# Patient Record
Sex: MALE | Race: WHITE | NOT HISPANIC OR LATINO | Employment: OTHER | URBAN - METROPOLITAN AREA
[De-identification: names, ages, dates, MRNs, and addresses within clinical notes are randomized per-mention and may not be internally consistent; named-entity substitution may affect disease eponyms.]

---

## 2017-03-07 ENCOUNTER — AMBULATORY - HEALTHEAST (OUTPATIENT)
Dept: CARDIOLOGY | Facility: CLINIC | Age: 82
End: 2017-03-07

## 2017-03-07 ENCOUNTER — OFFICE VISIT - HEALTHEAST (OUTPATIENT)
Dept: CARDIOLOGY | Facility: CLINIC | Age: 82
End: 2017-03-07

## 2017-03-07 DIAGNOSIS — I47.29 NONSUSTAINED VENTRICULAR TACHYCARDIA (H): ICD-10-CM

## 2017-03-07 DIAGNOSIS — I25.10 ATHEROSCLEROSIS OF NATIVE CORONARY ARTERY OF NATIVE HEART WITHOUT ANGINA PECTORIS: ICD-10-CM

## 2017-03-07 DIAGNOSIS — Z95.0 CARDIAC PACEMAKER IN SITU: ICD-10-CM

## 2017-03-07 DIAGNOSIS — E78.2 MIXED HYPERLIPIDEMIA: ICD-10-CM

## 2017-03-07 DIAGNOSIS — I10 ESSENTIAL HYPERTENSION WITH GOAL BLOOD PRESSURE LESS THAN 130/80: ICD-10-CM

## 2017-03-07 ASSESSMENT — MIFFLIN-ST. JEOR: SCORE: 1388.86

## 2017-03-20 ENCOUNTER — HOSPITAL ENCOUNTER (OUTPATIENT)
Dept: CARDIOLOGY | Facility: HOSPITAL | Age: 82
Discharge: HOME | End: 2017-03-20
Attending: INTERNAL MEDICINE

## 2017-03-20 DIAGNOSIS — I47.29 NONSUSTAINED VENTRICULAR TACHYCARDIA (H): ICD-10-CM

## 2017-03-20 DIAGNOSIS — I25.10 ATHEROSCLEROSIS OF NATIVE CORONARY ARTERY OF NATIVE HEART WITHOUT ANGINA PECTORIS: ICD-10-CM

## 2017-03-20 LAB
AORTIC ROOT: 3.2 CM
AORTIC VALVE MEAN VELOCITY: 73.5 CM/S
AR DECEL SLOPE: 1500 MM/S2
AR PEAK VELOCITY: 323 CM/S
ASCENDING AORTA: 3.4 CM
AV DIMENSIONLESS INDEX VTI: 0.6
AV MEAN GRADIENT: 3 MMHG
AV PEAK GRADIENT: 4.9 MMHG
AV REGURGITANT PEAK GRADIENT: 41.7 MMHG
AV REGURGITATION PRESSURE HALF TIME: 655 MS
AV VALVE AREA: 1.8 CM2
AV VELOCITY RATIO: 0.6
BSA FOR ECHO PROCEDURE: 1.89 M2
CV BLOOD PRESSURE: NORMAL MMHG
CV ECHO HEIGHT: 66 IN
CV ECHO WEIGHT: 170 LBS
DOP CALC AO PEAK VEL: 111 CM/S
DOP CALC AO VTI: 27 CM
DOP CALC LVOT AREA: 3.14 CM2
DOP CALC LVOT DIAMETER: 2 CM
DOP CALC LVOT PEAK VEL: 62.1 CM/S
DOP CALC LVOT STROKE VOLUME: 47.4 CM3
DOP CALCLVOT PEAK VEL VTI: 15.1 CM
EJECTION FRACTION: 54 % (ref 55–75)
FRACTIONAL SHORTENING: 28.8 % (ref 28–44)
INTERVENTRICULAR SEPTUM IN END DIASTOLE: 0.78 CM (ref 0.6–1)
IVS/PW RATIO: 0.8
LA AREA 1: 17.6 CM2
LA AREA 2: 19.1 CM2
LEFT ATRIUM LENGTH: 4.62 CM
LEFT ATRIUM SIZE: 3.7 CM
LEFT ATRIUM VOLUME INDEX: 32.7 ML/M2
LEFT ATRIUM VOLUME: 61.8 CM3
LEFT VENTRICLE CARDIAC INDEX: 1.8 L/MIN/M2
LEFT VENTRICLE CARDIAC OUTPUT: 3.4 L/MIN
LEFT VENTRICLE DIASTOLIC VOLUME INDEX: 44 CM3/M2 (ref 34–74)
LEFT VENTRICLE DIASTOLIC VOLUME: 83.1 CM3 (ref 62–150)
LEFT VENTRICLE HEART RATE: 71 BPM
LEFT VENTRICLE MASS INDEX: 64.3 G/M2
LEFT VENTRICLE SYSTOLIC VOLUME INDEX: 20.2 CM3/M2 (ref 11–31)
LEFT VENTRICLE SYSTOLIC VOLUME: 38.1 CM3 (ref 21–61)
LEFT VENTRICULAR INTERNAL DIMENSION IN DIASTOLE: 4.44 CM (ref 4.2–5.8)
LEFT VENTRICULAR INTERNAL DIMENSION IN SYSTOLE: 3.16 CM (ref 2.5–4)
LEFT VENTRICULAR MASS: 121.5 G
LEFT VENTRICULAR OUTFLOW TRACT MEAN GRADIENT: 1 MMHG
LEFT VENTRICULAR OUTFLOW TRACT MEAN VELOCITY: 41.5 CM/S
LEFT VENTRICULAR OUTFLOW TRACT PEAK GRADIENT: 1 MMHG
LEFT VENTRICULAR POSTERIOR WALL IN END DIASTOLE: 0.94 CM (ref 0.6–1)
LV STROKE VOLUME INDEX: 25.1 ML/M2
MITRAL VALVE DECELERATION SLOPE: 2210 MM/S2
MITRAL VALVE E/A RATIO: 0.7
MITRAL VALVE PRESSURE HALF-TIME: 61 MS
MV AVERAGE E/E' RATIO: 9 CM/S
MV DECELERATION TIME: 158 MS
MV E'TISSUE VEL-LAT: 5.9 CM/S
MV E'TISSUE VEL-MED: 4.06 CM/S
MV LATERAL E/E' RATIO: 7.6
MV MEDIAL E/E' RATIO: 11
MV PEAK A VELOCITY: 62.1 CM/S
MV PEAK E VELOCITY: 44.6 CM/S
MV VALVE AREA PRESSURE 1/2 METHOD: 3.6 CM2
NUC REST DIASTOLIC VOLUME INDEX: 2720 LBS
NUC REST SYSTOLIC VOLUME INDEX: 66 IN
RIGHT VENTRICULAR INTERNAL DIMENSION IN DYSTOLE: 2.26 CM
TRICUSPID REGURGITATION PEAK PRESSURE GRADIENT: 16.2 MMHG
TRICUSPID VALVE PEAK REGURGITANT VELOCITY: 201 CM/S

## 2017-03-20 ASSESSMENT — MIFFLIN-ST. JEOR: SCORE: 1388.86

## 2017-04-10 ENCOUNTER — COMMUNICATION - HEALTHEAST (OUTPATIENT)
Dept: CARDIOLOGY | Facility: CLINIC | Age: 82
End: 2017-04-10

## 2017-04-10 DIAGNOSIS — I10 HTN (HYPERTENSION): ICD-10-CM

## 2017-06-13 ENCOUNTER — AMBULATORY - HEALTHEAST (OUTPATIENT)
Dept: CARDIOLOGY | Facility: CLINIC | Age: 82
End: 2017-06-13

## 2017-06-13 DIAGNOSIS — Z95.0 CARDIAC PACEMAKER IN SITU: ICD-10-CM

## 2017-06-13 LAB — HCC DEVICE COMMENTS: NORMAL

## 2017-09-19 ENCOUNTER — AMBULATORY - HEALTHEAST (OUTPATIENT)
Dept: CARDIOLOGY | Facility: CLINIC | Age: 82
End: 2017-09-19

## 2017-09-19 DIAGNOSIS — Z95.0 CARDIAC PACEMAKER IN SITU: ICD-10-CM

## 2017-09-22 LAB — HCC DEVICE COMMENTS: NORMAL

## 2017-12-27 ENCOUNTER — AMBULATORY - HEALTHEAST (OUTPATIENT)
Dept: CARDIOLOGY | Facility: CLINIC | Age: 82
End: 2017-12-27

## 2017-12-27 DIAGNOSIS — Z95.0 CARDIAC PACEMAKER IN SITU: ICD-10-CM

## 2017-12-27 LAB — HCC DEVICE COMMENTS: NORMAL

## 2018-03-12 ENCOUNTER — OFFICE VISIT - HEALTHEAST (OUTPATIENT)
Dept: CARDIOLOGY | Facility: CLINIC | Age: 83
End: 2018-03-12

## 2018-03-12 ENCOUNTER — AMBULATORY - HEALTHEAST (OUTPATIENT)
Dept: CARDIOLOGY | Facility: CLINIC | Age: 83
End: 2018-03-12

## 2018-03-12 DIAGNOSIS — Z95.0 CARDIAC PACEMAKER IN SITU: ICD-10-CM

## 2018-03-12 DIAGNOSIS — I10 ESSENTIAL HYPERTENSION: ICD-10-CM

## 2018-03-12 DIAGNOSIS — I47.29 NONSUSTAINED VENTRICULAR TACHYCARDIA (H): ICD-10-CM

## 2018-03-12 LAB — HCC DEVICE COMMENTS: NORMAL

## 2018-03-12 ASSESSMENT — MIFFLIN-ST. JEOR: SCORE: 1390

## 2018-06-05 ENCOUNTER — RECORDS - HEALTHEAST (OUTPATIENT)
Dept: ADMINISTRATIVE | Facility: OTHER | Age: 83
End: 2018-06-05

## 2018-06-05 ENCOUNTER — AMBULATORY - HEALTHEAST (OUTPATIENT)
Dept: CARDIOLOGY | Facility: CLINIC | Age: 83
End: 2018-06-05

## 2018-06-08 ENCOUNTER — AMBULATORY - HEALTHEAST (OUTPATIENT)
Dept: CARDIOLOGY | Facility: CLINIC | Age: 83
End: 2018-06-08

## 2018-06-08 DIAGNOSIS — Z95.0 CARDIAC PACEMAKER IN SITU: ICD-10-CM

## 2018-06-08 LAB
HCC DEVICE COMMENTS: NORMAL
HCC DEVICE IMPLANTING PROVIDER: NORMAL
HCC DEVICE MANUFACTURE: NORMAL
HCC DEVICE MODEL: NORMAL
HCC DEVICE SERIAL NUMBER: NORMAL
HCC DEVICE TYPE: NORMAL

## 2018-09-12 ENCOUNTER — AMBULATORY - HEALTHEAST (OUTPATIENT)
Dept: CARDIOLOGY | Facility: CLINIC | Age: 83
End: 2018-09-12

## 2018-09-12 DIAGNOSIS — Z95.0 CARDIAC PACEMAKER IN SITU: ICD-10-CM

## 2018-12-19 ENCOUNTER — AMBULATORY - HEALTHEAST (OUTPATIENT)
Dept: CARDIOLOGY | Facility: CLINIC | Age: 83
End: 2018-12-19

## 2018-12-19 DIAGNOSIS — Z95.0 CARDIAC PACEMAKER IN SITU: ICD-10-CM

## 2019-04-01 ENCOUNTER — AMBULATORY - HEALTHEAST (OUTPATIENT)
Dept: CARDIOLOGY | Facility: CLINIC | Age: 84
End: 2019-04-01

## 2019-04-01 ENCOUNTER — OFFICE VISIT - HEALTHEAST (OUTPATIENT)
Dept: CARDIOLOGY | Facility: CLINIC | Age: 84
End: 2019-04-01

## 2019-04-01 DIAGNOSIS — I47.29 NONSUSTAINED VENTRICULAR TACHYCARDIA (H): ICD-10-CM

## 2019-04-01 DIAGNOSIS — Z95.0 CARDIAC PACEMAKER IN SITU: ICD-10-CM

## 2019-04-01 DIAGNOSIS — I25.10 ATHEROSCLEROSIS OF NATIVE CORONARY ARTERY OF NATIVE HEART WITHOUT ANGINA PECTORIS: ICD-10-CM

## 2019-04-01 ASSESSMENT — MIFFLIN-ST. JEOR: SCORE: 1417.21

## 2019-06-06 ASSESSMENT — MIFFLIN-ST. JEOR: SCORE: 1418.35

## 2019-06-25 ENCOUNTER — AMBULATORY - HEALTHEAST (OUTPATIENT)
Dept: CARDIOLOGY | Facility: CLINIC | Age: 84
End: 2019-06-25

## 2019-06-25 DIAGNOSIS — Z95.0 CARDIAC PACEMAKER IN SITU: ICD-10-CM

## 2019-07-07 ENCOUNTER — ANESTHESIA - HEALTHEAST (OUTPATIENT)
Dept: SURGERY | Facility: CLINIC | Age: 84
End: 2019-07-07

## 2019-07-08 ENCOUNTER — SURGERY - HEALTHEAST (OUTPATIENT)
Dept: SURGERY | Facility: CLINIC | Age: 84
End: 2019-07-08

## 2019-07-08 ASSESSMENT — MIFFLIN-ST. JEOR
SCORE: 1408.83
SCORE: 1400.21

## 2019-09-30 ENCOUNTER — AMBULATORY - HEALTHEAST (OUTPATIENT)
Dept: CARDIOLOGY | Facility: CLINIC | Age: 84
End: 2019-09-30

## 2019-09-30 DIAGNOSIS — Z95.0 CARDIAC PACEMAKER IN SITU: ICD-10-CM

## 2020-01-06 ENCOUNTER — AMBULATORY - HEALTHEAST (OUTPATIENT)
Dept: CARDIOLOGY | Facility: CLINIC | Age: 85
End: 2020-01-06

## 2020-01-06 DIAGNOSIS — I49.5 SICK SINUS SYNDROME (H): ICD-10-CM

## 2020-01-06 DIAGNOSIS — Z95.0 CARDIAC PACEMAKER IN SITU: ICD-10-CM

## 2020-01-07 ASSESSMENT — MIFFLIN-ST. JEOR: SCORE: 1382.07

## 2020-01-12 ENCOUNTER — ANESTHESIA - HEALTHEAST (OUTPATIENT)
Dept: SURGERY | Facility: CLINIC | Age: 85
End: 2020-01-12

## 2020-01-13 ENCOUNTER — SURGERY - HEALTHEAST (OUTPATIENT)
Dept: SURGERY | Facility: CLINIC | Age: 85
End: 2020-01-13

## 2020-01-13 ASSESSMENT — MIFFLIN-ST. JEOR: SCORE: 1427.71

## 2020-01-14 ASSESSMENT — MIFFLIN-ST. JEOR: SCORE: 1427.71

## 2020-04-07 ENCOUNTER — AMBULATORY - HEALTHEAST (OUTPATIENT)
Dept: CARDIOLOGY | Facility: CLINIC | Age: 85
End: 2020-04-07

## 2020-04-07 ENCOUNTER — COMMUNICATION - HEALTHEAST (OUTPATIENT)
Dept: CARDIOLOGY | Facility: CLINIC | Age: 85
End: 2020-04-07

## 2020-04-07 DIAGNOSIS — I47.29 NONSUSTAINED VENTRICULAR TACHYCARDIA (H): ICD-10-CM

## 2020-04-07 DIAGNOSIS — Z95.0 CARDIAC PACEMAKER IN SITU: ICD-10-CM

## 2020-04-08 ENCOUNTER — OFFICE VISIT - HEALTHEAST (OUTPATIENT)
Dept: CARDIOLOGY | Facility: CLINIC | Age: 85
End: 2020-04-08

## 2020-04-08 DIAGNOSIS — Z95.5 HISTORY OF CORONARY ARTERY STENT PLACEMENT: ICD-10-CM

## 2020-04-08 DIAGNOSIS — Z95.0 CARDIAC PACEMAKER IN SITU: ICD-10-CM

## 2020-04-08 DIAGNOSIS — I47.29 NONSUSTAINED VENTRICULAR TACHYCARDIA (H): ICD-10-CM

## 2020-04-08 DIAGNOSIS — I10 ESSENTIAL HYPERTENSION: ICD-10-CM

## 2020-04-08 ASSESSMENT — MIFFLIN-ST. JEOR: SCORE: 1404.74

## 2020-05-14 ENCOUNTER — COMMUNICATION - HEALTHEAST (OUTPATIENT)
Dept: CARDIOLOGY | Facility: CLINIC | Age: 85
End: 2020-05-14

## 2020-05-14 ENCOUNTER — AMBULATORY - HEALTHEAST (OUTPATIENT)
Dept: CARDIOLOGY | Facility: CLINIC | Age: 85
End: 2020-05-14

## 2020-05-14 DIAGNOSIS — I49.5 SICK SINUS SYNDROME (H): ICD-10-CM

## 2020-05-14 DIAGNOSIS — Z95.0 PACEMAKER: ICD-10-CM

## 2020-05-29 ENCOUNTER — COMMUNICATION - HEALTHEAST (OUTPATIENT)
Dept: CARDIOLOGY | Facility: CLINIC | Age: 85
End: 2020-05-29

## 2020-06-01 ENCOUNTER — OFFICE VISIT - HEALTHEAST (OUTPATIENT)
Dept: CARDIOLOGY | Facility: CLINIC | Age: 85
End: 2020-06-01

## 2020-06-01 DIAGNOSIS — Z95.5 HISTORY OF CORONARY ARTERY STENT PLACEMENT: ICD-10-CM

## 2020-06-01 DIAGNOSIS — I48.0 PAROXYSMAL ATRIAL FIBRILLATION (H): ICD-10-CM

## 2020-06-01 DIAGNOSIS — I47.29 NONSUSTAINED VENTRICULAR TACHYCARDIA (H): ICD-10-CM

## 2020-06-01 DIAGNOSIS — I49.5 SICK SINUS SYNDROME (H): ICD-10-CM

## 2020-06-01 RX ORDER — METOPROLOL SUCCINATE 50 MG/1
50 TABLET, EXTENDED RELEASE ORAL DAILY
Qty: 90 TABLET | Refills: 3 | Status: SHIPPED | OUTPATIENT
Start: 2020-06-01 | End: 2022-06-17

## 2020-06-02 ENCOUNTER — COMMUNICATION - HEALTHEAST (OUTPATIENT)
Dept: CARDIOLOGY | Facility: CLINIC | Age: 85
End: 2020-06-02

## 2020-07-08 ENCOUNTER — AMBULATORY - HEALTHEAST (OUTPATIENT)
Dept: CARDIOLOGY | Facility: CLINIC | Age: 85
End: 2020-07-08

## 2020-07-08 DIAGNOSIS — I49.5 SICK SINUS SYNDROME (H): ICD-10-CM

## 2020-07-08 DIAGNOSIS — Z95.0 CARDIAC PACEMAKER IN SITU: ICD-10-CM

## 2020-07-17 ENCOUNTER — AMBULATORY - HEALTHEAST (OUTPATIENT)
Dept: CARDIOLOGY | Facility: CLINIC | Age: 85
End: 2020-07-17

## 2020-07-17 DIAGNOSIS — I47.29 NONSUSTAINED VENTRICULAR TACHYCARDIA (H): ICD-10-CM

## 2020-07-17 DIAGNOSIS — Z95.0 CARDIAC PACEMAKER IN SITU: ICD-10-CM

## 2020-10-08 ENCOUNTER — COMMUNICATION - HEALTHEAST (OUTPATIENT)
Dept: CARDIOLOGY | Facility: CLINIC | Age: 85
End: 2020-10-08

## 2020-10-08 ENCOUNTER — AMBULATORY - HEALTHEAST (OUTPATIENT)
Dept: CARDIOLOGY | Facility: CLINIC | Age: 85
End: 2020-10-08

## 2020-10-08 DIAGNOSIS — Z95.0 CARDIAC PACEMAKER IN SITU: ICD-10-CM

## 2020-10-08 DIAGNOSIS — I49.5 SICK SINUS SYNDROME (H): ICD-10-CM

## 2020-10-09 RX ORDER — WARFARIN SODIUM 5 MG/1
2.5 TABLET ORAL EVERY EVENING
Status: SHIPPED | COMMUNITY
Start: 2020-10-09

## 2021-01-12 ENCOUNTER — COMMUNICATION - HEALTHEAST (OUTPATIENT)
Dept: CARDIOLOGY | Facility: CLINIC | Age: 86
End: 2021-01-12

## 2021-01-12 ENCOUNTER — AMBULATORY - HEALTHEAST (OUTPATIENT)
Dept: CARDIOLOGY | Facility: CLINIC | Age: 86
End: 2021-01-12

## 2021-01-12 DIAGNOSIS — I47.29 NONSUSTAINED VENTRICULAR TACHYCARDIA (H): ICD-10-CM

## 2021-01-12 DIAGNOSIS — Z95.0 CARDIAC PACEMAKER IN SITU: ICD-10-CM

## 2021-01-12 DIAGNOSIS — I49.5 SICK SINUS SYNDROME (H): ICD-10-CM

## 2021-04-02 ENCOUNTER — AMBULATORY - HEALTHEAST (OUTPATIENT)
Dept: CARDIOLOGY | Facility: CLINIC | Age: 86
End: 2021-04-02

## 2021-04-02 DIAGNOSIS — Z95.0 CARDIAC PACEMAKER IN SITU: ICD-10-CM

## 2021-04-02 DIAGNOSIS — I49.5 SICK SINUS SYNDROME (H): ICD-10-CM

## 2021-04-02 ASSESSMENT — MIFFLIN-ST. JEOR: SCORE: 1413.82

## 2021-05-27 NOTE — PROGRESS NOTES
[facility] Office Note    Assessment / Plan:    1.   Hypertension.  Well-controlled today on his current medication regimen.  No changes suggested.  2.  Paroxysmal Nonsustained ventricular tachycardia.  Rare episodes, with preserved left ventricular function on most recent echocardiogram.  No changes suggested   3.  History of coronary artery disease.  No recent symptoms.  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. 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 male with a history of coronary artery disease; in 2000 he underwent a stent placement to the mid circumflex artery following myocardial infarction.  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  He also has a history of sick sinus syndrome for which he underwent a pacemaker placement in 2009.  In 10/2015 he was noted to have frequent bouts of nonsustained ventricular tachycardia on his pacemaker.  He has continued to have occasional bursts of asymptomatic nonsustained ventricular tachycardia.  He returns today for routine follow-up.    Knee pains still limit his walking.  He has had no chest pains or palpitations    Pacemaker check today again shows a few episodes of nonsustained ventricular tachycardia some of which may have been supraventricular in origin but misrepresented.  Device check is otherwise normal.    ______________________________________________________________________    Problem List:  Patient Active Problem List   Diagnosis     Hyperlipidemia     Hypertension     Coronary Artery Disease     Sick Sinus Syndrome     Nonsustained ventricular tachycardia (H)     Cardiac pacemaker, dual, in situ     Medical History:  Past Medical History:   Diagnosis Date     A-fib (H)      Arrhythmia      Coronary  artery disease      Gout      Hyperlipidemia      Hypertension      Kidney stones      MI (myocardial infarction) (H)      VT (ventricular tachycardia) (H)      Surgical History:  Past Surgical History:   Procedure Laterality Date     CARDIAC CATHETERIZATION       CORONARY STENT PLACEMENT  2001    LCx     INSERT / REPLACE / REMOVE PACEMAKER  1/2009     WA INSERT INTRACORONARY STENT      Description: Cath Stent Placement;  Recorded: 10/01/2012;  Comments: Mid LCx     WA INSERT INTRACORONARY STENT      Description: Cath Stent Placement;  Recorded: 10/21/2014;  Comments: Mid LCx     TOTAL HIP ARTHROPLASTY Left      Social History:  Social History     Socioeconomic History     Marital status:      Spouse name: Not on file     Number of children: Not on file     Years of education: Not on file     Highest education level: Not on file   Occupational History     Not on file   Social Needs     Financial resource strain: Not on file     Food insecurity:     Worry: Not on file     Inability: Not on file     Transportation needs:     Medical: Not on file     Non-medical: Not on file   Tobacco Use     Smoking status: Never Smoker     Smokeless tobacco: Never Used   Substance and Sexual Activity     Alcohol use: No     Drug use: Not on file     Sexual activity: Not on file   Lifestyle     Physical activity:     Days per week: Not on file     Minutes per session: Not on file     Stress: Not on file   Relationships     Social connections:     Talks on phone: Not on file     Gets together: Not on file     Attends [employer] service: Not on file     Active member of club or organization: Not on file     Attends meetings of clubs or organizations: Not on file     Relationship status: Not on file     Intimate partner violence:     Fear of current or ex partner: Not on file     Emotionally abused: Not on file     Physically abused: Not on file     Forced sexual activity: Not on file   Other Topics Concern     Not on file   Social  "History Narrative     Not on file     Sleep History:  Restorative  Exercise:  Walks occasionally.  Up and down steps without a problem, except knee and back pain    Review of Systems:   General: WNL  Eyes: WNL  Ears/Nose/Throat: WNL  Lungs: WNL  Heart: WNL  Stomach: WNL  Bladder: WNL  Muscle/Joints: Joint Pain  Skin: WNL  Nervous System: WNL  Mental Health: WNL     Blood: WNL          Family History:  Family History   Problem Relation Age of Onset     Heart failure Mother      Heart failure Father          Allergies:  Allergies   Allergen Reactions     Lisinopril Cough     Indomethacin Rash     Naproxen Rash     Medications:  Current Outpatient Medications   Medication Sig Dispense Refill     allopurinol (ZYLOPRIM) 300 MG tablet Take 300 mg by mouth daily.       amLODIPine (NORVASC) 10 MG tablet Take 1 tablet (10 mg total) by mouth daily. 90 tablet 2     aspirin 325 MG tablet Take 325 mg by mouth daily.       atenolol (TENORMIN) 100 MG tablet Take 100 mg by mouth daily.       atorvastatin (LIPITOR) 20 MG tablet Take 20 mg by mouth every other day.       DOCOSAHEXANOIC ACID/EPA (FISH OIL ORAL) Take 1,200 mg by mouth daily.       losartan (COZAAR) 100 MG tablet Take 50 mg by mouth daily.              MULTIVITAMIN (MULTIPLE VITAMIN ORAL) Take 1 tablet by mouth daily.       nabumetone (RELAFEN) 750 MG tablet Take 750 mg by mouth 2 (two) times a day with meals.  1     nitroglycerin (NITROSTAT) 0.4 MG SL tablet Place 1 tablet (0.4 mg total) under the tongue every 5 (five) minutes as needed for chest pain. 25 tablet 10     No current facility-administered medications for this visit.        Objective:   Wt Readings from Last 3 Encounters:   04/01/19 178 lb (80.7 kg)   03/12/18 172 lb (78 kg)   03/20/17 170 lb (77.1 kg)     Vital signs:  /58 (Patient Site: Left Arm, Patient Position: Sitting, Cuff Size: Adult Regular)   Pulse 83   Resp 18   Ht 5' 5.5\" (1.664 m)   Wt 178 lb (80.7 kg)   BMI 29.17 kg/m  "       Physical Exam:    Eyes   Conjunctiva: Clear.   Sclerae: Clear, nonicteric.   ENT   Mouth: Oral mucuous membranes are pink and moist   Neck   Carotid pulses: Carotid pulses palpaple with normal contours and symmetric, soft, short systolic bruit on left.  Jugular Veins: Normal jugular venous pressure.   Thyroid: No visible thyromegaly.   Pulmonary   Examination of lungs: Clear throughout  Chest   Examination of Chest incision: Clear, dry and intact.  Pacer site without erythema warmth or tenderness  Cardiovascular   Auscultation of heart: Regular rhythm, normal S1 and S2, no murmurs, clicks or rubs.    Pulses: Normal   Extremities: No edema or clubbing.   Gastrointestinal   Abdomen: Non-tender, no masses noted.   Musculoskeletal   Spine: spine straight upon visual inspection.   Skin   Skin and subcutaneous tissue: No xanthelasma.   Neurologic   Orientation to person, place and time: Normal.   Psychiatric   Mood and affect: Normal.       Lab Results:    Echocardiogram 10/2014: Summary   Left ventricular size is normal.   Normal left ventricular wall thickness.   No regional wall motion abnormalities.   Left ventricular ejection fraction is visually estimated at 60%.   Normal right ventricular size with preserved systolic function.   Normal right ventricular size with preserved systolic function.   Mild aortic regurgitation is noted.    Cardiac perfusion imaging study October 2015: CONCLUSION:   1. Lexiscan stress nuclear study reveals a medium-sized area of infarction involving the inferolateral wall with mild travis-infarct ischemia.The measured resting ejection fraction is 64%. No wall motion abnormalities.            ANNA VELASQUEZ MD [facility]  [phone]    This note created using Dragon voice recognition software.  Sound alike errors may have escaped editing.

## 2021-05-27 NOTE — PATIENT INSTRUCTIONS - HE
1. Decrease aspirin dose to 81 mg daily  2. Continue to walk as your knee allows for 20 to 30 minutes most days of the week.  3. I look forward to seeing you again in 1 year

## 2021-05-27 NOTE — PROGRESS NOTES
In clinic device check with Device RN followed by office visit with Dr. Will.  Please see link for full device report.  Patient was informed of results and next follow up during today's visit.

## 2021-05-30 VITALS — BODY MASS INDEX: 27.32 KG/M2 | WEIGHT: 170 LBS | HEIGHT: 66 IN

## 2021-05-30 VITALS — HEIGHT: 66 IN | BODY MASS INDEX: 27.32 KG/M2 | WEIGHT: 170 LBS

## 2021-05-30 NOTE — ANESTHESIA PROCEDURE NOTES
Peripheral Block    Patient location during procedure: pre-op  Start time: 7/8/2019 7:15 AM  End time: 7/8/2019 7:17 AM  post-op analgesia per surgeon order as noted in medical record  Staffing:  Performing  Anesthesiologist: David Rodríguez MD  Preanesthetic Checklist  Completed: patient identified, site marked, risks, benefits, and alternatives discussed, timeout performed, consent obtained, airway assessed, oxygen available, suction available, emergency drugs available and hand hygiene performed  Peripheral Block  Block type: other, tibial  Prep: ChloraPrep  Patient position: supine  Patient monitoring: cardiac monitor, continuous pulse oximetry, blood pressure and heart rate  Laterality: left  Injection technique: ultrasound guided    Ultrasound used to visualize needle placement in proximity to nerve being blocked: yes   Permanent ultrasound image captured for medical record  Sterile gel and probe cover used for ultrasound.    Needle  Needle type: echogenic   Needle gauge: 20G  Needle length: 4 in  no peripheral nerve catheter placed  Assessment  Injection assessment: no difficulty with injection, negative aspiration for heme, no paresthesia on injection and incremental injection

## 2021-05-30 NOTE — ANESTHESIA POSTPROCEDURE EVALUATION
Patient: Skip Newman DDS  LEFT TOTAL KNEE ARTHROPLASTY  Anesthesia type: spinal    Patient location: PACU  Last vitals:   Vitals Value Taken Time   /66 7/8/2019 11:00 AM   Temp 36.4  C (97.5  F) 7/8/2019 10:30 AM   Pulse 64 7/8/2019 11:00 AM   Resp 18 7/8/2019 11:00 AM   SpO2 95 % 7/8/2019 11:00 AM     Post vital signs: stable  Level of consciousness: awake and responds to simple questions  Post-anesthesia pain: pain controlled  Post-anesthesia nausea and vomiting: no  Pulmonary: unassisted, return to baseline  Cardiovascular: stable and blood pressure at baseline  Hydration: adequate  Anesthetic events: no    QCDR Measures:  ASA# 11 - Sejal-op Cardiac Arrest: ASA11B - Patient did NOT experience unanticipated cardiac arrest  ASA# 12 - Sejal-op Mortality Rate: ASA12B - Patient did NOT die  ASA# 13 - PACU Re-Intubation Rate: ASA13B - Patient did NOT require a new airway mgmt  ASA# 10 - Composite Anes Safety: ASA10A - No serious adverse event    Additional Notes:

## 2021-05-30 NOTE — ANESTHESIA PREPROCEDURE EVALUATION
Anesthesia Evaluation      Patient summary reviewed   No history of anesthetic complications     Airway   Mallampati: II  Neck ROM: full   Pulmonary - negative ROS and normal exam    breath sounds clear to auscultation  (-) not a smoker                         Cardiovascular   Exercise tolerance: > or = 4 METS  (+) pacemaker (sick sinus syndrome), hypertension, past MI, CAD, dysrhythmias (afib; vtach), , hypercholesterolemia,     ECG reviewed (6/27/19: atrial paced rhythm)  Rhythm: regular  Rate: normal,      ROS comment: 6/25/19 Remote device check:  Type: routine remote pacemaker transmission.  Presenting rhythm: AP-VS, rate 93 bpm.  Battery/lead status: stable  Arrhythmias: since 4/1/19, one AT/AF, <1 minute, no EGM. One ventricular arrhythmia, 10 bts rate 160's.  Comments: normal magnet and pacemaker function. prd  AAIR <--> DDDR     Neuro/Psych - negative ROS     Endo/Other       Comments: Thrombocytopenia      GI/Hepatic/Renal    (+)   chronic renal disease (nephrolithiasis),      Other findings: Labs 7/5/19:  INR 1.0, Plt 110      Dental      Comment: Missing teeth                       Anesthesia Plan  Planned anesthetic: spinal and peripheral nerve block  Spinal w/ propofol gtt.  Plan for preoperative adductor canal saphenous nerve block and posterior tibial nerve block for postoperative analgesia.   Decadron and zofran for PONV ppx.  Magnet available.    ASA 3     Anesthetic plan and risks discussed with: patient  Anesthesia plan special considerations: antiemetics,   Post-op plan: routine recovery

## 2021-05-30 NOTE — ANESTHESIA PROCEDURE NOTES
Spinal Block    Patient location during procedure: OR  Start time: 7/8/2019 7:35 AM  End time: 7/8/2019 7:38 AM  Reason for block: primary anesthetic    Staffing:  Performing  Anesthesiologist: David Rodríguez MD    Preanesthetic Checklist  Completed: patient identified, risks, benefits, and alternatives discussed, timeout performed, consent obtained, airway assessed, oxygen available, suction available, emergency drugs available and hand hygiene performed  Spinal Block  Patient position: sitting  Prep: ChloraPrep  Patient monitoring: heart rate, cardiac monitor, continuous pulse ox and blood pressure  Approach: midline  Location: L3-4  Injection technique: single-shot  Needle type: pencil-tip   Needle gauge: 24 G    Assessment  Events: paresthesia    Additional Notes:  Free flow CSF, easily aspirated, half dose injected, easily aspirated, remaining dose injected, tolerated well.

## 2021-05-30 NOTE — ANESTHESIA CARE TRANSFER NOTE
Last vitals:   Vitals:    07/08/19 0927   BP: 133/65   Pulse: 75   Resp: 20   Temp: 36.4  C (97.5  F)   SpO2: 100%     Patient's level of consciousness is drowsy  Spontaneous respirations: yes  Maintains airway independently: yes  Dentition unchanged: yes  Oropharynx: oropharynx clear of all foreign objects    QCDR Measures:  ASA# 20 - Surgical Safety Checklist: WHO surgical safety checklist completed prior to induction    PQRS# 430 - Adult PONV Prevention: 4558F - Pt received => 2 anti-emetic agents (different classes) preop & intraop  ASA# 8 - Peds PONV Prevention: NA - Not pediatric patient, not GA or 2 or more risk factors NOT present  PQRS# 424 - Sejal-op Temp Management: 4559F - At least one body temp DOCUMENTED => 35.5C or 95.9F within required timeframe  PQRS# 426 - PACU Transfer Protocol: - Transfer of care checklist used  ASA# 14 - Acute Post-op Pain: ASA14B - Patient did NOT experience pain >= 7 out of 10

## 2021-05-30 NOTE — ANESTHESIA PROCEDURE NOTES
Peripheral Block    Patient location during procedure: pre-op  Start time: 7/8/2019 7:18 AM  End time: 7/8/2019 7:20 AM  post-op analgesia per surgeon order as noted in medical record  Staffing:  Performing  Anesthesiologist: David Rodríguez MD  Preanesthetic Checklist  Completed: patient identified, site marked, risks, benefits, and alternatives discussed, timeout performed, consent obtained, airway assessed, oxygen available, suction available, emergency drugs available and hand hygiene performed  Peripheral Block  Block type: saphenous, adductor canal block  Prep: ChloraPrep  Patient position: supine  Patient monitoring: cardiac monitor, continuous pulse oximetry, blood pressure and heart rate  Laterality: left  Injection technique: ultrasound guided    Ultrasound used to visualize needle placement in proximity to nerve being blocked: yes   Permanent ultrasound image captured for medical record  Sterile gel and probe cover used for ultrasound.    Needle  Needle type: echogenic   Needle gauge: 21 G  Needle length: 4 in  no peripheral nerve catheter placed  Assessment  Injection assessment: no difficulty with injection, negative aspiration for heme, no paresthesia on injection and incremental injection

## 2021-06-01 VITALS — BODY MASS INDEX: 27.64 KG/M2 | HEIGHT: 66 IN | WEIGHT: 172 LBS

## 2021-06-02 VITALS — HEIGHT: 66 IN | WEIGHT: 178 LBS | BODY MASS INDEX: 28.61 KG/M2

## 2021-06-03 ENCOUNTER — RECORDS - HEALTHEAST (OUTPATIENT)
Dept: CARDIOLOGY | Facility: CLINIC | Age: 86
End: 2021-06-03

## 2021-06-03 ENCOUNTER — RECORDS - HEALTHEAST (OUTPATIENT)
Dept: ADMINISTRATIVE | Facility: OTHER | Age: 86
End: 2021-06-03

## 2021-06-03 VITALS — BODY MASS INDEX: 26.53 KG/M2 | HEIGHT: 67 IN | WEIGHT: 169 LBS

## 2021-06-04 VITALS
HEART RATE: 82 BPM | DIASTOLIC BLOOD PRESSURE: 82 MMHG | RESPIRATION RATE: 16 BRPM | HEIGHT: 67 IN | SYSTOLIC BLOOD PRESSURE: 147 MMHG | WEIGHT: 170 LBS | BODY MASS INDEX: 26.68 KG/M2

## 2021-06-04 VITALS — WEIGHT: 175.06 LBS | HEIGHT: 67 IN | BODY MASS INDEX: 27.48 KG/M2

## 2021-06-04 VITALS
RESPIRATION RATE: 16 BRPM | HEART RATE: 58 BPM | DIASTOLIC BLOOD PRESSURE: 42 MMHG | WEIGHT: 176 LBS | SYSTOLIC BLOOD PRESSURE: 130 MMHG | BODY MASS INDEX: 27.57 KG/M2

## 2021-06-05 VITALS
RESPIRATION RATE: 16 BRPM | BODY MASS INDEX: 27 KG/M2 | DIASTOLIC BLOOD PRESSURE: 58 MMHG | HEIGHT: 67 IN | SYSTOLIC BLOOD PRESSURE: 130 MMHG | HEART RATE: 85 BPM | OXYGEN SATURATION: 98 % | WEIGHT: 172 LBS

## 2021-06-05 NOTE — ANESTHESIA POSTPROCEDURE EVALUATION
Patient: Skip Newman DDS  RIGHT TOTAL KNEE ARTHROPLASTY  Anesthesia type: No value filed.    Patient location: PACU  Last vitals:   Vitals Value Taken Time   /65 1/13/2020  3:28 PM   Temp 36.6  C (97.9  F) 1/13/2020  3:28 PM   Pulse 60 1/13/2020  3:28 PM   Resp 20 1/13/2020  3:28 PM   SpO2 96 % 1/13/2020  3:28 PM     Post vital signs: stable  Level of consciousness: awake and responds to simple questions  Post-anesthesia pain: pain controlled  Post-anesthesia nausea and vomiting: no  Pulmonary: unassisted, return to baseline  Cardiovascular: stable and blood pressure at baseline  Hydration: adequate  Anesthetic events: no    QCDR Measures:  ASA# 11 - Sejal-op Cardiac Arrest: ASA11B - Patient did NOT experience unanticipated cardiac arrest  ASA# 12 - Sejal-op Mortality Rate: ASA12B - Patient did NOT die  ASA# 13 - PACU Re-Intubation Rate: ASA13B - Patient did NOT require a new airway mgmt  ASA# 10 - Composite Anes Safety: ASA10A - No serious adverse event    Additional Notes:

## 2021-06-05 NOTE — ANESTHESIA CARE TRANSFER NOTE
Last vitals:   BP: 126/59  HR: 72  Resp: 16  SPO2: 99%  Temp: 98.4F    Patient's level of consciousness is awake  Spontaneous respirations: yes  Maintains airway independently: yes  Dentition unchanged: yes  Oropharynx: oropharynx clear of all foreign objects    QCDR Measures:  ASA# 20 - Surgical Safety Checklist: WHO surgical safety checklist completed prior to induction    PQRS# 430 - Adult PONV Prevention: 4558F-8P - Pt did NOT receive => 2 anti-emetic agents  ASA# 8 - Peds PONV Prevention: NA - Not pediatric patient, not GA or 2 or more risk factors NOT present  PQRS# 424 - Sejal-op Temp Management: 4559F - At least one body temp DOCUMENTED => 35.5C or 95.9F within required timeframe  PQRS# 426 - PACU Transfer Protocol: - Transfer of care checklist used  ASA# 14 - Acute Post-op Pain: ASA14B - Patient did NOT experience pain >= 7 out of 10

## 2021-06-05 NOTE — ANESTHESIA PROCEDURE NOTES
Spinal Block    Patient location during procedure: OR  Start time: 1/13/2020 9:11 AM  End time: 1/13/2020 9:13 AM  Reason for block: primary anesthetic    Staffing:  Performing  Anesthesiologist: Yenni Butler MD    Preanesthetic Checklist  Completed: patient identified, risks, benefits, and alternatives discussed, timeout performed, consent obtained, airway assessed, oxygen available, suction available, emergency drugs available and hand hygiene performed  Spinal Block  Patient position: sitting  Prep: Betadine  Patient monitoring: heart rate, cardiac monitor, continuous pulse ox and blood pressure  Approach: right paramedian  Location: L4-5  Injection technique: single-shot  Needle type: Quincke   Needle gauge: 22 G    Assessment  Sensory level: T8

## 2021-06-05 NOTE — ANESTHESIA PROCEDURE NOTES
Peripheral Block    Patient location during procedure: pre-op  Start time: 1/13/2020 8:22 AM  End time: 1/13/2020 8:23 AM  post-op analgesia per surgeon order as noted in medical record  Staffing:  Performing  Anesthesiologist: Yenni Butler MD  Preanesthetic Checklist  Completed: patient identified, site marked, risks, benefits, and alternatives discussed, timeout performed, consent obtained, airway assessed, oxygen available, suction available, emergency drugs available and hand hygiene performed  Peripheral Block  Block type: other, tibial  Prep: ChloraPrep  Patient position: left lateral decubitus  Patient monitoring: cardiac monitor, continuous pulse oximetry, heart rate and blood pressure  Laterality: right  Injection technique: ultrasound guided    Ultrasound used to visualize needle placement in proximity to nerve being blocked: yes   US used to visualize anesthetic spread    Permanent ultrasound image captured for medical record  Sterile gel and probe cover used for ultrasound.  Needle  Needle type: Stimuplex   Needle gauge: 20G  Needle length: 4 in  no peripheral nerve catheter placed  Assessment  Injection assessment: no difficulty with injection, negative aspiration for heme, no paresthesia on injection and incremental injection

## 2021-06-05 NOTE — ANESTHESIA PREPROCEDURE EVALUATION
Anesthesia Evaluation      Patient summary reviewed   No history of anesthetic complications     Airway   Mallampati: II  Neck ROM: full   Pulmonary - normal exam                          Cardiovascular - normal exam  (+) hypertension, CAD, ,     ECG reviewed        Neuro/Psych      Endo/Other       GI/Hepatic/Renal    (+)   chronic renal disease,           Dental    (+) upper dentures and lower dentures                       Anesthesia Plan  Planned anesthetic: spinal and peripheral nerve block    ASA 2     Anesthetic plan and risks discussed with: patient  Anesthesia plan special considerations: antiemetics,   Post-op plan: other

## 2021-06-05 NOTE — ANESTHESIA PROCEDURE NOTES
Peripheral Block    Patient location during procedure: pre-op  Start time: 1/13/2020 8:24 AM  End time: 1/13/2020 8:25 AM    Staffing:  Performing  Anesthesiologist: Yenni Butler MD  Preanesthetic Checklist  Completed: patient identified, site marked, risks, benefits, and alternatives discussed, timeout performed, consent obtained, airway assessed, oxygen available, suction available, emergency drugs available and hand hygiene performed  Peripheral Block  Block type: saphenous, adductor canal block  Prep: ChloraPrep  Patient position: supine  Patient monitoring: blood pressure, heart rate, continuous pulse oximetry and cardiac monitor  Laterality: right  Injection technique: ultrasound guided    Ultrasound used to visualize needle placement in proximity to nerve being blocked: yes   US used to visualize anesthetic spread    Permanent ultrasound image captured for medical record  Sterile gel and probe cover used for ultrasound.  Needle  Needle type: Stimuplex   Needle gauge: 20G  Needle length: 4 in  no peripheral nerve catheter placed  Assessment  Injection assessment: no difficulty with injection, negative aspiration for heme, no paresthesia on injection and incremental injection

## 2021-06-07 NOTE — PATIENT INSTRUCTIONS - HE
1. Increase atenolol to 25 mg twice daily.  2. Continue your walking program.  There is no substitute!  3. Call if palpitations develop or certainly if chest pain develops.  4. Otherwise I look forward to seeing you again in 1 year.

## 2021-06-07 NOTE — TELEPHONE ENCOUNTER
PC to patient regarding NSVT. He is doing well and had his second knee replacement on 1/13. He denies syncope, nearing syncope and lightheadedness. I reviewed the longest event which was about 10 seconds on 3/26. He denies awareness.     Routing to Dr. Will prior to upcoming appointment.           Remote Report  Type: Routine pacemaker remote transmission prior to Dr. Will telephone visit.   Presenting Rhythm: AP-VS 83bpm.  Lead/Battery status: Stable.   Arrhythmias: Since 1/6/2020: 1 mode switch, <0.1% burden, 5secs. 4 high ventricular arrhythmias: 1/25/2020 16:26- 10 bts NSVT, 160bpm; 2/16/2020- 12bt NSVT, 192bpm; 2/21/2020- 6bts NSVT; 3/26/2020 27bts event starts slow 100bpm V-A and then speeds up to 170bpm. Last ECHO 3/20/17 EF 54%.   Comments: Normal pacemaker function. See telephone note regarding NSVT, routed to Dr. Will.  13%.  PLACIDOL

## 2021-06-07 NOTE — PROGRESS NOTES
Review of Systems was done with the patient over the phone and positive for:    Review of Systems are all WNL.

## 2021-06-08 ENCOUNTER — OFFICE VISIT - HEALTHEAST (OUTPATIENT)
Dept: CARDIOLOGY | Facility: CLINIC | Age: 86
End: 2021-06-08

## 2021-06-08 DIAGNOSIS — I25.10 CORONARY ATHEROSCLEROSIS: ICD-10-CM

## 2021-06-08 DIAGNOSIS — I47.29 NONSUSTAINED VENTRICULAR TACHYCARDIA (H): ICD-10-CM

## 2021-06-08 DIAGNOSIS — I49.5 SICK SINUS SYNDROME (H): ICD-10-CM

## 2021-06-08 DIAGNOSIS — Z95.5 HISTORY OF CORONARY ARTERY STENT PLACEMENT: ICD-10-CM

## 2021-06-08 DIAGNOSIS — Z95.0 CARDIAC PACEMAKER IN SITU: ICD-10-CM

## 2021-06-08 DIAGNOSIS — I48.0 PAROXYSMAL ATRIAL FIBRILLATION (H): ICD-10-CM

## 2021-06-08 ASSESSMENT — MIFFLIN-ST. JEOR: SCORE: 1404.74

## 2021-06-08 NOTE — TELEPHONE ENCOUNTER
Called patient with recommendation for face to face f/u visit.  Patient reports complete resolution of dizziness as of yesterday afternoon after we talked. He is feeling well today and has no further concerns.    Instructed patient to continue monitoring and be certain to stay hydrated.  Encouraged patient to call 911 for recurrent dizziness with associated  numbness, tingling, loss of speech, weakness or any other symptoms. Patient verbalized understanding and will call back with further questions or concerns.

## 2021-06-08 NOTE — TELEPHONE ENCOUNTER
Called patient with recommendation below.  Patient did  metoprolol but did not  Eliquis.  He is already scheduled to see Dr. Yang tomorrow and will discuss anticoagulation with him at the visit. Encouraged patient to return the call after tomorrows visit if there are any further questions or concerns. Patient verbalized understanding and has no further questions at this time.

## 2021-06-08 NOTE — TELEPHONE ENCOUNTER
Dr. Will,  Please see patient concerns below.  He wishes for an alternative to Eliquis and is interested in warfarin.  Any new orders or recommendations?  Thank you,  Sharon    -------------------------------------------------  PC to patient to discuss anticoagulation.  He did  a month supply of Eliquis yesterday and it was $450.  He is not interested in continuing Eliquis due to the burden of cost. He also understands Xarelto can be costly as well.  He is interested in warfarin and understands the requirement of monitoring blood levels.  Informed patient there is a patient foundation for assistance obtaining Eliquis and offered to provide the phone number; patient is not interested in making the call and would rather initiate warfarin therapy.  Informed patient an update will be sent to Dr. Will and a return call will be made to patient with recommendation.

## 2021-06-08 NOTE — TELEPHONE ENCOUNTER
----- Message from Isai Dalal sent at 5/14/2020  2:32 PM CDT -----  Regarding: [facility] PT / EXPERIENCING SYMPTOMS  General phone call:    Caller: Alen Gaona     Primary cardiologist: [facility]     Detailed reason for call: Pt states he's been experiencing dizziness and his bp is also going up and down today. Pt is asking for a call back to discuss this in further detail.     New or active symptoms? Yes     Best phone number: [phone]    Best time to contact: Today if possible     Ok to leave a detailed message? Yes     Device? Yes     Additional Info:

## 2021-06-08 NOTE — PATIENT INSTRUCTIONS - HE
1. Stop atenolol  2. Stop aspirin  3. Start metoprolol XL 50 mg daily  4. Start Eliquis 5 mg twice daily  5. Continue your regular exercise regimen.  There is no substitute!  6. Call if bleeding problems bruising problems or lightheadedness develops.  7. I look forward to seeing you back in 1 year.

## 2021-06-08 NOTE — TELEPHONE ENCOUNTER
Remote Report:  Type: Add-on pacemaker remote for dizziness.    Presenting Rhythm: AP-VS 75bpm.   Lead/Battery status: Stable.   Arrhythmias: Since 4/7/2020: 3 mode switches, longest was 5.5hrs on 5/5/2020 starting at 2:30PM (long AF event for patient), atrial fibrillation, <1% burden, v-rates >/=120bpm ~25%. No ventricular arrhythmia.   Anticoagulant: None; takes 81mg Aspirin.   Comments: -13%; this is stable with before, but higher for MVP (AAIR+ programming), presenting rhythm is AP-VS with AP-R interval of about 320ms. Could consider reprogramming DDDR in the future if patient AVB with faster heart rates. Routing to Dr. Will. RONNIE    *I did not contact the patient with the results, awaiting Dr. Will's review of entire assessment from Sharon MATTHEWS RN. RONNIE

## 2021-06-08 NOTE — TELEPHONE ENCOUNTER
Thanks. Stop Eliquis ( I presume he wants to use the month's supply he bought) when he completes his supply, Then strt warfarin 4 mg daily, with INR 3 days after starting . [facility] 2.0-3.0.   He will need diet instruction in warfarin use, at the anticoagulation clinic he chooses to use. [facility]

## 2021-06-08 NOTE — TELEPHONE ENCOUNTER
----- Message from Bia Rodriguez sent at 6/2/2020  9:26 AM CDT -----    Caller: Patient  Primary cardiologist: Dr. Will  Detailed reason for call: Patient stated that he went to  his Eliquis and it was too expensive he is wondering about the other options    Best phone number: [phone]  Best time to contact: today  Ok to leave a detailed message? yes  Device? yes

## 2021-06-08 NOTE — TELEPHONE ENCOUNTER
Please call patient to arrange in clinic f/u visit with Dr. Will - next available.  Thank you - Sharon

## 2021-06-08 NOTE — TELEPHONE ENCOUNTER
Wellness Screening Tool  Symptom Screening:  Do you have one of the following NEW symptoms:    Fever (subjective or >100.0)?  No    A new cough?  No    Shortness of breath?  No     Chills? No     New loss of taste or smell? No     Generalized body aches? No     New persistent headache? No     New sore throat? No     Nausea, vomiting, or diarrhea?  No    Within the past 3 weeks, have you been exposed to someone with a known positive illness below:    COVID-19 (known or suspected)?  No    Chicken pox?  No    Mealses?  No    Pertussis?  No    Patient notified of visitor restrictions: Yes  Pt informed to wear a mask: Yes  Patient's appointment status: Patient will be seen in clinic as scheduled on 6/1

## 2021-06-08 NOTE — TELEPHONE ENCOUNTER
Dr. Will,  Please review patient concerns below.  Patient will send a remote device check.  Any new orders or recommendations?  Thank you - Sharon    ------------------------------------------  PC to patient to discuss his concerns. Upon waking this morning patient has new dizziness with ambulation. Walking across the room brings on dizziness. He is monitoring blood pressures and is concerned about the variation.    119/83 HR 89  To 136/57 HR 71  127/70  HR 80  174/80  HR 89    Patient denies chest tightness/pain, shortness of breath, palpitations, lightheadedness, nausea, diaphoresis and syncope.   He usually walks at least a half a mile every day but has remained sedentary today because he is afraid he may pass out.    He states he is staying hydrated and has no dietary changes.   Atenolol increased to 25 mg twice daily during telephone visit 4/8/20.    Patient called his PCP and was instructed to call Dr. Will for further recommendations and possibly send a remote check.  Patient will send a remote transmission.  Per PCP 5/14 phone note:  Patient Instructions  - documented in this encounter  Patient Instructions  Kapil Mathews MD - 05/14/2020 2:00 PM CDT    Please continue on your current medications, including atenolol 25 mg twice daily, as you have been doing.    Please continue to monitor your home pulse and blood pressure readings as you have been doing.    Please call your Cardiologist Dr. Ann to review your ongoing progress with him, as well as the variability today with your pulse and blood pressure readings. He may wish to remotely interrogate your pacemaker again to see if there is been any increased frequency in your cardiac arrhythmia that might be contributing to this variability.     Electronically signed by Kapil Mathews MD at 05/14/2020 2:34 PM CDT        Encouraged patient to call 911 for worsening dizziness, syncope or prolonged periods of dizziness. Patient verbalized  understanding and has no other associated symptoms to report.

## 2021-06-09 NOTE — PROGRESS NOTES
[facility] Office Note    Assessment / Plan:    1.   Hypertension.  Adequate control.  Stable blood pressures at home.  Will monitor for potential need to increase amlodipine.  Encouraged to continue his daily walking program.  2.  Paroxysmal Nonsustained ventricular tachycardia.  Suspect ischemic etiology, asymptomatic at the present time.  Ischemic evaluation in 2015 showed only mild travis-infarct ischemia.  We will plan to repeat an echocardiogram    Follow-up in 1 year   ______________________________________________________________________    Subjective:    I had the opportunity to see Skip Newman DDS at the [facility]. 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 male with a history of coronary artery disease; in 2000 he underwent a stent placement to the circumflex artery following myocardial infarction.  He also has a history of sick sinus syndrome for which she underwent a pacemaker placement in 2009.  In 10/2015 he was noted to have frequent bouts of nonsustained ventricular tachycardia on his pacemaker.  He underwent device replacement, but has continued to have occasional bursts of nonsustained ventricular tachycardia, 7 episodes in the last year.    He has no awareness of palpitations.  He feels well, traveling, and walks 5 days a week for 2 miles in 30 minutes without problem.  He has had no loss of stamina.  He checks his blood pressure at home and the systolics are in the 110-120 range.  He offers no complaints today.  We discussed that his weight is up an additional 7 pounds this year.    ______________________________________________________________________    Problem List:  Patient Active Problem List   Diagnosis     Dyslipidemia     Hypertension     Coronary Artery Disease     Sick Sinus Syndrome     Nonsustained ventricular tachycardia     Cardiac pacemaker, dual, in situ     Medical History:  Past Medical History:   Diagnosis Date     A-fib      Arrhythmia      Coronary  artery disease      Gout      Hyperlipidemia      Hypertension      Kidney stones      MI (myocardial infarction)      VT (ventricular tachycardia)      Surgical History:  Past Surgical History:   Procedure Laterality Date     CARDIAC CATHETERIZATION       CORONARY STENT PLACEMENT  2001    LCx     INSERT / REPLACE / REMOVE PACEMAKER  1/2009     HI INSERT INTRACORONARY STENT      Description: Cath Stent Placement;  Recorded: 10/01/2012;  Comments: Mid LCx     HI INSERT INTRACORONARY STENT      Description: Cath Stent Placement;  Recorded: 10/21/2014;  Comments: Mid LCx     TOTAL HIP ARTHROPLASTY Left      Social History:  Social History     Social History     Marital status:      Spouse name: N/A     Number of children: N/A     Years of education: N/A     Occupational History     Not on file.     Social History Main Topics     Smoking status: Never Smoker     Smokeless tobacco: Never Used     Alcohol use No     Drug use: Not on file     Sexual activity: Not on file     Other Topics Concern     Not on file     Social History Narrative     Sleep History:  Restorative  Exercise:  Walks 2 miles a day 5 days a week    Review of Systems:   General: WNL  Eyes: WNL  Ears/Nose/Throat: WNL  Lungs: WNL  Heart: WNL  Stomach: WNL  Bladder: WNL  Muscle/Joints: WNL  Skin: WNL  Nervous System: WNL  Mental Health: WNL     Blood: WNL          Family History:  Family History   Problem Relation Age of Onset     Heart failure Mother      Heart failure Father          Allergies:  Allergies   Allergen Reactions     Lisinopril Cough     Indomethacin Rash     Naproxen Rash     Medications:  Current Outpatient Prescriptions   Medication Sig Dispense Refill     allopurinol (ZYLOPRIM) 300 MG tablet Take 300 mg by mouth daily.       amLODIPine (NORVASC) 5 MG tablet Take 5 mg by mouth daily.       aspirin 325 MG tablet Take 325 mg by mouth daily.       atenolol (TENORMIN) 100 MG tablet Take 100 mg by mouth daily.       atorvastatin  "(LIPITOR) 20 MG tablet Take 20 mg by mouth every other day.       DOCOSAHEXANOIC ACID/EPA (FISH OIL ORAL) Take 1,200 mg by mouth daily.       losartan (COZAAR) 100 MG tablet Take 100 mg by mouth daily.       MULTIVITAMIN (MULTIPLE VITAMIN ORAL) Take 1 tablet by mouth daily.       nitroglycerin (NITROSTAT) 0.4 MG SL tablet Place 1 tablet (0.4 mg total) under the tongue every 5 (five) minutes as needed for chest pain. 25 tablet 10     No current facility-administered medications for this visit.        Objective:   Wt Readings from Last 3 Encounters:   03/07/17 170 lb (77.1 kg)   01/19/16 163 lb (73.9 kg)   11/03/15 159 lb (72.1 kg)     Vital signs:  Visit Vitals     /66 (Patient Site: Right Arm, Patient Position: Sitting, Cuff Size: Adult Regular)     Pulse 80     Resp 16     Ht 5' 6\" (1.676 m)     Wt 170 lb (77.1 kg)     BMI 27.44 kg/m2         Physical Exam:    Eyes   Conjunctiva: Clear.   Sclerae: Clear, nonicteric.   ENT   Mouth: Oral mucuous membranes are pink and moist   Neck   Carotid pulses: Carotid pulses palpaple with normal contours and symmetric, soft, short systolic bruit on left.   Jugular Veins: Normal jugular venous pressure.   Thyroid: No visible thyromegaly.   Pulmonary   Examination of lungs: Apices are clear.  A few scattered crackles at the right base.  Chest   Examination of Chest incision: Clear, dry and intact.  Pacer site without erythema warmth or tenderness  Cardiovascular   Auscultation of heart: Regular rhythm, normal S1 and S2, no murmurs, clicks or rubs.  Occasional extrasystoles  Pulses: Normal   Extremities: No edema or clubbing.   Gastrointestinal   Abdomen: Non-tender, no masses noted.   Musculoskeletal   Spine: spine straight upon visual inspection.   Skin   Skin and subcutaneous tissue: No xanthelasma.   Neurologic   Orientation to person, place and time: Normal.   Psychiatric   Mood and affect: Normal.       Lab Results:    Echocardiogram 10/2014: Summary   Left ventricular " size is normal.   Normal left ventricular wall thickness.   No regional wall motion abnormalities.   Left ventricular ejection fraction is visually estimated at 60%.   Normal right ventricular size with preserved systolic function.   Normal right ventricular size with preserved systolic function.   Mild aortic regurgitation is noted.    Cardiac perfusion imaging study October 2015: CONCLUSION:   1. Lexiscan stress nuclear study reveals a medium-sized area of infarction involving the inferolateral wall with mild travis-infarct ischemia.The measured resting ejection fraction is 64%. No wall motion abnormalities.            ANNA VELASQUEZ MD [facility]  [phone]    This note created using Dragon voice recognition software.  Sound alike errors may have escaped editing.

## 2021-06-09 NOTE — PROGRESS NOTES
Inpatient remote done at [facility].  Please see link for full device report.

## 2021-06-11 ENCOUNTER — HOSPITAL ENCOUNTER (OUTPATIENT)
Dept: CARDIOLOGY | Facility: HOSPITAL | Age: 86
Discharge: HOME | End: 2021-06-11
Attending: INTERNAL MEDICINE

## 2021-06-11 ENCOUNTER — HOSPITAL ENCOUNTER (OUTPATIENT)
Dept: NUCLEAR MEDICINE | Facility: HOSPITAL | Age: 86
Discharge: HOME | End: 2021-06-11
Attending: INTERNAL MEDICINE

## 2021-06-11 DIAGNOSIS — I25.10 CORONARY ATHEROSCLEROSIS: ICD-10-CM

## 2021-06-11 DIAGNOSIS — Z95.5 HISTORY OF CORONARY ARTERY STENT PLACEMENT: ICD-10-CM

## 2021-06-11 DIAGNOSIS — I47.29 NONSUSTAINED VENTRICULAR TACHYCARDIA (H): ICD-10-CM

## 2021-06-11 LAB
CV STRESS CURRENT BP HE: NORMAL
CV STRESS CURRENT HR HE: 100
CV STRESS CURRENT HR HE: 101
CV STRESS CURRENT HR HE: 105
CV STRESS CURRENT HR HE: 105
CV STRESS CURRENT HR HE: 108
CV STRESS CURRENT HR HE: 63
CV STRESS CURRENT HR HE: 66
CV STRESS CURRENT HR HE: 69
CV STRESS CURRENT HR HE: 70
CV STRESS CURRENT HR HE: 72
CV STRESS CURRENT HR HE: 75
CV STRESS CURRENT HR HE: 75
CV STRESS CURRENT HR HE: 78
CV STRESS CURRENT HR HE: 79
CV STRESS CURRENT HR HE: 81
CV STRESS CURRENT HR HE: 82
CV STRESS CURRENT HR HE: 83
CV STRESS CURRENT HR HE: 85
CV STRESS CURRENT HR HE: 86
CV STRESS CURRENT HR HE: 86
CV STRESS CURRENT HR HE: 90
CV STRESS CURRENT HR HE: 91
CV STRESS CURRENT HR HE: 93
CV STRESS CURRENT HR HE: 94
CV STRESS CURRENT HR HE: 95
CV STRESS CURRENT HR HE: 99
CV STRESS CURRENT HR HE: 99
CV STRESS DEVIATION TIME HE: NORMAL
CV STRESS ECHO PERCENT HR HE: NORMAL
CV STRESS EXERCISE STAGE HE: NORMAL
CV STRESS EXERCISE STAGE REACHED HE: NORMAL
CV STRESS FINAL RESTING BP HE: NORMAL
CV STRESS FINAL RESTING HR HE: 69
CV STRESS MAX HR HE: 109
CV STRESS MAX TREADMILL GRADE HE: 0
CV STRESS MAX TREADMILL SPEED HE: 0
CV STRESS PEAK DIA BP HE: NORMAL
CV STRESS PEAK SYS BP HE: NORMAL
CV STRESS PHASE HE: NORMAL
CV STRESS PROTOCOL HE: NORMAL
CV STRESS RESTING PT POSITION HE: NORMAL
CV STRESS ST DEVIATION AMOUNT HE: NORMAL
CV STRESS ST DEVIATION ELEVATION HE: NORMAL
CV STRESS ST EVELATION AMOUNT HE: NORMAL
CV STRESS TEST TYPE HE: NORMAL
CV STRESS TOTAL STAGE TIME MIN 1 HE: NORMAL
NUC STRESS EJECTION FRACTION: 50 %
RATE PRESSURE PRODUCT: NORMAL
STRESS ECHO BASELINE DIASTOLIC HE: 72
STRESS ECHO BASELINE HR: 70
STRESS ECHO BASELINE SYSTOLIC BP: 165
STRESS ECHO CALCULATED PERCENT HR: 82 %
STRESS ECHO LAST STRESS DIASTOLIC BP: 70
STRESS ECHO LAST STRESS HR: 82
STRESS ECHO LAST STRESS SYSTOLIC BP: 174
STRESS ECHO POST ESTIMATED WORKLOAD: 4.2
STRESS ECHO POST EXERCISE DUR MIN: 7
STRESS ECHO POST EXERCISE DUR SEC: 31
STRESS ECHO TARGET HR: 133

## 2021-06-12 NOTE — TELEPHONE ENCOUNTER
"Was able to reach patient this am. States he had no awareness of AF or any lightheadedness/ near-syncope that he can recall.  States he did have a \"stroke a couple month ago\" though, is doing well with only residual numbness in fingers. Confirms Toprol XL 50 mg daily.     Will review NSVTs with Dr. Will per device protocol due to duration of event and Echo >1 yr old.     Kelin Lentz RN    "

## 2021-06-14 NOTE — TELEPHONE ENCOUNTER
Thanks.  Please schedule echocardiogram prior to follow-up visit to evaluate possible nonsustained ventricular tachycardia.[facility]

## 2021-06-14 NOTE — TELEPHONE ENCOUNTER
Type: routine remote pacemaker transmission.  Presenting rhythm: AP-VS rate 90 bpm.  Battery/lead status: stable  Arrhythmias: since 10/8/20, 8 mode switches, longest 3 hr 48 mins, burden 0.1%, V-rates >/=120 bpm 40%. Five ventricular high rate episodes, marker channels only, suggest NSVT vs PSVT with atrial marker channel drop out.  Anticoagulant: warfarin  Comments: normal magnet and pacemaker function. Routed to device RN. prd    Transmission reviewed, Known PAF and NSVTs. Difficult to discern NSVTs vs channel drop out. Similar episodes on last remote and reviewed with Dr. Will, no further recommendations/changes at that time.  Last EF 54% per Echo 2017. Patient was asymptomatic, no recollection of any troublesome symptoms to correlate with recent episodes. Patient is aware of need to call with any near-syncope/syncope or prolonged rapid palpitations. Due to OV with Dr. Will in April. Will continue routine monitoring for now.     Kelin Lentz RN

## 2021-06-14 NOTE — TELEPHONE ENCOUNTER
Order placed and message sent to scheduling to call patient and arrange echo just prior to follow up in April.

## 2021-06-16 PROBLEM — N20.0 RECURRENT KIDNEY STONES: Status: ACTIVE | Noted: 2019-07-08

## 2021-06-16 PROBLEM — M17.11 PRIMARY OSTEOARTHRITIS OF RIGHT KNEE: Status: ACTIVE | Noted: 2020-01-13

## 2021-06-16 PROBLEM — Z96.652 STATUS POST TOTAL LEFT KNEE REPLACEMENT: Chronic | Status: ACTIVE | Noted: 2019-07-08

## 2021-06-16 PROBLEM — M17.12 PRIMARY OSTEOARTHRITIS OF LEFT KNEE: Chronic | Status: ACTIVE | Noted: 2019-07-08

## 2021-06-16 NOTE — PROGRESS NOTES
[facility] Office Note    Assessment / Plan:    1.   Hypertension.  Well-controlled today on his current medication regimen.  No changes suggested.  2.  Paroxysmal Nonsustained ventricular tachycardia.  Rare episodes, with preserved left ventricular function on most recent echocardiogram.  No changes suggested   3.  History of coronary artery disease.  No recent symptoms.    Plan follow-up in 1 year   ______________________________________________________________________    Subjective:    I had the opportunity to see Skip Newman DDS at the [facility]. 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  He also has a history of sick sinus syndrome for which she underwent a pacemaker placement in 2009.  In 10/2015 he was noted to have frequent bouts of nonsustained ventricular tachycardia on his pacemaker.  He underwent device replacement, but has continued to have occasional bursts of asymptomatic nonsustained ventricular tachycardia.  He returns today for routine follow-up.    Since I saw him last year he has not been aware of any palpitations.  He has had bilateral knee pains, and has no benefit from cortisone injection.  A Synvisc injection has been done in the last week.  This is decreased his walking considerably.  He walks one lap in the mall and has to stop because of knee pain.  The pain resolves within a couple of minutes and is able to resume activities.  He is not short of breath and has had no chest pain with this.    Pacemaker check today shows only a few episodes of nonsustained ventricular tachycardia some of which may have been supraventricular in origin but misrepresented.  Device check is otherwise normal.    ______________________________________________________________________    Problem List:  Patient Active Problem List   Diagnosis      Hyperlipidemia     Hypertension     Coronary Artery Disease     Sick Sinus Syndrome     Nonsustained ventricular tachycardia     Cardiac pacemaker, dual, in situ     Medical History:  Past Medical History:   Diagnosis Date     A-fib      Arrhythmia      Coronary artery disease      Gout      Hyperlipidemia      Hypertension      Kidney stones      MI (myocardial infarction)      VT (ventricular tachycardia)      Surgical History:  Past Surgical History:   Procedure Laterality Date     CARDIAC CATHETERIZATION       CORONARY STENT PLACEMENT  2001    LCx     INSERT / REPLACE / REMOVE PACEMAKER  1/2009     MT INSERT INTRACORONARY STENT      Description: Cath Stent Placement;  Recorded: 10/01/2012;  Comments: Mid LCx     MT INSERT INTRACORONARY STENT      Description: Cath Stent Placement;  Recorded: 10/21/2014;  Comments: Mid LCx     TOTAL HIP ARTHROPLASTY Left      Social History:  Social History     Social History     Marital status:      Spouse name: N/A     Number of children: N/A     Years of education: N/A     Occupational History     Not on file.     Social History Main Topics     Smoking status: Never Smoker     Smokeless tobacco: Never Used     Alcohol use No     Drug use: Not on file     Sexual activity: Not on file     Other Topics Concern     Not on file     Social History Narrative     Sleep History:  Restorative, particularly on his new mattress.  Exercise:  Walks occasionally.  Up and down steps without a problem.    Review of Systems:   General: WNL  Eyes: WNL  Ears/Nose/Throat: WNL  Lungs: WNL  Heart: WNL  Stomach: WNL  Bladder: WNL  Muscle/Joints: Joint Pain  Skin: WNL  Nervous System: WNL  Mental Health: WNL     Blood: WNL          Family History:  Family History   Problem Relation Age of Onset     Heart failure Mother      Heart failure Father          Allergies:  Allergies   Allergen Reactions     Lisinopril Cough     Indomethacin Rash     Naproxen Rash     Medications:  Current  "Outpatient Prescriptions   Medication Sig Dispense Refill     allopurinol (ZYLOPRIM) 300 MG tablet Take 300 mg by mouth daily.       amLODIPine (NORVASC) 10 MG tablet Take 1 tablet (10 mg total) by mouth daily. 90 tablet 2     aspirin 325 MG tablet Take 325 mg by mouth daily.       atenolol (TENORMIN) 100 MG tablet Take 100 mg by mouth daily.       atorvastatin (LIPITOR) 20 MG tablet Take 20 mg by mouth every other day.       DOCOSAHEXANOIC ACID/EPA (FISH OIL ORAL) Take 1,200 mg by mouth daily.       losartan (COZAAR) 100 MG tablet Take 100 mg by mouth daily.       MULTIVITAMIN (MULTIPLE VITAMIN ORAL) Take 1 tablet by mouth daily.       nabumetone (RELAFEN) 750 MG tablet Take 750 mg by mouth 2 (two) times a day with meals.  1     nitroglycerin (NITROSTAT) 0.4 MG SL tablet Place 1 tablet (0.4 mg total) under the tongue every 5 (five) minutes as needed for chest pain. 25 tablet 10     No current facility-administered medications for this visit.        Objective:   Wt Readings from Last 3 Encounters:   03/12/18 172 lb (78 kg)   03/20/17 170 lb (77.1 kg)   03/07/17 170 lb (77.1 kg)     Vital signs:  /64 (Patient Site: Right Arm, Patient Position: Sitting, Cuff Size: Adult Regular)  Pulse 80  Resp 16  Ht 5' 5.5\" (1.664 m)  Wt 172 lb (78 kg)  BMI 28.19 kg/m2      Physical Exam:    Eyes   Conjunctiva: Clear.   Sclerae: Clear, nonicteric.   ENT   Mouth: Oral mucuous membranes are pink and moist   Neck   Carotid pulses: Carotid pulses palpaple with normal contours and symmetric, soft, short systolic bruit on left.   Jugular Veins: Normal jugular venous pressure.   Thyroid: No visible thyromegaly.   Pulmonary   Examination of lungs: Clear throughout  Chest   Examination of Chest incision: Clear, dry and intact.  Pacer site without erythema warmth or tenderness  Cardiovascular   Auscultation of heart: Regular rhythm, normal S1 and S2, no murmurs, clicks or rubs.    Pulses: Normal   Extremities: No edema or clubbing. "   Gastrointestinal   Abdomen: Non-tender, no masses noted.   Musculoskeletal   Spine: spine straight upon visual inspection.   Skin   Skin and subcutaneous tissue: No xanthelasma.   Neurologic   Orientation to person, place and time: Normal.   Psychiatric   Mood and affect: Normal.       Lab Results:    Echocardiogram 10/2014: Summary   Left ventricular size is normal.   Normal left ventricular wall thickness.   No regional wall motion abnormalities.   Left ventricular ejection fraction is visually estimated at 60%.   Normal right ventricular size with preserved systolic function.   Normal right ventricular size with preserved systolic function.   Mild aortic regurgitation is noted.    Cardiac perfusion imaging study October 2015: CONCLUSION:   1. Lexiscan stress nuclear study reveals a medium-sized area of infarction involving the inferolateral wall with mild travis-infarct ischemia.The measured resting ejection fraction is 64%. No wall motion abnormalities.            ANNA VELASQUEZ MD [facility]  [phone]    This note created using Dragon voice recognition software.  Sound alike errors may have escaped editing.

## 2021-06-16 NOTE — PROGRESS NOTES
In clinic device check.  Please see link for full device report.  Patient was informed of results and next follow up during today's visit.    ALEXEY EscotoN, RN, CV-BC  Device Nurse  [facility]

## 2021-06-17 NOTE — TELEPHONE ENCOUNTER
Telephone Encounter by Kelin Lentz RN at 10/8/2020  1:08 PM     Author: Kelin Lentz RN Service: -- Author Type: Registered Nurse    Filed: 10/9/2020 10:40 AM Encounter Date: 10/8/2020 Status: Addendum    : Kelin Lentz RN (Registered Nurse)    Related Notes: Original Note by Kelin Lentz RN (Registered Nurse) filed at 10/8/2020  1:14 PM       Type: routine remote pacemaker transmission.  Presenting rhythm: AP-VS rate 74 bpm.  Battery/lead status: stable  Arrhythmias: since 7/17/20, 5 mode switches, longest 6 hrs 18 minutes, EGMs confirm atrial tachy arrhythmias, burden <0.1%, V-rates >/=120 bpm 25-30%. Three ventricular high rate episodes, mostly marker channels, NSVT vs PSVT (possible atrial marker channel drop out), 9-18 bts, rates 150-170 bpm.  Anticoagulant: Warfarin  Comments: normal magnet and pacemaker function. Routed to device RN. prd     Transmission reviewed, 3 NSVTs noted, longest 18 beats. Hx NSVTs in past, Last EF 54% per Echo 3/20/17.   12%. Takes Toprol XL 50 mg daily per med list. Also had ~6 hours AF on 9/1, on Warfarin.   Call placed to patient to review findings/assess for any symptoms. LVM for callback.         Kelin Lentz RN

## 2021-06-19 NOTE — LETTER
Letter by Emma Longo RDCS at      Author: Emma Longo RDCS Service: -- Author Type: --    Filed:  Encounter Date: 9/30/2019 Status: Signed         Skip Newman, DDS  [address] [place] [address]      September 30, 2019      Dear Mr. Newman,    RE: Remote Results    We are writing to you regarding your recent Remote Pacemaker check from home. Your transmission was received successfully. Battery status is satisfactory at this time.     Your results are being reviewed.  You will be contacted if further information is necessary.     Your next device appointment will be a remote check on January 6, 2020.  You can choose the time of day you wish to transmit.    To schedule or reschedule, please call [phone] and press 1.    NOTE: If you would like to do an extra transmission, please call [phone] and press 3 to speak to a nurse BEFORE transmitting. This ensures that the Device Clinic staff is aware of the reason you are sending a transmission, and can follow-up with you after it has been reviewed.    We will be checking your implanted device from home (remotely) every three months unless otherwise instructed. We will need to see you in the clinic at least once a year. You may need to be seen in the clinic sooner depending on the results of your check.    Please be aware:    The follow-up schedule is like a Physician prescription.    Your remote monitor is paired to your specific implanted device.      Sincerely,    [facility]

## 2021-06-19 NOTE — LETTER
Letter by Emma Longo RDCS at      Author: Emma Longo RDCS Service: -- Author Type: --    Filed:  Encounter Date: 6/25/2019 Status: (Other)         Skip Newman, DDS  [address] [place] [address]      June 25, 2019      Dear Mr. Newman,    RE: Remote Results    We are writing to you regarding your recent Remote Pacemaker check from home. Your transmission was received successfully. Battery status is satisfactory at this time.     Your results are showing no significant changes.    Your next device appointment will be a remote check on September 30, 2019.  You can choose the time of day you wish to transmit.    To schedule or reschedule, please call [phone] and press 1.    NOTE: If you would like to do an extra transmission, please call [phone] and press 3 to speak to a nurse BEFORE transmitting. This ensures that the Device Clinic staff is aware of the reason you are sending a transmission, and can follow-up with you after it has been reviewed.    We will be checking your implanted device from home (remotely) every three months unless otherwise instructed. We will need to see you in the clinic at least once a year. You may need to be seen in the clinic sooner depending on the results of your check.    Please be aware:    The follow-up schedule is like a Physician prescription.    Your remote monitor is paired to your specific implanted device.      Sincerely,    [facility]

## 2021-06-20 NOTE — LETTER
Letter by Emma Longo RDCS at      Author: Emma Longo RDCS Service: -- Author Type: --    Filed:  Encounter Date: 1/6/2020 Status: Signed         Skip Newman, DDS  [address] [place] [address]      January 6, 2020      Dear Mr. Downingne,    RE: Remote Results    We are writing to you regarding your recent Remote Pacemaker check from home. Your transmission was received successfully. Battery status is satisfactory at this time.     Your results are being reviewed.  You will be contacted if further information is necessary.     Your next device appointment will be a clinic visit.  Please call in February to schedule.      To schedule or reschedule, please call [phone] and press 1.    NOTE: If you would like to do an extra transmission, please call [phone] and press 3 to speak to a nurse BEFORE transmitting. This ensures that the Device Clinic staff is aware of the reason you are sending a transmission, and can follow-up with you after it has been reviewed.    We will be checking your implanted device from home (remotely) every three months unless otherwise instructed. We will need to see you in the clinic at least once a year. You may need to be seen in the clinic sooner depending on the results of your check.    Please be aware:    The follow-up schedule is like a Physician prescription.    Your remote monitor is paired to your specific implanted device.      Sincerely,    [facility]

## 2021-06-20 NOTE — LETTER
Letter by Melody Shetty RN at      Author: Melody Shetty RN Service: -- Author Type: --    Filed:  Encounter Date: 4/7/2020 Status: (Other)         Skip Newman, DDS  [address] [place] [address]      April 7, 2020      Dear Mr. Newamn,    RE: Remote Results    We are writing to you regarding your recent Remote Pacemaker check from home. Your transmission was received successfully. Battery status is satisfactory at this time.     Your results are showing no significant changes.    Your next device appointment will be a remote check on 7/8/2020.  You can choose the time of day you wish to transmit.    To schedule or reschedule, please call [phone] and press 1.    NOTE: If you would like to do an extra transmission, please call [phone] and press 3 to speak to a nurse BEFORE transmitting. This ensures that the Device Clinic staff is aware of the reason you are sending a transmission, and can follow-up with you after it has been reviewed.    We will be checking your implanted device from home (remotely) every three months unless otherwise instructed. We will need to see you in the clinic at least once a year. You may need to be seen in the clinic sooner depending on the results of your check.    Please be aware:    The follow-up schedule is like a Physician prescription.    Your remote monitor is paired to your specific implanted device.      Sincerely,    [facility]

## 2021-06-20 NOTE — LETTER
Letter by Emma Longo RDCS at      Author: Emma Longo RDCS Service: -- Author Type: --    Filed:  Encounter Date: 10/8/2020 Status: (Other)         Skip Newman, DDS  [address] [place] [address]      October 8, 2020      Dear Mr. Newman,    RE: Remote Results    We are writing to you regarding your recent Remote Pacemaker check from home. Your transmission was received successfully. Battery status is satisfactory at this time.     Your results are being reviewed.  You will be contacted if further information is necessary.     Your next device appointment will be a remote check on January 8, 2021.  You can choose the time of day you wish to transmit.    To schedule or reschedule, please call [phone] and press 1.    NOTE: If you would like to do an extra transmission, please call [phone] and press 3 to speak to a nurse BEFORE transmitting. This ensures that the Device Clinic staff is aware of the reason you are sending a transmission, and can follow-up with you after it has been reviewed.    We will be checking your implanted device from home (remotely) every three months unless otherwise instructed. We will need to see you in the clinic at least once a year. You may need to be seen in the clinic sooner depending on the results of your check.    Please be aware:    The follow-up schedule is like a Physician prescription.    Your remote monitor is paired to your specific implanted device.      Sincerely,    [facility]

## 2021-06-20 NOTE — LETTER
Letter by Payton Serra RN at      Author: Payton Serra RN Service: -- Author Type: --    Filed:  Encounter Date: 7/8/2020 Status: (Other)         Skip Newman, DDS  [address] [place] [address]      July 8, 2020      Dear Mr. Newman,    RE: Remote Results    We are writing to you regarding your recent Remote Pacemaker check from home. Your transmission was received successfully. Battery status is satisfactory at this time.     Your results are showing no significant changes.    Your next device appointment will be a remote check on 10/8/2020.  You can choose the time of day you wish to transmit.    To schedule or reschedule, please call [phone] and press 1.    NOTE: If you would like to do an extra transmission, please call [phone] and press 3 to speak to a nurse BEFORE transmitting. This ensures that the Device Clinic staff is aware of the reason you are sending a transmission, and can follow-up with you after it has been reviewed.    We will be checking your implanted device from home (remotely) every three months unless otherwise instructed. We will need to see you in the clinic at least once a year. You may need to be seen in the clinic sooner depending on the results of your check.    Please be aware:    The follow-up schedule is like a Physician prescription.    Your remote monitor is paired to your specific implanted device.      Sincerely,    [facility]

## 2021-06-21 NOTE — LETTER
Letter by Emma Longo RDCS at      Author: Emma Longo RDCS Service: -- Author Type: --    Filed:  Encounter Date: 1/12/2021 Status: (Other)         Skip Newman, DDS  [address] [place] [address]      January 12, 2021      Dear Mr. Downingne,    RE: Remote Results    We are writing to you regarding your recent Remote Pacemaker check from home. Your transmission was received successfully. Battery status is satisfactory at this time.     Your results are being reviewed.  You will be contacted if further information is necessary.     Your next device appointment will be a clinic visit.  Please call in late February to schedule.      To schedule or reschedule, please call [phone] and press 1.    NOTE: If you would like to do an extra transmission, please call [phone] and press 3 to speak to a nurse BEFORE transmitting. This ensures that the Device Clinic staff is aware of the reason you are sending a transmission, and can follow-up with you after it has been reviewed.    We will be checking your implanted device from home (remotely) every three months unless otherwise instructed. We will need to see you in the clinic at least once a year. You may need to be seen in the clinic sooner depending on the results of your check.    Please be aware:    The follow-up schedule is like a Physician prescription.    Your remote monitor is paired to your specific implanted device.      Sincerely,    [facility]

## 2021-06-26 NOTE — PATIENT INSTRUCTIONS - HE
1. Check with your primary care provider about your anemia and B12 testing.  2. We will check a exercise nuclear stress test to look for new areas of threatened heart muscle that may warrant further intervention.  3. No medication changes today.

## 2021-06-28 NOTE — PROGRESS NOTES
"Progress Notes by Roc Will MD at 4/8/2020  2:30 PM     Author: Roc Will MD Service: -- Author Type: Physician    Filed: 4/9/2020  4:10 PM Encounter Date: 4/8/2020 Status: Addendum    : Roc Will MD (Physician)    Related Notes: Original Note by Roc Will MD (Physician) filed at 4/8/2020  3:34 PM       The patient has been notified of following:     \"This telephone visit will be conducted via a call between you and your physician/provider. We have found that certain health care needs can be provided without the need for a physical exam.  This service lets us provide the care you need with a phone conversation.  If a prescription is necessary we can send it directly to your pharmacy.  If lab work is needed we can place an order for that and you can then stop by our lab to have the test done at a later time. If during the course of the call the physician/provider feels a telephone visit is not appropriate, you will not be charged for this service.\"       \"The patient has chosen to have the visit conducted as a telephone visit, to reduce risk of exposure given the current status of Coronavirus in our community. This telephone visit is being conducted via a call between the patient and physician/provider. Health care needs are being provided without a physical exam.\"     HEART CARE PHONE ENCOUNTER        Appointment is being conducted as a telephone visit, to reduce risk of exposure given the current status of Coronovirus in our community. This telephone visit is being conducted via a call between the patient and physician/provider. Health care needs are being provided without a physical exam.     Assessment/Recommendations   Assessment/Plan:    1. Coronary artery disease, status post intervention 2000 with no recent symptoms.  Encouraged to continue daily walking regimen.  2. Essential hypertension.  Will increase atenolol to 25 mg twice daily.  3. Paroxysmal nonsustained " ventricular tachycardia.  Rare episodes, asymptomatic.  Increasing atenolol may help to decrease frequency of these episodes.      Follow Up Plan: Follow up in 1 year .  I have reviewed the note as documented.  This accurately captures the substance of my conversation with the patient.    Total time of call between patient and provider was 11 minutes        History of Present Illness/Subjective    Skip ROBERTS JERI Newman is a 86 y.o. male who is being evaluated via a billable telephone visit.    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  He has a history of bursts of nonsustained ventricular tachycardia since 2015, status post pacemaker placement for sick sinus syndrome in 2009.  He was scheduled for routine follow-up today.    Over the past year he has had bilateral total knee replacements.  This is allowed him to be walking more, currently up to half a mile daily.  He feels well with this activity and has not experienced any chest pain.  He has had no recurrent syncopal episodes.  He has no awareness of palpitations.    Device check yesterday showed no atrial fibrillation but he did have 6-12 beat bursts of nonsustained ventricular tachycardia.  These were asymptomatic.  He reports his blood pressure has trending higher, in recent weeks.  He was taking Relafen following his knee replacement but has been advised to limit use of this.  A year ago he was on Celebrex, it is not clear why he was switched.    He otherwise offers no complaints today.    I have reviewed and updated the patient's Past Medical History, Social History, Family History and Medication List.     Physical Examination not perform given phone encounter Review of Systems                                                Medical History  Surgical History Family History Social History   Past Medical  History:   Diagnosis Date   ? A-fib (H)    ? Anemia    ? Arrhythmia    ? Arthritis    ? Chronic kidney disease    ? Claudication (H)    ? Coronary artery disease    ? Gout    ? Hearing loss     high frequency   ? History of transfusion    ? Hyperlipidemia    ? Hypertension    ? Impotence of organic origin    ? Kidney stone     recurrent   ? MI (myocardial infarction) (H)    ? Pacemaker     Medtronic   ? Primary osteoarthritis of both knees    ? Sick sinus syndrome (H)    ? Skin cancer    ? Spinal stenosis, lumbar region, with neurogenic claudication    ? Syncope    ? Thrombocytopenia (H)    ? VT (ventricular tachycardia) (H)     Past Surgical History:   Procedure Laterality Date   ? CARDIAC CATHETERIZATION     ? CORONARY STENT PLACEMENT  2001    [facility]   ? HEMORRHOID SURGERY  1989   ? INSERT / REPLACE / REMOVE PACEMAKER  01/2009    Medtronic   ? l3-l4 lumbar stenosis  11/2018   ? IL INSERT INTRACORONARY STENT      Description: Cath Stent Placement;  Recorded: 10/01/2012;  Comments: Mid [facility]   ? IL INSERT INTRACORONARY STENT      Description: Cath Stent Placement;  Recorded: 10/21/2014;  Comments: Mid [facility]x   ? IL TOTAL KNEE ARTHROPLASTY Left 7/8/2019    Procedure: LEFT TOTAL KNEE ARTHROPLASTY;  Surgeon: Bowen Padgett MD;  Location: [facility] OR;  Service: Orthopedics   ? IL TOTAL KNEE ARTHROPLASTY Right 1/13/2020    Procedure: RIGHT TOTAL KNEE ARTHROPLASTY;  Surgeon: Bowen Padgett MD;  Location: [facility] OR;  Service: Orthopedics   ? TONSILLECTOMY      child   ? TOTAL HIP ARTHROPLASTY Left     Family History   Problem Relation Age of Onset   ? Heart failure Mother    ? Heart failure Father     Social History     Socioeconomic History   ? Marital status:      Spouse name: Not on file   ? Number of children: Not on file   ? Years of education: Not on file   ? Highest education level: Not on file   Occupational History   ? Not on file   Social Needs   ? Financial resource strain: Not on file   ?  Food insecurity     Worry: Not on file     Inability: Not on file   ? Transportation needs     Medical: Not on file     Non-medical: Not on file   Tobacco Use   ? Smoking status: Never Smoker   ? Smokeless tobacco: Never Used   Substance and Sexual Activity   ? Alcohol use: No   ? Drug use: Not Currently   ? Sexual activity: Not on file   Lifestyle   ? Physical activity     Days per week: Not on file     Minutes per session: Not on file   ? Stress: Not on file   Relationships   ? Social connections     Talks on phone: Not on file     Gets together: Not on file     Attends Mandaeism service: Not on file     Active member of club or organization: Not on file     Attends meetings of clubs or organizations: Not on file     Relationship status: Not on file   ? Intimate partner violence     Fear of current or ex partner: Not on file     Emotionally abused: Not on file     Physically abused: Not on file     Forced sexual activity: Not on file   Other Topics Concern   ? Not on file   Social History Narrative   ? Not on file        Sleep History: Restorative    Exercise History: Walks 1/2 mile daily since he had bilateral total knee replacements    Medications  Allergies   Current Outpatient Medications   Medication Sig Dispense Refill   ? acetaminophen (TYLENOL) 500 MG tablet Take 2 tablets (1,000 mg total) by mouth 3 (three) times a day. (Patient taking differently: Take 1,000 mg by mouth every 6 (six) hours as needed. )  0   ? allopurinol (ZYLOPRIM) 300 MG tablet Take 300 mg by mouth every evening.      ? amoxicillin (AMOXIL) 500 MG capsule Take 4 capsules by mouth as needed. Prior to dental appointments     ? aspirin 81 mg chewable tablet Chew 81 mg 2 (two) times a day.      ? atenolol (TENORMIN) 25 MG tablet Take 25 mg by mouth daily.     ? atorvastatin (LIPITOR) 20 MG tablet Take 20 mg by mouth every other day.     ? chlorthalidone (HYGROTEN) 25 MG tablet Take 25 mg by mouth daily.     ? losartan (COZAAR) 100 MG tablet  Take 100 mg by mouth every evening.      ? MULTIVITAMIN (MULTIPLE VITAMIN ORAL) Take 1 tablet by mouth daily.     ? nitroglycerin (NITROSTAT) 0.4 MG SL tablet Place 1 tablet (0.4 mg total) under the tongue every 5 (five) minutes as needed for chest pain. 25 tablet 10   ? terbinafine HCl (LAMISIL) 1 % cream Apply 1 application topically as needed (to feet). Every week as needed     ? celecoxib (CELEBREX) 100 MG capsule Take 100 mg by mouth daily. Start the day before surgery and take daily until all gone     ? omega 3-dha-epa-fish oil (FISH OIL) 1,000 mg (120 mg-180 mg) cap Take 1 capsule by mouth daily.     ? oxyCODONE (ROXICODONE) 5 MG immediate release tablet Take 1 tablet (5 mg total) by mouth every 4 (four) hours as needed. 15 tablet 0   ? senna-docusate (PERICOLACE) 8.6-50 mg tablet Take 1 tablet by mouth 2 (two) times a day.  0     No current facility-administered medications for this visit.     Allergies   Allergen Reactions   ? Lisinopril Cough   ? Indomethacin Rash   ? Naproxen Rash         Lab Results    Chemistry/lipid CBC Cardiac Enzymes/BNP/TSH/INR   Lab Results   Component Value Date    CREATININE 1.43 (H) 01/14/2020    BUN 48 (H) 01/14/2020    K 4.5 01/14/2020     01/14/2020     (H) 01/14/2020    CO2 22 01/14/2020    Lab Results   Component Value Date    WBC 8.0 01/13/2020    HGB 11.5 (L) 01/15/2020    HCT 39.5 (L) 01/13/2020     (H) 01/13/2020     (L) 01/13/2020    Lab Results   Component Value Date    INR 1.05 01/13/2020        Roc Will MD, FACC

## 2021-06-29 NOTE — PROGRESS NOTES
Progress Notes by Roc Will MD at 6/1/2020  8:50 AM     Author: Roc Will MD Service: -- Author Type: Physician    Filed: 6/1/2020  9:46 AM Encounter Date: 6/1/2020 Status: Signed    : Roc Will MD (Physician)         Cardiology Clinic Office Note    Assessment / Plan:    1.  Paroxysmal atrial fibrillation, dizziness.  With 5 and half hour episode of atrial fibrillation and symptoms, I believe we have crossed the threshold where anticoagulation is justified.  We discussed options and elected to start Eliquis 5 mg twice daily.  His creatinine is somewhat marginal for this dosage, we will plan to recheck a basic metabolic profile in 6 months time to see if reduction in dosage is needed.  We will discontinue aspirin for the time being, though with his remote history of coronary disease this may be restarted in the future  2.  Hypertension, labile.  Will switch atenolol to metoprolol XL 50 mg daily.  3.  Coronary artery disease, stable since 2005.  Encouraged to continue his walking program.      Plan follow-up in 1 year.  ______________________________________________________________________    Subjective:    I had the opportunity to see Skip Newman DDS at the [facility]. Skip Newman DDS is a 86 y.o. male with a history of coronary artery disease status post circumflex intervention following myocardial infarction in 2000 and found to have chronic occlusion of posterior descending artery in 2008.  He has a history of bursts of nonsustained ventricular tachycardia since 2015, status post pacemaker placement for sick sinus syndrome in 2009.   Over the past year he has had bilateral total knee replacements and is walking more , now up to three quarters of a mile daily..  He feels well with this activity and has not experienced any chest pain.  He has had no recurrent syncopal episodes.     He did have a day where he experienced dizziness that  was not  associated with a low blood pressure.  He notes that since his atenolol was increased, his blood pressures have been quite labile, ranging from 115 into the 160s.  He has not experienced any chest pain, or awareness of palpitations.  His last device check did show a 5-1/2-hour episode of atrial fibrillation, with a rate greater than 120 bpm for 25% of the time.    ______________________________________________________________________    Problem List:  Patient Active Problem List   Diagnosis   ? Hyperlipidemia   ? Hypertension   ? Coronary Artery Disease   ? Sick sinus syndrome (H)   ? Nonsustained ventricular tachycardia (H)   ? Cardiac pacemaker, dual, in situ   ? Primary osteoarthritis of left knee   ? Gout   ? Recurrent kidney stones   ? Status post total left knee replacement   ? History of coronary artery stent placement   ? Primary osteoarthritis of right knee   ? Thrombocytopenia (H)   ? Spinal stenosis, lumbar region, with neurogenic claudication   ? Pacemaker   ? MI (myocardial infarction) (H)   ? Hypertension   ? Coronary artery disease   ? Chronic kidney disease     Medical History:  Past Medical History:   Diagnosis Date   ? A-fib (H)    ? Anemia    ? Arrhythmia    ? Arthritis    ? Chronic kidney disease    ? Claudication (H)    ? Coronary artery disease    ? Gout    ? Hearing loss     high frequency   ? History of transfusion    ? Hyperlipidemia    ? Hypertension    ? Impotence of organic origin    ? Kidney stone     recurrent   ? MI (myocardial infarction) (H)    ? Pacemaker     Medtronic   ? Primary osteoarthritis of both knees    ? Sick sinus syndrome (H)    ? Skin cancer    ? Spinal stenosis, lumbar region, with neurogenic claudication    ? Syncope    ? Thrombocytopenia (H)    ? VT (ventricular tachycardia) (H)      Surgical History:  Past Surgical History:   Procedure Laterality Date   ? CARDIAC CATHETERIZATION     ? CORONARY STENT PLACEMENT  2001    LCx   ? HEMORRHOID SURGERY  1989   ?  INSERT / REPLACE / REMOVE PACEMAKER  01/2009    Medtronic   ? l3-l4 lumbar stenosis  11/2018   ? [place] INSERT INTRACORONARY STENT      Description: Cath Stent Placement;  Recorded: 10/01/2012;  Comments: Mid LCx   ? [place] INSERT INTRACORONARY STENT      Description: Cath Stent Placement;  Recorded: 10/21/2014;  Comments: Mid LCx   ? UT TOTAL KNEE ARTHROPLASTY Left 7/8/2019    Procedure: LEFT TOTAL KNEE ARTHROPLASTY;  Surgeon: Bowen Padgett MD;  Location: [facility];  Service: Orthopedics   ? UT TOTAL KNEE ARTHROPLASTY Right 1/13/2020    Procedure: RIGHT TOTAL KNEE ARTHROPLASTY;  Surgeon: Bowen Padgett MD;  Location: [facility] OR;  Service: Orthopedics   ? TONSILLECTOMY      child   ? TOTAL HIP ARTHROPLASTY Left      Social History:  Social History     Socioeconomic History   ? Marital status:      Spouse name: Not on file   ? Number of children: Not on file   ? Years of education: Not on file   ? Highest education level: Not on file   Occupational History   ? Not on file   Social Needs   ? Financial resource strain: Not on file   ? Food insecurity     Worry: Not on file     Inability: Not on file   ? Transportation needs     Medical: Not on file     Non-medical: Not on file   Tobacco Use   ? Smoking status: Never Smoker   ? Smokeless tobacco: Never Used   Substance and Sexual Activity   ? Alcohol use: No   ? Drug use: Not Currently   ? Sexual activity: Not on file   Lifestyle   ? Physical activity     Days per week: Not on file     Minutes per session: Not on file   ? Stress: Not on file   Relationships   ? Social connections     Talks on phone: Not on file     Gets together: Not on file     Attends [employer] service: Not on file     Active member of club or organization: Not on file     Attends meetings of clubs or organizations: Not on file     Relationship status: Not on file   ? Intimate partner violence     Fear of current or ex partner: Not on file     Emotionally abused: Not on file      Physically abused: Not on file     Forced sexual activity: Not on file   Other Topics Concern   ? Not on file   Social History Narrative   ? Not on file     Sleep History:  Restorative  Exercise History:  Walks three quarters mile daily    Review of Systems:   General: WNL  Eyes: WNL  Ears/Nose/Throat: WNL  Lungs: WNL  Heart: WNL  Stomach: WNL  Bladder: WNL  Muscle/Joints: WNL  Skin: WNL  Nervous System: WNL  Mental Health: WNL     Blood: WNL          Family History:  Family History   Problem Relation Age of Onset   ? Heart failure Mother    ? Heart failure Father          Allergies:  Allergies   Allergen Reactions   ? Lisinopril Cough   ? Indomethacin Rash   ? Naproxen Rash     Medications:  Current Outpatient Medications   Medication Sig Dispense Refill   ? acetaminophen (TYLENOL) 500 MG tablet Take 2 tablets (1,000 mg total) by mouth 3 (three) times a day. (Patient taking differently: Take 1,000 mg by mouth every 6 (six) hours as needed. )  0   ? allopurinol (ZYLOPRIM) 300 MG tablet Take 300 mg by mouth every evening.      ? aspirin 81 mg chewable tablet Chew 81 mg 2 (two) times a day.      ? atenoloL (TENORMIN) 25 MG tablet Take 1 tablet (25 mg total) by mouth 2 (two) times a day. 180 tablet 3   ? atorvastatin (LIPITOR) 20 MG tablet Take 20 mg by mouth every other day.     ? chlorthalidone (HYGROTEN) 25 MG tablet Take 25 mg by mouth daily.     ? losartan (COZAAR) 100 MG tablet Take 100 mg by mouth every evening.      ? MULTIVITAMIN (MULTIPLE VITAMIN ORAL) Take 1 tablet by mouth daily.     ? nitroglycerin (NITROSTAT) 0.4 MG SL tablet Place 1 tablet (0.4 mg total) under the tongue every 5 (five) minutes as needed for chest pain. 25 tablet 10   ? omega 3-dha-epa-fish oil (FISH OIL) 1,000 mg (120 mg-180 mg) cap Take 1 capsule by mouth daily.     ? senna-docusate (PERICOLACE) 8.6-50 mg tablet Take 1 tablet by mouth 2 (two) times a day.  0   ? terbinafine HCl (LAMISIL) 1 % cream Apply 1 application topically as needed  (to feet). Every week as needed     ? amoxicillin (AMOXIL) 500 MG capsule Take 4 capsules by mouth as needed. Prior to dental appointments     ? celecoxib (CELEBREX) 100 MG capsule Take 100 mg by mouth daily. Start the day before surgery and take daily until all gone     ? oxyCODONE (ROXICODONE) 5 MG immediate release tablet Take 1 tablet (5 mg total) by mouth every 4 (four) hours as needed. 15 tablet 0     No current facility-administered medications for this visit.        Objective:   Wt Readings from Last 3 Encounters:   06/01/20 176 lb (79.8 kg)   04/08/20 170 lb (77.1 kg)   01/14/20 175 lb 1 oz (79.4 kg)     Vital signs:  /42 (Patient Site: Right Arm, Patient Position: Sitting, Cuff Size: Adult Regular)   Pulse (!) 58   Resp 16   Wt 176 lb (79.8 kg)   BMI 27.57 kg/m        Physical Exam:    General Appearance : Awake, Alert, No acute distress  HEENT: No Scleral icterus; the mucous membranes were pink and moist.  Conjunctivae not injected  Neck:  No cervical bruits, jugular venous distention, or thyromegaly   Chest: The spine was straight. Chest wall symmetric  Lungs: Respirations unlabored; the lungs are clear to auscultation.  No wheezing   Cardiovascular:  Normal point of maximal impulse.  Auscultation reveals normal first and second heart sounds with no murmurs, rubs, or gallops.  Carotid, radial, and dorsalis pedal pulses are intact and symmetric.    Abdomen: Rounded.  No organomegaly, masses, bruits, or tenderness. Bowels sounds are present  Extremities:  No clubbing, cyanosis.  No edema  Skin: No rash.  Scattered ecchymoses over forearms  Musculoskeletal: No tenderness.  Neurologic: Alert and oriented ×3. Speech is fluent.    Lab Results:  Creatinine 1.46 in 1/2020          ANNA VELASQUEZ MD [facility]  [phone]    This note created using Dragon voice recognition software.  Sound alike errors may have escaped editing.

## 2021-07-04 NOTE — PROGRESS NOTES
Progress Notes by Roc Will MD at 6/8/2021  9:10 AM     Author: Roc Will MD Service: -- Author Type: Physician    Filed: 6/8/2021 10:13 AM Encounter Date: 6/8/2021 Status: Signed    : Roc Will MD (Physician)         Cardiology Clinic Office Note    Assessment / Plan:    1.  Paroxysmal atrial fibrillation.  No symptomatic recurrence of though still with rare episodes.  40% of these episodes are associated with a heart rate greater than 120 but with A. fib burden still 1% will not change his medication regimen at this time.  2.  Hypertension, labile, fairly controlled today .  Significant ischemia demonstrated on stress testing outside of the inferior wall may need further adjustments in medications.  3.  Coronary artery disease, stable since 2005.  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      Plan follow-up in 1 year.  ______________________________________________________________________    Subjective:    I had the opportunity to see Skip Newman DDS at the [facility]. 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 male with a history of coronary artery disease status post circumflex intervention following myocardial infarction in 2000 and found to have chronic occlusion of posterior descending artery in 2008.  He has a history of bursts of nonsustained ventricular tachycardia since 2015, status post pacemaker placement for sick sinus syndrome in 2009.  Echocardiogram last year showed an ejection fraction of 43% with inferior hypokinesis.  He returns today for routine follow-up    Since I saw him last, he has generally felt well.  He has been active walking on a daily basis.  Over the last couple of months though he has noticed that he fatigues after three quarters of a mile and has to pause before resuming exercise.  He has no chest pain or lightheadedness.  He wonders if this relates to his stroke from last summer.  This occurred  despite him being on anticoagulation.  He otherwise feels well and offers no complaints.    ______________________________________________________________________    Problem List:  Patient Active Problem List   Diagnosis   ? Hyperlipidemia   ? Hypertension   ? Coronary Artery Disease   ? Sick sinus syndrome (H)   ? Nonsustained ventricular tachycardia (H)   ? Cardiac pacemaker, dual, in situ   ? Primary osteoarthritis of left knee   ? Gout   ? Recurrent kidney stones   ? Status post total left knee replacement   ? History of coronary artery stent placement   ? Primary osteoarthritis of right knee   ? Thrombocytopenia (H)   ? Spinal stenosis, lumbar region, with neurogenic claudication   ? Pacemaker   ? MI (myocardial infarction) (H)   ? Hypertension   ? Coronary artery disease   ? Chronic kidney disease   ? Paroxysmal atrial fibrillation (H)     Medical History:  Past Medical History:   Diagnosis Date   ? A-fib (H)    ? Anemia    ? Arrhythmia    ? Arthritis    ? Chronic kidney disease    ? Claudication (H)    ? Coronary artery disease    ? Gout    ? Hearing loss     high frequency   ? History of transfusion    ? Hyperlipidemia    ? Hypertension    ? Impotence of organic origin    ? Kidney stone     recurrent   ? MI (myocardial infarction) (H)    ? Pacemaker     Medtronic   ? Primary osteoarthritis of both knees    ? Sick sinus syndrome (H)    ? Skin cancer    ? Spinal stenosis, lumbar region, with neurogenic claudication    ? Syncope    ? Thrombocytopenia (H)    ? VT (ventricular tachycardia) (H)      Surgical History:  Past Surgical History:   Procedure Laterality Date   ? CARDIAC CATHETERIZATION     ? CORONARY STENT PLACEMENT  2001    LCx   ? HEMORRHOID SURGERY  1989   ? INSERT / REPLACE / REMOVE PACEMAKER  01/2009    Medtronic   ? l3-l4 lumbar stenosis  11/2018   ? [place] INSERT INTRACORONARY STENT      Description: Cath Stent Placement;  Recorded: 10/01/2012;  Comments: Mid LCx   ? [place] INSERT INTRACORONARY STENT       Description: Cath Stent Placement;  Recorded: 10/21/2014;  Comments: Mid LCx   ? IN TOTAL KNEE ARTHROPLASTY Left 7/8/2019    Procedure: LEFT TOTAL KNEE ARTHROPLASTY;  Surgeon: Bowen Padgett MD;  Location: [facility];  Service: Orthopedics   ? IN TOTAL KNEE ARTHROPLASTY Right 1/13/2020    Procedure: RIGHT TOTAL KNEE ARTHROPLASTY;  Surgeon: Bowen Padgett MD;  Location: [facility];  Service: Orthopedics   ? TONSILLECTOMY      child   ? TOTAL HIP ARTHROPLASTY Left      Social History:  Social History     Socioeconomic History   ? Marital status:      Spouse name: Not on file   ? Number of children: Not on file   ? Years of education: Not on file   ? Highest education level: Not on file   Occupational History   ? Not on file   Social Needs   ? Financial resource strain: Not on file   ? Food insecurity     Worry: Not on file     Inability: Not on file   ? Transportation needs     Medical: Not on file     Non-medical: Not on file   Tobacco Use   ? Smoking status: Never Smoker   ? Smokeless tobacco: Never Used   Substance and Sexual Activity   ? Alcohol use: No   ? Drug use: Not Currently   ? Sexual activity: Not on file   Lifestyle   ? Physical activity     Days per week: Not on file     Minutes per session: Not on file   ? Stress: Not on file   Relationships   ? Social connections     Talks on phone: Not on file     Gets together: Not on file     Attends [employer] service: Not on file     Active member of club or organization: Not on file     Attends meetings of clubs or organizations: Not on file     Relationship status: Not on file   ? Intimate partner violence     Fear of current or ex partner: Not on file     Emotionally abused: Not on file     Physically abused: Not on file     Forced sexual activity: Not on file   Other Topics Concern   ? Not on file   Social History Narrative   ? Not on file     Sleep History:  Restorative  Exercise History:  Walks three quarters mile daily     Review  of Systems:   General: WNL  Eyes: WNL  Ears/Nose/Throat: WNL  Lungs: WNL  Heart: WNL  Stomach: WNL  Bladder: WNL  Muscle/Joints: WNL  Skin: WNL  Nervous System: WNL  Mental Health: WNL     Blood: Easy Bruising(Warfarin)          Family History:  Family History   Problem Relation Age of Onset   ? Heart failure Mother    ? Heart failure Father          Allergies:  Allergies   Allergen Reactions   ? Lisinopril Cough   ? Indomethacin Rash   ? Naproxen Rash     Medications:  Current Outpatient Medications   Medication Sig Dispense Refill   ? acetaminophen (TYLENOL) 500 MG tablet Take 2 tablets (1,000 mg total) by mouth 3 (three) times a day. (Patient taking differently: Take 1,000 mg by mouth every 6 (six) hours as needed. )  0   ? allopurinol (ZYLOPRIM) 300 MG tablet Take 300 mg by mouth every evening.      ? amoxicillin (AMOXIL) 500 MG capsule Take 4 capsules by mouth as needed. Prior to dental appointments     ? atorvastatin (LIPITOR) 20 MG tablet Take 20 mg by mouth every other day.     ? chlorthalidone (HYGROTEN) 25 MG tablet Take 25 mg by mouth daily.     ? losartan (COZAAR) 100 MG tablet Take 100 mg by mouth every evening.      ? metoprolol succinate (TOPROL-XL) 50 MG 24 hr tablet Take 1 tablet (50 mg total) by mouth daily. 90 tablet 3   ? MULTIVITAMIN (MULTIPLE VITAMIN ORAL) Take 1 tablet by mouth daily.     ? nitroglycerin (NITROSTAT) 0.4 MG SL tablet Place 1 tablet (0.4 mg total) under the tongue every 5 (five) minutes as needed for chest pain. 25 tablet 10   ? omega 3-dha-epa-fish oil (FISH OIL) 1,000 mg (120 mg-180 mg) cap Take 1 capsule by mouth daily.     ? terbinafine HCl (LAMISIL) 1 % cream Apply 1 application topically as needed (to feet). Every week as needed     ? warfarin ANTICOAGULANT (COUMADIN/JANTOVEN) 1 MG tablet Take by mouth See Admin Instructions. Adjust dose based on INR results as directed.       No current facility-administered medications for this visit.        Objective:   Wt Readings from  "Last 3 Encounters:   06/08/21 170 lb (77.1 kg)   04/02/21 172 lb (78 kg)   02/28/21 170 lb (77.1 kg)     Vital signs:  /62 (Patient Site: Right Arm, Patient Position: Sitting, Cuff Size: Adult Regular)   Pulse 86   Resp 16   Ht 5' 7\" (1.702 m)   Wt 170 lb (77.1 kg) Comment: With shoes.  BMI 26.63 kg/m        Physical Exam:    General Appearance : Awake, Alert, No acute distress  HEENT: No Scleral icterus; the mucous membranes were pink and moist.  Conjunctivae not injected  Neck:  No cervical bruits, jugular venous distention, or thyromegaly   Chest: The spine was straight. Chest wall symmetric  Lungs: Respirations unlabored; the lungs are clear to auscultation.  No wheezing   Cardiovascular:  Normal point of maximal impulse.  Auscultation reveals regular first and second heart sounds with no murmurs, rubs, or gallops.  Carotid, radial, and dorsalis pedal pulses are intact and symmetric.  Abdomen: Rounded.  No organomegaly, masses, bruits, or tenderness. Bowels sounds are present  Extremities:  No clubbing, cyanosis.  No edema  Skin: No rash.  Scattered ecchymoses over forearms  Musculoskeletal: No tenderness.  Neurologic: Alert and oriented ×3. Speech is fluent.    Lab Results:  Creatinine 1.23 in 1/2021    Echocardiogram 7/2020 [facility] [place]:   1. Normal left ventricular chamber size. Normal left ventricular wall thickness.   Mildly-moderately decreased left ventricular systolic   function. Calculated left ventricular ejection fraction (modified Zheng technique) is 43 %.   2. Severe hypokinesis to akinesis of the basal to mid inferior and inferseptal segments as   well as the apical septum.   3. Normal right ventricular chamber size. Normal right ventricular systolic function.   4. No significant valvular heart disease.   5. There were no prior studies available for comparison.        ANNA VELASQUEZ MD [facility]  [phone]    This note created using Dragon voice recognition software.  Sound alike " errors may have escaped editing.

## 2021-07-06 VITALS
HEART RATE: 86 BPM | HEIGHT: 67 IN | RESPIRATION RATE: 16 BRPM | WEIGHT: 170 LBS | BODY MASS INDEX: 26.68 KG/M2 | DIASTOLIC BLOOD PRESSURE: 62 MMHG | SYSTOLIC BLOOD PRESSURE: 138 MMHG

## 2021-07-13 ENCOUNTER — ANCILLARY PROCEDURE (OUTPATIENT)
Dept: CARDIOLOGY | Facility: CLINIC | Age: 86
End: 2021-07-13
Attending: INTERNAL MEDICINE
Payer: MEDICARE

## 2021-07-13 DIAGNOSIS — Z95.0 CARDIAC PACEMAKER IN SITU: ICD-10-CM

## 2021-07-13 PROCEDURE — 93296 REM INTERROG EVL PM/IDS: CPT | Performed by: INTERNAL MEDICINE

## 2021-07-13 PROCEDURE — 93294 REM INTERROG EVL PM/LDLS PM: CPT | Performed by: INTERNAL MEDICINE

## 2021-07-15 LAB
MDC_IDC_LEAD_IMPLANT_DT: NORMAL
MDC_IDC_LEAD_IMPLANT_DT: NORMAL
MDC_IDC_LEAD_LOCATION: NORMAL
MDC_IDC_LEAD_LOCATION: NORMAL
MDC_IDC_LEAD_LOCATION_DETAIL_1: NORMAL
MDC_IDC_LEAD_LOCATION_DETAIL_1: NORMAL
MDC_IDC_LEAD_MFG: NORMAL
MDC_IDC_LEAD_MFG: NORMAL
MDC_IDC_LEAD_MODEL: NORMAL
MDC_IDC_LEAD_MODEL: NORMAL
MDC_IDC_LEAD_POLARITY_TYPE: NORMAL
MDC_IDC_LEAD_POLARITY_TYPE: NORMAL
MDC_IDC_LEAD_SERIAL: NORMAL
MDC_IDC_LEAD_SERIAL: NORMAL
MDC_IDC_MSMT_BATTERY_DTM: NORMAL
MDC_IDC_MSMT_BATTERY_IMPEDANCE: 484 OHM
MDC_IDC_MSMT_BATTERY_REMAINING_LONGEVITY: 101 MO
MDC_IDC_MSMT_BATTERY_STATUS: NORMAL
MDC_IDC_MSMT_BATTERY_VOLTAGE: 2.78 V
MDC_IDC_MSMT_LEADCHNL_RA_IMPEDANCE_VALUE: 444 OHM
MDC_IDC_MSMT_LEADCHNL_RA_PACING_THRESHOLD_AMPLITUDE: 0.5 V
MDC_IDC_MSMT_LEADCHNL_RA_PACING_THRESHOLD_PULSEWIDTH: 0.4 MS
MDC_IDC_MSMT_LEADCHNL_RV_IMPEDANCE_VALUE: 466 OHM
MDC_IDC_MSMT_LEADCHNL_RV_PACING_THRESHOLD_AMPLITUDE: 1.38 V
MDC_IDC_MSMT_LEADCHNL_RV_PACING_THRESHOLD_PULSEWIDTH: 0.4 MS
MDC_IDC_MSMT_LEADCHNL_RV_SENSING_INTR_AMPL: 16 MV
MDC_IDC_PG_IMPLANT_DTM: NORMAL
MDC_IDC_PG_MFG: NORMAL
MDC_IDC_PG_MODEL: NORMAL
MDC_IDC_PG_SERIAL: NORMAL
MDC_IDC_PG_TYPE: NORMAL
MDC_IDC_SESS_CLINIC_NAME: NORMAL
MDC_IDC_SESS_DTM: NORMAL
MDC_IDC_SESS_TYPE: NORMAL
MDC_IDC_SET_BRADY_AT_MODE_SWITCH_MODE: NORMAL
MDC_IDC_SET_BRADY_AT_MODE_SWITCH_RATE: 175 {BEATS}/MIN
MDC_IDC_SET_BRADY_LOWRATE: 60 {BEATS}/MIN
MDC_IDC_SET_BRADY_MAX_SENSOR_RATE: 120 {BEATS}/MIN
MDC_IDC_SET_BRADY_MAX_TRACKING_RATE: 120 {BEATS}/MIN
MDC_IDC_SET_BRADY_MODE: NORMAL
MDC_IDC_SET_BRADY_PAV_DELAY_LOW: 150 MS
MDC_IDC_SET_BRADY_SAV_DELAY_LOW: 120 MS
MDC_IDC_SET_LEADCHNL_RA_PACING_AMPLITUDE: 1.5 V
MDC_IDC_SET_LEADCHNL_RA_PACING_CAPTURE_MODE: NORMAL
MDC_IDC_SET_LEADCHNL_RA_PACING_POLARITY: NORMAL
MDC_IDC_SET_LEADCHNL_RA_PACING_PULSEWIDTH: 0.4 MS
MDC_IDC_SET_LEADCHNL_RA_SENSING_POLARITY: NORMAL
MDC_IDC_SET_LEADCHNL_RA_SENSING_SENSITIVITY: 0.35 MV
MDC_IDC_SET_LEADCHNL_RV_PACING_AMPLITUDE: 2 V
MDC_IDC_SET_LEADCHNL_RV_PACING_CAPTURE_MODE: NORMAL
MDC_IDC_SET_LEADCHNL_RV_PACING_POLARITY: NORMAL
MDC_IDC_SET_LEADCHNL_RV_PACING_PULSEWIDTH: 0.4 MS
MDC_IDC_SET_LEADCHNL_RV_SENSING_POLARITY: NORMAL
MDC_IDC_SET_LEADCHNL_RV_SENSING_SENSITIVITY: 5.6 MV
MDC_IDC_SET_ZONE_DETECTION_INTERVAL: 342.86 MS
MDC_IDC_SET_ZONE_DETECTION_INTERVAL: 375 MS
MDC_IDC_SET_ZONE_TYPE: NORMAL
MDC_IDC_SET_ZONE_TYPE: NORMAL
MDC_IDC_STAT_AT_BURDEN_PERCENT: 0.3 %
MDC_IDC_STAT_AT_DTM_END: NORMAL
MDC_IDC_STAT_AT_DTM_START: NORMAL
MDC_IDC_STAT_AT_MODE_SW_COUNT: 7
MDC_IDC_STAT_BRADY_AP_VP_PERCENT: 8 %
MDC_IDC_STAT_BRADY_AP_VS_PERCENT: 60 %
MDC_IDC_STAT_BRADY_AS_VP_PERCENT: 4 %
MDC_IDC_STAT_BRADY_AS_VS_PERCENT: 29 %
MDC_IDC_STAT_BRADY_DTM_END: NORMAL
MDC_IDC_STAT_BRADY_DTM_START: NORMAL
MDC_IDC_STAT_BRADY_RA_PERCENT_PACED: 68 %
MDC_IDC_STAT_BRADY_RV_PERCENT_PACED: 12 %
MDC_IDC_STAT_EPISODE_RECENT_COUNT: 1
MDC_IDC_STAT_EPISODE_RECENT_COUNT: 1
MDC_IDC_STAT_EPISODE_RECENT_COUNT_DTM_END: NORMAL
MDC_IDC_STAT_EPISODE_RECENT_COUNT_DTM_END: NORMAL
MDC_IDC_STAT_EPISODE_RECENT_COUNT_DTM_START: NORMAL
MDC_IDC_STAT_EPISODE_RECENT_COUNT_DTM_START: NORMAL
MDC_IDC_STAT_EPISODE_TYPE: NORMAL
MDC_IDC_STAT_EPISODE_TYPE: NORMAL

## 2021-08-24 ENCOUNTER — HOSPITAL ENCOUNTER (EMERGENCY)
Facility: HOSPITAL | Age: 86
Discharge: HOME | End: 2021-08-24
Attending: EMERGENCY MEDICINE | Admitting: EMERGENCY MEDICINE
Payer: MEDICARE

## 2021-08-24 ENCOUNTER — APPOINTMENT (OUTPATIENT)
Dept: CT IMAGING | Facility: HOSPITAL | Age: 86
End: 2021-08-24
Attending: EMERGENCY MEDICINE
Payer: MEDICARE

## 2021-08-24 ENCOUNTER — APPOINTMENT (OUTPATIENT)
Dept: RADIOLOGY | Facility: HOSPITAL | Age: 86
End: 2021-08-24
Attending: EMERGENCY MEDICINE
Payer: MEDICARE

## 2021-08-24 VITALS
RESPIRATION RATE: 20 BRPM | HEART RATE: 60 BPM | TEMPERATURE: 98 F | SYSTOLIC BLOOD PRESSURE: 149 MMHG | BODY MASS INDEX: 25.06 KG/M2 | DIASTOLIC BLOOD PRESSURE: 65 MMHG | OXYGEN SATURATION: 97 % | WEIGHT: 160 LBS

## 2021-08-24 DIAGNOSIS — M25.551 HIP PAIN, RIGHT: ICD-10-CM

## 2021-08-24 DIAGNOSIS — M62.838 MUSCLE SPASM: ICD-10-CM

## 2021-08-24 LAB
ANION GAP SERPL CALCULATED.3IONS-SCNC: 10 MMOL/L (ref 5–18)
BASOPHILS # BLD AUTO: 0 10E3/UL (ref 0–0.2)
BASOPHILS NFR BLD AUTO: 1 %
BUN SERPL-MCNC: 38 MG/DL (ref 8–28)
CALCIUM SERPL-MCNC: 9.8 MG/DL (ref 8.5–10.5)
CHLORIDE BLD-SCNC: 104 MMOL/L (ref 98–107)
CO2 SERPL-SCNC: 26 MMOL/L (ref 22–31)
CREAT SERPL-MCNC: 1.25 MG/DL (ref 0.7–1.3)
EOSINOPHIL # BLD AUTO: 0 10E3/UL (ref 0–0.7)
EOSINOPHIL NFR BLD AUTO: 0 %
ERYTHROCYTE [DISTWIDTH] IN BLOOD BY AUTOMATED COUNT: 14.5 % (ref 10–15)
GFR SERPL CREATININE-BSD FRML MDRD: 51 ML/MIN/1.73M2
GLUCOSE BLD-MCNC: 109 MG/DL (ref 70–125)
HCT VFR BLD AUTO: 40 % (ref 40–53)
HGB BLD-MCNC: 13.3 G/DL (ref 13.3–17.7)
IMM GRANULOCYTES # BLD: 0 10E3/UL
IMM GRANULOCYTES NFR BLD: 0 %
INR PPP: 3.02 (ref 0.9–1.15)
LYMPHOCYTES # BLD AUTO: 2 10E3/UL (ref 0.8–5.3)
LYMPHOCYTES NFR BLD AUTO: 23 %
MAGNESIUM SERPL-MCNC: 2.2 MG/DL (ref 1.8–2.6)
MCH RBC QN AUTO: 32.8 PG (ref 26.5–33)
MCHC RBC AUTO-ENTMCNC: 33.3 G/DL (ref 31.5–36.5)
MCV RBC AUTO: 99 FL (ref 78–100)
MONOCYTES # BLD AUTO: 0.8 10E3/UL (ref 0–1.3)
MONOCYTES NFR BLD AUTO: 9 %
NEUTROPHILS # BLD AUTO: 5.9 10E3/UL (ref 1.6–8.3)
NEUTROPHILS NFR BLD AUTO: 67 %
NRBC # BLD AUTO: 0 10E3/UL
NRBC BLD AUTO-RTO: 0 /100
PLATELET # BLD AUTO: 123 10E3/UL (ref 150–450)
POTASSIUM BLD-SCNC: 4.3 MMOL/L (ref 3.5–5)
RBC # BLD AUTO: 4.06 10E6/UL (ref 4.4–5.9)
SODIUM SERPL-SCNC: 140 MMOL/L (ref 136–145)
WBC # BLD AUTO: 8.7 10E3/UL (ref 4–11)

## 2021-08-24 PROCEDURE — 85610 PROTHROMBIN TIME: CPT | Performed by: EMERGENCY MEDICINE

## 2021-08-24 PROCEDURE — 73502 X-RAY EXAM HIP UNI 2-3 VIEWS: CPT

## 2021-08-24 PROCEDURE — 250N000013 HC RX MED GY IP 250 OP 250 PS 637: Performed by: EMERGENCY MEDICINE

## 2021-08-24 PROCEDURE — 85025 COMPLETE CBC W/AUTO DIFF WBC: CPT | Performed by: EMERGENCY MEDICINE

## 2021-08-24 PROCEDURE — 99285 EMERGENCY DEPT VISIT HI MDM: CPT | Mod: 25

## 2021-08-24 PROCEDURE — 250N000011 HC RX IP 250 OP 636: Performed by: EMERGENCY MEDICINE

## 2021-08-24 PROCEDURE — 73700 CT LOWER EXTREMITY W/O DYE: CPT | Mod: RT

## 2021-08-24 PROCEDURE — 83735 ASSAY OF MAGNESIUM: CPT | Performed by: EMERGENCY MEDICINE

## 2021-08-24 PROCEDURE — 36415 COLL VENOUS BLD VENIPUNCTURE: CPT | Performed by: EMERGENCY MEDICINE

## 2021-08-24 PROCEDURE — 96374 THER/PROPH/DIAG INJ IV PUSH: CPT

## 2021-08-24 PROCEDURE — 80048 BASIC METABOLIC PNL TOTAL CA: CPT | Performed by: EMERGENCY MEDICINE

## 2021-08-24 RX ORDER — CYCLOBENZAPRINE HCL 10 MG
10 TABLET ORAL ONCE
Status: COMPLETED | OUTPATIENT
Start: 2021-08-24 | End: 2021-08-24

## 2021-08-24 RX ORDER — ACETAMINOPHEN 325 MG/1
650 TABLET ORAL ONCE
Status: COMPLETED | OUTPATIENT
Start: 2021-08-24 | End: 2021-08-24

## 2021-08-24 RX ORDER — CYCLOBENZAPRINE HCL 10 MG
10 TABLET ORAL 2 TIMES DAILY PRN
Qty: 20 TABLET | Refills: 0 | Status: SHIPPED | OUTPATIENT
Start: 2021-08-24 | End: 2021-08-30

## 2021-08-24 RX ORDER — LORAZEPAM 2 MG/ML
0.5 INJECTION INTRAMUSCULAR ONCE
Status: COMPLETED | OUTPATIENT
Start: 2021-08-24 | End: 2021-08-24

## 2021-08-24 RX ADMIN — LORAZEPAM 0.5 MG: 2 INJECTION INTRAMUSCULAR; INTRAVENOUS at 13:29

## 2021-08-24 RX ADMIN — ACETAMINOPHEN 650 MG: 325 TABLET ORAL at 13:17

## 2021-08-24 RX ADMIN — CYCLOBENZAPRINE 10 MG: 10 TABLET, FILM COATED ORAL at 13:17

## 2021-08-24 NOTE — ED PROVIDER NOTES
EMERGENCY DEPARTMENT ENCOUNTER      NAME: Skip Newman  AGE: 87 year old male  YOB: 1934  MRN: [id number]  EVALUATION DATE & TIME: 8/24/2021 12:34 PM    PCP: Nikos Yang    ED PROVIDER: Raymond Walters M.D.      Chief Complaint   Patient presents with     Hip Pain         FINAL IMPRESSION:  1. Muscle spasm    2. Hip pain, right          ED COURSE & MEDICAL DECISION MAKING:    Pertinent Labs & Imaging studies reviewed. (See chart for details)  87 year old male presents to the Emergency Department for evaluation of right hip pain. Patient appears non toxic with stable vitals signs, patient is afebrile with no tachycardia or hypoxia, no increased work of breathing.  Patient is neurovascular intact with good distal sensation and pulses.  There is no outward signs of trauma he denies any falls or trauma.  No joint swelling, erythema or warmth, no fever or body aches or other systemic signs of illness.  Clinically, given history and exam noted, nothing to suggest septic joint, cellulitis, necrotizing fasciitis, compartment syndrome, vascular compromise, DVT, or other more malicious etiology of symptoms.  Concern for fracture or dislocation and plain film was obtained from triage which reported no fracture or dislocation.  Clinically exam is very suggestive of muscle spasm, considered but lower suspicion for joint effusion or soft tissue hematoma as the patient is on warfarin.  We will obtain screening labs as well as CT imaging to rule out occult fracture or deep tissue hematoma.  Patient was given pain medications and muscle relaxer.    Reassessment: Labs showed no acute concerning findings.  INR was 3.02.  CT imaging reported no acute concerning findings.  Following our interventions the patient felt markedly improved, following the muscle relaxers patient states that his pain is now improved he still has some slight discomfort with movement but again much improved.  With negative work-up and  improvement, I feel he is safe for discharge.  My overall clinical suspicion is for muscle spasm, he had no pain with palpation only discomfort when those muscles were engaged, did note CT report of probable hip joint effusion but again his pain was more so over the buttocks and lateral thigh which again makes me think it is more muscle spasm.  Discussed findings here with patient and family and they overall felt very reassured and comfortable with discharge, patient states that he does have some crutches at home that he will use but I otherwise recommended rest and gentle ambulation, will discharge with a short course of Flexeril and recommend that he also take Tylenol.  Will recommend that he follows up with his primary care provider in the next 3 to 5 days for continued outpatient management evaluation.  All questions were answered and reasons to return discussed.  Patient and family felt comfortable this plan he was discharged in stable condition.    12:50 PM I met with the patient, obtained history, performed an initial exam, and discussed options and plan for diagnostics and treatment here in the ED. [place] PPE was worn during the entire patient encounter.  3:11 PM I rechecked and reevaluated the patient. Repeat exam is benign and patient states that he feels much improved.  Discussed findings and discharge close follow-up.    At the conclusion of the encounter I discussed the results of all of the tests and the disposition. The questions were answered and return precautions provided. The patient or family acknowledged understanding and was agreeable with the care plan.         MEDICATIONS GIVEN IN THE EMERGENCY:  Medications   acetaminophen (TYLENOL) tablet 650 mg (650 mg Oral Given 8/24/21 1317)   cyclobenzaprine (FLEXERIL) tablet 10 mg (10 mg Oral Given 8/24/21 1317)   LORazepam (ATIVAN) injection 0.5 mg (0.5 mg Intravenous Given 8/24/21 1329)       NEW PRESCRIPTIONS STARTED AT TODAY'S ER VISIT  New  Prescriptions    CYCLOBENZAPRINE (FLEXERIL) 10 MG TABLET    Take 1 tablet (10 mg) by mouth 2 times daily as needed for muscle spasms            =================================================================    HPI    Patient information was obtained from: Patient    Use of Intrepreter: N/A       Skip Newman is a 87 year old male with a pertinent history of s/p left knee arthroplasty, s/p right knee arthroplasty, s/p left hip arthroplasty, and atrial fibrillation on Warfarin, who presents with worsening right hip pain since 03:00 this morning. Patient was turning over in bed when he felt onset of sudden sharp pain to his right hip. Denies fall or trauma. States that the pain worsens with movement and straightening his right leg, and improves with rest. He took tylenol at 06:00 with only minor provided relief. In addition, he endorses increased urinary frequency last night stating he had to urinate 3x. He notes that he has been experiencing minor right hip pain at his baseline for past 3 months and has an upcoming appointment with orthopedics.     Denies fever, blood in stool, constipation, diarrhea, dysuria, hematuria, chest pain, shortness of breath, or any additional symptoms.    REVIEW OF SYSTEMS   Constitutional:  Denies fever, chills  Respiratory:  Denies productive cough or increased work of breathing  Cardiovascular:  Denies chest pain, palpitations  GI:  Denies abdominal pain, nausea, vomiting, or change in bowel or bladder habits   : Positive for increased urinary frequency. Denies dysuria or hematuria.  Musculoskeletal: Positive for right hip pain. Denies any new muscle/joint swelling  Skin:  Denies rash   Neurologic:  Denies focal weakness  All systems negative except as marked.     PAST MEDICAL HISTORY:  Past Medical History:   Diagnosis Date     A-fib (H)      Anemia      Arrhythmia      Arthritis      Chronic kidney disease      Claudication (H)      Coronary artery disease      Gout       Hearing loss     high frequency     History of transfusion      Hyperlipidemia      Hypertension      Impotence of organic origin      Kidney stone     recurrent     MI (myocardial infarction) (H)      Pacemaker     Medtronic     Primary osteoarthritis of both knees      Sick sinus syndrome (H)      Skin cancer      Spinal stenosis, lumbar region, with neurogenic claudication      Syncope      Thrombocytopenia (H)      VT (ventricular tachycardia) (H)        PAST SURGICAL HISTORY:  Past Surgical History:   Procedure Laterality Date     C TOTAL KNEE ARTHROPLASTY Left 7/8/2019    Procedure: LEFT TOTAL KNEE ARTHROPLASTY;  Surgeon: Bowen Padgett MD;  Location: [facility];  Service: Orthopedics     C TOTAL KNEE ARTHROPLASTY Right 1/13/2020    Procedure: RIGHT TOTAL KNEE ARTHROPLASTY;  Surgeon: Bowen Padgett MD;  Location: [facility];  Service: Orthopedics     CARDIAC CATHETERIZATION       CORONARY STENT PLACEMENT  2001    LCx     HEMORRHOID SURGERY  1989     INSERT / REPLACE / REMOVE PACEMAKER  01/2009    Medtronic     OTHER SURGICAL HISTORY  11/2018    l3-l4 lumbar stenosis     TONSILLECTOMY      child     TOTAL HIP ARTHROPLASTY Left      [facility] CORONARY STENT PERCUT, INITIAL VESSEL      Description: Cath Stent Placement;  Recorded: 10/01/2012;  Comments: Mid LCx     ZZHC CORONARY STENT PERCUT, INITIAL VESSEL      Description: Cath Stent Placement;  Recorded: 10/21/2014;  Comments: Mid LCx         CURRENT MEDICATIONS:    Prior to Admission medications    Medication Sig Start Date End Date Taking? Authorizing Provider   acetaminophen (TYLENOL) 500 MG tablet [ACETAMINOPHEN (TYLENOL) 500 MG TABLET] Take 2 tablets (1,000 mg total) by mouth 3 (three) times a day. 7/10/19   Rob Smith PA-C   allopurinol (ZYLOPRIM) 300 MG tablet [ALLOPURINOL (ZYLOPRIM) 300 MG TABLET] Take 300 mg by mouth every evening.  10/12/15   Provider, Historical   amLODIPine (NORVASC) 2.5 MG tablet [AMLODIPINE (NORVASC) 2.5  MG TABLET] Take 1 tablet (2.5 mg total) by mouth daily. 6/23/21   Roc Will MD   amoxicillin (AMOXIL) 500 MG capsule [AMOXICILLIN (AMOXIL) 500 MG CAPSULE] Take 4 capsules by mouth as needed. Prior to dental appointments 11/5/19   Provider, Historical   apixaban ANTICOAGULANT (ELIQUIS) 5 mg Tab tablet [APIXABAN ANTICOAGULANT (ELIQUIS) 5 MG TAB TABLET] Take 1 tablet (5 mg total) by mouth 2 times a day at 9:00am and 5:00pm. 6/23/21   Roc Will MD   atorvastatin (LIPITOR) 20 MG tablet [ATORVASTATIN (LIPITOR) 20 MG TABLET] Take 20 mg by mouth every other day. 10/12/15   Provider, Historical   chlorthalidone (HYGROTEN) 25 MG tablet [CHLORTHALIDONE (HYGROTEN) 25 MG TABLET] Take 25 mg by mouth daily. 7/1/19   Provider, Historical   docosahexaenoic acid-epa 120-180 mg cap [DOCOSAHEXAENOIC ACID--180 MG CAP] Take 1 capsule by mouth daily. 1/9/20   Provider, Historical   losartan (COZAAR) 100 MG tablet [LOSARTAN (COZAAR) 100 MG TABLET] Take 100 mg by mouth every evening.  10/12/15   Provider, Historical   metoprolol succinate (TOPROL-XL) 50 MG 24 hr tablet [METOPROLOL SUCCINATE (TOPROL-XL) 50 MG 24 HR TABLET] Take 0.5 tablets (25 mg total) by mouth daily. 6/23/21   Roc Will MD   metoprolol succinate (TOPROL-XL) 50 MG 24 hr tablet [METOPROLOL SUCCINATE (TOPROL-XL) 50 MG 24 HR TABLET] Take 1 tablet (50 mg total) by mouth daily. 6/1/20   Roc Will MD   MULTIVITAMIN (MULTIPLE VITAMIN ORAL) [MULTIVITAMIN (MULTIPLE VITAMIN ORAL)] Take 1 tablet by mouth daily. 10/12/15   Provider, Historical   nitroglycerin (NITROSTAT) 0.4 MG SL tablet [NITROGLYCERIN (NITROSTAT) 0.4 MG SL TABLET] Place 1 tablet (0.4 mg total) under the tongue every 5 (five) minutes as needed for chest pain. 10/12/15   Roc Will MD   omega 0-mvr-xhx-fish oil (FISH OIL) 1,000 mg (120 mg-180 mg) cap [OMEGA 3-DHA-EPA-FISH OIL (FISH OIL) 1,000 MG (120 MG-180 MG) CAP] Take 1 capsule by mouth daily. 1/9/20   Provider  Historical   terbinafine HCl (LAMISIL) 1 % cream [TERBINAFINE HCL (LAMISIL) 1 % CREAM] Apply 1 application topically as needed (to feet). Every week as needed 7/8/19   Provider, Historical   warfarin ANTICOAGULANT (COUMADIN/JANTOVEN) 1 MG tablet [WARFARIN ANTICOAGULANT (COUMADIN/JANTOVEN) 1 MG TABLET] Take by mouth See Admin Instructions. Adjust dose based on INR results as directed. 10/9/20   Provider, Historical        ALLERGIES:  Allergies   Allergen Reactions     Lisinopril Cough     Indomethacin Rash     Naproxen Rash       FAMILY HISTORY:  Family History   Problem Relation Age of Onset     Heart Failure Mother      Heart Failure Father        SOCIAL HISTORY:   Social History     Socioeconomic History     Marital status:      Spouse name: Not on file     Number of children: Not on file     Years of education: Not on file     Highest education level: Not on file   Occupational History     Not on file   Tobacco Use     Smoking status: Never Smoker     Smokeless tobacco: Never Used   Substance and Sexual Activity     Alcohol use: No     Drug use: Not Currently     Sexual activity: Not on file   Other Topics Concern     Not on file   Social History Narrative     Not on file     Social Determinants of Health     Financial Resource Strain:      Difficulty of Paying Living Expenses:    Food Insecurity:      Worried About Running Out of Food in the Last Year:      Ran Out of Food in the Last Year:    Transportation Needs:      Lack of Transportation (Medical):      Lack of Transportation (Non-Medical):    Physical Activity:      Days of Exercise per Week:      Minutes of Exercise per Session:    Stress:      Feeling of Stress :    Social Connections:      Frequency of Communication with Friends and Family:      Frequency of Social Gatherings with Friends and Family:      Attends Jain Services:      Active Member of Clubs or Organizations:      Attends Club or Organization Meetings:      Marital Status:     Intimate Partner Violence:      Fear of Current or Ex-Partner:      Emotionally Abused:      Physically Abused:      Sexually Abused:        VITALS:  Patient Vitals for the past 24 hrs:   BP Temp Temp src Pulse Resp SpO2 Weight   08/24/21 1422 -- -- -- 65 -- 97 % --   08/24/21 1415 121/66 -- -- -- -- -- --   08/24/21 1331 (!) 176/77 -- -- -- -- -- --   08/24/21 1316 (!) 183/79 -- -- -- -- -- --   08/24/21 1300 (!) 178/81 -- -- -- -- -- --   08/24/21 1245 (!) 163/81 -- -- -- -- -- --   08/24/21 1239 (!) 186/88 -- -- -- -- -- --   08/24/21 0804 (!) 146/65 98  F (36.7  C) Temporal 71 20 97 % 72.6 kg (160 lb)        PHYSICAL EXAM    Constitutional:  Awake, alert, in no apparent distress  HENT:  Normocephalic, Atraumatic. Bilateral external ears normal. Oropharynx moist. Nose normal. Neck- Normal range of motion with no guarding, No midline cervical tenderness, Supple, No stridor.   Eyes:  PERRL, EOMI with no signs of entrapment, Conjunctiva normal, No discharge.   Respiratory:  Normal breath sounds, No respiratory distress, No wheezing.    Cardiovascular:  Normal heart rate, Normal rhythm, No appreciable rubs or gallops.   GI:  Soft, No tenderness, No distension, No palpable masses  Musculoskeletal:  Intact distal pulses, No edema.  Guarded range of motion at the right hip, pain with passive range of motion. No tenderness to palpation of the right hip, no major deformities noted.  No joint swelling, erythema or warmth.  Integument:  Warm, Dry, No erythema, No rash.  No ecchymosis over the right hip or leg  Neurologic:  Alert & oriented, Normal motor function, Normal sensory function, No focal deficits noted.   Psychiatric:  Affect normal, Judgment normal, Mood normal.     LAB:  All pertinent labs reviewed and interpreted.  Results for orders placed or performed during the hospital encounter of 08/24/21   XR Pelvis and Hip Right 2 Views    Impression    IMPRESSION:   1. No evidence of fracture or osteonecrosis.  2.  Mild osteoarthrosis in the right hip.  3. Moderate to severe degenerative changes in the lower lumbar spine. Mild to moderate degenerative changes in the SI joints.  4. Left total hip arthroplasty.  5. Extensive arterial calcifications.   CT Hip Right w/o Contrast    Impression    IMPRESSION:  1.  No CT evidence for acute displaced right hip fracture or malalignment. MRI more sensitive to detect nondisplaced fractures.  2.  Moderate right hip osteoarthritis.  3.  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  4.  Arterial calcification.     Basic metabolic panel   Result Value Ref Range    Sodium 140 136 - 145 mmol/L    Potassium 4.3 3.5 - 5.0 mmol/L    Chloride 104 98 - 107 mmol/L    Carbon Dioxide (CO2) 26 22 - 31 mmol/L    Anion Gap 10 5 - 18 mmol/L    Urea Nitrogen 38 (H) 8 - 28 mg/dL    Creatinine 1.25 0.70 - 1.30 mg/dL    Calcium 9.8 8.5 - 10.5 mg/dL    Glucose 109 70 - 125 mg/dL    GFR Estimate 51 (L) >60 mL/min/1.73m2   Result Value Ref Range    INR 3.02 (H) 0.90 - 1.15   Result Value Ref Range    Magnesium 2.2 1.8 - 2.6 mg/dL   CBC with platelets and differential   Result Value Ref Range    WBC Count 8.7 4.0 - 11.0 10e3/uL    RBC Count 4.06 (L) 4.40 - 5.90 10e6/uL    Hemoglobin 13.3 13.3 - 17.7 g/dL    Hematocrit 40.0 40.0 - 53.0 %    MCV 99 78 - 100 fL    MCH 32.8 26.5 - 33.0 pg    MCHC 33.3 31.5 - 36.5 g/dL    RDW 14.5 10.0 - 15.0 %    Platelet Count 123 (L) 150 - 450 10e3/uL    % Neutrophils 67 %    % Lymphocytes 23 %    % Monocytes 9 %    % Eosinophils 0 %    % Basophils 1 %    % Immature Granulocytes 0 %    NRBCs per 100 WBC 0 <1 /100    Absolute Neutrophils 5.9 1.6 - 8.3 10e3/uL    Absolute Lymphocytes 2.0 0.8 - 5.3 10e3/uL    Absolute Monocytes 0.8 0.0 - 1.3 10e3/uL    Absolute Eosinophils 0.0 0.0 - 0.7 10e3/uL    Absolute Basophils 0.0 0.0 - 0.2 10e3/uL    Absolute Immature Granulocytes 0.0 <=0.0 10e3/uL    Absolute NRBCs 0.0 10e3/uL       RADIOLOGY:  CT Hip Right w/o  Contrast   Final Result   IMPRESSION:   1.  No CT evidence for acute displaced right hip fracture or malalignment. MRI more sensitive to detect nondisplaced fractures.   2.  Moderate right hip osteoarthritis.   3.  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   4.  Arterial calcification.         XR Pelvis and Hip Right 2 Views   Final Result   IMPRESSION:    1. No evidence of fracture or osteonecrosis.   2. Mild osteoarthrosis in the right hip.   3. Moderate to severe degenerative changes in the lower lumbar spine. Mild to moderate degenerative changes in the SI joints.   4. Left total hip arthroplasty.   5. Extensive arterial calcifications.             EKG:      I have independently reviewed and interpreted the EKG(s) documented above.    PROCEDURES:          I, Bo Jacobs, am serving as a scribe to document services personally performed by Raymond Walters MD, based on my observation and the provider's statements to me. I, Raymond Walters MD attest that Bo Jacobs is acting in a scribe capacity, has observed my performance of the services and has documented them in accordance with my direction.    Raymond Walters M.D.  Emergency Medicine  [facility] EMERGENCY DEPARTMENT  [facility] [facility] [id number] [phone]  Dept: [phone]     Raymond Walters MD  08/24/21 4716

## 2021-08-24 NOTE — ED TRIAGE NOTES
Brought in by his grandson from home. During the night he woke up with right hip pain. Concerned that it may be dislocated. States it has not been dislocated before.     Ambulated into ED with crutches. Denies any falls or injuries. Unsteady on his feet. Wife had to help him dress this morning which is atypical for him. Currrently pain-free at rest sitting in wheelchair; pain goes to 8-9/10 with ambulation. Takes warfarin.

## 2021-10-12 ENCOUNTER — ANCILLARY PROCEDURE (OUTPATIENT)
Dept: CARDIOLOGY | Facility: CLINIC | Age: 86
End: 2021-10-12
Attending: INTERNAL MEDICINE
Payer: MEDICARE

## 2021-10-12 DIAGNOSIS — I49.5 SSS (SICK SINUS SYNDROME) (H): ICD-10-CM

## 2021-10-12 DIAGNOSIS — Z95.0 CARDIAC PACEMAKER IN SITU: ICD-10-CM

## 2021-10-12 LAB
MDC_IDC_LEAD_IMPLANT_DT: NORMAL
MDC_IDC_LEAD_IMPLANT_DT: NORMAL
MDC_IDC_LEAD_LOCATION: NORMAL
MDC_IDC_LEAD_LOCATION: NORMAL
MDC_IDC_LEAD_LOCATION_DETAIL_1: NORMAL
MDC_IDC_LEAD_LOCATION_DETAIL_1: NORMAL
MDC_IDC_LEAD_MFG: NORMAL
MDC_IDC_LEAD_MFG: NORMAL
MDC_IDC_LEAD_MODEL: NORMAL
MDC_IDC_LEAD_MODEL: NORMAL
MDC_IDC_LEAD_POLARITY_TYPE: NORMAL
MDC_IDC_LEAD_POLARITY_TYPE: NORMAL
MDC_IDC_LEAD_SERIAL: NORMAL
MDC_IDC_LEAD_SERIAL: NORMAL
MDC_IDC_MSMT_BATTERY_DTM: NORMAL
MDC_IDC_MSMT_BATTERY_IMPEDANCE: 509 OHM
MDC_IDC_MSMT_BATTERY_REMAINING_LONGEVITY: 100 MO
MDC_IDC_MSMT_BATTERY_STATUS: NORMAL
MDC_IDC_MSMT_BATTERY_VOLTAGE: 2.78 V
MDC_IDC_MSMT_LEADCHNL_RA_IMPEDANCE_VALUE: 478 OHM
MDC_IDC_MSMT_LEADCHNL_RA_PACING_THRESHOLD_AMPLITUDE: 0.5 V
MDC_IDC_MSMT_LEADCHNL_RA_PACING_THRESHOLD_PULSEWIDTH: 0.4 MS
MDC_IDC_MSMT_LEADCHNL_RV_IMPEDANCE_VALUE: 465 OHM
MDC_IDC_MSMT_LEADCHNL_RV_PACING_THRESHOLD_AMPLITUDE: 1.38 V
MDC_IDC_MSMT_LEADCHNL_RV_PACING_THRESHOLD_PULSEWIDTH: 0.4 MS
MDC_IDC_MSMT_LEADCHNL_RV_SENSING_INTR_AMPL: 16 MV
MDC_IDC_PG_IMPLANT_DTM: NORMAL
MDC_IDC_PG_MFG: NORMAL
MDC_IDC_PG_MODEL: NORMAL
MDC_IDC_PG_SERIAL: NORMAL
MDC_IDC_PG_TYPE: NORMAL
MDC_IDC_SESS_CLINIC_NAME: NORMAL
MDC_IDC_SESS_DTM: NORMAL
MDC_IDC_SESS_TYPE: NORMAL
MDC_IDC_SET_BRADY_AT_MODE_SWITCH_MODE: NORMAL
MDC_IDC_SET_BRADY_AT_MODE_SWITCH_RATE: 175 {BEATS}/MIN
MDC_IDC_SET_BRADY_LOWRATE: 60 {BEATS}/MIN
MDC_IDC_SET_BRADY_MAX_SENSOR_RATE: 120 {BEATS}/MIN
MDC_IDC_SET_BRADY_MAX_TRACKING_RATE: 120 {BEATS}/MIN
MDC_IDC_SET_BRADY_MODE: NORMAL
MDC_IDC_SET_BRADY_PAV_DELAY_LOW: 150 MS
MDC_IDC_SET_BRADY_SAV_DELAY_LOW: 120 MS
MDC_IDC_SET_LEADCHNL_RA_PACING_AMPLITUDE: 1.5 V
MDC_IDC_SET_LEADCHNL_RA_PACING_CAPTURE_MODE: NORMAL
MDC_IDC_SET_LEADCHNL_RA_PACING_POLARITY: NORMAL
MDC_IDC_SET_LEADCHNL_RA_PACING_PULSEWIDTH: 0.4 MS
MDC_IDC_SET_LEADCHNL_RA_SENSING_POLARITY: NORMAL
MDC_IDC_SET_LEADCHNL_RA_SENSING_SENSITIVITY: 0.35 MV
MDC_IDC_SET_LEADCHNL_RV_PACING_AMPLITUDE: 2 V
MDC_IDC_SET_LEADCHNL_RV_PACING_CAPTURE_MODE: NORMAL
MDC_IDC_SET_LEADCHNL_RV_PACING_POLARITY: NORMAL
MDC_IDC_SET_LEADCHNL_RV_PACING_PULSEWIDTH: 0.4 MS
MDC_IDC_SET_LEADCHNL_RV_SENSING_POLARITY: NORMAL
MDC_IDC_SET_LEADCHNL_RV_SENSING_SENSITIVITY: 5.6 MV
MDC_IDC_SET_ZONE_DETECTION_INTERVAL: 342.86 MS
MDC_IDC_SET_ZONE_DETECTION_INTERVAL: 375 MS
MDC_IDC_SET_ZONE_TYPE: NORMAL
MDC_IDC_SET_ZONE_TYPE: NORMAL
MDC_IDC_STAT_AT_BURDEN_PERCENT: 0.2 %
MDC_IDC_STAT_AT_DTM_END: NORMAL
MDC_IDC_STAT_AT_DTM_START: NORMAL
MDC_IDC_STAT_AT_MODE_SW_COUNT: 11
MDC_IDC_STAT_BRADY_AP_VP_PERCENT: 5 %
MDC_IDC_STAT_BRADY_AP_VS_PERCENT: 57 %
MDC_IDC_STAT_BRADY_AS_VP_PERCENT: 4 %
MDC_IDC_STAT_BRADY_AS_VS_PERCENT: 34 %
MDC_IDC_STAT_BRADY_DTM_END: NORMAL
MDC_IDC_STAT_BRADY_DTM_START: NORMAL
MDC_IDC_STAT_BRADY_RA_PERCENT_PACED: 62 %
MDC_IDC_STAT_BRADY_RV_PERCENT_PACED: 9 %
MDC_IDC_STAT_EPISODE_RECENT_COUNT: 1
MDC_IDC_STAT_EPISODE_RECENT_COUNT: 2
MDC_IDC_STAT_EPISODE_RECENT_COUNT_DTM_END: NORMAL
MDC_IDC_STAT_EPISODE_RECENT_COUNT_DTM_END: NORMAL
MDC_IDC_STAT_EPISODE_RECENT_COUNT_DTM_START: NORMAL
MDC_IDC_STAT_EPISODE_RECENT_COUNT_DTM_START: NORMAL
MDC_IDC_STAT_EPISODE_TYPE: NORMAL
MDC_IDC_STAT_EPISODE_TYPE: NORMAL

## 2021-10-12 PROCEDURE — 93296 REM INTERROG EVL PM/IDS: CPT | Performed by: INTERNAL MEDICINE

## 2021-10-12 PROCEDURE — 93294 REM INTERROG EVL PM/LDLS PM: CPT | Performed by: INTERNAL MEDICINE

## 2022-01-21 ENCOUNTER — ANCILLARY PROCEDURE (OUTPATIENT)
Dept: CARDIOLOGY | Facility: CLINIC | Age: 87
End: 2022-01-21
Attending: INTERNAL MEDICINE
Payer: MEDICARE

## 2022-01-21 DIAGNOSIS — I49.5 SSS (SICK SINUS SYNDROME) (H): ICD-10-CM

## 2022-01-21 DIAGNOSIS — Z95.0 CARDIAC PACEMAKER IN SITU: ICD-10-CM

## 2022-01-21 PROCEDURE — 93296 REM INTERROG EVL PM/IDS: CPT | Performed by: INTERNAL MEDICINE

## 2022-01-21 PROCEDURE — 93294 REM INTERROG EVL PM/LDLS PM: CPT | Performed by: INTERNAL MEDICINE

## 2022-01-25 LAB
MDC_IDC_LEAD_IMPLANT_DT: NORMAL
MDC_IDC_LEAD_IMPLANT_DT: NORMAL
MDC_IDC_LEAD_LOCATION: NORMAL
MDC_IDC_LEAD_LOCATION: NORMAL
MDC_IDC_LEAD_LOCATION_DETAIL_1: NORMAL
MDC_IDC_LEAD_LOCATION_DETAIL_1: NORMAL
MDC_IDC_LEAD_MFG: NORMAL
MDC_IDC_LEAD_MFG: NORMAL
MDC_IDC_LEAD_MODEL: NORMAL
MDC_IDC_LEAD_MODEL: NORMAL
MDC_IDC_LEAD_POLARITY_TYPE: NORMAL
MDC_IDC_LEAD_POLARITY_TYPE: NORMAL
MDC_IDC_LEAD_SERIAL: NORMAL
MDC_IDC_LEAD_SERIAL: NORMAL
MDC_IDC_MSMT_BATTERY_DTM: NORMAL
MDC_IDC_MSMT_BATTERY_IMPEDANCE: 586 OHM
MDC_IDC_MSMT_BATTERY_REMAINING_LONGEVITY: 94 MO
MDC_IDC_MSMT_BATTERY_STATUS: NORMAL
MDC_IDC_MSMT_BATTERY_VOLTAGE: 2.78 V
MDC_IDC_MSMT_LEADCHNL_RA_IMPEDANCE_VALUE: 437 OHM
MDC_IDC_MSMT_LEADCHNL_RA_PACING_THRESHOLD_AMPLITUDE: 0.5 V
MDC_IDC_MSMT_LEADCHNL_RA_PACING_THRESHOLD_PULSEWIDTH: 0.4 MS
MDC_IDC_MSMT_LEADCHNL_RV_IMPEDANCE_VALUE: 461 OHM
MDC_IDC_MSMT_LEADCHNL_RV_PACING_THRESHOLD_AMPLITUDE: 1.38 V
MDC_IDC_MSMT_LEADCHNL_RV_PACING_THRESHOLD_PULSEWIDTH: 0.4 MS
MDC_IDC_MSMT_LEADCHNL_RV_SENSING_INTR_AMPL: 16 MV
MDC_IDC_PG_IMPLANT_DTM: NORMAL
MDC_IDC_PG_MFG: NORMAL
MDC_IDC_PG_MODEL: NORMAL
MDC_IDC_PG_SERIAL: NORMAL
MDC_IDC_PG_TYPE: NORMAL
MDC_IDC_SESS_CLINIC_NAME: NORMAL
MDC_IDC_SESS_DTM: NORMAL
MDC_IDC_SESS_TYPE: NORMAL
MDC_IDC_SET_BRADY_AT_MODE_SWITCH_MODE: NORMAL
MDC_IDC_SET_BRADY_AT_MODE_SWITCH_RATE: 175 {BEATS}/MIN
MDC_IDC_SET_BRADY_LOWRATE: 60 {BEATS}/MIN
MDC_IDC_SET_BRADY_MAX_SENSOR_RATE: 120 {BEATS}/MIN
MDC_IDC_SET_BRADY_MAX_TRACKING_RATE: 120 {BEATS}/MIN
MDC_IDC_SET_BRADY_MODE: NORMAL
MDC_IDC_SET_BRADY_PAV_DELAY_LOW: 150 MS
MDC_IDC_SET_BRADY_SAV_DELAY_LOW: 120 MS
MDC_IDC_SET_LEADCHNL_RA_PACING_AMPLITUDE: 1.5 V
MDC_IDC_SET_LEADCHNL_RA_PACING_CAPTURE_MODE: NORMAL
MDC_IDC_SET_LEADCHNL_RA_PACING_POLARITY: NORMAL
MDC_IDC_SET_LEADCHNL_RA_PACING_PULSEWIDTH: 0.4 MS
MDC_IDC_SET_LEADCHNL_RA_SENSING_POLARITY: NORMAL
MDC_IDC_SET_LEADCHNL_RA_SENSING_SENSITIVITY: 0.35 MV
MDC_IDC_SET_LEADCHNL_RV_PACING_AMPLITUDE: 2 V
MDC_IDC_SET_LEADCHNL_RV_PACING_CAPTURE_MODE: NORMAL
MDC_IDC_SET_LEADCHNL_RV_PACING_POLARITY: NORMAL
MDC_IDC_SET_LEADCHNL_RV_PACING_PULSEWIDTH: 0.4 MS
MDC_IDC_SET_LEADCHNL_RV_SENSING_POLARITY: NORMAL
MDC_IDC_SET_LEADCHNL_RV_SENSING_SENSITIVITY: 5.6 MV
MDC_IDC_SET_ZONE_DETECTION_INTERVAL: 342.86 MS
MDC_IDC_SET_ZONE_DETECTION_INTERVAL: 375 MS
MDC_IDC_SET_ZONE_TYPE: NORMAL
MDC_IDC_SET_ZONE_TYPE: NORMAL
MDC_IDC_STAT_AT_BURDEN_PERCENT: 0.2 %
MDC_IDC_STAT_AT_DTM_END: NORMAL
MDC_IDC_STAT_AT_DTM_START: NORMAL
MDC_IDC_STAT_AT_MODE_SW_COUNT: 14
MDC_IDC_STAT_BRADY_AP_VP_PERCENT: 5 %
MDC_IDC_STAT_BRADY_AP_VS_PERCENT: 55 %
MDC_IDC_STAT_BRADY_AS_VP_PERCENT: 4 %
MDC_IDC_STAT_BRADY_AS_VS_PERCENT: 37 %
MDC_IDC_STAT_BRADY_DTM_END: NORMAL
MDC_IDC_STAT_BRADY_DTM_START: NORMAL
MDC_IDC_STAT_BRADY_RA_PERCENT_PACED: 60 %
MDC_IDC_STAT_BRADY_RV_PERCENT_PACED: 9 %
MDC_IDC_STAT_EPISODE_RECENT_COUNT: 1
MDC_IDC_STAT_EPISODE_RECENT_COUNT: 4
MDC_IDC_STAT_EPISODE_RECENT_COUNT_DTM_END: NORMAL
MDC_IDC_STAT_EPISODE_RECENT_COUNT_DTM_END: NORMAL
MDC_IDC_STAT_EPISODE_RECENT_COUNT_DTM_START: NORMAL
MDC_IDC_STAT_EPISODE_RECENT_COUNT_DTM_START: NORMAL
MDC_IDC_STAT_EPISODE_TYPE: NORMAL
MDC_IDC_STAT_EPISODE_TYPE: NORMAL

## 2022-04-06 ENCOUNTER — ANCILLARY PROCEDURE (OUTPATIENT)
Dept: CARDIOLOGY | Facility: CLINIC | Age: 87
End: 2022-04-06
Attending: INTERNAL MEDICINE
Payer: MEDICARE

## 2022-04-06 DIAGNOSIS — I49.5 SICK SINUS SYNDROME (H): ICD-10-CM

## 2022-04-06 DIAGNOSIS — Z95.0 CARDIAC PACEMAKER IN SITU: ICD-10-CM

## 2022-04-06 PROCEDURE — 93280 PM DEVICE PROGR EVAL DUAL: CPT | Performed by: INTERNAL MEDICINE

## 2022-04-07 LAB
MDC_IDC_LEAD_IMPLANT_DT: NORMAL
MDC_IDC_LEAD_IMPLANT_DT: NORMAL
MDC_IDC_LEAD_LOCATION: NORMAL
MDC_IDC_LEAD_LOCATION: NORMAL
MDC_IDC_LEAD_LOCATION_DETAIL_1: NORMAL
MDC_IDC_LEAD_LOCATION_DETAIL_1: NORMAL
MDC_IDC_LEAD_MFG: NORMAL
MDC_IDC_LEAD_MFG: NORMAL
MDC_IDC_LEAD_MODEL: NORMAL
MDC_IDC_LEAD_MODEL: NORMAL
MDC_IDC_LEAD_POLARITY_TYPE: NORMAL
MDC_IDC_LEAD_POLARITY_TYPE: NORMAL
MDC_IDC_LEAD_SERIAL: NORMAL
MDC_IDC_LEAD_SERIAL: NORMAL
MDC_IDC_MSMT_BATTERY_DTM: NORMAL
MDC_IDC_MSMT_BATTERY_IMPEDANCE: 610 OHM
MDC_IDC_MSMT_BATTERY_REMAINING_LONGEVITY: 92 MO
MDC_IDC_MSMT_BATTERY_STATUS: NORMAL
MDC_IDC_MSMT_BATTERY_VOLTAGE: 2.78 V
MDC_IDC_MSMT_LEADCHNL_RA_IMPEDANCE_VALUE: 457 OHM
MDC_IDC_MSMT_LEADCHNL_RA_PACING_THRESHOLD_AMPLITUDE: 0.5 V
MDC_IDC_MSMT_LEADCHNL_RA_PACING_THRESHOLD_AMPLITUDE: 0.5 V
MDC_IDC_MSMT_LEADCHNL_RA_PACING_THRESHOLD_PULSEWIDTH: 0.4 MS
MDC_IDC_MSMT_LEADCHNL_RA_PACING_THRESHOLD_PULSEWIDTH: 0.4 MS
MDC_IDC_MSMT_LEADCHNL_RA_SENSING_INTR_AMPL: 1.4 MV
MDC_IDC_MSMT_LEADCHNL_RV_IMPEDANCE_VALUE: 447 OHM
MDC_IDC_MSMT_LEADCHNL_RV_PACING_THRESHOLD_AMPLITUDE: 1.25 V
MDC_IDC_MSMT_LEADCHNL_RV_PACING_THRESHOLD_AMPLITUDE: 1.38 V
MDC_IDC_MSMT_LEADCHNL_RV_PACING_THRESHOLD_PULSEWIDTH: 0.4 MS
MDC_IDC_MSMT_LEADCHNL_RV_PACING_THRESHOLD_PULSEWIDTH: 0.4 MS
MDC_IDC_MSMT_LEADCHNL_RV_SENSING_INTR_AMPL: 22.4 MV
MDC_IDC_PG_IMPLANT_DTM: NORMAL
MDC_IDC_PG_MFG: NORMAL
MDC_IDC_PG_MODEL: NORMAL
MDC_IDC_PG_SERIAL: NORMAL
MDC_IDC_PG_TYPE: NORMAL
MDC_IDC_SESS_CLINIC_NAME: NORMAL
MDC_IDC_SESS_DTM: NORMAL
MDC_IDC_SESS_TYPE: NORMAL
MDC_IDC_SET_BRADY_AT_MODE_SWITCH_MODE: NORMAL
MDC_IDC_SET_BRADY_AT_MODE_SWITCH_RATE: 175 {BEATS}/MIN
MDC_IDC_SET_BRADY_LOWRATE: 60 {BEATS}/MIN
MDC_IDC_SET_BRADY_MAX_SENSOR_RATE: 120 {BEATS}/MIN
MDC_IDC_SET_BRADY_MAX_TRACKING_RATE: 120 {BEATS}/MIN
MDC_IDC_SET_BRADY_MODE: NORMAL
MDC_IDC_SET_BRADY_PAV_DELAY_LOW: 150 MS
MDC_IDC_SET_BRADY_SAV_DELAY_LOW: 120 MS
MDC_IDC_SET_LEADCHNL_RA_PACING_AMPLITUDE: NORMAL
MDC_IDC_SET_LEADCHNL_RA_PACING_CAPTURE_MODE: NORMAL
MDC_IDC_SET_LEADCHNL_RA_PACING_POLARITY: NORMAL
MDC_IDC_SET_LEADCHNL_RA_PACING_PULSEWIDTH: 0.4 MS
MDC_IDC_SET_LEADCHNL_RA_SENSING_POLARITY: NORMAL
MDC_IDC_SET_LEADCHNL_RA_SENSING_SENSITIVITY: 0.35 MV
MDC_IDC_SET_LEADCHNL_RV_PACING_AMPLITUDE: NORMAL
MDC_IDC_SET_LEADCHNL_RV_PACING_CAPTURE_MODE: NORMAL
MDC_IDC_SET_LEADCHNL_RV_PACING_POLARITY: NORMAL
MDC_IDC_SET_LEADCHNL_RV_PACING_PULSEWIDTH: 0.4 MS
MDC_IDC_SET_LEADCHNL_RV_SENSING_POLARITY: NORMAL
MDC_IDC_SET_LEADCHNL_RV_SENSING_SENSITIVITY: 5.6 MV
MDC_IDC_SET_ZONE_DETECTION_INTERVAL: 342.86 MS
MDC_IDC_SET_ZONE_DETECTION_INTERVAL: 375 MS
MDC_IDC_SET_ZONE_TYPE: NORMAL
MDC_IDC_SET_ZONE_TYPE: NORMAL
MDC_IDC_STAT_AT_BURDEN_PERCENT: 0.6 %
MDC_IDC_STAT_AT_DTM_END: NORMAL
MDC_IDC_STAT_AT_DTM_START: NORMAL
MDC_IDC_STAT_AT_MODE_SW_COUNT: 66
MDC_IDC_STAT_BRADY_AP_VP_PERCENT: 5 %
MDC_IDC_STAT_BRADY_AP_VS_PERCENT: 54 %
MDC_IDC_STAT_BRADY_AS_VP_PERCENT: 4 %
MDC_IDC_STAT_BRADY_AS_VS_PERCENT: 37 %
MDC_IDC_STAT_BRADY_DTM_END: NORMAL
MDC_IDC_STAT_BRADY_DTM_START: NORMAL
MDC_IDC_STAT_BRADY_RA_PERCENT_PACED: 59 %
MDC_IDC_STAT_BRADY_RV_PERCENT_PACED: 9 %
MDC_IDC_STAT_EPISODE_RECENT_COUNT: 16
MDC_IDC_STAT_EPISODE_RECENT_COUNT: 9
MDC_IDC_STAT_EPISODE_RECENT_COUNT: 9
MDC_IDC_STAT_EPISODE_RECENT_COUNT_DTM_END: NORMAL
MDC_IDC_STAT_EPISODE_RECENT_COUNT_DTM_START: NORMAL
MDC_IDC_STAT_EPISODE_TYPE: NORMAL

## 2022-06-17 ENCOUNTER — OFFICE VISIT (OUTPATIENT)
Dept: CARDIOLOGY | Facility: CLINIC | Age: 87
End: 2022-06-17
Payer: MEDICARE

## 2022-06-17 VITALS
DIASTOLIC BLOOD PRESSURE: 56 MMHG | RESPIRATION RATE: 14 BRPM | SYSTOLIC BLOOD PRESSURE: 138 MMHG | WEIGHT: 168 LBS | HEART RATE: 68 BPM | BODY MASS INDEX: 26.31 KG/M2

## 2022-06-17 DIAGNOSIS — I47.29 NONSUSTAINED VENTRICULAR TACHYCARDIA (H): Primary | ICD-10-CM

## 2022-06-17 DIAGNOSIS — I49.5 SICK SINUS SYNDROME (H): ICD-10-CM

## 2022-06-17 DIAGNOSIS — Z95.0 PACEMAKER: ICD-10-CM

## 2022-06-17 DIAGNOSIS — I48.0 PAROXYSMAL ATRIAL FIBRILLATION (H): ICD-10-CM

## 2022-06-17 DIAGNOSIS — Z95.5 HISTORY OF CORONARY ARTERY STENT PLACEMENT: ICD-10-CM

## 2022-06-17 DIAGNOSIS — I25.10 ATHEROSCLEROSIS OF NATIVE CORONARY ARTERY OF NATIVE HEART WITHOUT ANGINA PECTORIS: Chronic | ICD-10-CM

## 2022-06-17 LAB
ANION GAP SERPL CALCULATED.3IONS-SCNC: 10 MMOL/L (ref 5–18)
BUN SERPL-MCNC: 48 MG/DL (ref 8–28)
CALCIUM SERPL-MCNC: 8.9 MG/DL (ref 8.5–10.5)
CHLORIDE BLD-SCNC: 107 MMOL/L (ref 98–107)
CO2 SERPL-SCNC: 25 MMOL/L (ref 22–31)
CREAT SERPL-MCNC: 1.37 MG/DL (ref 0.7–1.3)
GFR SERPL CREATININE-BSD FRML MDRD: 50 ML/MIN/1.73M2
GLUCOSE BLD-MCNC: 93 MG/DL (ref 70–125)
MAGNESIUM SERPL-MCNC: 2.2 MG/DL (ref 1.8–2.6)
POTASSIUM BLD-SCNC: 4.6 MMOL/L (ref 3.5–5)
SODIUM SERPL-SCNC: 142 MMOL/L (ref 136–145)

## 2022-06-17 PROCEDURE — 83735 ASSAY OF MAGNESIUM: CPT | Performed by: INTERNAL MEDICINE

## 2022-06-17 PROCEDURE — 36415 COLL VENOUS BLD VENIPUNCTURE: CPT | Performed by: INTERNAL MEDICINE

## 2022-06-17 PROCEDURE — 99214 OFFICE O/P EST MOD 30 MIN: CPT | Performed by: INTERNAL MEDICINE

## 2022-06-17 PROCEDURE — 80048 BASIC METABOLIC PNL TOTAL CA: CPT | Performed by: INTERNAL MEDICINE

## 2022-06-17 RX ORDER — ATORVASTATIN CALCIUM 40 MG/1
20 TABLET, FILM COATED ORAL EVERY EVENING
COMMUNITY
Start: 2022-05-12

## 2022-06-17 RX ORDER — AMLODIPINE BESYLATE 2.5 MG/1
5 TABLET ORAL DAILY
Qty: 180 TABLET | Refills: 3 | Status: SHIPPED | OUTPATIENT
Start: 2022-06-17 | End: 2023-04-17

## 2022-06-17 NOTE — PATIENT INSTRUCTIONS
Continue your walking and bicycling for exercise.  There is no substitute!  Increase amlodipine to 5 mg daily to help with better blood pressure control.  We will check your magnesium and potassium along with renal function today to make sure these are not contributing to your nocturnal leg cramps.  I look forward to seeing you back in 1 year

## 2022-06-17 NOTE — LETTER
6/17/2022    Yaquelin Mcgill MD, MD  [facility] [address] [facility] [address]    RE: Skip Newman       Dear Colleague,     I had the pleasure of seeing Skip Newman in the [facility].    Cardiology Clinic Office Note    Assessment / Plan:    1.  Paroxysmal atrial fibrillation.  Brief recurrences on warfarin, asymptomatic, detected by device.  Continues anticoagulation with warfarin  2.  Hypertension with less than ideal control.  Will increase amlodipine to 5 mg daily.  3.  Coronary artery disease with no recent anginal symptoms.    Follow-up 1 year    ______________________________________________________________________    Subjective:    I had the opportunity to see Skip Downingne at the [facility]. 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  He has a history of bursts of nonsustained ventricular tachycardia since 2015, status post pacemaker placement for sick sinus syndrome in 2009.  Echocardiogram 2020 showed an ejection fraction of 43% with inferior hypokinesis. 2021  pharmacologic myocardial perfusion imaging study that showed a fixed inferior defect with possible transient ischemic dilatation versus normal variant.  He returns today for routine follow-up.    Since I saw him last year he switched Eliquis to warfarin for cost considerations.  He is tolerating the warfarin well.  He continues to be active, walking a mile and a half most days along with 10-minute bicycle ride.  His wife has mild dementia and this is increasingly burdensome for him.  He has not experienced any chest pain.  He does note some nocturnal leg cramps but these do not occur when he is walking.  He is trying to keep his sodium intake low though  realizes there is more sodium in his diet than he thinks  Systolic blood pressures generally are in the 130s.    Device check in April showed normal function with 66 mode switches suggesting bouts of atrial fibrillation, totaling less than 1%.  He has no awareness of atrial fibrillation episodes  ______________________________________________________________________    Problem List:  Patient Active Problem List   Diagnosis     Hyperlipidemia     Hypertension     Coronary Artery Disease     Sick sinus syndrome (H)     Nonsustained ventricular tachycardia (H)     Cardiac pacemaker, dual, in situ     Primary osteoarthritis of left knee     Gout     Recurrent kidney stones     Status post total left knee replacement     History of coronary artery stent placement     Primary osteoarthritis of right knee     Thrombocytopenia (H)     Spinal stenosis, lumbar region, with neurogenic claudication     Pacemaker     MI (myocardial infarction) (H)     Hypertension     Coronary artery disease     Chronic kidney disease     Paroxysmal atrial fibrillation (H)     Medical History:  Past Medical History:   Diagnosis Date     A-fib (H)      Anemia      Arrhythmia      Arthritis      Chronic kidney disease      Claudication (H)      Coronary artery disease      Gout      Hearing loss     high frequency     History of transfusion      Hyperlipidemia      Hypertension      Impotence of organic origin      Kidney stone     recurrent     MI (myocardial infarction) (H)      Pacemaker     Medtronic     Primary osteoarthritis of both knees      Sick sinus syndrome (H)      Skin cancer      Spinal stenosis, lumbar region, with neurogenic claudication      Syncope      Thrombocytopenia (H)      VT (ventricular tachycardia) (H)      Surgical History:  Past Surgical History:   Procedure Laterality Date     CARDIAC CATHETERIZATION       CORONARY STENT PLACEMENT  2001    LCx     HEMORRHOID SURGERY  1989     INSERT / REPLACE / REMOVE PACEMAKER   01/2009    Medtronic     OTHER SURGICAL HISTORY  11/2018    l3-l4 lumbar stenosis     TONSILLECTOMY      child     TOTAL HIP ARTHROPLASTY Left      [facility] TOTAL KNEE ARTHROPLASTY Left 7/8/2019    Procedure: LEFT TOTAL KNEE ARTHROPLASTY;  Surgeon: Bowen Padgett MD;  Location: [facility];  Service: Orthopedics     [facility] TOTAL KNEE ARTHROPLASTY Right 1/13/2020    Procedure: RIGHT TOTAL KNEE ARTHROPLASTY;  Surgeon: Bowen Padgett MD;  Location: [facility] OR;  Service: Orthopedics     [facility] CORONARY STENT PERCUT, INITIAL VESSEL      Description: Cath Stent Placement;  Recorded: 10/01/2012;  Comments: Mid LCx     [facility] CORONARY STENT PERCUT, INITIAL VESSEL      Description: Cath Stent Placement;  Recorded: 10/21/2014;  Comments: Mid LCx     Social History:  Social History     Socioeconomic History     Marital status:      Spouse name: Not on file     Number of children: Not on file     Years of education: Not on file     Highest education level: Not on file   Occupational History     Not on file   Tobacco Use     Smoking status: Never Smoker     Smokeless tobacco: Never Used   Substance and Sexual Activity     Alcohol use: No     Drug use: Not Currently     Sexual activity: Not on file   Other Topics Concern     Not on file   Social History Narrative     Not on file     Social Determinants of Health     Financial Resource Strain: Not on file   Food Insecurity: Not on file   Transportation Needs: Not on file   Physical Activity: Not on file   Stress: Not on file   Social Connections: Not on file   Intimate Partner Violence: Not on file   Housing Stability: Not on file     Sleep History:  Restorative  Exercise History:  Rides bicycle for 10 minutes each morning then goes for a walk for 1.5 miles most days.    Review of Systems:      12 point review of systems otherwise negative     Please refer above for cardiac ROS details.         Family History:  Family History   Problem Relation Age of Onset     Heart  Failure Mother      Heart Failure Father          Allergies:  Allergies   Allergen Reactions     Lisinopril Cough     Indomethacin Rash     Naproxen Rash     Medications:  Current Outpatient Medications   Medication Sig Dispense Refill     acetaminophen (TYLENOL) 500 MG tablet [ACETAMINOPHEN (TYLENOL) 500 MG TABLET] Take 2 tablets (1,000 mg total) by mouth 3 (three) times a day. (Patient taking differently: Take 1,000 mg by mouth every 6 hours as needed)  0     allopurinol (ZYLOPRIM) 300 MG tablet [ALLOPURINOL (ZYLOPRIM) 300 MG TABLET] Take 300 mg by mouth every evening.        amLODIPine (NORVASC) 2.5 MG tablet Take 2 tablets (5 mg) by mouth daily 180 tablet 3     amoxicillin (AMOXIL) 500 MG capsule [AMOXICILLIN (AMOXIL) 500 MG CAPSULE] Take 4 capsules by mouth as needed. Prior to dental appointments       atorvastatin (LIPITOR) 40 MG tablet Take 20 mg by mouth daily       chlorthalidone (HYGROTEN) 25 MG tablet [CHLORTHALIDONE (HYGROTEN) 25 MG TABLET] Take 25 mg by mouth daily.       losartan (COZAAR) 100 MG tablet Take 50 mg by mouth every evening       metoprolol succinate (TOPROL-XL) 50 MG 24 hr tablet [METOPROLOL SUCCINATE (TOPROL-XL) 50 MG 24 HR TABLET] Take 0.5 tablets (25 mg total) by mouth daily. 90 tablet 3     MULTIVITAMIN (MULTIPLE VITAMIN ORAL) [MULTIVITAMIN (MULTIPLE VITAMIN ORAL)] Take 1 tablet by mouth daily.       nitroglycerin (NITROSTAT) 0.4 MG SL tablet [NITROGLYCERIN (NITROSTAT) 0.4 MG SL TABLET] Place 1 tablet (0.4 mg total) under the tongue every 5 (five) minutes as needed for chest pain. 25 tablet 10     omega 3-dha-epa-fish oil (FISH OIL) 1,000 mg (120 mg-180 mg) cap [OMEGA 3-DHA-EPA-FISH OIL (FISH OIL) 1,000 MG (120 MG-180 MG) CAP] Take 1 capsule by mouth daily.       terbinafine HCl (LAMISIL) 1 % cream [TERBINAFINE HCL (LAMISIL) 1 % CREAM] Apply 1 application topically as needed (to feet). Every week as needed       warfarin ANTICOAGULANT (COUMADIN/JANTOVEN) 1 MG tablet [WARFARIN  ANTICOAGULANT (COUMADIN/JANTOVEN) 1 MG TABLET] Take by mouth See Admin Instructions. Adjust dose based on INR results as directed.         Objective:   Wt Readings from Last 3 Encounters:   06/17/22 76.2 kg (168 lb)   08/24/21 72.6 kg (160 lb)   06/23/21 78 kg (172 lb)     Vital signs:  /56 (BP Location: Right arm, Patient Position: Sitting, Cuff Size: Adult Regular)   Pulse 68   Resp 14   Wt 76.2 kg (168 lb)   BMI 26.31 kg/m        Physical Exam:    General Appearance : Awake, Alert, No acute distress  HEENT: No Scleral icterus; the mucous membranes were pink and moist.  Conjunctivae not injected  Neck:  No jugular venous distention, or thyromegaly left carotid systolic bruit  Chest: The spine was straight. Chest wall symmetric  Lungs: Respirations unlabored; the lungs are clear to auscultation.  No wheezing   Cardiovascular:   Normal point of maximal impulse.  Auscultation reveals regular first and second heart sounds with no murmurs, rubs, or gallops.  Carotid, radial, and dorsalis pedal pulses are intact and symmetric.  Abdomen: No organomegaly, masses, bruits, or tenderness. Bowels sounds are present  Extremities:  No clubbing, cyanosis.  Trace pretibial edema  Skin: No rash, bruising  Musculoskeletal: No tenderness.  Neurologic: Alert and oriented ×3. Speech is fluent.    Lab Results:  LIPIDS:  No results found for: CHOL  No results found for: HDL  No results found for: LDL  No results found for: TRIG    Echocardiogram 7/2020 [facility]:   1. Normal left ventricular chamber size. Normal left ventricular wall thickness.   Mildly-moderately decreased left ventricular systolic function. Calculated left ventricular ejection fraction (modified Zheng technique) is 43 %.   2. Severe hypokinesis to akinesis of the basal to mid inferior and inferseptal segments as well as the apical septum.   3. Normal right ventricular chamber size. Normal right ventricular systolic function.   4. No significant  valvular heart disease.   5. There were no prior studies available for comparison.        Nuclear myocardial perfusion imaging study 6/11/2021:   A prior study was conducted on 10/12/2015, there is transient myocardial dilation in current study.     Pharmacological regadenosen stress ECG is negative for inducible myocardial ischemia.  Frequent PVCs are identified.     Pharmacological regadenosen nuclear stress test is abnormal.  There is no reversible change indicating inducible myocardial ischemia. There is nontransmural myocardial infarction in mid to distal inferior and inferolateral walls.  It is noticed that there is transient ischemic dilation indicating possible three vessel disease versus nonspecific findings.     Normal left ventricular size with mildly reduced left ventricular systolic function.  The calculated left ventricular systolic function is 50%.     The patient is at an intermediate risk of future cardiac ischemic events.            ANNA VELASQUEZ MD [facility]  [phone]    This note created using Dragon voice recognition software.  Sound alike errors may have escaped editing.    Thank you for allowing me to participate in the care of your patient.      Sincerely,     Anna Velasquez MD     [facility]  cc:   No referring provider defined for this encounter.

## 2022-06-17 NOTE — PROGRESS NOTES
Cardiology Clinic Office Note    Assessment / Plan:    1.  Paroxysmal atrial fibrillation.  Brief recurrences on warfarin, asymptomatic, detected by device.  Continues anticoagulation with warfarin  2.  Hypertension with less than ideal control.  Will increase amlodipine to 5 mg daily.  3.  Coronary artery disease with no recent anginal symptoms.    Follow-up 1 year    ______________________________________________________________________    Subjective:    I had the opportunity to see Skip Newman at the [facility]. 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  He has a history of bursts of nonsustained ventricular tachycardia since 2015, status post pacemaker placement for sick sinus syndrome in 2009.  Echocardiogram 2020 showed an ejection fraction of 43% with inferior hypokinesis. 2021  pharmacologic myocardial perfusion imaging study that showed a fixed inferior defect with possible transient ischemic dilatation versus normal variant.  He returns today for routine follow-up.    Since I saw him last year he switched Eliquis to warfarin for cost considerations.  He is tolerating the warfarin well.  He continues to be active, walking a mile and a half most days along with 10-minute bicycle ride.  His wife has mild dementia and this is increasingly burdensome for him.  He has not experienced any chest pain.  He does note some nocturnal leg cramps but these do not occur when he is walking.  He is trying to keep his sodium intake low though realizes there is more sodium in his diet than he thinks  Systolic blood pressures generally are in the 130s.    Device check in April showed normal function with 66 mode switches suggesting bouts of atrial fibrillation, totaling less than 1%.  He has  no awareness of atrial fibrillation episodes  ______________________________________________________________________    Problem List:  Patient Active Problem List   Diagnosis     Hyperlipidemia     Hypertension     Coronary Artery Disease     Sick sinus syndrome (H)     Nonsustained ventricular tachycardia (H)     Cardiac pacemaker, dual, in situ     Primary osteoarthritis of left knee     Gout     Recurrent kidney stones     Status post total left knee replacement     History of coronary artery stent placement     Primary osteoarthritis of right knee     Thrombocytopenia (H)     Spinal stenosis, lumbar region, with neurogenic claudication     Pacemaker     MI (myocardial infarction) (H)     Hypertension     Coronary artery disease     Chronic kidney disease     Paroxysmal atrial fibrillation (H)     Medical History:  Past Medical History:   Diagnosis Date     A-fib (H)      Anemia      Arrhythmia      Arthritis      Chronic kidney disease      Claudication (H)      Coronary artery disease      Gout      Hearing loss     high frequency     History of transfusion      Hyperlipidemia      Hypertension      Impotence of organic origin      Kidney stone     recurrent     MI (myocardial infarction) (H)      Pacemaker     Medtronic     Primary osteoarthritis of both knees      Sick sinus syndrome (H)      Skin cancer      Spinal stenosis, lumbar region, with neurogenic claudication      Syncope      Thrombocytopenia (H)      VT (ventricular tachycardia) (H)      Surgical History:  Past Surgical History:   Procedure Laterality Date     CARDIAC CATHETERIZATION       CORONARY STENT PLACEMENT  2001    LCx     HEMORRHOID SURGERY  1989     INSERT / REPLACE / REMOVE PACEMAKER  01/2009    Medtronic     OTHER SURGICAL HISTORY  11/2018    l3-l4 lumbar stenosis     TONSILLECTOMY      child     TOTAL HIP ARTHROPLASTY Left      [facility] TOTAL KNEE ARTHROPLASTY Left 7/8/2019    Procedure: LEFT TOTAL KNEE ARTHROPLASTY;  Surgeon: Bowen Padgett  MD MARISA;  Location: [facility];  Service: Orthopedics     [facility] TOTAL KNEE ARTHROPLASTY Right 1/13/2020    Procedure: RIGHT TOTAL KNEE ARTHROPLASTY;  Surgeon: Bowen Padgett MD;  Location: [facility];  Service: Orthopedics     [facility] CORONARY STENT PERCUT, INITIAL VESSEL      Description: Cath Stent Placement;  Recorded: 10/01/2012;  Comments: Mid LCx     [facility] CORONARY STENT PERCUT, INITIAL VESSEL      Description: Cath Stent Placement;  Recorded: 10/21/2014;  Comments: Mid LCx     Social History:  Social History     Socioeconomic History     Marital status:      Spouse name: Not on file     Number of children: Not on file     Years of education: Not on file     Highest education level: Not on file   Occupational History     Not on file   Tobacco Use     Smoking status: Never Smoker     Smokeless tobacco: Never Used   Substance and Sexual Activity     Alcohol use: No     Drug use: Not Currently     Sexual activity: Not on file   Other Topics Concern     Not on file   Social History Narrative     Not on file     Social Determinants of Health     Financial Resource Strain: Not on file   Food Insecurity: Not on file   Transportation Needs: Not on file   Physical Activity: Not on file   Stress: Not on file   Social Connections: Not on file   Intimate Partner Violence: Not on file   Housing Stability: Not on file     Sleep History:  Restorative  Exercise History:  Rides bicycle for 10 minutes each morning then goes for a walk for 1.5 miles most days.    Review of Systems:      12 point review of systems otherwise negative     Please refer above for cardiac ROS details.         Family History:  Family History   Problem Relation Age of Onset     Heart Failure Mother      Heart Failure Father          Allergies:  Allergies   Allergen Reactions     Lisinopril Cough     Indomethacin Rash     Naproxen Rash     Medications:  Current Outpatient Medications   Medication Sig Dispense Refill     acetaminophen  (TYLENOL) 500 MG tablet [ACETAMINOPHEN (TYLENOL) 500 MG TABLET] Take 2 tablets (1,000 mg total) by mouth 3 (three) times a day. (Patient taking differently: Take 1,000 mg by mouth every 6 hours as needed)  0     allopurinol (ZYLOPRIM) 300 MG tablet [ALLOPURINOL (ZYLOPRIM) 300 MG TABLET] Take 300 mg by mouth every evening.        amLODIPine (NORVASC) 2.5 MG tablet Take 2 tablets (5 mg) by mouth daily 180 tablet 3     amoxicillin (AMOXIL) 500 MG capsule [AMOXICILLIN (AMOXIL) 500 MG CAPSULE] Take 4 capsules by mouth as needed. Prior to dental appointments       atorvastatin (LIPITOR) 40 MG tablet Take 20 mg by mouth daily       chlorthalidone (HYGROTEN) 25 MG tablet [CHLORTHALIDONE (HYGROTEN) 25 MG TABLET] Take 25 mg by mouth daily.       losartan (COZAAR) 100 MG tablet Take 50 mg by mouth every evening       metoprolol succinate (TOPROL-XL) 50 MG 24 hr tablet [METOPROLOL SUCCINATE (TOPROL-XL) 50 MG 24 HR TABLET] Take 0.5 tablets (25 mg total) by mouth daily. 90 tablet 3     MULTIVITAMIN (MULTIPLE VITAMIN ORAL) [MULTIVITAMIN (MULTIPLE VITAMIN ORAL)] Take 1 tablet by mouth daily.       nitroglycerin (NITROSTAT) 0.4 MG SL tablet [NITROGLYCERIN (NITROSTAT) 0.4 MG SL TABLET] Place 1 tablet (0.4 mg total) under the tongue every 5 (five) minutes as needed for chest pain. 25 tablet 10     omega 3-dha-epa-fish oil (FISH OIL) 1,000 mg (120 mg-180 mg) cap [OMEGA 3-DHA-EPA-FISH OIL (FISH OIL) 1,000 MG (120 MG-180 MG) CAP] Take 1 capsule by mouth daily.       terbinafine HCl (LAMISIL) 1 % cream [TERBINAFINE HCL (LAMISIL) 1 % CREAM] Apply 1 application topically as needed (to feet). Every week as needed       warfarin ANTICOAGULANT (COUMADIN/JANTOVEN) 1 MG tablet [WARFARIN ANTICOAGULANT (COUMADIN/JANTOVEN) 1 MG TABLET] Take by mouth See Admin Instructions. Adjust dose based on INR results as directed.         Objective:   Wt Readings from Last 3 Encounters:   06/17/22 76.2 kg (168 lb)   08/24/21 72.6 kg (160 lb)   06/23/21 78 kg  (172 lb)     Vital signs:  /56 (BP Location: Right arm, Patient Position: Sitting, Cuff Size: Adult Regular)   Pulse 68   Resp 14   Wt 76.2 kg (168 lb)   BMI 26.31 kg/m        Physical Exam:    General Appearance : Awake, Alert, No acute distress  HEENT: No Scleral icterus; the mucous membranes were pink and moist.  Conjunctivae not injected  Neck:  No jugular venous distention, or thyromegaly left carotid systolic bruit  Chest: The spine was straight. Chest wall symmetric  Lungs: Respirations unlabored; the lungs are clear to auscultation.  No wheezing   Cardiovascular:   Normal point of maximal impulse.  Auscultation reveals regular first and second heart sounds with no murmurs, rubs, or gallops.  Carotid, radial, and dorsalis pedal pulses are intact and symmetric.  Abdomen: No organomegaly, masses, bruits, or tenderness. Bowels sounds are present  Extremities:  No clubbing, cyanosis.  Trace pretibial edema  Skin: No rash, bruising  Musculoskeletal: No tenderness.  Neurologic: Alert and oriented ×3. Speech is fluent.    Lab Results:  LIPIDS:  No results found for: CHOL  No results found for: HDL  No results found for: LDL  No results found for: TRIG    Echocardiogram 7/2020 [facility]:   1. Normal left ventricular chamber size. Normal left ventricular wall thickness.   Mildly-moderately decreased left ventricular systolic function. Calculated left ventricular ejection fraction (modified Zheng technique) is 43 %.   2. Severe hypokinesis to akinesis of the basal to mid inferior and inferseptal segments as well as the apical septum.   3. Normal right ventricular chamber size. Normal right ventricular systolic function.   4. No significant valvular heart disease.   5. There were no prior studies available for comparison.        Nuclear myocardial perfusion imaging study 6/11/2021:   A prior study was conducted on 10/12/2015, there is transient myocardial dilation in current study.     Pharmacological  regadenosen stress ECG is negative for inducible myocardial ischemia.  Frequent PVCs are identified.     Pharmacological regadenosen nuclear stress test is abnormal.  There is no reversible change indicating inducible myocardial ischemia. There is nontransmural myocardial infarction in mid to distal inferior and inferolateral walls.  It is noticed that there is transient ischemic dilation indicating possible three vessel disease versus nonspecific findings.     Normal left ventricular size with mildly reduced left ventricular systolic function.  The calculated left ventricular systolic function is 50%.     The patient is at an intermediate risk of future cardiac ischemic events.            ANNA VELASQUEZ MD [facility]  [phone]    This note created using Dragon voice recognition software.  Sound alike errors may have escaped editing.

## 2022-07-13 ENCOUNTER — ANCILLARY PROCEDURE (OUTPATIENT)
Dept: CARDIOLOGY | Facility: CLINIC | Age: 87
End: 2022-07-13
Attending: INTERNAL MEDICINE
Payer: MEDICARE

## 2022-07-13 DIAGNOSIS — Z95.0 CARDIAC PACEMAKER IN SITU: ICD-10-CM

## 2022-07-13 DIAGNOSIS — I49.5 SSS (SICK SINUS SYNDROME) (H): ICD-10-CM

## 2022-07-13 LAB
MDC_IDC_LEAD_IMPLANT_DT: NORMAL
MDC_IDC_LEAD_IMPLANT_DT: NORMAL
MDC_IDC_LEAD_LOCATION: NORMAL
MDC_IDC_LEAD_LOCATION: NORMAL
MDC_IDC_LEAD_LOCATION_DETAIL_1: NORMAL
MDC_IDC_LEAD_LOCATION_DETAIL_1: NORMAL
MDC_IDC_LEAD_MFG: NORMAL
MDC_IDC_LEAD_MFG: NORMAL
MDC_IDC_LEAD_MODEL: NORMAL
MDC_IDC_LEAD_MODEL: NORMAL
MDC_IDC_LEAD_POLARITY_TYPE: NORMAL
MDC_IDC_LEAD_POLARITY_TYPE: NORMAL
MDC_IDC_LEAD_SERIAL: NORMAL
MDC_IDC_LEAD_SERIAL: NORMAL
MDC_IDC_MSMT_BATTERY_DTM: NORMAL
MDC_IDC_MSMT_BATTERY_IMPEDANCE: 740 OHM
MDC_IDC_MSMT_BATTERY_REMAINING_LONGEVITY: 83 MO
MDC_IDC_MSMT_BATTERY_STATUS: NORMAL
MDC_IDC_MSMT_BATTERY_VOLTAGE: 2.78 V
MDC_IDC_MSMT_LEADCHNL_RA_IMPEDANCE_VALUE: 456 OHM
MDC_IDC_MSMT_LEADCHNL_RA_PACING_THRESHOLD_AMPLITUDE: 0.5 V
MDC_IDC_MSMT_LEADCHNL_RA_PACING_THRESHOLD_PULSEWIDTH: 0.4 MS
MDC_IDC_MSMT_LEADCHNL_RV_IMPEDANCE_VALUE: 466 OHM
MDC_IDC_MSMT_LEADCHNL_RV_PACING_THRESHOLD_AMPLITUDE: 1.38 V
MDC_IDC_MSMT_LEADCHNL_RV_PACING_THRESHOLD_PULSEWIDTH: 0.4 MS
MDC_IDC_PG_IMPLANT_DTM: NORMAL
MDC_IDC_PG_MFG: NORMAL
MDC_IDC_PG_MODEL: NORMAL
MDC_IDC_PG_SERIAL: NORMAL
MDC_IDC_PG_TYPE: NORMAL
MDC_IDC_SESS_CLINIC_NAME: NORMAL
MDC_IDC_SESS_DTM: NORMAL
MDC_IDC_SESS_TYPE: NORMAL
MDC_IDC_SET_BRADY_AT_MODE_SWITCH_MODE: NORMAL
MDC_IDC_SET_BRADY_AT_MODE_SWITCH_RATE: 175 {BEATS}/MIN
MDC_IDC_SET_BRADY_LOWRATE: 60 {BEATS}/MIN
MDC_IDC_SET_BRADY_MAX_SENSOR_RATE: 120 {BEATS}/MIN
MDC_IDC_SET_BRADY_MAX_TRACKING_RATE: 120 {BEATS}/MIN
MDC_IDC_SET_BRADY_MODE: NORMAL
MDC_IDC_SET_BRADY_PAV_DELAY_LOW: 150 MS
MDC_IDC_SET_BRADY_SAV_DELAY_LOW: 120 MS
MDC_IDC_SET_LEADCHNL_RA_PACING_AMPLITUDE: 1.5 V
MDC_IDC_SET_LEADCHNL_RA_PACING_CAPTURE_MODE: NORMAL
MDC_IDC_SET_LEADCHNL_RA_PACING_POLARITY: NORMAL
MDC_IDC_SET_LEADCHNL_RA_PACING_PULSEWIDTH: 0.4 MS
MDC_IDC_SET_LEADCHNL_RA_SENSING_POLARITY: NORMAL
MDC_IDC_SET_LEADCHNL_RA_SENSING_SENSITIVITY: 0.35 MV
MDC_IDC_SET_LEADCHNL_RV_PACING_AMPLITUDE: 2 V
MDC_IDC_SET_LEADCHNL_RV_PACING_CAPTURE_MODE: NORMAL
MDC_IDC_SET_LEADCHNL_RV_PACING_POLARITY: NORMAL
MDC_IDC_SET_LEADCHNL_RV_PACING_PULSEWIDTH: 0.4 MS
MDC_IDC_SET_LEADCHNL_RV_SENSING_POLARITY: NORMAL
MDC_IDC_SET_LEADCHNL_RV_SENSING_SENSITIVITY: 5.6 MV
MDC_IDC_SET_ZONE_DETECTION_INTERVAL: 342.86 MS
MDC_IDC_SET_ZONE_DETECTION_INTERVAL: 375 MS
MDC_IDC_SET_ZONE_TYPE: NORMAL
MDC_IDC_SET_ZONE_TYPE: NORMAL
MDC_IDC_STAT_AT_BURDEN_PERCENT: 0.4 %
MDC_IDC_STAT_AT_DTM_END: NORMAL
MDC_IDC_STAT_AT_DTM_START: NORMAL
MDC_IDC_STAT_AT_MODE_SW_COUNT: 13
MDC_IDC_STAT_BRADY_AP_VP_PERCENT: 5 %
MDC_IDC_STAT_BRADY_AP_VS_PERCENT: 63 %
MDC_IDC_STAT_BRADY_AS_VP_PERCENT: 4 %
MDC_IDC_STAT_BRADY_AS_VS_PERCENT: 29 %
MDC_IDC_STAT_BRADY_DTM_END: NORMAL
MDC_IDC_STAT_BRADY_DTM_START: NORMAL
MDC_IDC_STAT_EPISODE_RECENT_COUNT: 2
MDC_IDC_STAT_EPISODE_RECENT_COUNT: 4
MDC_IDC_STAT_EPISODE_RECENT_COUNT_DTM_END: NORMAL
MDC_IDC_STAT_EPISODE_RECENT_COUNT_DTM_END: NORMAL
MDC_IDC_STAT_EPISODE_RECENT_COUNT_DTM_START: NORMAL
MDC_IDC_STAT_EPISODE_RECENT_COUNT_DTM_START: NORMAL
MDC_IDC_STAT_EPISODE_TYPE: NORMAL
MDC_IDC_STAT_EPISODE_TYPE: NORMAL

## 2022-07-13 PROCEDURE — 93296 REM INTERROG EVL PM/IDS: CPT | Performed by: INTERNAL MEDICINE

## 2022-07-13 PROCEDURE — 93294 REM INTERROG EVL PM/LDLS PM: CPT | Performed by: INTERNAL MEDICINE

## 2022-10-21 ENCOUNTER — ANCILLARY PROCEDURE (OUTPATIENT)
Dept: CARDIOLOGY | Facility: CLINIC | Age: 87
End: 2022-10-21
Attending: INTERNAL MEDICINE
Payer: MEDICARE

## 2022-10-21 DIAGNOSIS — Z95.0 CARDIAC PACEMAKER IN SITU: ICD-10-CM

## 2022-10-21 DIAGNOSIS — I49.5 SICK SINUS SYNDROME (H): ICD-10-CM

## 2022-10-21 LAB
MDC_IDC_LEAD_IMPLANT_DT: NORMAL
MDC_IDC_LEAD_IMPLANT_DT: NORMAL
MDC_IDC_LEAD_LOCATION: NORMAL
MDC_IDC_LEAD_LOCATION: NORMAL
MDC_IDC_LEAD_LOCATION_DETAIL_1: NORMAL
MDC_IDC_LEAD_LOCATION_DETAIL_1: NORMAL
MDC_IDC_LEAD_MFG: NORMAL
MDC_IDC_LEAD_MFG: NORMAL
MDC_IDC_LEAD_MODEL: NORMAL
MDC_IDC_LEAD_MODEL: NORMAL
MDC_IDC_LEAD_POLARITY_TYPE: NORMAL
MDC_IDC_LEAD_POLARITY_TYPE: NORMAL
MDC_IDC_LEAD_SERIAL: NORMAL
MDC_IDC_LEAD_SERIAL: NORMAL
MDC_IDC_MSMT_BATTERY_DTM: NORMAL
MDC_IDC_MSMT_BATTERY_IMPEDANCE: 740 OHM
MDC_IDC_MSMT_BATTERY_REMAINING_LONGEVITY: 83 MO
MDC_IDC_MSMT_BATTERY_STATUS: NORMAL
MDC_IDC_MSMT_BATTERY_VOLTAGE: 2.77 V
MDC_IDC_MSMT_LEADCHNL_RA_IMPEDANCE_VALUE: 450 OHM
MDC_IDC_MSMT_LEADCHNL_RA_PACING_THRESHOLD_AMPLITUDE: 0.5 V
MDC_IDC_MSMT_LEADCHNL_RA_PACING_THRESHOLD_PULSEWIDTH: 0.4 MS
MDC_IDC_MSMT_LEADCHNL_RV_IMPEDANCE_VALUE: 456 OHM
MDC_IDC_MSMT_LEADCHNL_RV_PACING_THRESHOLD_AMPLITUDE: 1.38 V
MDC_IDC_MSMT_LEADCHNL_RV_PACING_THRESHOLD_PULSEWIDTH: 0.4 MS
MDC_IDC_PG_IMPLANT_DTM: NORMAL
MDC_IDC_PG_MFG: NORMAL
MDC_IDC_PG_MODEL: NORMAL
MDC_IDC_PG_SERIAL: NORMAL
MDC_IDC_PG_TYPE: NORMAL
MDC_IDC_SESS_CLINIC_NAME: NORMAL
MDC_IDC_SESS_DTM: NORMAL
MDC_IDC_SESS_TYPE: NORMAL
MDC_IDC_SET_BRADY_AT_MODE_SWITCH_MODE: NORMAL
MDC_IDC_SET_BRADY_AT_MODE_SWITCH_RATE: 175 {BEATS}/MIN
MDC_IDC_SET_BRADY_LOWRATE: 60 {BEATS}/MIN
MDC_IDC_SET_BRADY_MAX_SENSOR_RATE: 120 {BEATS}/MIN
MDC_IDC_SET_BRADY_MAX_TRACKING_RATE: 120 {BEATS}/MIN
MDC_IDC_SET_BRADY_MODE: NORMAL
MDC_IDC_SET_BRADY_PAV_DELAY_LOW: 150 MS
MDC_IDC_SET_BRADY_SAV_DELAY_LOW: 120 MS
MDC_IDC_SET_LEADCHNL_RA_PACING_AMPLITUDE: 1.5 V
MDC_IDC_SET_LEADCHNL_RA_PACING_CAPTURE_MODE: NORMAL
MDC_IDC_SET_LEADCHNL_RA_PACING_POLARITY: NORMAL
MDC_IDC_SET_LEADCHNL_RA_PACING_PULSEWIDTH: 0.4 MS
MDC_IDC_SET_LEADCHNL_RA_SENSING_POLARITY: NORMAL
MDC_IDC_SET_LEADCHNL_RA_SENSING_SENSITIVITY: 0.35 MV
MDC_IDC_SET_LEADCHNL_RV_PACING_AMPLITUDE: 2 V
MDC_IDC_SET_LEADCHNL_RV_PACING_CAPTURE_MODE: NORMAL
MDC_IDC_SET_LEADCHNL_RV_PACING_POLARITY: NORMAL
MDC_IDC_SET_LEADCHNL_RV_PACING_PULSEWIDTH: 0.4 MS
MDC_IDC_SET_LEADCHNL_RV_SENSING_POLARITY: NORMAL
MDC_IDC_SET_LEADCHNL_RV_SENSING_SENSITIVITY: 5.6 MV
MDC_IDC_SET_ZONE_DETECTION_INTERVAL: 342.86 MS
MDC_IDC_SET_ZONE_DETECTION_INTERVAL: 375 MS
MDC_IDC_SET_ZONE_TYPE: NORMAL
MDC_IDC_SET_ZONE_TYPE: NORMAL
MDC_IDC_STAT_AT_BURDEN_PERCENT: 2.9 %
MDC_IDC_STAT_AT_DTM_END: NORMAL
MDC_IDC_STAT_AT_DTM_START: NORMAL
MDC_IDC_STAT_AT_MODE_SW_COUNT: 48
MDC_IDC_STAT_BRADY_AP_VP_PERCENT: 6 %
MDC_IDC_STAT_BRADY_AP_VS_PERCENT: 58 %
MDC_IDC_STAT_BRADY_AS_VP_PERCENT: 5 %
MDC_IDC_STAT_BRADY_AS_VS_PERCENT: 31 %
MDC_IDC_STAT_BRADY_DTM_END: NORMAL
MDC_IDC_STAT_BRADY_DTM_START: NORMAL
MDC_IDC_STAT_EPISODE_RECENT_COUNT: 8
MDC_IDC_STAT_EPISODE_RECENT_COUNT: 8
MDC_IDC_STAT_EPISODE_RECENT_COUNT_DTM_END: NORMAL
MDC_IDC_STAT_EPISODE_RECENT_COUNT_DTM_END: NORMAL
MDC_IDC_STAT_EPISODE_RECENT_COUNT_DTM_START: NORMAL
MDC_IDC_STAT_EPISODE_RECENT_COUNT_DTM_START: NORMAL
MDC_IDC_STAT_EPISODE_TYPE: NORMAL
MDC_IDC_STAT_EPISODE_TYPE: NORMAL

## 2022-10-21 PROCEDURE — 93294 REM INTERROG EVL PM/LDLS PM: CPT | Performed by: INTERNAL MEDICINE

## 2022-10-21 PROCEDURE — 93296 REM INTERROG EVL PM/IDS: CPT | Performed by: INTERNAL MEDICINE

## 2022-12-16 NOTE — PROGRESS NOTES
ED Provider Note    Scribed for Cesario Marquez M.D. by Gina Quintero. 12/16/2022,  8:41 AM.    Means of Arrival: Walk-in  History obtained from: Patient  History limited by: None    CHIEF COMPLAINT  Chief Complaint   Patient presents with    Flank Pain     HPI  Tim Sandoval is a 22 y.o. female who presents to the Emergency Department for mild suprapubic pain onset a week ago. The patient is 19 weeks pregnant with her first child and recently treated for a UTI. She reports symptoms of bilateral paraspinal pain, cramping, slight dysuria, and increased sweating. She notes her suprapubic pain is exacerbated with palpation. She also notes symptoms of nausea which she attributes to her pregnancy. She denies symptoms of fever or chills.  Denies any vaginal bleeding or discharge.  She notes she has been trying to get an appointment with her OB-GYN for the last 2 weeks, but has been unable to. She denies any medical issues. No alleviating factors noted.     REVIEW OF SYSTEMS  CONSTITUTIONAL: No fever.  CARDIOVASCULAR: No chest discomfort.  RESPIRATORY: No pleuritic chest pain.  GASTROINTESTINAL: Suprapubic pain.   GENITOURINARY: Dysuria.  MUSCULOSKELETAL: No arthralgia.  SKIN: No rash or suspicious lesions.  NEUROLOGIC: No headache.  See HPI for further details.   All other systems are negative.     PAST MEDICAL HISTORY  History reviewed. No pertinent past medical history.    FAMILY HISTORY  None noted.     SOCIAL HISTORY   reports that she has never smoked. She has never used smokeless tobacco. She reports that she does not currently use alcohol. She reports that she does not currently use drugs.    SURGICAL HISTORY  History reviewed. No pertinent surgical history.    CURRENT MEDICATIONS  Home Medications       Reviewed by Shayna Gastelum R.N. (Registered Nurse) on 12/16/22 at 0806  Med List Status: <None>     Medication Last Dose Status        Patient Leo Taking any Medications                         ALLERGIES  No  In clinic device check with Dr. Will.  Please see link for full device report.  Patient was informed of results and next follow up during today's visit.     Known Allergies    PHYSICAL EXAM  VITAL SIGNS: /75   Pulse 86   Temp 36.7 °C (98.1 °F) (Temporal)   Resp 16   Wt 73.1 kg (161 lb 2.5 oz)   SpO2 96%   BMI 28.55 kg/m²      Gen: Alert, no acute distress  HEENT: ATNC  Eyes: PERRL, EOMI, normal conjunctiva  Neck: trachea midline  Resp: no respiratory distress  CV: No JVD  Back: Right and left paraspinal tenderness on thoracic and lumbar spine to soft palpation  Abd: non-distended, gravid uterus to just below umbilicus  Ext: No deformities  Psych: normal mood  Neuro: speech fluent, moves all extremities. Sensation intact bilateral lower extremities    DIAGNOSTIC STUDIES / PROCEDURES   LABS  Labs Reviewed   CBC WITH DIFFERENTIAL - Abnormal; Notable for the following components:       Result Value    MCHC 35.3 (*)     Neutrophils-Polys 73.40 (*)     Lymphocytes 21.40 (*)     All other components within normal limits   COMP METABOLIC PANEL - Abnormal; Notable for the following components:    Glucose 112 (*)     Bun 6 (*)     All other components within normal limits   HCG QUANTITATIVE - Abnormal; Notable for the following components:    Bhcg 8989.0 (*)     All other components within normal limits   URINALYSIS,CULTURE IF INDICATED - Abnormal; Notable for the following components:    Leukocyte Esterase Trace (*)     All other components within normal limits    Narrative:     Indication for culture:->Pregnant women: fever and/or  asymptomatic screening   LIPASE   URINE MICROSCOPIC (W/UA)    Narrative:     Indication for culture:->Pregnant women: fever and/or  asymptomatic screening   CORRECTED CALCIUM   ESTIMATED GFR     All labs reviewed by me.    COURSE & MEDICAL DECISION MAKING  Pertinent Labs & Imaging studies reviewed. (See chart for details)    8:41 AM - Patient seen and examined at bedside by Resident Dr. Noriega. Ordered labs and radiology for evaluation. He discussed with the patient the current plan for treatment. The patient was given the opportunity  to ask questions at this time. Patient verbalizes understanding and agreement to this plan of care.     9:20 AM - I discussed plan for discharge and follow up as outlined below. The patient is stable for discharge at this time and will return for any new or worsening symptoms. Patient verbalizes understanding and support with my plan for discharge.     Appropriate PPE was worn during this encounter.     Medical Decision Making:    The patient presents with nonspecific suprapubic tenderness.  Currently 19 weeks pregnant.  She has a history of UTIs during this pregnancy last treated about 1 month ago.  Differential includes UTI versus pyelonephritis versus unspecified pelvic pain versus ligament pain versus muscle strain.  UA and CBC are both reassuring.  No evidence of UTI noted on UA and white count was within normal limits.  Physical exam reassuring.  Patient had minimal tenderness in the suprapubic region.  She did have some point tenderness to light palpation along the paraspinous muscles, again, reassuring that it was pain in the lumbar and thoracic region which reduces further my clinical suspicion for pyelonephritis.    I did a bedside ultrasound.  Baby's heart rate in the high 130s.  Baby moving.  Patient tolerated the ultrasound well, no increased tenderness.    Discussed my findings with the patient.  At this point time we do not have a formal diagnosis of what is causing her suprapubic tenderness but her lab work, history, and Dopplers are all reassuring.  Unlikely to be UTI or more significant issue like abruption, appendicitis.  Counseled the patient that if this pain is to increase, she starts to experience any vaginal bleeding, has any fevers, worsening of flank pain, or any other concerns she should come back to the emergency room or be seen by her OB provider.  She is in agreement with this plan. She has her 20-week appointment scheduled with her OB for this coming week as well as a formal anatomy  scan.    The patient will return for new or worsening symptoms and is stable at the time of discharge.    The patient is referred to a primary physician for diabetic screening and for all other preventative health concerns.    DISPOSITION:  Patient will be discharged home in stable condition.    FOLLOW UP:  Your OB/gyn          [facility], Emergency Dept  [address] [facility] [address] [id number] [phone]    If symptoms worsen      FINAL IMPRESSION  1. Pelvic pain affecting pregnancy in second trimester, antepartum      IGina (Scribkaleb), am scribing for, and in the presence of, Cesario Marquez M.D..    Electronically signed by: Gina Quintero (Lionel), 12/16/2022    ICesario M.D. personally performed the services described in this documentation, as scribed by Gina Quintero in my presence, and it is both accurate and complete.    The note accurately reflects work and decisions made by me.  Cesario Marquez M.D.  12/16/2022  3:36 PM    This dictation was created using voice recognition software. The accuracy of the dictation is limited to the abilities of the software. I expect there may be some errors of grammar and possibly content. The nursing notes were reviewed and certain aspects of this information were incorporated into this note.

## 2023-04-17 ENCOUNTER — ANCILLARY PROCEDURE (OUTPATIENT)
Dept: CARDIOLOGY | Facility: CLINIC | Age: 88
End: 2023-04-17
Attending: INTERNAL MEDICINE
Payer: MEDICARE

## 2023-04-17 ENCOUNTER — OFFICE VISIT (OUTPATIENT)
Dept: CARDIOLOGY | Facility: CLINIC | Age: 88
End: 2023-04-17
Payer: MEDICARE

## 2023-04-17 VITALS
HEART RATE: 92 BPM | WEIGHT: 165 LBS | SYSTOLIC BLOOD PRESSURE: 126 MMHG | BODY MASS INDEX: 25.84 KG/M2 | DIASTOLIC BLOOD PRESSURE: 66 MMHG | RESPIRATION RATE: 18 BRPM | OXYGEN SATURATION: 97 %

## 2023-04-17 DIAGNOSIS — I48.0 PAROXYSMAL ATRIAL FIBRILLATION (H): Primary | ICD-10-CM

## 2023-04-17 DIAGNOSIS — I49.5 SICK SINUS SYNDROME (H): ICD-10-CM

## 2023-04-17 DIAGNOSIS — I49.5 SICK SINUS SYNDROME (H): Primary | ICD-10-CM

## 2023-04-17 DIAGNOSIS — Z95.0 PACEMAKER: ICD-10-CM

## 2023-04-17 DIAGNOSIS — I25.10 ATHEROSCLEROSIS OF NATIVE CORONARY ARTERY OF NATIVE HEART WITHOUT ANGINA PECTORIS: Chronic | ICD-10-CM

## 2023-04-17 DIAGNOSIS — I47.29 NONSUSTAINED VENTRICULAR TACHYCARDIA (H): ICD-10-CM

## 2023-04-17 PROCEDURE — 99214 OFFICE O/P EST MOD 30 MIN: CPT | Performed by: INTERNAL MEDICINE

## 2023-04-17 PROCEDURE — 93280 PM DEVICE PROGR EVAL DUAL: CPT | Performed by: INTERNAL MEDICINE

## 2023-04-17 RX ORDER — METOPROLOL SUCCINATE 50 MG/1
50 TABLET, EXTENDED RELEASE ORAL DAILY
Qty: 90 TABLET | Refills: 3 | Status: SHIPPED | OUTPATIENT
Start: 2023-04-17 | End: 2023-11-28

## 2023-04-17 NOTE — PATIENT INSTRUCTIONS
Stop amlodipine  Increase metoprolol succinate to 50 mg entheses 1 whole tablet) daily  Work to get 20 minutes of stationary bicycle riding in, or other walking, each day.

## 2023-04-17 NOTE — LETTER
4/17/2023    Yaquelin Mcgill MD, MD  [facility] [address] [facility] [address]    RE: Skip Newman       Dear Colleague,     I had the pleasure of seeing Skip Newman in the [facility].    Cardiology Clinic Office Note    Assessment / Plan:    1.  Paroxysmal atrial fibrillation.  Anticoagulated with warfarin.  Will increase metoprolol to 50 mg daily to improve rate control and hopefully activity tolerance.  2.  Hypertension with good control.  We will discontinue amlodipine, concurrent with increase in metoprolol dosing.  3.  History of coronary artery disease with no anginal symptoms.  encouraged increasing daily exercise to reach 20 minutes each day.    Follow-up 1 year    ______________________________________________________________________    Subjective:    I had the opportunity to see Skip Newman at the [facility]. Skip Newman is a 89 year old male with a history of  coronary artery disease status post circumflex intervention following myocardial infarction in 2000 and found to have chronic occlusion of posterior descending artery in 2008.  He has a history of bursts of nonsustained ventricular tachycardia since 2015, status post pacemaker placement for sick sinus syndrome in 2009.  Echocardiogram 2020 showed an ejection fraction of 43% with inferior hypokinesis. 2021  pharmacologic myocardial perfusion imaging study that showed a fixed inferior defect with possible transient ischemic dilatation versus normal variant, and an ejection fraction of 50%.    He cares for his wife suffering from dementia, and he returns today for routine follow-up.    He has had no chest pain or palpitations.  He is exercising less, but still tries to get on his stationary bicycle for probably about 10 minutes a day.  His sleep is impacted by having to care for his wife, who sleeps little as she wanders around.  He insists  he has family to help him at home.  He has had some longstanding trace left ankle edema, not worsened recently.  He has occasional nocturnal cramps in that leg.  He has not experienced any syncope or near syncopal spells.    Device check today shows normal function with approximately 19% burden of atrial fibrillation with heart rates greater than 120 beats a minute about 25% of the time.  Heart rates in the 160s to 170s appear to be associated with him helping his wife to get ready.  ______________________________________________________________________    Problem List:  Patient Active Problem List   Diagnosis    Hyperlipidemia    Hypertension    Coronary Artery Disease    Sick sinus syndrome (H)    Nonsustained ventricular tachycardia (H)    Cardiac pacemaker, dual, in situ    Primary osteoarthritis of left knee    Gout    Recurrent kidney stones    Status post total left knee replacement    History of coronary artery stent placement    Primary osteoarthritis of right knee    Thrombocytopenia (H)    Spinal stenosis, lumbar region, with neurogenic claudication    Pacemaker    MI (myocardial infarction) (H)    Hypertension    Coronary artery disease    Chronic kidney disease    Paroxysmal atrial fibrillation (H)     Medical History:  Past Medical History:   Diagnosis Date    A-fib (H)     Anemia     Arrhythmia     Arthritis     Chronic kidney disease     Claudication (H)     Coronary artery disease     Gout     Hearing loss     high frequency    History of transfusion     Hyperlipidemia     Hypertension     Impotence of organic origin     Kidney stone     recurrent    MI (myocardial infarction) (H)     Pacemaker     [employer]    Primary osteoarthritis of both knees     Sick sinus syndrome (H)     Skin cancer     Spinal stenosis, lumbar region, with neurogenic claudication     Syncope     Thrombocytopenia (H)     VT (ventricular tachycardia) (H)      Surgical History:  Past Surgical History:   Procedure Laterality  Date    CARDIAC CATHETERIZATION      CORONARY STENT PLACEMENT  2001    LCx    HEMORRHOID SURGERY  1989    INSERT / REPLACE / REMOVE PACEMAKER  01/2009    [employer]    OTHER SURGICAL HISTORY  11/2018    l3-l4 lumbar stenosis    TONSILLECTOMY      child    TOTAL HIP ARTHROPLASTY Left     [facility] TOTAL KNEE ARTHROPLASTY Left 7/8/2019    Procedure: LEFT TOTAL KNEE ARTHROPLASTY;  Surgeon: Bowen Padgett MD;  Location: [facility];  Service: Orthopedics    [facility] TOTAL KNEE ARTHROPLASTY Right 1/13/2020    Procedure: RIGHT TOTAL KNEE ARTHROPLASTY;  Surgeon: Bowen Padgett MD;  Location: [facility];  Service: Orthopedics    [facility] CORONARY STENT PERCUT, INITIAL VESSEL      Description: Cath Stent Placement;  Recorded: 10/01/2012;  Comments: Mid LCx    [facility] CORONARY STENT PERCUT, INITIAL VESSEL      Description: Cath Stent Placement;  Recorded: 10/21/2014;  Comments: Mid LCx     Social History:  Social History     Socioeconomic History    Marital status:      Spouse name: Not on file    Number of children: Not on file    Years of education: Not on file    Highest education level: Not on file   Occupational History    Not on file   Tobacco Use    Smoking status: Never    Smokeless tobacco: Never   Vaping Use    Vaping status: Not on file   Substance and Sexual Activity    Alcohol use: No    Drug use: Not Currently    Sexual activity: Not on file   Other Topics Concern    Not on file   Social History Narrative    Not on file     Social Determinants of Health     Financial Resource Strain: Not on file   Food Insecurity: Not on file   Transportation Needs: Not on file   Physical Activity: Not on file   Stress: Not on file   Social Connections: Not on file   Intimate Partner Violence: Not on file   Housing Stability: Not on file     Sleep History:  Restorative when sleeping, sleep interrupted by wife who does not sleep well.  Exercise History:  Rides a stationary bike approximately 10 minutes a day indoors.   Walks outside, weather permitting    Review of Systems:      12 point review of systems otherwise negative.     Please refer above for cardiac ROS details.         Family History:  Family History   Problem Relation Age of Onset    Heart Failure Mother     Heart Failure Father          Allergies:  Allergies   Allergen Reactions    Lisinopril Cough    Indomethacin Rash    Naproxen Rash     Medications:  Current Outpatient Medications   Medication Sig Dispense Refill    acetaminophen (TYLENOL) 500 MG tablet [ACETAMINOPHEN (TYLENOL) 500 MG TABLET] Take 2 tablets (1,000 mg total) by mouth 3 (three) times a day. (Patient taking differently: Take 1,000 mg by mouth every 6 hours as needed)  0    amLODIPine (NORVASC) 2.5 MG tablet Take 2 tablets (5 mg) by mouth daily 180 tablet 3    amoxicillin (AMOXIL) 500 MG capsule [AMOXICILLIN (AMOXIL) 500 MG CAPSULE] Take 4 capsules by mouth as needed. Prior to dental appointments      atorvastatin (LIPITOR) 40 MG tablet Take 20 mg by mouth daily      chlorthalidone (HYGROTEN) 25 MG tablet [CHLORTHALIDONE (HYGROTEN) 25 MG TABLET] Take 25 mg by mouth daily.      losartan (COZAAR) 100 MG tablet Take 50 mg by mouth every evening      metoprolol succinate (TOPROL-XL) 50 MG 24 hr tablet [METOPROLOL SUCCINATE (TOPROL-XL) 50 MG 24 HR TABLET] Take 0.5 tablets (25 mg total) by mouth daily. 90 tablet 3    MULTIVITAMIN (MULTIPLE VITAMIN ORAL) [MULTIVITAMIN (MULTIPLE VITAMIN ORAL)] Take 1 tablet by mouth daily.      omega 3-dha-epa-fish oil (FISH OIL) 1,000 mg (120 mg-180 mg) cap [OMEGA 3-DHA-EPA-FISH OIL (FISH OIL) 1,000 MG (120 MG-180 MG) CAP] Take 1 capsule by mouth daily.      terbinafine HCl (LAMISIL) 1 % cream [TERBINAFINE HCL (LAMISIL) 1 % CREAM] Apply 1 application topically as needed (to feet). Every week as needed      warfarin ANTICOAGULANT (COUMADIN/JANTOVEN) 1 MG tablet [WARFARIN ANTICOAGULANT (COUMADIN/JANTOVEN) 1 MG TABLET] Take by mouth See Admin Instructions. Adjust dose based on  INR results as directed.      allopurinol (ZYLOPRIM) 300 MG tablet [ALLOPURINOL (ZYLOPRIM) 300 MG TABLET] Take 300 mg by mouth every evening.  (Patient not taking: Reported on 4/17/2023)      nitroglycerin (NITROSTAT) 0.4 MG SL tablet [NITROGLYCERIN (NITROSTAT) 0.4 MG SL TABLET] Place 1 tablet (0.4 mg total) under the tongue every 5 (five) minutes as needed for chest pain. (Patient not taking: Reported on 4/17/2023) 25 tablet 10       Objective:   Wt Readings from Last 3 Encounters:   04/17/23 74.8 kg (165 lb)   06/17/22 76.2 kg (168 lb)   08/24/21 72.6 kg (160 lb)     Vital signs:  /66 (BP Location: Left arm, Patient Position: Sitting, Cuff Size: Adult Regular)   Pulse 92   Resp 18   Wt 74.8 kg (165 lb)   SpO2 97%   BMI 25.84 kg/m        Physical Exam:    General Appearance : Awake, Alert, No acute distress  HEENT: No Scleral icterus; the mucous membranes were pink and moist.  Conjunctivae not injected  Neck:  No cervical bruits, jugular venous distention, or thyromegaly   Chest: The spine was straight. Chest wall symmetric  Lungs: Respirations unlabored; the lungs are clear to auscultation.  No wheezing   Cardiovascular:   Normal point of maximal impulse.  Auscultation reveals irregularly irregular first and second heart sounds with no murmurs, rubs, or gallops.  Carotid, radial, and dorsalis pedal pulses are intact and symmetric.    Abdomen: No organomegaly, masses, bruits, or tenderness. Bowels sounds are present  Extremities:  No clubbing, cyanosis.  Trace left ankle edema  Skin: No rash, bruising  Musculoskeletal: No tenderness.  Neurologic: Alert and oriented ×3. Speech is fluent.    Lab Results:  LIPIDS:  No results found for: CHOL  No results found for: HDL  No results found for: LDL  No results found for: TRIG      Pharmacologic myocardial perfusion imaging study 6/2021:    A prior study was conducted on 10/12/2015, there is transient myocardial dilation in current study.    Pharmacological  regadenosen stress ECG is negative for inducible myocardial ischemia.  Frequent PVCs are identified.    Pharmacological regadenosen nuclear stress test is abnormal.  There is no reversible change indicating inducible myocardial ischemia. There is nontransmural myocardial infarction in mid to distal inferior and inferolateral walls.  It is noticed that   there is transient ischemic dilation indicating possible three vessel disease versus nonspecific findings.    Normal left ventricular size with mildly reduced left ventricular systolic function.  The calculated left ventricular systolic function is 50%.    The patient is at an intermediate risk of future cardiac ischemic events.    Echocardiogram [facility] 2020:   1. Normal left ventricular chamber size. Normal left ventricular wall thickness.   Mildly-moderately decreased left ventricular systolic    function. Calculated left ventricular ejection fraction (modified Zheng technique) is 43 %.    2. Severe hypokinesis to akinesis of the basal to mid inferior and inferseptal segments as   well as the apical septum.    3. Normal right ventricular chamber size. Normal right ventricular systolic function.    4. No significant valvular heart disease.    5. There were no prior studies available for comparison.         ANNA VELASQUEZ MD [facility]  [phone]    This note created using Dragon voice recognition software.  Sound alike errors may have escaped editing.            Thank you for allowing me to participate in the care of your patient.      Sincerely,     Anna Velasquez MD     [facility]  cc:   No referring provider defined for this encounter.

## 2023-04-17 NOTE — PROGRESS NOTES
Cardiology Clinic Office Note    Assessment / Plan:    1.  Paroxysmal atrial fibrillation.  Anticoagulated with warfarin.  Will increase metoprolol to 50 mg daily to improve rate control and hopefully activity tolerance.  2.  Hypertension with good control.  We will discontinue amlodipine, concurrent with increase in metoprolol dosing.  3.  History of coronary artery disease with no anginal symptoms.  encouraged increasing daily exercise to reach 20 minutes each day.    Follow-up 1 year    ______________________________________________________________________    Subjective:    I had the opportunity to see Skip Newman at the [facility]. Skip Newman is a 89 year old male with a history of  coronary artery disease status post circumflex intervention following myocardial infarction in 2000 and found to have chronic occlusion of posterior descending artery in 2008.  He has a history of bursts of nonsustained ventricular tachycardia since 2015, status post pacemaker placement for sick sinus syndrome in 2009.  Echocardiogram 2020 showed an ejection fraction of 43% with inferior hypokinesis. 2021  pharmacologic myocardial perfusion imaging study that showed a fixed inferior defect with possible transient ischemic dilatation versus normal variant, and an ejection fraction of 50%.    He cares for his wife suffering from dementia, and he returns today for routine follow-up.    He has had no chest pain or palpitations.  He is exercising less, but still tries to get on his stationary bicycle for probably about 10 minutes a day.  His sleep is impacted by having to care for his wife, who sleeps little as she wanders around.  He insists he has family to help him at home.  He has had some longstanding trace left ankle edema, not worsened recently.  He has occasional nocturnal cramps in that leg.  He has not experienced any syncope or near syncopal spells.    Device check today shows  normal function with approximately 19% burden of atrial fibrillation with heart rates greater than 120 beats a minute about 25% of the time.  Heart rates in the 160s to 170s appear to be associated with him helping his wife to get ready.  ______________________________________________________________________    Problem List:  Patient Active Problem List   Diagnosis     Hyperlipidemia     Hypertension     Coronary Artery Disease     Sick sinus syndrome (H)     Nonsustained ventricular tachycardia (H)     Cardiac pacemaker, dual, in situ     Primary osteoarthritis of left knee     Gout     Recurrent kidney stones     Status post total left knee replacement     History of coronary artery stent placement     Primary osteoarthritis of right knee     Thrombocytopenia (H)     Spinal stenosis, lumbar region, with neurogenic claudication     Pacemaker     MI (myocardial infarction) (H)     Hypertension     Coronary artery disease     Chronic kidney disease     Paroxysmal atrial fibrillation (H)     Medical History:  Past Medical History:   Diagnosis Date     A-fib (H)      Anemia      Arrhythmia      Arthritis      Chronic kidney disease      Claudication (H)      Coronary artery disease      Gout      Hearing loss     high frequency     History of transfusion      Hyperlipidemia      Hypertension      Impotence of organic origin      Kidney stone     recurrent     MI (myocardial infarction) (H)      Pacemaker     Medtronic     Primary osteoarthritis of both knees      Sick sinus syndrome (H)      Skin cancer      Spinal stenosis, lumbar region, with neurogenic claudication      Syncope      Thrombocytopenia (H)      VT (ventricular tachycardia) (H)      Surgical History:  Past Surgical History:   Procedure Laterality Date     CARDIAC CATHETERIZATION       CORONARY STENT PLACEMENT  2001    LCx     HEMORRHOID SURGERY  1989     INSERT / REPLACE / REMOVE PACEMAKER  01/2009    Medtronic     OTHER SURGICAL HISTORY  11/2018     l3-l4 lumbar stenosis     TONSILLECTOMY      child     TOTAL HIP ARTHROPLASTY Left      [facility] TOTAL KNEE ARTHROPLASTY Left 7/8/2019    Procedure: LEFT TOTAL KNEE ARTHROPLASTY;  Surgeon: Boewn Padgett MD;  Location: [facility];  Service: Orthopedics     [facility] TOTAL KNEE ARTHROPLASTY Right 1/13/2020    Procedure: RIGHT TOTAL KNEE ARTHROPLASTY;  Surgeon: Bowen Padgett MD;  Location: [facility];  Service: Orthopedics     RUST CORONARY STENT PERCUT, INITIAL VESSEL      Description: Cath Stent Placement;  Recorded: 10/01/2012;  Comments: Mid LCx     RUST CORONARY STENT PERCUT, INITIAL VESSEL      Description: Cath Stent Placement;  Recorded: 10/21/2014;  Comments: Mid LCx     Social History:  Social History     Socioeconomic History     Marital status:      Spouse name: Not on file     Number of children: Not on file     Years of education: Not on file     Highest education level: Not on file   Occupational History     Not on file   Tobacco Use     Smoking status: Never     Smokeless tobacco: Never   Vaping Use     Vaping status: Not on file   Substance and Sexual Activity     Alcohol use: No     Drug use: Not Currently     Sexual activity: Not on file   Other Topics Concern     Not on file   Social History Narrative     Not on file     Social Determinants of Health     Financial Resource Strain: Not on file   Food Insecurity: Not on file   Transportation Needs: Not on file   Physical Activity: Not on file   Stress: Not on file   Social Connections: Not on file   Intimate Partner Violence: Not on file   Housing Stability: Not on file     Sleep History:  Restorative when sleeping, sleep interrupted by wife who does not sleep well.  Exercise History:  Rides a stationary bike approximately 10 minutes a day indoors.  Walks outside, weather permitting    Review of Systems:      12 point review of systems otherwise negative.     Please refer above for cardiac ROS details.         Family History:  Family  History   Problem Relation Age of Onset     Heart Failure Mother      Heart Failure Father          Allergies:  Allergies   Allergen Reactions     Lisinopril Cough     Indomethacin Rash     Naproxen Rash     Medications:  Current Outpatient Medications   Medication Sig Dispense Refill     acetaminophen (TYLENOL) 500 MG tablet [ACETAMINOPHEN (TYLENOL) 500 MG TABLET] Take 2 tablets (1,000 mg total) by mouth 3 (three) times a day. (Patient taking differently: Take 1,000 mg by mouth every 6 hours as needed)  0     amLODIPine (NORVASC) 2.5 MG tablet Take 2 tablets (5 mg) by mouth daily 180 tablet 3     amoxicillin (AMOXIL) 500 MG capsule [AMOXICILLIN (AMOXIL) 500 MG CAPSULE] Take 4 capsules by mouth as needed. Prior to dental appointments       atorvastatin (LIPITOR) 40 MG tablet Take 20 mg by mouth daily       chlorthalidone (HYGROTEN) 25 MG tablet [CHLORTHALIDONE (HYGROTEN) 25 MG TABLET] Take 25 mg by mouth daily.       losartan (COZAAR) 100 MG tablet Take 50 mg by mouth every evening       metoprolol succinate (TOPROL-XL) 50 MG 24 hr tablet [METOPROLOL SUCCINATE (TOPROL-XL) 50 MG 24 HR TABLET] Take 0.5 tablets (25 mg total) by mouth daily. 90 tablet 3     MULTIVITAMIN (MULTIPLE VITAMIN ORAL) [MULTIVITAMIN (MULTIPLE VITAMIN ORAL)] Take 1 tablet by mouth daily.       omega 3-dha-epa-fish oil (FISH OIL) 1,000 mg (120 mg-180 mg) cap [OMEGA 3-DHA-EPA-FISH OIL (FISH OIL) 1,000 MG (120 MG-180 MG) CAP] Take 1 capsule by mouth daily.       terbinafine HCl (LAMISIL) 1 % cream [TERBINAFINE HCL (LAMISIL) 1 % CREAM] Apply 1 application topically as needed (to feet). Every week as needed       warfarin ANTICOAGULANT (COUMADIN/JANTOVEN) 1 MG tablet [WARFARIN ANTICOAGULANT (COUMADIN/JANTOVEN) 1 MG TABLET] Take by mouth See Admin Instructions. Adjust dose based on INR results as directed.       allopurinol (ZYLOPRIM) 300 MG tablet [ALLOPURINOL (ZYLOPRIM) 300 MG TABLET] Take 300 mg by mouth every evening.  (Patient not taking: Reported  on 4/17/2023)       nitroglycerin (NITROSTAT) 0.4 MG SL tablet [NITROGLYCERIN (NITROSTAT) 0.4 MG SL TABLET] Place 1 tablet (0.4 mg total) under the tongue every 5 (five) minutes as needed for chest pain. (Patient not taking: Reported on 4/17/2023) 25 tablet 10       Objective:   Wt Readings from Last 3 Encounters:   04/17/23 74.8 kg (165 lb)   06/17/22 76.2 kg (168 lb)   08/24/21 72.6 kg (160 lb)     Vital signs:  /66 (BP Location: Left arm, Patient Position: Sitting, Cuff Size: Adult Regular)   Pulse 92   Resp 18   Wt 74.8 kg (165 lb)   SpO2 97%   BMI 25.84 kg/m        Physical Exam:    General Appearance : Awake, Alert, No acute distress  HEENT: No Scleral icterus; the mucous membranes were pink and moist.  Conjunctivae not injected  Neck:  No cervical bruits, jugular venous distention, or thyromegaly   Chest: The spine was straight. Chest wall symmetric  Lungs: Respirations unlabored; the lungs are clear to auscultation.  No wheezing   Cardiovascular:   Normal point of maximal impulse.  Auscultation reveals irregularly irregular first and second heart sounds with no murmurs, rubs, or gallops.  Carotid, radial, and dorsalis pedal pulses are intact and symmetric.    Abdomen: No organomegaly, masses, bruits, or tenderness. Bowels sounds are present  Extremities:  No clubbing, cyanosis.  Trace left ankle edema  Skin: No rash, bruising  Musculoskeletal: No tenderness.  Neurologic: Alert and oriented ×3. Speech is fluent.    Lab Results:  LIPIDS:  No results found for: CHOL  No results found for: HDL  No results found for: LDL  No results found for: TRIG      Pharmacologic myocardial perfusion imaging study 6/2021:     A prior study was conducted on 10/12/2015, there is transient myocardial dilation in current study.     Pharmacological regadenosen stress ECG is negative for inducible myocardial ischemia.  Frequent PVCs are identified.     Pharmacological regadenosen nuclear stress test is abnormal.  There  is no reversible change indicating inducible myocardial ischemia. There is nontransmural myocardial infarction in mid to distal inferior and inferolateral walls.  It is noticed that   there is transient ischemic dilation indicating possible three vessel disease versus nonspecific findings.     Normal left ventricular size with mildly reduced left ventricular systolic function.  The calculated left ventricular systolic function is 50%.     The patient is at an intermediate risk of future cardiac ischemic events.    Echocardiogram [facility] 2020:   1. Normal left ventricular chamber size. Normal left ventricular wall thickness.   Mildly-moderately decreased left ventricular systolic    function. Calculated left ventricular ejection fraction (modified Zheng technique) is 43 %.    2. Severe hypokinesis to akinesis of the basal to mid inferior and inferseptal segments as   well as the apical septum.    3. Normal right ventricular chamber size. Normal right ventricular systolic function.    4. No significant valvular heart disease.    5. There were no prior studies available for comparison.         ANNA VELASQUEZ MD [facility]  [phone]    This note created using Dragon voice recognition software.  Sound alike errors may have escaped editing.

## 2023-04-18 LAB
MDC_IDC_LEAD_IMPLANT_DT: NORMAL
MDC_IDC_LEAD_IMPLANT_DT: NORMAL
MDC_IDC_LEAD_LOCATION: NORMAL
MDC_IDC_LEAD_LOCATION: NORMAL
MDC_IDC_LEAD_LOCATION_DETAIL_1: NORMAL
MDC_IDC_LEAD_LOCATION_DETAIL_1: NORMAL
MDC_IDC_LEAD_MFG: NORMAL
MDC_IDC_LEAD_MFG: NORMAL
MDC_IDC_LEAD_MODEL: NORMAL
MDC_IDC_LEAD_MODEL: NORMAL
MDC_IDC_LEAD_POLARITY_TYPE: NORMAL
MDC_IDC_LEAD_POLARITY_TYPE: NORMAL
MDC_IDC_LEAD_SERIAL: NORMAL
MDC_IDC_LEAD_SERIAL: NORMAL
MDC_IDC_MSMT_BATTERY_DTM: NORMAL
MDC_IDC_MSMT_BATTERY_IMPEDANCE: 924 OHM
MDC_IDC_MSMT_BATTERY_REMAINING_LONGEVITY: 72 MO
MDC_IDC_MSMT_BATTERY_STATUS: NORMAL
MDC_IDC_MSMT_BATTERY_VOLTAGE: 2.76 V
MDC_IDC_MSMT_LEADCHNL_RA_IMPEDANCE_VALUE: 426 OHM
MDC_IDC_MSMT_LEADCHNL_RA_SENSING_INTR_AMPL: 0.5 MV
MDC_IDC_MSMT_LEADCHNL_RV_IMPEDANCE_VALUE: 450 OHM
MDC_IDC_MSMT_LEADCHNL_RV_PACING_THRESHOLD_AMPLITUDE: 1.5 V
MDC_IDC_MSMT_LEADCHNL_RV_PACING_THRESHOLD_PULSEWIDTH: 0.4 MS
MDC_IDC_MSMT_LEADCHNL_RV_SENSING_INTR_AMPL: 15.68 MV
MDC_IDC_PG_IMPLANT_DTM: NORMAL
MDC_IDC_PG_MFG: NORMAL
MDC_IDC_PG_MODEL: NORMAL
MDC_IDC_PG_SERIAL: NORMAL
MDC_IDC_PG_TYPE: NORMAL
MDC_IDC_SESS_CLINIC_NAME: NORMAL
MDC_IDC_SESS_DTM: NORMAL
MDC_IDC_SESS_TYPE: NORMAL
MDC_IDC_SET_BRADY_AT_MODE_SWITCH_MODE: NORMAL
MDC_IDC_SET_BRADY_AT_MODE_SWITCH_RATE: 175 {BEATS}/MIN
MDC_IDC_SET_BRADY_LOWRATE: 60 {BEATS}/MIN
MDC_IDC_SET_BRADY_MAX_SENSOR_RATE: 120 {BEATS}/MIN
MDC_IDC_SET_BRADY_MAX_TRACKING_RATE: 120 {BEATS}/MIN
MDC_IDC_SET_BRADY_MODE: NORMAL
MDC_IDC_SET_BRADY_PAV_DELAY_LOW: 150 MS
MDC_IDC_SET_BRADY_SAV_DELAY_LOW: 120 MS
MDC_IDC_SET_LEADCHNL_RA_PACING_AMPLITUDE: NORMAL
MDC_IDC_SET_LEADCHNL_RA_PACING_CAPTURE_MODE: NORMAL
MDC_IDC_SET_LEADCHNL_RA_PACING_POLARITY: NORMAL
MDC_IDC_SET_LEADCHNL_RA_PACING_PULSEWIDTH: 0.4 MS
MDC_IDC_SET_LEADCHNL_RA_SENSING_POLARITY: NORMAL
MDC_IDC_SET_LEADCHNL_RA_SENSING_SENSITIVITY: 0.18 MV
MDC_IDC_SET_LEADCHNL_RV_PACING_AMPLITUDE: NORMAL
MDC_IDC_SET_LEADCHNL_RV_PACING_CAPTURE_MODE: NORMAL
MDC_IDC_SET_LEADCHNL_RV_PACING_POLARITY: NORMAL
MDC_IDC_SET_LEADCHNL_RV_PACING_PULSEWIDTH: 0.4 MS
MDC_IDC_SET_LEADCHNL_RV_SENSING_POLARITY: NORMAL
MDC_IDC_SET_LEADCHNL_RV_SENSING_SENSITIVITY: 5.6 MV
MDC_IDC_SET_ZONE_DETECTION_INTERVAL: 342.86 MS
MDC_IDC_SET_ZONE_DETECTION_INTERVAL: 375 MS
MDC_IDC_SET_ZONE_TYPE: NORMAL
MDC_IDC_SET_ZONE_TYPE: NORMAL
MDC_IDC_STAT_AT_BURDEN_PERCENT: 18.7 %
MDC_IDC_STAT_AT_DTM_END: NORMAL
MDC_IDC_STAT_AT_DTM_START: NORMAL
MDC_IDC_STAT_AT_MODE_SW_COUNT: 3414
MDC_IDC_STAT_BRADY_AP_VP_PERCENT: 6 %
MDC_IDC_STAT_BRADY_AP_VS_PERCENT: 50 %
MDC_IDC_STAT_BRADY_AS_VP_PERCENT: 5 %
MDC_IDC_STAT_BRADY_AS_VS_PERCENT: 39 %
MDC_IDC_STAT_BRADY_DTM_END: NORMAL
MDC_IDC_STAT_BRADY_DTM_START: NORMAL
MDC_IDC_STAT_BRADY_RA_PERCENT_PACED: 56.1 %
MDC_IDC_STAT_BRADY_RV_PERCENT_PACED: 10.9 %
MDC_IDC_STAT_EPISODE_RECENT_COUNT: 27
MDC_IDC_STAT_EPISODE_RECENT_COUNT: 27
MDC_IDC_STAT_EPISODE_RECENT_COUNT: 2946
MDC_IDC_STAT_EPISODE_RECENT_COUNT_DTM_END: NORMAL
MDC_IDC_STAT_EPISODE_RECENT_COUNT_DTM_START: NORMAL
MDC_IDC_STAT_EPISODE_TYPE: NORMAL

## 2023-08-25 ENCOUNTER — ANCILLARY PROCEDURE (OUTPATIENT)
Dept: CARDIOLOGY | Facility: CLINIC | Age: 88
End: 2023-08-25
Attending: INTERNAL MEDICINE
Payer: MEDICARE

## 2023-08-25 DIAGNOSIS — I49.5 SICK SINUS SYNDROME (H): ICD-10-CM

## 2023-08-25 DIAGNOSIS — Z95.0 PACEMAKER: ICD-10-CM

## 2023-08-28 LAB
MDC_IDC_LEAD_IMPLANT_DT: NORMAL
MDC_IDC_LEAD_IMPLANT_DT: NORMAL
MDC_IDC_LEAD_LOCATION: NORMAL
MDC_IDC_LEAD_LOCATION: NORMAL
MDC_IDC_LEAD_LOCATION_DETAIL_1: NORMAL
MDC_IDC_LEAD_LOCATION_DETAIL_1: NORMAL
MDC_IDC_LEAD_MFG: NORMAL
MDC_IDC_LEAD_MFG: NORMAL
MDC_IDC_LEAD_MODEL: NORMAL
MDC_IDC_LEAD_MODEL: NORMAL
MDC_IDC_LEAD_POLARITY_TYPE: NORMAL
MDC_IDC_LEAD_POLARITY_TYPE: NORMAL
MDC_IDC_LEAD_SERIAL: NORMAL
MDC_IDC_LEAD_SERIAL: NORMAL
MDC_IDC_MSMT_BATTERY_DTM: NORMAL
MDC_IDC_MSMT_BATTERY_IMPEDANCE: 1111 OHM
MDC_IDC_MSMT_BATTERY_REMAINING_LONGEVITY: 63 MO
MDC_IDC_MSMT_BATTERY_STATUS: NORMAL
MDC_IDC_MSMT_BATTERY_VOLTAGE: 2.77 V
MDC_IDC_MSMT_LEADCHNL_RA_IMPEDANCE_VALUE: 452 OHM
MDC_IDC_MSMT_LEADCHNL_RA_PACING_THRESHOLD_AMPLITUDE: 0.5 V
MDC_IDC_MSMT_LEADCHNL_RA_PACING_THRESHOLD_PULSEWIDTH: 0.4 MS
MDC_IDC_MSMT_LEADCHNL_RV_IMPEDANCE_VALUE: 486 OHM
MDC_IDC_MSMT_LEADCHNL_RV_PACING_THRESHOLD_AMPLITUDE: 1.38 V
MDC_IDC_MSMT_LEADCHNL_RV_PACING_THRESHOLD_PULSEWIDTH: 0.4 MS
MDC_IDC_MSMT_LEADCHNL_RV_SENSING_INTR_AMPL: 16 MV
MDC_IDC_PG_IMPLANT_DTM: NORMAL
MDC_IDC_PG_MFG: NORMAL
MDC_IDC_PG_MODEL: NORMAL
MDC_IDC_PG_SERIAL: NORMAL
MDC_IDC_PG_TYPE: NORMAL
MDC_IDC_SESS_CLINIC_NAME: NORMAL
MDC_IDC_SESS_DTM: NORMAL
MDC_IDC_SESS_TYPE: NORMAL
MDC_IDC_SET_BRADY_AT_MODE_SWITCH_MODE: NORMAL
MDC_IDC_SET_BRADY_AT_MODE_SWITCH_RATE: 175 {BEATS}/MIN
MDC_IDC_SET_BRADY_LOWRATE: 60 {BEATS}/MIN
MDC_IDC_SET_BRADY_MAX_SENSOR_RATE: 120 {BEATS}/MIN
MDC_IDC_SET_BRADY_MAX_TRACKING_RATE: 120 {BEATS}/MIN
MDC_IDC_SET_BRADY_MODE: NORMAL
MDC_IDC_SET_BRADY_PAV_DELAY_LOW: 150 MS
MDC_IDC_SET_BRADY_SAV_DELAY_LOW: 120 MS
MDC_IDC_SET_LEADCHNL_RA_PACING_AMPLITUDE: 1.5 V
MDC_IDC_SET_LEADCHNL_RA_PACING_CAPTURE_MODE: NORMAL
MDC_IDC_SET_LEADCHNL_RA_PACING_POLARITY: NORMAL
MDC_IDC_SET_LEADCHNL_RA_PACING_PULSEWIDTH: 0.4 MS
MDC_IDC_SET_LEADCHNL_RA_SENSING_POLARITY: NORMAL
MDC_IDC_SET_LEADCHNL_RA_SENSING_SENSITIVITY: 0.18 MV
MDC_IDC_SET_LEADCHNL_RV_PACING_AMPLITUDE: 2 V
MDC_IDC_SET_LEADCHNL_RV_PACING_CAPTURE_MODE: NORMAL
MDC_IDC_SET_LEADCHNL_RV_PACING_POLARITY: NORMAL
MDC_IDC_SET_LEADCHNL_RV_PACING_PULSEWIDTH: 0.4 MS
MDC_IDC_SET_LEADCHNL_RV_SENSING_POLARITY: NORMAL
MDC_IDC_SET_LEADCHNL_RV_SENSING_SENSITIVITY: 5.6 MV
MDC_IDC_SET_ZONE_DETECTION_INTERVAL: 342.86 MS
MDC_IDC_SET_ZONE_DETECTION_INTERVAL: 375 MS
MDC_IDC_SET_ZONE_TYPE: NORMAL
MDC_IDC_SET_ZONE_TYPE: NORMAL
MDC_IDC_STAT_AT_BURDEN_PERCENT: 100 %
MDC_IDC_STAT_AT_DTM_END: NORMAL
MDC_IDC_STAT_AT_DTM_START: NORMAL
MDC_IDC_STAT_AT_MODE_SW_COUNT: 1
MDC_IDC_STAT_EPISODE_RECENT_COUNT: 0
MDC_IDC_STAT_EPISODE_RECENT_COUNT: 1
MDC_IDC_STAT_EPISODE_RECENT_COUNT_DTM_END: NORMAL
MDC_IDC_STAT_EPISODE_RECENT_COUNT_DTM_END: NORMAL
MDC_IDC_STAT_EPISODE_RECENT_COUNT_DTM_START: NORMAL
MDC_IDC_STAT_EPISODE_RECENT_COUNT_DTM_START: NORMAL
MDC_IDC_STAT_EPISODE_TYPE: NORMAL
MDC_IDC_STAT_EPISODE_TYPE: NORMAL

## 2023-08-28 PROCEDURE — 93296 REM INTERROG EVL PM/IDS: CPT | Performed by: INTERNAL MEDICINE

## 2023-08-28 PROCEDURE — 93294 REM INTERROG EVL PM/LDLS PM: CPT | Performed by: INTERNAL MEDICINE

## 2023-11-20 ENCOUNTER — TELEPHONE (OUTPATIENT)
Dept: CARDIOLOGY | Facility: CLINIC | Age: 88
End: 2023-11-20
Payer: MEDICARE

## 2023-11-20 DIAGNOSIS — I49.5 SICK SINUS SYNDROME (H): ICD-10-CM

## 2023-11-20 DIAGNOSIS — I47.29 NONSUSTAINED VENTRICULAR TACHYCARDIA (H): ICD-10-CM

## 2023-11-20 DIAGNOSIS — Z95.0 PACEMAKER: Primary | ICD-10-CM

## 2023-11-20 DIAGNOSIS — I25.10 ATHEROSCLEROSIS OF NATIVE CORONARY ARTERY OF NATIVE HEART WITHOUT ANGINA PECTORIS: ICD-10-CM

## 2023-11-20 DIAGNOSIS — I48.0 PAROXYSMAL ATRIAL FIBRILLATION (H): ICD-10-CM

## 2023-11-20 NOTE — TELEPHONE ENCOUNTER
Reached out to patient's daughter, Rashmi, to discuss new symptoms.  UC visit in [facility] revealed: , lungs clear, UC provider suggested patient follow up with cardiology.  Shortness of breath at rest new for the past 2 weeks.    Call transferred to device scheduling to arrange RAC; scheduled 11/28/23.

## 2023-11-20 NOTE — TELEPHONE ENCOUNTER
Health Call Center    Phone Message    May a detailed message be left on voicemail: yes     Reason for Call: Other: Rashmi called requesting to speak with her father's care team about some new symptoms he was seen for in Urgent Care over the weekend including a heaviness in his chest and a fast heart rate. Please reach out to Rashmi to discuss his father's care. Thank you!     Action Taken: Other: Cardiology    Travel Screening: Not Applicable    Thank you!  [facility]

## 2023-11-27 ENCOUNTER — ANCILLARY PROCEDURE (OUTPATIENT)
Dept: CARDIOLOGY | Facility: CLINIC | Age: 88
End: 2023-11-27
Attending: INTERNAL MEDICINE
Payer: MEDICARE

## 2023-11-27 DIAGNOSIS — I49.5 SICK SINUS SYNDROME (H): ICD-10-CM

## 2023-11-27 DIAGNOSIS — Z95.0 PACEMAKER: ICD-10-CM

## 2023-11-27 LAB
MDC_IDC_LEAD_CONNECTION_STATUS: NORMAL
MDC_IDC_LEAD_CONNECTION_STATUS: NORMAL
MDC_IDC_LEAD_IMPLANT_DT: NORMAL
MDC_IDC_LEAD_IMPLANT_DT: NORMAL
MDC_IDC_LEAD_LOCATION: NORMAL
MDC_IDC_LEAD_LOCATION: NORMAL
MDC_IDC_LEAD_LOCATION_DETAIL_1: NORMAL
MDC_IDC_LEAD_LOCATION_DETAIL_1: NORMAL
MDC_IDC_LEAD_MFG: NORMAL
MDC_IDC_LEAD_MFG: NORMAL
MDC_IDC_LEAD_MODEL: NORMAL
MDC_IDC_LEAD_MODEL: NORMAL
MDC_IDC_LEAD_POLARITY_TYPE: NORMAL
MDC_IDC_LEAD_POLARITY_TYPE: NORMAL
MDC_IDC_LEAD_SERIAL: NORMAL
MDC_IDC_LEAD_SERIAL: NORMAL
MDC_IDC_MSMT_BATTERY_DTM: NORMAL
MDC_IDC_MSMT_BATTERY_IMPEDANCE: 1223 OHM
MDC_IDC_MSMT_BATTERY_REMAINING_LONGEVITY: 59 MO
MDC_IDC_MSMT_BATTERY_STATUS: NORMAL
MDC_IDC_MSMT_BATTERY_VOLTAGE: 2.76 V
MDC_IDC_MSMT_LEADCHNL_RA_IMPEDANCE_VALUE: 399 OHM
MDC_IDC_MSMT_LEADCHNL_RA_PACING_THRESHOLD_AMPLITUDE: 0.5 V
MDC_IDC_MSMT_LEADCHNL_RA_PACING_THRESHOLD_PULSEWIDTH: 0.4 MS
MDC_IDC_MSMT_LEADCHNL_RV_IMPEDANCE_VALUE: 431 OHM
MDC_IDC_MSMT_LEADCHNL_RV_PACING_THRESHOLD_AMPLITUDE: 1.38 V
MDC_IDC_MSMT_LEADCHNL_RV_PACING_THRESHOLD_PULSEWIDTH: 0.4 MS
MDC_IDC_PG_IMPLANT_DTM: NORMAL
MDC_IDC_PG_MFG: NORMAL
MDC_IDC_PG_MODEL: NORMAL
MDC_IDC_PG_SERIAL: NORMAL
MDC_IDC_PG_TYPE: NORMAL
MDC_IDC_SESS_CLINIC_NAME: NORMAL
MDC_IDC_SESS_DTM: NORMAL
MDC_IDC_SESS_TYPE: NORMAL
MDC_IDC_SET_BRADY_AT_MODE_SWITCH_MODE: NORMAL
MDC_IDC_SET_BRADY_AT_MODE_SWITCH_RATE: 175 {BEATS}/MIN
MDC_IDC_SET_BRADY_LOWRATE: 60 {BEATS}/MIN
MDC_IDC_SET_BRADY_MAX_SENSOR_RATE: 120 {BEATS}/MIN
MDC_IDC_SET_BRADY_MAX_TRACKING_RATE: 120 {BEATS}/MIN
MDC_IDC_SET_BRADY_MODE: NORMAL
MDC_IDC_SET_BRADY_PAV_DELAY_LOW: 150 MS
MDC_IDC_SET_BRADY_SAV_DELAY_LOW: 120 MS
MDC_IDC_SET_LEADCHNL_RA_PACING_AMPLITUDE: 1.5 V
MDC_IDC_SET_LEADCHNL_RA_PACING_CAPTURE_MODE: NORMAL
MDC_IDC_SET_LEADCHNL_RA_PACING_POLARITY: NORMAL
MDC_IDC_SET_LEADCHNL_RA_PACING_PULSEWIDTH: 0.4 MS
MDC_IDC_SET_LEADCHNL_RA_SENSING_POLARITY: NORMAL
MDC_IDC_SET_LEADCHNL_RA_SENSING_SENSITIVITY: 0.18 MV
MDC_IDC_SET_LEADCHNL_RV_PACING_AMPLITUDE: 2 V
MDC_IDC_SET_LEADCHNL_RV_PACING_CAPTURE_MODE: NORMAL
MDC_IDC_SET_LEADCHNL_RV_PACING_POLARITY: NORMAL
MDC_IDC_SET_LEADCHNL_RV_PACING_PULSEWIDTH: 0.4 MS
MDC_IDC_SET_LEADCHNL_RV_SENSING_POLARITY: NORMAL
MDC_IDC_SET_LEADCHNL_RV_SENSING_SENSITIVITY: 5.6 MV
MDC_IDC_SET_ZONE_DETECTION_INTERVAL: 342.86 MS
MDC_IDC_SET_ZONE_DETECTION_INTERVAL: 375 MS
MDC_IDC_SET_ZONE_STATUS: NORMAL
MDC_IDC_SET_ZONE_STATUS: NORMAL
MDC_IDC_SET_ZONE_TYPE: NORMAL
MDC_IDC_SET_ZONE_TYPE: NORMAL
MDC_IDC_SET_ZONE_VENDOR_TYPE: NORMAL
MDC_IDC_SET_ZONE_VENDOR_TYPE: NORMAL
MDC_IDC_STAT_AT_BURDEN_PERCENT: 86.8 %
MDC_IDC_STAT_AT_DTM_END: NORMAL
MDC_IDC_STAT_AT_DTM_START: NORMAL
MDC_IDC_STAT_AT_MODE_SW_COUNT: 3
MDC_IDC_STAT_BRADY_AP_VP_PERCENT: 0 %
MDC_IDC_STAT_BRADY_AP_VS_PERCENT: 0 %
MDC_IDC_STAT_BRADY_AS_VP_PERCENT: 0 %
MDC_IDC_STAT_BRADY_AS_VS_PERCENT: 100 %
MDC_IDC_STAT_BRADY_DTM_END: NORMAL
MDC_IDC_STAT_BRADY_DTM_START: NORMAL
MDC_IDC_STAT_EPISODE_RECENT_COUNT: 0
MDC_IDC_STAT_EPISODE_RECENT_COUNT: 3
MDC_IDC_STAT_EPISODE_RECENT_COUNT_DTM_END: NORMAL
MDC_IDC_STAT_EPISODE_RECENT_COUNT_DTM_END: NORMAL
MDC_IDC_STAT_EPISODE_RECENT_COUNT_DTM_START: NORMAL
MDC_IDC_STAT_EPISODE_RECENT_COUNT_DTM_START: NORMAL
MDC_IDC_STAT_EPISODE_TYPE: NORMAL
MDC_IDC_STAT_EPISODE_TYPE: NORMAL

## 2023-11-27 PROCEDURE — 93294 REM INTERROG EVL PM/LDLS PM: CPT | Performed by: INTERNAL MEDICINE

## 2023-11-27 PROCEDURE — 93296 REM INTERROG EVL PM/IDS: CPT | Performed by: INTERNAL MEDICINE

## 2023-11-28 ENCOUNTER — OFFICE VISIT (OUTPATIENT)
Dept: CARDIOLOGY | Facility: CLINIC | Age: 88
End: 2023-11-28
Payer: MEDICARE

## 2023-11-28 VITALS
OXYGEN SATURATION: 96 % | SYSTOLIC BLOOD PRESSURE: 134 MMHG | BODY MASS INDEX: 25.37 KG/M2 | WEIGHT: 162 LBS | HEART RATE: 83 BPM | DIASTOLIC BLOOD PRESSURE: 80 MMHG | RESPIRATION RATE: 16 BRPM

## 2023-11-28 DIAGNOSIS — I49.5 SICK SINUS SYNDROME (H): ICD-10-CM

## 2023-11-28 DIAGNOSIS — I25.10 ATHEROSCLEROSIS OF NATIVE CORONARY ARTERY OF NATIVE HEART WITHOUT ANGINA PECTORIS: ICD-10-CM

## 2023-11-28 DIAGNOSIS — I48.0 PAROXYSMAL ATRIAL FIBRILLATION (H): Primary | ICD-10-CM

## 2023-11-28 DIAGNOSIS — I47.29 NONSUSTAINED VENTRICULAR TACHYCARDIA (H): ICD-10-CM

## 2023-11-28 DIAGNOSIS — Z95.0 PACEMAKER: ICD-10-CM

## 2023-11-28 PROCEDURE — 99214 OFFICE O/P EST MOD 30 MIN: CPT | Performed by: INTERNAL MEDICINE

## 2023-11-28 RX ORDER — METOPROLOL SUCCINATE 100 MG/1
100 TABLET, EXTENDED RELEASE ORAL DAILY
Qty: 30 TABLET | Refills: 11 | Status: SHIPPED | OUTPATIENT
Start: 2023-11-28 | End: 2024-03-13

## 2023-11-28 NOTE — PATIENT INSTRUCTIONS
Skip Newman,    It was a pleasure to see you today at the [facility].     My recommendations after this visit include:    Increase metoprolol to 100 mg a day  Echocardiogram further recommendations by Dr. Ann when results available.    TIA Chaudhari MD, FACC, [facility]

## 2023-11-28 NOTE — LETTER
11/28/2023    Jacquelin Francis MD  [address]  [facility] [address]    RE: Skip Newman       Dear Colleague,     I had the pleasure of seeing Skip Newman in the [facility].      Cardiology Progress Note     Assessment:  Persistent atrial fibrillation probably suboptimal rate control  Permanent pacemaker, normal function  Coronary artery disease with remote history of coronary intervention, no angina  Chronic systolic heart failure with mildly depressed LV systolic function no obvious fluid overload    Plan:  Increase metoprolol extended release  to 100 mg a day to improve rate control  Echo to reassess LV function  Further recommendation by patient's primary cardiologist Dr. Ann    Subjective:   This is 89 year old male who comes in today to rapid access clinic for evaluation after recent visit to urgency care.  About a week ago he went in with complaints of fluttering in the chest and mild shortness of breath.  He was not admitted.  He reports his symptoms improved.  He denies any pains in the chest.  He has not had syncope.  He denies any recent medication changes.  He weight has been stable.  He can walk a short distance at his nursing home.      Review of Systems:   Negative other than history of present illness    Objective:   /80 (BP Location: Left arm, Patient Position: Sitting, Cuff Size: Adult Regular)   Pulse 83   Resp 16   Wt 73.5 kg (162 lb)   SpO2 96%   BMI 25.37 kg/m    Physical Exam:  GENERAL: no distress  NECK: No JVD  LUNGS: Few crackles at the bases  CARDIAC: irregular rhythm, S1 & S2 normal.  No heaves, thrills, gallops or murmurs.  ABDOMEN: flat, negative hepatosplenomegaly, soft and non-tender.  EXTREMITIES: No evidence of cyanosis, clubbing or edema.    Current Outpatient Medications   Medication Sig Dispense Refill    acetaminophen (TYLENOL) 500 MG tablet [ACETAMINOPHEN (TYLENOL) 500 MG TABLET] Take 2 tablets (1,000 mg total) by mouth 3 (three)  times a day. (Patient taking differently: Take 1,000 mg by mouth every 6 hours as needed)  0    amoxicillin (AMOXIL) 500 MG capsule [AMOXICILLIN (AMOXIL) 500 MG CAPSULE] Take 4 capsules by mouth as needed. Prior to dental appointments      atorvastatin (LIPITOR) 40 MG tablet Take 20 mg by mouth daily      chlorthalidone (HYGROTEN) 25 MG tablet [CHLORTHALIDONE (HYGROTEN) 25 MG TABLET] Take 25 mg by mouth daily.      losartan (COZAAR) 100 MG tablet Take 50 mg by mouth every evening      metoprolol succinate ER (TOPROL XL) 100 MG 24 hr tablet Take 1 tablet (100 mg) by mouth daily 30 tablet 11    MULTIVITAMIN (MULTIPLE VITAMIN ORAL) [MULTIVITAMIN (MULTIPLE VITAMIN ORAL)] Take 1 tablet by mouth daily.      omega 3-dha-epa-fish oil (FISH OIL) 1,000 mg (120 mg-180 mg) cap [OMEGA 3-DHA-EPA-FISH OIL (FISH OIL) 1,000 MG (120 MG-180 MG) CAP] Take 1 capsule by mouth daily.      terbinafine HCl (LAMISIL) 1 % cream [TERBINAFINE HCL (LAMISIL) 1 % CREAM] Apply 1 application topically as needed (to feet). Every week as needed      warfarin ANTICOAGULANT (COUMADIN/JANTOVEN) 1 MG tablet [WARFARIN ANTICOAGULANT (COUMADIN/JANTOVEN) 1 MG TABLET] Take by mouth See Admin Instructions. Adjust dose based on INR results as directed.      allopurinol (ZYLOPRIM) 300 MG tablet [ALLOPURINOL (ZYLOPRIM) 300 MG TABLET] Take 300 mg by mouth every evening.  (Patient not taking: Reported on 4/17/2023)         Cardiographics:    Pacemaker interrogation: 11/27/2023  Pacing % /Programmed: AP 0%,  0% at AAIR<=>DDDR 60/120.   Lead(s): Stable.  Battery longevity: 4yrs, 11mo estimated.   Presenting: Atrial fibrillation with ventricular sensing  bpm.  Atrial high rates: since 8/25/23; per trend AF is persitent, AF burden 86%, v-rates >/=120bpm ~30%.   Anticoagulant: warfarin.  Ventricular High rates: since 8/25/23; None detected.  Comments: Normal device function.      Lab Results    Chemistry/lipid CBC Cardiac Enzymes/BNP/TSH/INR   No results for  "input(s): \"CHOL\", \"HDL\", \"LDL\", \"TRIG\", \"CHOLHDLRATIO\" in the last 59911 hours.  No results for input(s): \"LDL\" in the last 49320 hours.  Recent Labs   Lab Test 06/17/22  1557      POTASSIUM 4.6   CHLORIDE 107   CO2 25   GLC 93   BUN 48*   CR 1.37*   GFRESTIMATED 50*   JUAN 8.9     Recent Labs   Lab Test 06/17/22  1557 08/24/21  1323 02/28/21  1500   CR 1.37* 1.25 1.32*     No results for input(s): \"A1C\" in the last 52085 hours.       Recent Labs   Lab Test 08/24/21  1323   WBC 8.7   HGB 13.3   HCT 40.0   MCV 99   *     Recent Labs   Lab Test 08/24/21  1323 02/28/21  1544 01/15/20  0724   HGB 13.3 10.5* 11.5*    No results for input(s): \"TROPONINI\" in the last 34556 hours.  No results for input(s): \"BNP\", \"NTBNPI\", \"NTBNP\" in the last 65542 hours.  No results for input(s): \"TSH\" in the last 75140 hours.  Recent Labs   Lab Test 08/24/21  1323 02/28/21  1500 01/13/20  0643   INR 3.02* 2.68* 1.05                         Thank you for allowing me to participate in the care of your patient.      Sincerely,     Portillo Chaudhari MD     [facility]  cc:   Roc Will MD  [address]  [place] [address]      "

## 2023-11-28 NOTE — PROGRESS NOTES
Cardiology Progress Note     Assessment:  Persistent atrial fibrillation probably suboptimal rate control  Permanent pacemaker, normal function  Coronary artery disease with remote history of coronary intervention, no angina  Chronic systolic heart failure with mildly depressed LV systolic function no obvious fluid overload    Plan:  Increase metoprolol extended release  to 100 mg a day to improve rate control  Echo to reassess LV function  Further recommendation by patient's primary cardiologist Dr. Ann    Subjective:   This is 89 year old male who comes in today to rapid access clinic for evaluation after recent visit to [facility].  About a week ago he went in with complaints of fluttering in the chest and mild shortness of breath.  He was not admitted.  He reports his symptoms improved.  He denies any pains in the chest.  He has not had syncope.  He denies any recent medication changes.  He weight has been stable.  He can walk a short distance at his nursing home.      Review of Systems:   Negative other than history of present illness    Objective:   /80 (BP Location: Left arm, Patient Position: Sitting, Cuff Size: Adult Regular)   Pulse 83   Resp 16   Wt 73.5 kg (162 lb)   SpO2 96%   BMI 25.37 kg/m    Physical Exam:  GENERAL: no distress  NECK: No JVD  LUNGS: Few crackles at the bases  CARDIAC: irregular rhythm, S1 & S2 normal.  No heaves, thrills, gallops or murmurs.  ABDOMEN: flat, negative hepatosplenomegaly, soft and non-tender.  EXTREMITIES: No evidence of cyanosis, clubbing or edema.    Current Outpatient Medications   Medication Sig Dispense Refill    acetaminophen (TYLENOL) 500 MG tablet [ACETAMINOPHEN (TYLENOL) 500 MG TABLET] Take 2 tablets (1,000 mg total) by mouth 3 (three) times a day. (Patient taking differently: Take 1,000 mg by mouth every 6 hours as needed)  0    amoxicillin (AMOXIL) 500 MG capsule [AMOXICILLIN (AMOXIL) 500 MG CAPSULE] Take 4 capsules by mouth as needed.  "Prior to dental appointments      atorvastatin (LIPITOR) 40 MG tablet Take 20 mg by mouth daily      chlorthalidone (HYGROTEN) 25 MG tablet [CHLORTHALIDONE (HYGROTEN) 25 MG TABLET] Take 25 mg by mouth daily.      losartan (COZAAR) 100 MG tablet Take 50 mg by mouth every evening      metoprolol succinate ER (TOPROL XL) 100 MG 24 hr tablet Take 1 tablet (100 mg) by mouth daily 30 tablet 11    MULTIVITAMIN (MULTIPLE VITAMIN ORAL) [MULTIVITAMIN (MULTIPLE VITAMIN ORAL)] Take 1 tablet by mouth daily.      omega 3-dha-epa-fish oil (FISH OIL) 1,000 mg (120 mg-180 mg) cap [OMEGA 3-DHA-EPA-FISH OIL (FISH OIL) 1,000 MG (120 MG-180 MG) CAP] Take 1 capsule by mouth daily.      terbinafine HCl (LAMISIL) 1 % cream [TERBINAFINE HCL (LAMISIL) 1 % CREAM] Apply 1 application topically as needed (to feet). Every week as needed      warfarin ANTICOAGULANT (COUMADIN/JANTOVEN) 1 MG tablet [WARFARIN ANTICOAGULANT (COUMADIN/JANTOVEN) 1 MG TABLET] Take by mouth See Admin Instructions. Adjust dose based on INR results as directed.      allopurinol (ZYLOPRIM) 300 MG tablet [ALLOPURINOL (ZYLOPRIM) 300 MG TABLET] Take 300 mg by mouth every evening.  (Patient not taking: Reported on 4/17/2023)         Cardiographics:    Pacemaker interrogation: 11/27/2023  Pacing % /Programmed: AP 0%,  0% at AAIR<=>DDDR 60/120.   Lead(s): Stable.  Battery longevity: 4yrs, 11mo estimated.   Presenting: Atrial fibrillation with ventricular sensing  bpm.  Atrial high rates: since 8/25/23; per trend AF is persitent, AF burden 86%, v-rates >/=120bpm ~30%.   Anticoagulant: warfarin.  Ventricular High rates: since 8/25/23; None detected.  Comments: Normal device function.      Lab Results    Chemistry/lipid CBC Cardiac Enzymes/BNP/TSH/INR   No results for input(s): \"CHOL\", \"HDL\", \"LDL\", \"TRIG\", \"CHOLHDLRATIO\" in the last 39426 hours.  No results for input(s): \"LDL\" in the last 63331 hours.  Recent Labs   Lab Test 06/17/22  1557      POTASSIUM 4.6 " "  CHLORIDE 107   CO2 25   GLC 93   BUN 48*   CR 1.37*   GFRESTIMATED 50*   JUAN 8.9     Recent Labs   Lab Test 06/17/22  1557 08/24/21  1323 02/28/21  1500   CR 1.37* 1.25 1.32*     No results for input(s): \"A1C\" in the last 69896 hours.       Recent Labs   Lab Test 08/24/21  1323   WBC 8.7   HGB 13.3   HCT 40.0   MCV 99   *     Recent Labs   Lab Test 08/24/21  1323 02/28/21  1544 01/15/20  0724   HGB 13.3 10.5* 11.5*    No results for input(s): \"TROPONINI\" in the last 56705 hours.  No results for input(s): \"BNP\", \"NTBNPI\", \"NTBNP\" in the last 68422 hours.  No results for input(s): \"TSH\" in the last 17425 hours.  Recent Labs   Lab Test 08/24/21  1323 02/28/21  1500 01/13/20  0643   INR 3.02* 2.68* 1.05                     "

## 2023-12-21 ENCOUNTER — HOSPITAL ENCOUNTER (OUTPATIENT)
Dept: CARDIOLOGY | Facility: HOSPITAL | Age: 88
Discharge: HOME | End: 2023-12-21
Attending: INTERNAL MEDICINE | Admitting: INTERNAL MEDICINE
Payer: MEDICARE

## 2023-12-21 DIAGNOSIS — I48.0 PAROXYSMAL ATRIAL FIBRILLATION (H): ICD-10-CM

## 2023-12-21 LAB — LVEF ECHO: NORMAL

## 2023-12-21 PROCEDURE — 999N000208 ECHOCARDIOGRAM COMPLETE

## 2023-12-21 PROCEDURE — 93306 TTE W/DOPPLER COMPLETE: CPT | Mod: 26 | Performed by: INTERNAL MEDICINE

## 2023-12-21 PROCEDURE — 255N000002 HC RX 255 OP 636: Performed by: INTERNAL MEDICINE

## 2023-12-21 RX ADMIN — PERFLUTREN 2.5 ML: 6.52 INJECTION, SUSPENSION INTRAVENOUS at 10:56

## 2023-12-22 DIAGNOSIS — I48.0 PAROXYSMAL ATRIAL FIBRILLATION (H): ICD-10-CM

## 2023-12-22 DIAGNOSIS — Z95.0 PACEMAKER: ICD-10-CM

## 2023-12-22 DIAGNOSIS — R94.30 EJECTION FRACTION < 50%: Primary | ICD-10-CM

## 2024-01-30 ENCOUNTER — ANCILLARY PROCEDURE (OUTPATIENT)
Dept: CARDIOLOGY | Facility: CLINIC | Age: 89
End: 2024-01-30
Attending: INTERNAL MEDICINE
Payer: MEDICARE

## 2024-01-30 VITALS
OXYGEN SATURATION: 97 % | RESPIRATION RATE: 16 BRPM | WEIGHT: 168 LBS | DIASTOLIC BLOOD PRESSURE: 78 MMHG | SYSTOLIC BLOOD PRESSURE: 164 MMHG | BODY MASS INDEX: 26.31 KG/M2 | HEART RATE: 97 BPM

## 2024-01-30 DIAGNOSIS — I49.5 SICK SINUS SYNDROME (H): ICD-10-CM

## 2024-01-30 DIAGNOSIS — I48.0 PAROXYSMAL ATRIAL FIBRILLATION (H): ICD-10-CM

## 2024-01-30 DIAGNOSIS — Z95.0 PACEMAKER: ICD-10-CM

## 2024-01-30 DIAGNOSIS — I47.29 NONSUSTAINED VENTRICULAR TACHYCARDIA (H): ICD-10-CM

## 2024-01-30 DIAGNOSIS — I48.0 PAROXYSMAL ATRIAL FIBRILLATION (H): Primary | ICD-10-CM

## 2024-01-30 DIAGNOSIS — I25.10 ATHEROSCLEROSIS OF NATIVE CORONARY ARTERY OF NATIVE HEART WITHOUT ANGINA PECTORIS: ICD-10-CM

## 2024-01-30 DIAGNOSIS — R94.30 EJECTION FRACTION < 50%: ICD-10-CM

## 2024-01-30 LAB
ANION GAP SERPL CALCULATED.3IONS-SCNC: 10 MMOL/L (ref 7–15)
BUN SERPL-MCNC: 38.5 MG/DL (ref 8–23)
CALCIUM SERPL-MCNC: 9.4 MG/DL (ref 8.8–10.2)
CHLORIDE SERPL-SCNC: 105 MMOL/L (ref 98–107)
CREAT SERPL-MCNC: 1.24 MG/DL (ref 0.67–1.17)
DEPRECATED HCO3 PLAS-SCNC: 28 MMOL/L (ref 22–29)
EGFRCR SERPLBLD CKD-EPI 2021: 56 ML/MIN/1.73M2
GLUCOSE SERPL-MCNC: 89 MG/DL (ref 70–99)
POTASSIUM SERPL-SCNC: 4.3 MMOL/L (ref 3.4–5.3)
SODIUM SERPL-SCNC: 143 MMOL/L (ref 135–145)

## 2024-01-30 PROCEDURE — 36415 COLL VENOUS BLD VENIPUNCTURE: CPT | Performed by: INTERNAL MEDICINE

## 2024-01-30 PROCEDURE — 99214 OFFICE O/P EST MOD 30 MIN: CPT | Performed by: INTERNAL MEDICINE

## 2024-01-30 PROCEDURE — 80048 BASIC METABOLIC PNL TOTAL CA: CPT | Performed by: INTERNAL MEDICINE

## 2024-01-30 RX ORDER — SPIRONOLACTONE 25 MG/1
25 TABLET ORAL DAILY
Qty: 90 TABLET | Refills: 3 | Status: SHIPPED | OUTPATIENT
Start: 2024-01-30 | End: 2024-03-13

## 2024-01-30 NOTE — PATIENT INSTRUCTIONS
We will check a basic metabolic profile today  Start spironolactone 25 mg daily to help with blood pressure control and help allowing heart function to improve  We will recheck your device 2/16 to look for improved control in your atrial fibrillation.  We will recheck an echocardiogram in about 6 weeks to look for improvement in your heart muscle function.  I look forward to seeing you back in 6 months, if not sooner based on above testing
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
## 2024-01-31 DIAGNOSIS — I47.29 NONSUSTAINED VENTRICULAR TACHYCARDIA (H): ICD-10-CM

## 2024-01-31 DIAGNOSIS — R94.30 EJECTION FRACTION < 50%: ICD-10-CM

## 2024-01-31 DIAGNOSIS — Z95.0 PACEMAKER: Primary | ICD-10-CM

## 2024-01-31 DIAGNOSIS — I25.10 ATHEROSCLEROSIS OF NATIVE CORONARY ARTERY OF NATIVE HEART WITHOUT ANGINA PECTORIS: ICD-10-CM

## 2024-01-31 DIAGNOSIS — Z95.5 HISTORY OF CORONARY ARTERY STENT PLACEMENT: ICD-10-CM

## 2024-01-31 DIAGNOSIS — I49.5 SICK SINUS SYNDROME (H): ICD-10-CM

## 2024-01-31 DIAGNOSIS — I48.0 PAROXYSMAL ATRIAL FIBRILLATION (H): ICD-10-CM

## 2024-02-02 LAB
MDC_IDC_LEAD_CONNECTION_STATUS: NORMAL
MDC_IDC_LEAD_CONNECTION_STATUS: NORMAL
MDC_IDC_LEAD_IMPLANT_DT: NORMAL
MDC_IDC_LEAD_IMPLANT_DT: NORMAL
MDC_IDC_LEAD_LOCATION: NORMAL
MDC_IDC_LEAD_LOCATION: NORMAL
MDC_IDC_LEAD_LOCATION_DETAIL_1: NORMAL
MDC_IDC_LEAD_LOCATION_DETAIL_1: NORMAL
MDC_IDC_LEAD_MFG: NORMAL
MDC_IDC_LEAD_MFG: NORMAL
MDC_IDC_LEAD_MODEL: NORMAL
MDC_IDC_LEAD_MODEL: NORMAL
MDC_IDC_LEAD_POLARITY_TYPE: NORMAL
MDC_IDC_LEAD_POLARITY_TYPE: NORMAL
MDC_IDC_LEAD_SERIAL: NORMAL
MDC_IDC_LEAD_SERIAL: NORMAL
MDC_IDC_MSMT_BATTERY_DTM: NORMAL
MDC_IDC_MSMT_BATTERY_IMPEDANCE: 1248 OHM
MDC_IDC_MSMT_BATTERY_REMAINING_LONGEVITY: 59 MO
MDC_IDC_MSMT_BATTERY_STATUS: NORMAL
MDC_IDC_MSMT_BATTERY_VOLTAGE: 2.76 V
MDC_IDC_MSMT_LEADCHNL_RA_IMPEDANCE_VALUE: 394 OHM
MDC_IDC_MSMT_LEADCHNL_RA_SENSING_INTR_AMPL: 1.4 MV
MDC_IDC_MSMT_LEADCHNL_RV_IMPEDANCE_VALUE: 456 OHM
MDC_IDC_MSMT_LEADCHNL_RV_PACING_THRESHOLD_AMPLITUDE: 1.25 V
MDC_IDC_MSMT_LEADCHNL_RV_PACING_THRESHOLD_PULSEWIDTH: 0.4 MS
MDC_IDC_MSMT_LEADCHNL_RV_SENSING_INTR_AMPL: 22.4 MV
MDC_IDC_PG_IMPLANT_DTM: NORMAL
MDC_IDC_PG_MFG: NORMAL
MDC_IDC_PG_MODEL: NORMAL
MDC_IDC_PG_SERIAL: NORMAL
MDC_IDC_PG_TYPE: NORMAL
MDC_IDC_SESS_CLINIC_NAME: NORMAL
MDC_IDC_SESS_DTM: NORMAL
MDC_IDC_SESS_TYPE: NORMAL
MDC_IDC_SET_BRADY_AT_MODE_SWITCH_MODE: NORMAL
MDC_IDC_SET_BRADY_AT_MODE_SWITCH_RATE: 175 {BEATS}/MIN
MDC_IDC_SET_BRADY_LOWRATE: 60 {BEATS}/MIN
MDC_IDC_SET_BRADY_MAX_SENSOR_RATE: 120 {BEATS}/MIN
MDC_IDC_SET_BRADY_MAX_TRACKING_RATE: 120 {BEATS}/MIN
MDC_IDC_SET_BRADY_MODE: NORMAL
MDC_IDC_SET_BRADY_PAV_DELAY_LOW: 150 MS
MDC_IDC_SET_BRADY_SAV_DELAY_LOW: 120 MS
MDC_IDC_SET_LEADCHNL_RA_PACING_AMPLITUDE: NORMAL
MDC_IDC_SET_LEADCHNL_RA_PACING_CAPTURE_MODE: NORMAL
MDC_IDC_SET_LEADCHNL_RA_PACING_POLARITY: NORMAL
MDC_IDC_SET_LEADCHNL_RA_PACING_PULSEWIDTH: 0.4 MS
MDC_IDC_SET_LEADCHNL_RA_SENSING_POLARITY: NORMAL
MDC_IDC_SET_LEADCHNL_RA_SENSING_SENSITIVITY: 0.18 MV
MDC_IDC_SET_LEADCHNL_RV_PACING_AMPLITUDE: NORMAL
MDC_IDC_SET_LEADCHNL_RV_PACING_CAPTURE_MODE: NORMAL
MDC_IDC_SET_LEADCHNL_RV_PACING_POLARITY: NORMAL
MDC_IDC_SET_LEADCHNL_RV_PACING_PULSEWIDTH: 0.4 MS
MDC_IDC_SET_LEADCHNL_RV_SENSING_POLARITY: NORMAL
MDC_IDC_SET_LEADCHNL_RV_SENSING_SENSITIVITY: 5.6 MV
MDC_IDC_SET_ZONE_DETECTION_INTERVAL: 342.86 MS
MDC_IDC_SET_ZONE_DETECTION_INTERVAL: 375 MS
MDC_IDC_SET_ZONE_STATUS: NORMAL
MDC_IDC_SET_ZONE_STATUS: NORMAL
MDC_IDC_SET_ZONE_TYPE: NORMAL
MDC_IDC_SET_ZONE_TYPE: NORMAL
MDC_IDC_SET_ZONE_VENDOR_TYPE: NORMAL
MDC_IDC_SET_ZONE_VENDOR_TYPE: NORMAL
MDC_IDC_STAT_AT_BURDEN_PERCENT: 67.4 %
MDC_IDC_STAT_AT_DTM_END: NORMAL
MDC_IDC_STAT_AT_DTM_START: NORMAL
MDC_IDC_STAT_AT_MODE_SW_COUNT: 7
MDC_IDC_STAT_BRADY_AP_VP_PERCENT: 0 %
MDC_IDC_STAT_BRADY_AP_VS_PERCENT: 0 %
MDC_IDC_STAT_BRADY_AS_VP_PERCENT: 0 %
MDC_IDC_STAT_BRADY_AS_VS_PERCENT: 100 %
MDC_IDC_STAT_BRADY_DTM_END: NORMAL
MDC_IDC_STAT_BRADY_DTM_START: NORMAL
MDC_IDC_STAT_BRADY_RA_PERCENT_PACED: 0.1 %
MDC_IDC_STAT_BRADY_RV_PERCENT_PACED: 0.1 %
MDC_IDC_STAT_EPISODE_RECENT_COUNT: 0
MDC_IDC_STAT_EPISODE_RECENT_COUNT: 0
MDC_IDC_STAT_EPISODE_RECENT_COUNT: 6
MDC_IDC_STAT_EPISODE_RECENT_COUNT_DTM_END: NORMAL
MDC_IDC_STAT_EPISODE_RECENT_COUNT_DTM_START: NORMAL
MDC_IDC_STAT_EPISODE_TYPE: NORMAL

## 2024-02-02 PROCEDURE — 93280 PM DEVICE PROGR EVAL DUAL: CPT | Performed by: INTERNAL MEDICINE

## 2024-02-15 ENCOUNTER — APPOINTMENT (OUTPATIENT)
Dept: RADIOLOGY | Facility: HOSPITAL | Age: 89
End: 2024-02-15
Attending: EMERGENCY MEDICINE
Payer: MEDICARE

## 2024-02-15 ENCOUNTER — APPOINTMENT (OUTPATIENT)
Dept: CT IMAGING | Facility: HOSPITAL | Age: 89
End: 2024-02-15
Attending: EMERGENCY MEDICINE
Payer: MEDICARE

## 2024-02-15 ENCOUNTER — HOSPITAL ENCOUNTER (OUTPATIENT)
Facility: HOSPITAL | Age: 89
Setting detail: OBSERVATION
Discharge: HOME | End: 2024-02-16
Attending: EMERGENCY MEDICINE | Admitting: EMERGENCY MEDICINE
Payer: MEDICARE

## 2024-02-15 ENCOUNTER — ANCILLARY PROCEDURE (OUTPATIENT)
Dept: CARDIOLOGY | Facility: CLINIC | Age: 89
End: 2024-02-15
Attending: INTERNAL MEDICINE
Payer: MEDICARE

## 2024-02-15 DIAGNOSIS — Z95.0 PACEMAKER: ICD-10-CM

## 2024-02-15 DIAGNOSIS — I48.91 ATRIAL FIBRILLATION, UNSPECIFIED TYPE (H): ICD-10-CM
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
## 2024-02-15 DIAGNOSIS — I48.0 PAROXYSMAL ATRIAL FIBRILLATION (H): ICD-10-CM

## 2024-02-15 DIAGNOSIS — R79.89 ELEVATED TROPONIN: ICD-10-CM

## 2024-02-15 DIAGNOSIS — M62.82 NON-TRAUMATIC RHABDOMYOLYSIS: ICD-10-CM

## 2024-02-15 DIAGNOSIS — N28.9 RENAL INSUFFICIENCY: ICD-10-CM

## 2024-02-15 DIAGNOSIS — I50.22 CHRONIC SYSTOLIC CONGESTIVE HEART FAILURE (H): Primary | ICD-10-CM

## 2024-02-15 DIAGNOSIS — R79.1 SUBTHERAPEUTIC INTERNATIONAL NORMALIZED RATIO (INR): ICD-10-CM

## 2024-02-15 DIAGNOSIS — I49.5 SICK SINUS SYNDROME (H): ICD-10-CM

## 2024-02-15 PROBLEM — Z86.73 HISTORY OF STROKE: Status: ACTIVE | Noted: 2024-02-15

## 2024-02-15 PROBLEM — Z95.5 HISTORY OF CORONARY ARTERY STENT PLACEMENT: Chronic | Status: ACTIVE | Noted: 2019-07-08

## 2024-02-15 PROBLEM — N18.30 CKD (CHRONIC KIDNEY DISEASE) STAGE 3, GFR 30-59 ML/MIN (H): Status: ACTIVE | Noted: 2024-02-15

## 2024-02-15 LAB
ALBUMIN UR-MCNC: 10 MG/DL
ANION GAP SERPL CALCULATED.3IONS-SCNC: 9 MMOL/L (ref 7–15)
APPEARANCE UR: CLEAR
BILIRUB UR QL STRIP: NEGATIVE
BUN SERPL-MCNC: 47.5 MG/DL (ref 8–23)
CALCIUM SERPL-MCNC: 9.3 MG/DL (ref 8.8–10.2)
CHLORIDE SERPL-SCNC: 104 MMOL/L (ref 98–107)
CK SERPL-CCNC: 477 U/L (ref 39–308)
COLOR UR AUTO: ABNORMAL
CREAT SERPL-MCNC: 1.49 MG/DL (ref 0.67–1.17)
DEPRECATED HCO3 PLAS-SCNC: 27 MMOL/L (ref 22–29)
EGFRCR SERPLBLD CKD-EPI 2021: 45 ML/MIN/1.73M2
ERYTHROCYTE [DISTWIDTH] IN BLOOD BY AUTOMATED COUNT: 15.2 % (ref 10–15)
GLUCOSE SERPL-MCNC: 120 MG/DL (ref 70–99)
GLUCOSE UR STRIP-MCNC: NEGATIVE MG/DL
HCT VFR BLD AUTO: 37.6 % (ref 40–53)
HGB BLD-MCNC: 12.2 G/DL (ref 13.3–17.7)
HGB UR QL STRIP: ABNORMAL
HOLD SPECIMEN: NORMAL
HYALINE CASTS: 2 /LPF
INR PPP: 1.47 (ref 0.85–1.15)
KETONES UR STRIP-MCNC: NEGATIVE MG/DL
LACTATE SERPL-SCNC: 1.2 MMOL/L (ref 0.7–2)
LEUKOCYTE ESTERASE UR QL STRIP: NEGATIVE
MAGNESIUM SERPL-MCNC: 2.1 MG/DL (ref 1.7–2.3)
MCH RBC QN AUTO: 31.4 PG (ref 26.5–33)
MCHC RBC AUTO-ENTMCNC: 32.4 G/DL (ref 31.5–36.5)
MCV RBC AUTO: 97 FL (ref 78–100)
MDC_IDC_LEAD_CONNECTION_STATUS: NORMAL
MDC_IDC_LEAD_CONNECTION_STATUS: NORMAL
MDC_IDC_LEAD_IMPLANT_DT: NORMAL
MDC_IDC_LEAD_IMPLANT_DT: NORMAL
MDC_IDC_LEAD_LOCATION: NORMAL
MDC_IDC_LEAD_LOCATION: NORMAL
MDC_IDC_LEAD_LOCATION_DETAIL_1: NORMAL
MDC_IDC_LEAD_LOCATION_DETAIL_1: NORMAL
MDC_IDC_LEAD_MFG: NORMAL
MDC_IDC_LEAD_MFG: NORMAL
MDC_IDC_LEAD_MODEL: NORMAL
MDC_IDC_LEAD_MODEL: NORMAL
MDC_IDC_LEAD_POLARITY_TYPE: NORMAL
MDC_IDC_LEAD_POLARITY_TYPE: NORMAL
MDC_IDC_LEAD_SERIAL: NORMAL
MDC_IDC_LEAD_SERIAL: NORMAL
MDC_IDC_MSMT_BATTERY_DTM: NORMAL
MDC_IDC_MSMT_BATTERY_IMPEDANCE: 1279 OHM
MDC_IDC_MSMT_BATTERY_REMAINING_LONGEVITY: 58 MO
MDC_IDC_MSMT_BATTERY_STATUS: NORMAL
MDC_IDC_MSMT_BATTERY_VOLTAGE: 2.76 V
MDC_IDC_MSMT_LEADCHNL_RA_IMPEDANCE_VALUE: 416 OHM
MDC_IDC_MSMT_LEADCHNL_RA_PACING_THRESHOLD_AMPLITUDE: 0.5 V
MDC_IDC_MSMT_LEADCHNL_RA_PACING_THRESHOLD_PULSEWIDTH: 0.4 MS
MDC_IDC_MSMT_LEADCHNL_RV_IMPEDANCE_VALUE: 457 OHM
MDC_IDC_MSMT_LEADCHNL_RV_PACING_THRESHOLD_AMPLITUDE: 1 V
MDC_IDC_MSMT_LEADCHNL_RV_PACING_THRESHOLD_PULSEWIDTH: 0.4 MS
MDC_IDC_PG_IMPLANT_DTM: NORMAL
MDC_IDC_PG_MFG: NORMAL
MDC_IDC_PG_MODEL: NORMAL
MDC_IDC_PG_SERIAL: NORMAL
MDC_IDC_PG_TYPE: NORMAL
MDC_IDC_SESS_CLINIC_NAME: NORMAL
MDC_IDC_SESS_DTM: NORMAL
MDC_IDC_SESS_TYPE: NORMAL
MDC_IDC_SET_BRADY_AT_MODE_SWITCH_MODE: NORMAL
MDC_IDC_SET_BRADY_AT_MODE_SWITCH_RATE: 175 {BEATS}/MIN
MDC_IDC_SET_BRADY_LOWRATE: 60 {BEATS}/MIN
MDC_IDC_SET_BRADY_MAX_SENSOR_RATE: 120 {BEATS}/MIN
MDC_IDC_SET_BRADY_MAX_TRACKING_RATE: 120 {BEATS}/MIN
MDC_IDC_SET_BRADY_MODE: NORMAL
MDC_IDC_SET_BRADY_PAV_DELAY_LOW: 150 MS
MDC_IDC_SET_BRADY_SAV_DELAY_LOW: 120 MS
MDC_IDC_SET_LEADCHNL_RA_PACING_AMPLITUDE: 1.5 V
MDC_IDC_SET_LEADCHNL_RA_PACING_CAPTURE_MODE: NORMAL
MDC_IDC_SET_LEADCHNL_RA_PACING_POLARITY: NORMAL
MDC_IDC_SET_LEADCHNL_RA_PACING_PULSEWIDTH: 0.4 MS
MDC_IDC_SET_LEADCHNL_RA_SENSING_POLARITY: NORMAL
MDC_IDC_SET_LEADCHNL_RA_SENSING_SENSITIVITY: 0.18 MV
MDC_IDC_SET_LEADCHNL_RV_PACING_AMPLITUDE: 1.5 V
MDC_IDC_SET_LEADCHNL_RV_PACING_CAPTURE_MODE: NORMAL
MDC_IDC_SET_LEADCHNL_RV_PACING_POLARITY: NORMAL
MDC_IDC_SET_LEADCHNL_RV_PACING_PULSEWIDTH: 0.4 MS
MDC_IDC_SET_LEADCHNL_RV_SENSING_POLARITY: NORMAL
MDC_IDC_SET_LEADCHNL_RV_SENSING_SENSITIVITY: 5.6 MV
MDC_IDC_SET_ZONE_DETECTION_INTERVAL: 342.86 MS
MDC_IDC_SET_ZONE_DETECTION_INTERVAL: 375 MS
MDC_IDC_SET_ZONE_STATUS: NORMAL
MDC_IDC_SET_ZONE_STATUS: NORMAL
MDC_IDC_SET_ZONE_TYPE: NORMAL
MDC_IDC_SET_ZONE_TYPE: NORMAL
MDC_IDC_SET_ZONE_VENDOR_TYPE: NORMAL
MDC_IDC_SET_ZONE_VENDOR_TYPE: NORMAL
MDC_IDC_STAT_AT_BURDEN_PERCENT: 100 %
MDC_IDC_STAT_AT_DTM_END: NORMAL
MDC_IDC_STAT_AT_DTM_START: NORMAL
MDC_IDC_STAT_AT_MODE_SW_COUNT: 1
MDC_IDC_STAT_EPISODE_RECENT_COUNT: 0
MDC_IDC_STAT_EPISODE_RECENT_COUNT: 1
MDC_IDC_STAT_EPISODE_RECENT_COUNT_DTM_END: NORMAL
MDC_IDC_STAT_EPISODE_RECENT_COUNT_DTM_END: NORMAL
MDC_IDC_STAT_EPISODE_RECENT_COUNT_DTM_START: NORMAL
MDC_IDC_STAT_EPISODE_RECENT_COUNT_DTM_START: NORMAL
MDC_IDC_STAT_EPISODE_TYPE: NORMAL
MDC_IDC_STAT_EPISODE_TYPE: NORMAL
MUCOUS THREADS #/AREA URNS LPF: PRESENT /LPF
NITRATE UR QL: NEGATIVE
PH UR STRIP: 6 [PH] (ref 5–7)
PLATELET # BLD AUTO: 121 10E3/UL (ref 150–450)
POTASSIUM SERPL-SCNC: 4.7 MMOL/L (ref 3.4–5.3)
RBC # BLD AUTO: 3.88 10E6/UL (ref 4.4–5.9)
RBC URINE: <1 /HPF
SODIUM SERPL-SCNC: 140 MMOL/L (ref 135–145)
SP GR UR STRIP: 1.02 (ref 1–1.03)
SQUAMOUS EPITHELIAL: <1 /HPF
TRANSITIONAL EPI: <1 /HPF
TROPONIN T SERPL HS-MCNC: 111 NG/L
TROPONIN T SERPL HS-MCNC: 72 NG/L
UROBILINOGEN UR STRIP-MCNC: <2 MG/DL
WBC # BLD AUTO: 9.3 10E3/UL (ref 4–11)
WBC URINE: <1 /HPF

## 2024-02-15 PROCEDURE — G0378 HOSPITAL OBSERVATION PER HR: HCPCS

## 2024-02-15 PROCEDURE — 83735 ASSAY OF MAGNESIUM: CPT | Performed by: EMERGENCY MEDICINE

## 2024-02-15 PROCEDURE — 258N000003 HC RX IP 258 OP 636: Performed by: EMERGENCY MEDICINE

## 2024-02-15 PROCEDURE — 85027 COMPLETE CBC AUTOMATED: CPT | Performed by: EMERGENCY MEDICINE

## 2024-02-15 PROCEDURE — 80048 BASIC METABOLIC PNL TOTAL CA: CPT | Performed by: EMERGENCY MEDICINE

## 2024-02-15 PROCEDURE — 250N000013 HC RX MED GY IP 250 OP 250 PS 637: Performed by: EMERGENCY MEDICINE

## 2024-02-15 PROCEDURE — 83605 ASSAY OF LACTIC ACID: CPT | Performed by: INTERNAL MEDICINE

## 2024-02-15 PROCEDURE — 85610 PROTHROMBIN TIME: CPT | Performed by: EMERGENCY MEDICINE

## 2024-02-15 PROCEDURE — 72125 CT NECK SPINE W/O DYE: CPT | Mod: ME

## 2024-02-15 PROCEDURE — 84484 ASSAY OF TROPONIN QUANT: CPT | Performed by: EMERGENCY MEDICINE

## 2024-02-15 PROCEDURE — 250N000013 HC RX MED GY IP 250 OP 250 PS 637: Performed by: INTERNAL MEDICINE

## 2024-02-15 PROCEDURE — 84484 ASSAY OF TROPONIN QUANT: CPT | Performed by: INTERNAL MEDICINE

## 2024-02-15 PROCEDURE — 99285 EMERGENCY DEPT VISIT HI MDM: CPT | Mod: 25

## 2024-02-15 PROCEDURE — 82550 ASSAY OF CK (CPK): CPT | Performed by: EMERGENCY MEDICINE

## 2024-02-15 PROCEDURE — 93005 ELECTROCARDIOGRAM TRACING: CPT | Performed by: EMERGENCY MEDICINE

## 2024-02-15 PROCEDURE — 96360 HYDRATION IV INFUSION INIT: CPT

## 2024-02-15 PROCEDURE — 81001 URINALYSIS AUTO W/SCOPE: CPT | Performed by: EMERGENCY MEDICINE

## 2024-02-15 PROCEDURE — 36415 COLL VENOUS BLD VENIPUNCTURE: CPT | Performed by: EMERGENCY MEDICINE

## 2024-02-15 PROCEDURE — 36415 COLL VENOUS BLD VENIPUNCTURE: CPT | Performed by: INTERNAL MEDICINE

## 2024-02-15 PROCEDURE — 99223 1ST HOSP IP/OBS HIGH 75: CPT | Performed by: INTERNAL MEDICINE

## 2024-02-15 PROCEDURE — 70450 CT HEAD/BRAIN W/O DYE: CPT | Mod: MG

## 2024-02-15 PROCEDURE — 96361 HYDRATE IV INFUSION ADD-ON: CPT

## 2024-02-15 PROCEDURE — 73030 X-RAY EXAM OF SHOULDER: CPT | Mod: LT

## 2024-02-15 RX ORDER — ONDANSETRON 4 MG/1
4 TABLET, ORALLY DISINTEGRATING ORAL EVERY 6 HOURS PRN
Status: DISCONTINUED | OUTPATIENT
Start: 2024-02-15 | End: 2024-02-16 | Stop reason: HOSPADM

## 2024-02-15 RX ORDER — PROCHLORPERAZINE 25 MG
12.5 SUPPOSITORY, RECTAL RECTAL EVERY 12 HOURS PRN
Status: DISCONTINUED | OUTPATIENT
Start: 2024-02-15 | End: 2024-02-16 | Stop reason: HOSPADM

## 2024-02-15 RX ORDER — AMOXICILLIN 250 MG
1 CAPSULE ORAL 2 TIMES DAILY PRN
Status: DISCONTINUED | OUTPATIENT
Start: 2024-02-15 | End: 2024-02-16 | Stop reason: HOSPADM

## 2024-02-15 RX ORDER — SPIRONOLACTONE 25 MG/1
25 TABLET ORAL EVERY EVENING
Status: DISCONTINUED | OUTPATIENT
Start: 2024-02-15 | End: 2024-02-16 | Stop reason: HOSPADM

## 2024-02-15 RX ORDER — ATORVASTATIN CALCIUM 10 MG/1
20 TABLET, FILM COATED ORAL EVERY EVENING
Status: DISCONTINUED | OUTPATIENT
Start: 2024-02-15 | End: 2024-02-16

## 2024-02-15 RX ORDER — CHLORTHALIDONE 25 MG/1
25 TABLET ORAL EVERY EVENING
Status: DISCONTINUED | OUTPATIENT
Start: 2024-02-15 | End: 2024-02-16 | Stop reason: HOSPADM

## 2024-02-15 RX ORDER — PROCHLORPERAZINE MALEATE 5 MG
5 TABLET ORAL EVERY 6 HOURS PRN
Status: DISCONTINUED | OUTPATIENT
Start: 2024-02-15 | End: 2024-02-16 | Stop reason: HOSPADM

## 2024-02-15 RX ORDER — WARFARIN SODIUM 2.5 MG/1
2.5 TABLET ORAL
Status: COMPLETED | OUTPATIENT
Start: 2024-02-15 | End: 2024-02-15

## 2024-02-15 RX ORDER — METOPROLOL SUCCINATE 100 MG/1
100 TABLET, EXTENDED RELEASE ORAL EVERY EVENING
Status: DISCONTINUED | OUTPATIENT
Start: 2024-02-15 | End: 2024-02-16 | Stop reason: HOSPADM

## 2024-02-15 RX ORDER — AMOXICILLIN 250 MG
2 CAPSULE ORAL 2 TIMES DAILY PRN
Status: DISCONTINUED | OUTPATIENT
Start: 2024-02-15 | End: 2024-02-16 | Stop reason: HOSPADM

## 2024-02-15 RX ORDER — LOSARTAN POTASSIUM 50 MG/1
50 TABLET ORAL EVERY EVENING
Status: DISCONTINUED | OUTPATIENT
Start: 2024-02-15 | End: 2024-02-16 | Stop reason: HOSPADM

## 2024-02-15 RX ORDER — METOPROLOL TARTRATE 1 MG/ML
5 INJECTION, SOLUTION INTRAVENOUS EVERY 5 MIN PRN
Status: DISCONTINUED | OUTPATIENT
Start: 2024-02-15 | End: 2024-02-16 | Stop reason: HOSPADM

## 2024-02-15 RX ORDER — HYDRALAZINE HYDROCHLORIDE 20 MG/ML
10 INJECTION INTRAMUSCULAR; INTRAVENOUS EVERY 4 HOURS PRN
Status: DISCONTINUED | OUTPATIENT
Start: 2024-02-15 | End: 2024-02-16 | Stop reason: HOSPADM

## 2024-02-15 RX ORDER — LANOLIN ALCOHOL/MO/W.PET/CERES
3 CREAM (GRAM) TOPICAL
Status: DISCONTINUED | OUTPATIENT
Start: 2024-02-15 | End: 2024-02-16 | Stop reason: HOSPADM

## 2024-02-15 RX ORDER — ACETAMINOPHEN 325 MG/1
650 TABLET ORAL EVERY 4 HOURS PRN
Status: DISCONTINUED | OUTPATIENT
Start: 2024-02-15 | End: 2024-02-16 | Stop reason: HOSPADM

## 2024-02-15 RX ORDER — ACETAMINOPHEN 650 MG/1
650 SUPPOSITORY RECTAL EVERY 4 HOURS PRN
Status: DISCONTINUED | OUTPATIENT
Start: 2024-02-15 | End: 2024-02-16 | Stop reason: HOSPADM

## 2024-02-15 RX ORDER — POLYETHYLENE GLYCOL 3350 17 G/17G
17 POWDER, FOR SOLUTION ORAL 2 TIMES DAILY PRN
Status: DISCONTINUED | OUTPATIENT
Start: 2024-02-15 | End: 2024-02-16 | Stop reason: HOSPADM

## 2024-02-15 RX ORDER — CALCIUM CARBONATE 500 MG/1
1000 TABLET, CHEWABLE ORAL 4 TIMES DAILY PRN
Status: DISCONTINUED | OUTPATIENT
Start: 2024-02-15 | End: 2024-02-16 | Stop reason: HOSPADM

## 2024-02-15 RX ORDER — ONDANSETRON 2 MG/ML
4 INJECTION INTRAMUSCULAR; INTRAVENOUS EVERY 6 HOURS PRN
Status: DISCONTINUED | OUTPATIENT
Start: 2024-02-15 | End: 2024-02-16 | Stop reason: HOSPADM

## 2024-02-15 RX ADMIN — SODIUM CHLORIDE 500 ML: 9 INJECTION, SOLUTION INTRAVENOUS at 12:05

## 2024-02-15 RX ADMIN — WARFARIN SODIUM 2.5 MG: 2.5 TABLET ORAL at 18:16

## 2024-02-15 RX ADMIN — METOPROLOL SUCCINATE 100 MG: 100 TABLET, EXTENDED RELEASE ORAL at 19:52

## 2024-02-15 ASSESSMENT — ACTIVITIES OF DAILY LIVING (ADL)
ADLS_ACUITY_SCORE: 40
DEPENDENT_IADLS:: TRANSPORTATION;MEAL PREPARATION;CLEANING
ADLS_ACUITY_SCORE: 35
ADLS_ACUITY_SCORE: 38
ADLS_ACUITY_SCORE: 40
ADLS_ACUITY_SCORE: 40
ADLS_ACUITY_SCORE: 38

## 2024-02-15 NOTE — ED TRIAGE NOTES
Arrives via EMS from [facility] living after a fall last night around 9pm. Denies hitting head or LOC. Is on thinners. A&O, no pain reported. Hx afib

## 2024-02-15 NOTE — PHARMACY-ADMISSION MEDICATION HISTORY
Pharmacist Admission Medication History    Admission medication history is complete. The information provided in this note is only as accurate as the sources available at the time of the update.    Information Source(s): Patient and Family member via in-person and phone    Pertinent Information: Patient takes warfarin 2.5mg daily but yesterday was instructed to take 5mg due to a subtherapeutic INR. I spoke to the patient's daughter here at the hospital and then called home and spoke to the grandson who looked at Bill's pill box to confirm.     Changes made to PTA medication list:  Added: None  Deleted: Allopurinol, Amoxicillin, terbinafine  Changed: None    Allergies reviewed with patient and updates made in EHR: yes    Medication History Completed By: Gregorio Vasquez RPH 2/15/2024 1:57 PM    PTA Med List   Medication Sig Last Dose    acetaminophen (TYLENOL) 500 MG tablet [ACETAMINOPHEN (TYLENOL) 500 MG TABLET] Take 2 tablets (1,000 mg total) by mouth 3 (three) times a day. (Patient taking differently: Take 1,000 mg by mouth every 6 hours as needed) Past Week at PRN    atorvastatin (LIPITOR) 40 MG tablet Take 20 mg by mouth daily 2/14/2024 at PM    chlorthalidone (HYGROTEN) 25 MG tablet [CHLORTHALIDONE (HYGROTEN) 25 MG TABLET] Take 25 mg by mouth daily. 2/14/2024 at PM    losartan (COZAAR) 100 MG tablet Take 50 mg by mouth every evening 2/14/2024 at PM    metoprolol succinate ER (TOPROL XL) 100 MG 24 hr tablet Take 1 tablet (100 mg) by mouth daily 2/14/2024 at PM    MULTIVITAMIN (MULTIPLE VITAMIN ORAL) [MULTIVITAMIN (MULTIPLE VITAMIN ORAL)] Take 1 tablet by mouth daily. 2/14/2024 at PM    omega 3-dha-epa-fish oil (FISH OIL) 1,000 mg (120 mg-180 mg) cap [OMEGA 3-DHA-EPA-FISH OIL (FISH OIL) 1,000 MG (120 MG-180 MG) CAP] Take 1 capsule by mouth daily. 2/14/2024 at PM    spironolactone (ALDACTONE) 25 MG tablet Take 1 tablet (25 mg) by mouth daily 2/14/2024 at PM    warfarin ANTICOAGULANT (COUMADIN) 5 MG tablet Take 2.5  mg by mouth See Admin Instructions 2/14/2024 at 5mg 1700

## 2024-02-15 NOTE — CONSULTS
Care Management Initial Consult    General Information  Assessment completed with: Patient, Family, pt and grandson Elgin  Type of CM/SW Visit: Initial Assessment    Primary Care Provider verified and updated as needed: Yes   Readmission within the last 30 days: no previous admission in last 30 days      Reason for Consult: discharge planning  Advance Care Planning: Advance Care Planning Reviewed: no concerns identified          Communication Assessment  Patient's communication style: spoken language (English or Bilingual)             Cognitive  Cognitive/Neuro/Behavioral: WDL                      Living Environment:   People in home: spouse     Current living Arrangements: assisted living  Name of Facility: [facility]   Able to return to prior arrangements: yes       Family/Social Support:  Care provided by: self  Provides care for: no one  Marital Status:   Wife          Description of Support System: Involved, Supportive    Support Assessment: Adequate family and caregiver support, Adequate social supports    Current Resources:   Patient receiving home care services: No     Community Resources: None  Equipment currently used at home:    Supplies currently used at home: Hearing Aid Batteries    Employment/Financial:  Employment Status:          Financial Concerns: none   Referral to Financial Worker: No       Does the patient's insurance plan have a 3 day qualifying hospital stay waiver?  No    Lifestyle & Psychosocial Needs:  Social Determinants of Health     Food Insecurity: Not on file   Depression: Not on file   Housing Stability: Not on file   Tobacco Use: Low Risk  (1/30/2024)    Patient History     Smoking Tobacco Use: Never     Smokeless Tobacco Use: Never     Passive Exposure: Not on file   Financial Resource Strain: Not on file   Alcohol Use: Not on file   Transportation Needs: Not on file   Physical Activity: Not on file   Interpersonal Safety: Not on file   Stress: Not on file   Social  Connections: Not on file       Functional Status:  Prior to admission patient needed assistance:   Dependent ADLs:: Ambulation-cane  Dependent IADLs:: Transportation, Meal Preparation, Cleaning  Assesssment of Functional Status: Not at baseline with ADL Functioning, Not at baseline with mobility    Mental Health Status:  Mental Health Status: No Current Concerns       Chemical Dependency Status:                Values/Beliefs:  Spiritual, Cultural Beliefs, Anabaptism Practices, Values that affect care:                 Additional Information:  Assessed, lives w/spouse at [facility], pt is semi independent and family and will transport. CM to follow.     Elgin Luciano - grandson [phone]  Rashmi Luciano - daughter [phone]    Anayeli Cohn RN

## 2024-02-15 NOTE — PHARMACY-ANTICOAGULATION SERVICE
Clinical Pharmacy - Warfarin Dosing Consult     Pharmacy has been consulted to manage this patient s warfarin therapy.  Indication: Atrial Fibrillation  Therapy Goal: INR 2-3  OP Anticoag Clinic: [facility]  Warfarin Prior to Admission: Yes  Warfarin PTA Regimen: 2.5mg daily  Significant drug interactions: Moderate: Spironolactone started 1/30, Chlorthalidone  Recent documented change in oral intake/nutrition: Unknown  Dose Comments: 2.5mg today in case there is a delayed effect with 5mg tablet reported to be taken 2/14    INR   Date Value Ref Range Status   02/15/2024 1.47 (H) 0.85 - 1.15 Final   08/24/2021 3.02 (H) 0.90 - 1.15 Final     Comment:     Effective 7/11/2021, the reference range for this assay has changed.       Recommend warfarin 2.5 mg today.  Pharmacy will monitor Skip Newman daily and order warfarin doses to achieve specified goal.      Please contact pharmacy as soon as possible if the warfarin needs to be held for a procedure or if the warfarin goals change.

## 2024-02-15 NOTE — ED NOTES
Bed: JNED-02  Expected date: 2/15/24  Expected time: 10:50 AM  Means of arrival: Ambulance  Comments:  [facility] - fall last night, on ground for 12 hours

## 2024-02-15 NOTE — ED NOTES
[facility] ED Handoff Report    ED Chief Complaint: fall    ED Diagnosis:  (M62.82) Non-traumatic rhabdomyolysis  Comment:   Plan:     (W19.XXXA) Fall, initial encounter  Comment:   Plan:     (R79.89) Elevated troponin  Comment:   Plan:     (N28.9) Renal insufficiency  Comment:   Plan:     (R79.1) Subtherapeutic international normalized ratio (INR)  Comment:   Plan:     (I48.91) Atrial fibrillation, unspecified type (H)  Comment:   Plan:        PMH:    Past Medical History:   Diagnosis Date    A-fib (H)     Anemia     Arrhythmia     Arthritis     Chronic kidney disease     Claudication (H24)     Coronary artery disease     Gout     Hearing loss     high frequency    History of transfusion     Hyperlipidemia     Hypertension     Impotence of organic origin     Kidney stone     recurrent    MI (myocardial infarction) (H)     Pacemaker     Medtronic    Primary osteoarthritis of both knees     Sick sinus syndrome (H)     Skin cancer     Spinal stenosis, lumbar region, with neurogenic claudication     Syncope     Thrombocytopenia (H24)     VT (ventricular tachycardia) (H)         Code Status:  No Order     Falls Risk: Yes Band: Applied    Current Living Situation/Residence: lives in an apartment     Elimination Status: Continent: Yes     Activity Level: 2 assist    Patients Preferred Language:  English     Needed: No    Vital Signs:  BP (!) 174/71   Pulse 99   Temp 98.2  F (36.8  C) (Oral)   Resp 28   SpO2 93%      Cardiac Rhythm: afib cvr/rvr    Pain Score: 0/10    Is the Patient Confused:  No    Last Food or Drink: 2/14/24 at 1900    Focused Assessment:  A&O, denies pain. Occasional dizziness. In afib cvr/rvr. Trops elevated.     Tests Performed: Done: Labs and Imaging    Treatments Provided: iv bolus    Family Dynamics/Concerns: No    Family Updated On Visitor Policy: Yes    Plan of Care Communicated to Family: Yes    Who Was Updated about Plan of Care: daughter/son in law at  bedside    Belongings Checklist Done and Signed by Patient: Yes    Medications sent with patient: n/a    Covid: asymptomatic     Additional Information:     RN: Mark Anthony Frost RN   2/15/2024 1:43 PM

## 2024-02-15 NOTE — ED PROVIDER NOTES
EMERGENCY DEPARTMENT ENCOUNTER      NAME: Skip Newman  AGE: 89 year old male  YOB: 1934  MRN: [id number]  EVALUATION DATE & TIME: 2/15/2024 11:10 AM    PCP: Jacquelin Francis    ED PROVIDER: Nu Calvo MD    Chief Complaint   Patient presents with    Fall         FINAL IMPRESSION:  1. Non-traumatic rhabdomyolysis    2. Fall, initial encounter    3. Elevated troponin    4. Renal insufficiency    5. Subtherapeutic international normalized ratio (INR)    6. Atrial fibrillation, unspecified type (H)          ED COURSE & MEDICAL DECISION MAKING:    Pertinent Labs & Imaging studies reviewed. (See chart for details)  89 year old male with history of HTN, HLD, CAD, A-fib status post PPM, CKD anticoagulated on Coumadin who presents to the Emergency Department for evaluation of unwitnessed fall last night.  Patient really does not remember the events of the fall but states that he was trying to calm down his demented wife.  Unclear whether this was a mechanical fall versus syncope but regardless he has been laying on the ground for the last 12 hours.  He really denies any complaints.  Concern for closed head injury, skull fracture, ICH with him being anticoagulated prophylactically his INR yesterday was subtherapeutic at 1.5.  Concern for arrhythmia, will interrogate his pacemaker, dehydration, electrolyte abnormality, symptomatic anemia versus mechanical fall.  He does have some left shoulder pain that he states is chronic, will obtain imaging with a fall to rule out fracture.  Clinically no signs of dislocation.    Patient met by myself upon EMS arrival, placed on monitor, IV established and blood obtained.  Twelve-lead EKG shows A-fib with RVR.  Rates here mostly in the 100s.  CBC, BMP, magnesium, troponin, CPK, INR acute on chronic renal insufficiency with creatinine of 1.49 up from 1.2-1.3 baseline.  Given 500 mL normal saline.  CK of 477 consistent with mild rhabdomyolysis.  INR subtherapeutic  1.47.  Troponin elevated at 111.  No previous high-sensitivity troponin to compare.  Urinalysis unremarkable.  CT of the head, cervical spine unremarkable for acute abnormalities.  X-ray left shoulder unremarkable for acute abnormalities.  Pacemaker interrogation is performed, results of interrogation pending.    Per review of most recent cardiology visit note patient does have a history of CAD, had MI in 2000 and had PCI to the circumflex with chronic occlusion of the posterior descending and 2008.  Case discussed with cardiology.  As patient is asymptomatic, will trend his troponin, no indication for anticoagulation at this time.  Will be admitted for serial cardiac enzymes, PT OT and potential placement.      ED Course as of 02/15/24 1320   Thu Feb 15, 2024   1112 I met with the patient for the initial interview and physical examination. Discussed plan for treatment and workup in the ED.      1135 Hemoglobin(!): 12.2  chronic   1135 Platelet Count(!): 121  chronic   1147 CK Total(!): 477   1147 Creatinine(!): 1.49  Up from 1.2-1.3   1153 Troponin T, High Sensitivity(!!): 111  No previous on file to compare   1208 XR Shoulder Left G/E 3 Views  X-ray independently interpreted by myself with significant arthritis to the shoulder no visualized fracture   1209 CT Head w/o Contrast  CT independently interpreted by myself without visualized ICH   1209 I spoke with Dr. Schmid, cardiology, about the patient.    1307 I discussed the case with hospitalist, Dr Edwards, who accepts the patient.        Medical Decision Making    History:  Supplemental history from: Documented in chart and EMS  External Record(s) reviewed: INR yesterday    Work Up:  Chart documentation includes differential considered and any EKGs or imaging independently interpreted by provider, see MDM  In additional to work up documented, I considered the following work up: see MDM    External consultation:  Discussion of management with another provider:  cardiology    Complicating factors:  Care impacted by chronic illness: Anticoagulated State, Chronic Kidney Disease, Heart Disease, Hyperlipidemia, and Other: s/p stent placement, MI, atrial fibrillation, pacemaker, CVA,   Care affected by social determinants of health: Access to affordable healthcare    Disposition considerations: Admit.        At the conclusion of the encounter I discussed the results of all of the tests and the disposition. The questions were answered. The patient or family acknowledged understanding and was agreeable with the care plan.      MEDICATIONS GIVEN IN THE EMERGENCY:  Medications   sodium chloride 0.9% BOLUS 500 mL (500 mLs Intravenous $New Bag 2/15/24 1205)       NEW PRESCRIPTIONS STARTED AT TODAY'S ER VISIT  New Prescriptions    No medications on file          =================================================================    HPI    Patient information was obtained from: EMS and patient     Use of Intrepreter: N/A        Skip Newman is a 89 year old male with pertinent medical history of CKD3, CAD s/p stent placement, hyperlipidemia, MI, atrial fibrillation, pacemaker, CVA, on Warfarin who presents after a fall.     Per chart review:   Patient's INR was 1.5 on 2/14/24.     EMS reports patient was trying to calm his wife who has Alzheimer's down last night and somehow ended up on the floor at 9 PM. Patient denies loss of consciousness or hitting his head. Denies any pain.     Patient reports he doesn't know how he ended up on the floor. Was unable to get up because everywhere hurt when he tried to. Endorses right shoulder bruise and left shoulder pain, but that is chronic. Denies neck pain, back pain, abdominal pain, head pain.       PAST MEDICAL HISTORY:  Past Medical History:   Diagnosis Date    A-fib (H)     Anemia     Arrhythmia     Arthritis     Chronic kidney disease     Claudication (H24)     Coronary artery disease     Gout     Hearing loss     high frequency    History  of transfusion     Hyperlipidemia     Hypertension     Impotence of organic origin     Kidney stone     recurrent    MI (myocardial infarction) (H)     Pacemaker     Medtronic    Primary osteoarthritis of both knees     Sick sinus syndrome (H)     Skin cancer     Spinal stenosis, lumbar region, with neurogenic claudication     Syncope     Thrombocytopenia (H24)     VT (ventricular tachycardia) (H)        PAST SURGICAL HISTORY:  Past Surgical History:   Procedure Laterality Date    CARDIAC CATHETERIZATION      CORONARY STENT PLACEMENT  2001    LCx    HEMORRHOID SURGERY  1989    INSERT / REPLACE / REMOVE PACEMAKER  01/2009    Medtronic    OTHER SURGICAL HISTORY  11/2018    l3-l4 lumbar stenosis    TONSILLECTOMY      child    TOTAL HIP ARTHROPLASTY Left     Z TOTAL KNEE ARTHROPLASTY Left 7/8/2019    Procedure: LEFT TOTAL KNEE ARTHROPLASTY;  Surgeon: Bowen Padgett MD;  Location: [facility];  Service: Orthopedics    [facility] TOTAL KNEE ARTHROPLASTY Right 1/13/2020    Procedure: RIGHT TOTAL KNEE ARTHROPLASTY;  Surgeon: Bowen Padgett MD;  Location: [facility];  Service: Orthopedics    [facility] CORONARY STENT PERCUT, INITIAL VESSEL      Description: Cath Stent Placement;  Recorded: 10/01/2012;  Comments: Mid LCx    [facility] CORONARY STENT PERCUT, INITIAL VESSEL      Description: Cath Stent Placement;  Recorded: 10/21/2014;  Comments: Mid LCx       CURRENT MEDICATIONS:    Prior to Admission Medications   Prescriptions Last Dose Informant Patient Reported? Taking?   MULTIVITAMIN (MULTIPLE VITAMIN ORAL) 2/14/2024 at PM  Yes Yes   Sig: [MULTIVITAMIN (MULTIPLE VITAMIN ORAL)] Take 1 tablet by mouth daily.   acetaminophen (TYLENOL) 500 MG tablet Past Week at PRN  No Yes   Sig: [ACETAMINOPHEN (TYLENOL) 500 MG TABLET] Take 2 tablets (1,000 mg total) by mouth 3 (three) times a day.   Patient taking differently: Take 1,000 mg by mouth every 6 hours as needed   atorvastatin (LIPITOR) 40 MG tablet 2/14/2024 at PM  Yes Yes    Sig: Take 20 mg by mouth daily   chlorthalidone (HYGROTEN) 25 MG tablet 2/14/2024 at PM  Yes Yes   Sig: [CHLORTHALIDONE (HYGROTEN) 25 MG TABLET] Take 25 mg by mouth daily.   losartan (COZAAR) 100 MG tablet 2/14/2024 at PM  Yes Yes   Sig: Take 50 mg by mouth every evening   metoprolol succinate ER (TOPROL XL) 100 MG 24 hr tablet 2/14/2024 at PM  No Yes   Sig: Take 1 tablet (100 mg) by mouth daily   omega 3-dha-epa-fish oil (FISH OIL) 1,000 mg (120 mg-180 mg) cap 2/14/2024 at PM  Yes Yes   Sig: [OMEGA 3-DHA-EPA-FISH OIL (FISH OIL) 1,000 MG (120 MG-180 MG) CAP] Take 1 capsule by mouth daily.   spironolactone (ALDACTONE) 25 MG tablet 2/14/2024 at PM  No Yes   Sig: Take 1 tablet (25 mg) by mouth daily   warfarin ANTICOAGULANT (COUMADIN) 5 MG tablet   Yes No   Sig: Take 2.5 mg by mouth See Admin Instructions      Facility-Administered Medications: None       ALLERGIES:  Allergies   Allergen Reactions    Lisinopril Cough    Indomethacin Rash    Naproxen Rash       FAMILY HISTORY:  Family History   Problem Relation Age of Onset    Heart Failure Mother     Heart Failure Father        SOCIAL HISTORY:  Social History     Tobacco Use    Smoking status: Never    Smokeless tobacco: Never   Substance Use Topics    Alcohol use: No    Drug use: Not Currently        VITALS:  Patient Vitals for the past 24 hrs:   BP Temp Temp src Pulse Resp SpO2   02/15/24 1257 -- -- -- 99 28 --   02/15/24 1256 -- -- -- 100 28 --   02/15/24 1255 -- -- -- 98 26 --   02/15/24 1214 -- 98.2  F (36.8  C) Oral -- -- --   02/15/24 1134 -- -- -- 98 17 93 %   02/15/24 1133 -- -- -- 99 17 91 %   02/15/24 1132 -- -- -- 102 27 92 %   02/15/24 1131 -- -- -- 108 24 96 %   02/15/24 1130 (!) 174/71 -- -- 101 30 95 %   02/15/24 1129 -- -- -- 99 26 93 %   02/15/24 1128 -- -- -- 110 28 94 %   02/15/24 1127 -- -- -- 118 26 94 %       PHYSICAL EXAM    General Appearance: Well-appearing, well-nourished, no acute distress   Head:  Normocephalic, atraumatic  Eyes:  PERRL,  conjunctiva/corneas clear, EOM's intact  ENT:  membranes are dry without pallor  Neck:  Supple, no midline tenderness.  No pain with range of motion  Chest:  No tenderness or deformity, no crepitus  Cardio:  tachycardic in 100s and irregularly irregular rhythm, no murmur/gallop/rub, 2+ pulses symmetric in all extremities  Pulm:  No respiratory distress, clear to auscultation bilaterally  Back:  No midline tenderness to palpation, no paraspinal tenderness,  Abdomen:  Soft, non-tender, non distended,no rebound or guarding.  Extremities:  Extremities normal, no cyanosis or edema, motor tone intact, bilateral pulses intact upper and lower. Mild pain of left shoulder with ROM, deformity  Skin:  Skin warm, dry, no rashes  Neuro:  Alert and oriented ×3, moving all extremities, no gross sensory defects     RADIOLOGY/LABS:  Reviewed all pertinent imaging. Please see official radiology report. All pertinent labs reviewed and interpreted.    Results for orders placed or performed during the hospital encounter of 02/15/24   CT Head w/o Contrast    Impression    IMPRESSION:  HEAD CT:  1.  Age-related changes and chronic ischemic changes as above with no acute intracranial abnormality.    CERVICAL SPINE CT:  1.  No CT evidence for acute fracture or post traumatic subluxation.  2.  Osteopenia with degenerative change as above. No high-grade spinal canal or neural foraminal stenosis.   CT Cervical Spine w/o Contrast    Impression    IMPRESSION:  HEAD CT:  1.  Age-related changes and chronic ischemic changes as above with no acute intracranial abnormality.    CERVICAL SPINE CT:  1.  No CT evidence for acute fracture or post traumatic subluxation.  2.  Osteopenia with degenerative change as above. No high-grade spinal canal or neural foraminal stenosis.   XR Shoulder Left G/E 3 Views    Impression    IMPRESSION:   Diffuse osseous demineralization. Advanced degenerative arthrosis of the glenohumeral joint with marginal osteophytes,  patchy subchondral sclerosis, and subchondral cystic change. Superior subluxation of the humerus in relation to the glenoid fossa   consistent with chronic rotator cuff insufficiency. Moderate degenerative arthrosis of the acromioclavicular joint. Benign lucency within the proximal humeral metaphysis with a sharply marginated sclerotic rim, possibly related to prior biceps tenodesis.   Correlation with surgical history is suggested. No acute fracture. Left chest wall cardiac conduction device.    Basic metabolic panel   Result Value Ref Range    Sodium 140 135 - 145 mmol/L    Potassium 4.7 3.4 - 5.3 mmol/L    Chloride 104 98 - 107 mmol/L    Carbon Dioxide (CO2) 27 22 - 29 mmol/L    Anion Gap 9 7 - 15 mmol/L    Urea Nitrogen 47.5 (H) 8.0 - 23.0 mg/dL    Creatinine 1.49 (H) 0.67 - 1.17 mg/dL    GFR Estimate 45 (L) >60 mL/min/1.73m2    Calcium 9.3 8.8 - 10.2 mg/dL    Glucose 120 (H) 70 - 99 mg/dL   Result Value Ref Range    Magnesium 2.1 1.7 - 2.3 mg/dL   CBC with platelets   Result Value Ref Range    WBC Count 9.3 4.0 - 11.0 10e3/uL    RBC Count 3.88 (L) 4.40 - 5.90 10e6/uL    Hemoglobin 12.2 (L) 13.3 - 17.7 g/dL    Hematocrit 37.6 (L) 40.0 - 53.0 %    MCV 97 78 - 100 fL    MCH 31.4 26.5 - 33.0 pg    MCHC 32.4 31.5 - 36.5 g/dL    RDW 15.2 (H) 10.0 - 15.0 %    Platelet Count 121 (L) 150 - 450 10e3/uL   Protime-INR   Result Value Ref Range    INR 1.47 (H) 0.85 - 1.15   Result Value Ref Range    Troponin T, High Sensitivity 111 (HH) <=22 ng/L   UA with Microscopic reflex to Culture    Specimen: Urine, NOS   Result Value Ref Range    Color Urine Light Yellow Colorless, Straw, Light Yellow, Yellow    Appearance Urine Clear Clear    Glucose Urine Negative Negative mg/dL    Bilirubin Urine Negative Negative    Ketones Urine Negative Negative mg/dL    Specific Gravity Urine 1.018 1.001 - 1.030    Blood Urine 0.03 mg/dL (A) Negative    pH Urine 6.0 5.0 - 7.0    Protein Albumin Urine 10 (A) Negative mg/dL    Urobilinogen Urine  <2.0 <2.0 mg/dL    Nitrite Urine Negative Negative    Leukocyte Esterase Urine Negative Negative    Mucus Urine Present (A) None Seen /LPF    RBC Urine <1 <=2 /HPF    WBC Urine <1 <=5 /HPF    Squamous Epithelials Urine <1 <=1 /HPF    Transitional Epithelials Urine <1 <=1 /HPF    Hyaline Casts Urine 2 <=2 /LPF   Result Value Ref Range     (H) 39 - 308 U/L   Extra Red Top Tube   Result Value Ref Range    Hold Specimen JIC    Lactic Acid STAT   Result Value Ref Range    Lactic Acid 1.2 0.7 - 2.0 mmol/L       EKG:  Performed at: 2/15/24 11:12 AM    Impression: atrial fibrillation with RVR. Low voltage QRS. Cannot rule out inferior infarct (cited on or before 10/12/15).   Rate: 111 BPM  Rhythm: atrial fibrillation   MA Interval: * ms  QRS Interval: 76 ms  QTc Interval: 459 ms  Comparison: 10/12/15 atrial fibrillation replaced paced rhythm    I have independently reviewed and interpreted the EKG(s) documented above.     The creation of this record is based on the scribe s observations of the work being performed by Nu Calvo MD and the provider s statements to them. It was created on her behalf by Caroline Alonso, a trained medical scribe. This document has been checked and approved by the attending provider.    Nu Calvo MD  Emergency Medicine  [facility] EMERGENCY DEPARTMENT  [facility] [facility] [address]  [phone]  Dept: [phone]     Nu Calvo MD  02/15/24 6415

## 2024-02-15 NOTE — H&P
[facility]    History and Physical - Hospitalist Service       Date of Admission:  2/15/2024    Assessment & Plan   Principal Problem:    Paroxysmal atrial fibrillation (H)  Active Problems:    Hypertension    Coronary Artery Disease    Sick sinus syndrome (H)    Cardiac pacemaker, dual, in situ    Gout    History of coronary artery stent placement    Thrombocytopenia (H24)    CKD (chronic kidney disease) stage 3, GFR 30-59 ml/min (H)    Elevated troponin    Subtherapeutic international normalized ratio (INR)    Fall, initial encounter    Non-traumatic rhabdomyolysis    History of stroke    Chronic systolic congestive heart failure (H)       Skip Newman is a 89 year old male with history of CAD, chronic systolic CHF, sick sinus syndrome with pacemaker in place, paroxysmal A-fib on warfarin, CKD 3, gout and chronic thrombocytopenia admitted on 2/15/2024 after being found down at home with mild rhabdo and troponin elevation.      Weakness  Deconditioning  Falls  Found down after being on the floor greater than 12 hours  CT head and C-spine negative for any acute pathology  XR left shoulder also negative for any fracture  --PT/OT  -- interrogate pacemaker      Mild rhabdo:  Since CK level less than 1000 would not administer aggressive IV fluids in setting of ischemic cardiomyopathy  -- Trend CK in the a.m.  -- hold home statin pending trend of CK on recheck      Mild PRICILA on CKD3:  -- hold home chlorthalidone, losartan, spironolactone   -- trend       CAD  History of coronary stent placement  Paroxysmal A-fib  Chronic systolic CHF with EF 30 to 35%  -- Mild troponin elevation likely reflects demand ischemia and troponin has trended down.  Will stop checking troponin  -- Monitor on telemetry for any RVR  -- Continue home metoprolol  -- Continue home warfarin  -- hold home statin as above      Subtherapeutic INR:  --Pharmacy to dose warfarin      Chronic thrombocytopenia: Platelets stable,  trend     Diet: Regular Diet Adult    DVT Prophylaxis: Warfarin  Chandler Catheter: Not present  Lines: None     Cardiac Monitoring: ACTIVE order. Indication: Tachyarrhythmias, acute (48 hours)  Code Status: No CPR- Do NOT Intubate      Clinically Significant Risk Factors Present on Admission               # Drug Induced Coagulation Defect: home medication list includes an anticoagulant medication  # Thrombocytopenia: Lowest platelets = 121 in last 2 days, will monitor for bleeding   # Hypertension: Noted on problem list  # Chronic heart failure with reduced ejection fraction: last echo with EF <40%          # Pacemaker present       Disposition Plan      Expected Discharge Date: 02/16/2024                  Felipe Edwards DO  Hospitalist Service  [facility]  Securely message with [employer] (more info)  Text page via [employer] Paging/Directory     ______________________________________________________________________    Chief Complaint   Fall    History is obtained from the patient    History of Present Illness   Skip Newman is a 89 year old male who presents after a fall and was down on the floor for~12 hours.   Patient endorses frequent mechanical falls over the last year. He denies any other complaints       Past Medical History    Past Medical History:   Diagnosis Date    A-fib (H)     Anemia     Arrhythmia     Arthritis     Chronic kidney disease     Claudication (H24)     Coronary artery disease     Gout     Hearing loss     high frequency    History of transfusion     Hyperlipidemia     Hypertension     Impotence of organic origin     Kidney stone     recurrent    MI (myocardial infarction) (H)     Pacemaker     [employer]tronic    Primary osteoarthritis of both knees     Sick sinus syndrome (H)     Skin cancer     Spinal stenosis, lumbar region, with neurogenic claudication     Syncope     Thrombocytopenia (H24)     VT (ventricular tachycardia) (H)        Past Surgical History   Past Surgical  History:   Procedure Laterality Date    CARDIAC CATHETERIZATION      CORONARY STENT PLACEMENT  2001    LCx    HEMORRHOID SURGERY  1989    INSERT / REPLACE / REMOVE PACEMAKER  01/2009    [employer]    OTHER SURGICAL HISTORY  11/2018    l3-l4 lumbar stenosis    TONSILLECTOMY      child    TOTAL HIP ARTHROPLASTY Left     [facility] TOTAL KNEE ARTHROPLASTY Left 7/8/2019    Procedure: LEFT TOTAL KNEE ARTHROPLASTY;  Surgeon: Bowen Padgett MD;  Location: [facility];  Service: Orthopedics    [facility] TOTAL KNEE ARTHROPLASTY Right 1/13/2020    Procedure: RIGHT TOTAL KNEE ARTHROPLASTY;  Surgeon: Bowen Padgett MD;  Location: [facility];  Service: Orthopedics    [facility] CORONARY STENT PERCUT, INITIAL VESSEL      Description: Cath Stent Placement;  Recorded: 10/01/2012;  Comments: Mid LCx    [facility] CORONARY STENT PERCUT, INITIAL VESSEL      Description: Cath Stent Placement;  Recorded: 10/21/2014;  Comments: Mid LCx       Prior to Admission Medications   Prior to Admission Medications   Prescriptions Last Dose Informant Patient Reported? Taking?   MULTIVITAMIN (MULTIPLE VITAMIN ORAL) 2/14/2024 at PM  Yes Yes   Sig: [MULTIVITAMIN (MULTIPLE VITAMIN ORAL)] Take 1 tablet by mouth daily.   acetaminophen (TYLENOL) 500 MG tablet Past Week at PRN  No Yes   Sig: [ACETAMINOPHEN (TYLENOL) 500 MG TABLET] Take 2 tablets (1,000 mg total) by mouth 3 (three) times a day.   Patient taking differently: Take 1,000 mg by mouth every 6 hours as needed   atorvastatin (LIPITOR) 40 MG tablet 2/14/2024 at PM  Yes Yes   Sig: Take 20 mg by mouth daily   chlorthalidone (HYGROTEN) 25 MG tablet 2/14/2024 at PM  Yes Yes   Sig: [CHLORTHALIDONE (HYGROTEN) 25 MG TABLET] Take 25 mg by mouth daily.   losartan (COZAAR) 100 MG tablet 2/14/2024 at PM  Yes Yes   Sig: Take 50 mg by mouth every evening   metoprolol succinate ER (TOPROL XL) 100 MG 24 hr tablet 2/14/2024 at PM  No Yes   Sig: Take 1 tablet (100 mg) by mouth daily   omega 3-dha-epa-fish oil (FISH OIL)  1,000 mg (120 mg-180 mg) cap 2/14/2024 at PM  Yes Yes   Sig: [OMEGA 3-DHA-EPA-FISH OIL (FISH OIL) 1,000 MG (120 MG-180 MG) CAP] Take 1 capsule by mouth daily.   spironolactone (ALDACTONE) 25 MG tablet 2/14/2024 at PM  No Yes   Sig: Take 1 tablet (25 mg) by mouth daily   warfarin ANTICOAGULANT (COUMADIN) 5 MG tablet 2/14/2024 at 5mg 1700  Yes Yes   Sig: Take 2.5 mg by mouth See Admin Instructions      Facility-Administered Medications: None           Physical Exam   Vital Signs: Temp: 98.2  F (36.8  C) Temp src: Oral BP: (!) 148/82 Pulse: 100   Resp: 26 SpO2: 93 %      Weight: 0 lbs 0 oz    General Appearance: In no acute distress  EYES: Clear, without inflammation   HEENT: Without congestion or inflammation  NECK:  supple, without adenopathy or thyroid enlargement  CHEST: no focal tenderness   BREAST: deferred  RESPIRATORY: Clear to auscultation, no wheezes or rales  CARDIOVASCULAR: S1, S2, without murmur, rub, or gallop. No le edema bilat.  ABDOMEN: soft and non-tender  NEUROLOGIC: No focal arm or leg  weakness, speech is clear         Medical Decision Making       >75 MINUTES SPENT BY ME on the date of service doing chart review, history, exam, documentation & further activities per the note.      Data

## 2024-02-16 ENCOUNTER — APPOINTMENT (OUTPATIENT)
Dept: PHYSICAL THERAPY | Facility: HOSPITAL | Age: 89
End: 2024-02-16
Attending: INTERNAL MEDICINE
Payer: MEDICARE

## 2024-02-16 ENCOUNTER — APPOINTMENT (OUTPATIENT)
Dept: OCCUPATIONAL THERAPY | Facility: HOSPITAL | Age: 89
End: 2024-02-16
Attending: INTERNAL MEDICINE
Payer: MEDICARE

## 2024-02-16 VITALS
TEMPERATURE: 97.6 F | SYSTOLIC BLOOD PRESSURE: 148 MMHG | RESPIRATION RATE: 18 BRPM | DIASTOLIC BLOOD PRESSURE: 77 MMHG | HEART RATE: 118 BPM | OXYGEN SATURATION: 92 %

## 2024-02-16 PROBLEM — M75.102 SECONDARY OSTEOARTHRITIS OF LEFT SHOULDER DUE TO ROTATOR CUFF TEAR: Status: ACTIVE | Noted: 2024-02-16

## 2024-02-16 PROBLEM — I35.1 AORTIC REGURGITATION: Status: ACTIVE | Noted: 2024-02-16

## 2024-02-16 PROBLEM — Z95.5 HISTORY OF CORONARY ARTERY STENT PLACEMENT: Chronic | Status: RESOLVED | Noted: 2019-07-08 | Resolved: 2024-02-16

## 2024-02-16 PROBLEM — M19.212 SECONDARY OSTEOARTHRITIS OF LEFT SHOULDER DUE TO ROTATOR CUFF TEAR: Status: ACTIVE | Noted: 2024-02-16

## 2024-02-16 PROBLEM — I27.20 PULMONARY HYPERTENSION (H): Status: ACTIVE | Noted: 2024-02-16

## 2024-02-16 LAB
ANION GAP SERPL CALCULATED.3IONS-SCNC: 9 MMOL/L (ref 7–15)
BUN SERPL-MCNC: 41.2 MG/DL (ref 8–23)
CALCIUM SERPL-MCNC: 9 MG/DL (ref 8.8–10.2)
CHLORIDE SERPL-SCNC: 106 MMOL/L (ref 98–107)
CK SERPL-CCNC: 387 U/L (ref 39–308)
CREAT SERPL-MCNC: 1.37 MG/DL (ref 0.67–1.17)
DEPRECATED HCO3 PLAS-SCNC: 25 MMOL/L (ref 22–29)
EGFRCR SERPLBLD CKD-EPI 2021: 49 ML/MIN/1.73M2
ERYTHROCYTE [DISTWIDTH] IN BLOOD BY AUTOMATED COUNT: 15.4 % (ref 10–15)
GLUCOSE SERPL-MCNC: 96 MG/DL (ref 70–99)
HCT VFR BLD AUTO: 37.7 % (ref 40–53)
HGB BLD-MCNC: 12.1 G/DL (ref 13.3–17.7)
INR PPP: 1.49 (ref 0.85–1.15)
MCH RBC QN AUTO: 31.1 PG (ref 26.5–33)
MCHC RBC AUTO-ENTMCNC: 32.1 G/DL (ref 31.5–36.5)
MCV RBC AUTO: 97 FL (ref 78–100)
PLATELET # BLD AUTO: 113 10E3/UL (ref 150–450)
POTASSIUM SERPL-SCNC: 4.9 MMOL/L (ref 3.4–5.3)
RBC # BLD AUTO: 3.89 10E6/UL (ref 4.4–5.9)
SODIUM SERPL-SCNC: 140 MMOL/L (ref 135–145)
WBC # BLD AUTO: 7.5 10E3/UL (ref 4–11)

## 2024-02-16 PROCEDURE — 97535 SELF CARE MNGMENT TRAINING: CPT | Mod: GO

## 2024-02-16 PROCEDURE — 36415 COLL VENOUS BLD VENIPUNCTURE: CPT | Performed by: INTERNAL MEDICINE

## 2024-02-16 PROCEDURE — G0378 HOSPITAL OBSERVATION PER HR: HCPCS

## 2024-02-16 PROCEDURE — 97165 OT EVAL LOW COMPLEX 30 MIN: CPT | Mod: GO

## 2024-02-16 PROCEDURE — 80048 BASIC METABOLIC PNL TOTAL CA: CPT | Performed by: INTERNAL MEDICINE

## 2024-02-16 PROCEDURE — 85027 COMPLETE CBC AUTOMATED: CPT | Performed by: INTERNAL MEDICINE

## 2024-02-16 PROCEDURE — 99239 HOSP IP/OBS DSCHRG MGMT >30: CPT | Performed by: INTERNAL MEDICINE

## 2024-02-16 PROCEDURE — 97116 GAIT TRAINING THERAPY: CPT | Mod: GP | Performed by: PHYSICAL THERAPIST

## 2024-02-16 PROCEDURE — 82550 ASSAY OF CK (CPK): CPT | Performed by: INTERNAL MEDICINE

## 2024-02-16 PROCEDURE — 85610 PROTHROMBIN TIME: CPT | Performed by: INTERNAL MEDICINE

## 2024-02-16 PROCEDURE — 97162 PT EVAL MOD COMPLEX 30 MIN: CPT | Mod: GP | Performed by: PHYSICAL THERAPIST

## 2024-02-16 RX ORDER — ATORVASTATIN CALCIUM 10 MG/1
20 TABLET, FILM COATED ORAL EVERY EVENING
Status: DISCONTINUED | OUTPATIENT
Start: 2024-02-19 | End: 2024-02-16 | Stop reason: HOSPADM

## 2024-02-16 ASSESSMENT — ACTIVITIES OF DAILY LIVING (ADL)
ADLS_ACUITY_SCORE: 40
ADLS_ACUITY_SCORE: 40
ADLS_ACUITY_SCORE: 39
ADLS_ACUITY_SCORE: 46
ADLS_ACUITY_SCORE: 38
ADLS_ACUITY_SCORE: 38

## 2024-02-16 NOTE — PROGRESS NOTES
Patient admitted to room 02 at approximately 1610 via cart from emergency room.  Reason for Admission: Fall  Report received from: N/A  Patient was accompanied by nephew.  Discharge transportation provided by: family  Patient ambulated/transferred:  A2 . air ingrid.  Patient is alert and orientated x 3.  Outpatient Observation education provided to: (patient, family, friend)  MDRO Education done if applicable (MRSA, VRE, etc)  Safety risks were identified during admission:  fall.   Yellow risk/fall band applied:  Yes  Detailed Belongings: clothes, shoes, glasses

## 2024-02-16 NOTE — PROGRESS NOTES
Care Management Discharge Note    Discharge Date: 02/16/2024       Discharge Disposition:  Return [facility]  Discharge Services:  home RN PT OT  Discharge DME:  n/a  Discharge Transportation:  family   Private pay costs discussed: Not applicable    Education Provided on the Discharge Plan:  yes  Persons Notified of Discharge Plans: yes  Patient/Family in Agreement with the Plan: yes      Handoff Referral Completed: Yes    Additional Information:  ANTWON spoke with Assisted Living Facility ([facility]) RN Leydi ([phone]) who stated that pt is alert and indpt at baseline as well as the care taker for spouse. Assisted Living Facility stated that they are able to take pt back today and agreeable to home care (RN PT OT) and requested [facility] as they are their in house agency (referral send and pending). ANTWON placed call to daughter, Rashmi ([phone]), to discuss discharge planning and goals. Pt is anticipated to return with said services and family is able to transport between [phone]. Orders to be faxed to [phone].  9:25 AM    ANTWON met with pt to discuss, and confirm discharge plan to home with home care services; RN PT OT. All parties aware, and agreeable to discharge plan. [facility] intake notified of discharge and orders faxed. No other needs from  at this time. Family to transport at 1100.  10:19 AM    Brenna Kjellberg, BSW  2/16/2024

## 2024-02-16 NOTE — PLAN OF CARE
PRIMARY DIAGNOSIS: FALL  OUTPATIENT/OBSERVATION GOALS TO BE MET BEFORE DISCHARGE:  1. Orthostatic performed: N/A    2. Diagnostic testing complete & at baseline neurologic testing: No, Cr and troponin in AM    3. Cleared by consultants (if involved): No    4. Interpretation of cardiac rhythm per telemetry tech: Afib    5. Tolerating adequate PO diet and medications: Yes    6. Return to near baseline physical activity or neurologic status: No    Discharge Planner Nurse   Safe discharge environment identified: No  Barriers to discharge: Yes       Entered by: Cristian Pablo RN 02/15/2024 6:28 PM     Please review provider order for any additional goals.   Nurse to notify provider when observation goals have been met and patient is ready for discharge.    Goal Outcome Evaluation:      Plan of Care Reviewed With: patient    Overall Patient Progress: no changeOverall Patient Progress: no change    Outcome Evaluation: Assumed cares: 0175-6180. Pt A&Ox4. VSS on 2 L of O2 via NC. R and L PIV- SL. Tele- Afib. Pt denies any pain. Assist x2. Follow patient plan of care.

## 2024-02-16 NOTE — PLAN OF CARE
"Goal Outcome Evaluation:      Plan of Care Reviewed With: patient, family    Overall Patient Progress: improving      Problem: Adult Inpatient Plan of Care  Goal: Plan of Care Review  Description: The Plan of Care Review/Shift note should be completed every shift.  The Outcome Evaluation is a brief statement about your assessment that the patient is improving, declining, or no change.  This information will be displayed automatically on your shift  note.  Outcome: Adequate for Care Transition  Flowsheets (Taken 2/16/2024 1116)  Plan of Care Reviewed With:   patient   family  Overall Patient Progress: improving  Goal: Patient-Specific Goal (Individualized)  Description: You can add care plan individualizations to a care plan. Examples of Individualization might be:  \"Parent requests to be called daily at 9am for status\", \"I have a hard time hearing out of my right ear\", or \"Do not touch me to wake me up as it startles  me\".  Outcome: Adequate for Care Transition  Goal: Absence of Hospital-Acquired Illness or Injury  Outcome: Adequate for Care Transition  Intervention: Identify and Manage Fall Risk  Recent Flowsheet Documentation  Taken 2/16/2024 0815 by Jocelynn Thapa, RN  Safety Promotion/Fall Prevention:   activity supervised   assistive device/personal items within reach   clutter free environment maintained   nonskid shoes/slippers when out of bed  Intervention: Prevent Skin Injury  Recent Flowsheet Documentation  Taken 2/16/2024 0815 by Jocelynn Thapa, RN  Body Position: position changed independently  Goal: Optimal Comfort and Wellbeing  Outcome: Adequate for Care Transition  Goal: Readiness for Transition of Care  Outcome: Adequate for Care Transition     "

## 2024-02-16 NOTE — PLAN OF CARE
"PRIMARY DIAGNOSIS: \"GENERIC\" NURSING  OUTPATIENT/OBSERVATION GOALS TO BE MET BEFORE DISCHARGE:  ADLs back to baseline: No    Activity and level of assistance: Assist of one    Pain status: Pain free.    Return to near baseline physical activity: No     Discharge Planner Nurse   Safe discharge environment identified: No  Barriers to discharge: Yes       Entered by: Gregorio Hartman RN 02/16/2024 7:06 AM     Please review provider order for any additional goals.   Nurse to notify provider when observation goals have been met and patient is ready for discharge.Goal Outcome Evaluation: pt is alert and oriented confused with time. Telemetry continues to read Atrial Fibrillation. RA.                         "

## 2024-02-16 NOTE — DISCHARGE SUMMARY
[facility]  Hospitalist Discharge Summary      Date of Admission:  2/15/2024  Date of Discharge:  2/16/2024  Discharging Provider: Felipe Edwards,   Discharge Service: Hospitalist Service        Follow-ups Needed After Discharge   Follow-up Appointments     Follow-up and recommended labs and tests       Follow up with primary care provider, Jacquelin Francis, within 3-5 days   for hospital follow- up.  The following labs/tests are recommended: BMP,   INR, CK Total.  Follow up with anticoagulation clinic with INR check 2/18 vs 2/19            Unresulted Labs Ordered in the Past 30 Days of this Admission       No orders found for last 31 day(s).        These results will be followed up by Hospitalist results pool    Discharge Disposition   Discharged to prior [facility]  Condition at discharge: Stable      Hospital Course   89 year old male with history of CAD, chronic systolic CHF, sick sinus syndrome with pacemaker in place, paroxysmal A-fib on warfarin, CKD 3, gout and chronic thrombocytopenia admitted on 2/15/2024 after being found down at home with mild rhabdo and troponin elevation.        Weakness  Deconditioning  Falls  Found down after being on the floor greater than 12 hours  CT head and C-spine negative for any acute pathology  Pacemaker interrogation without concerning arrhythmia   XR left shoulder also negative for any fracture  --PT/OT recommends discharge back to care home with increased services including transition to Memory care  -- Home care PT/OT/RN ordered   -- Family aware of memory care transition recommendations           Mild rhabdo:  CK improved from 477 to 387 without IVF  Since CK level less than 1000 would not administer IV fluids in setting of ischemic cardiomyopathy  -- Trend CK as outpatient  -- ok to resume home statin after discharge        Mild PRICILA on CKD3:  Resolved  -- resume home chlorthalidone, losartan, spironolactone   -- trend GFR as outpatient         CAD  History of coronary stent placement  Paroxysmal A-fib  Chronic systolic CHF with EF 30 to 35%  -- Mild troponin elevation likely reflects demand ischemia and troponin has trended down.  Will stop checking troponin  -- Telemetry without any RVR  -- Continue home metoprolol  -- Continue home warfarin          Subtherapeutic INR:  --Pharmacy recommends to continue home dose warfarin with INR check 2/18 vs 2/19 at the latest        Chronic thrombocytopenia: Platelets stable        Consultations This Hospital Stay   PHARMACY TO DOSE WARFARIN  PHYSICAL THERAPY ADULT IP CONSULT  OCCUPATIONAL THERAPY ADULT IP CONSULT  CARE MANAGEMENT / SOCIAL WORK IP CONSULT    Code Status   No CPR- Do NOT Intubate    Time Spent on this Encounter   I, Felipe Edwards DO, personally saw the patient today and spent greater than 30 minutes discharging this patient.       Felipe Edwards DO  [facility] EXTENDED RECOVERY AND SHORT STAY  [address] [place] [id number]  Phone: [phone]  Fax: [phone]  ______________________________________________________________________    Physical Exam   Vital Signs: Temp: 97.6  F (36.4  C) Temp src: Oral BP: (!) 148/77 Pulse: 118   Resp: 18 SpO2: 92 % O2 Device: None (Room air)    Weight: 0 lbs 0 oz    General: NAD  HEENT: Without congestion or inflammation  RESPIRATORY: Respirations nonlabored  CARDIOVASCULAR: No le edema bilat.  NEUROLOGIC: No focal arm or leg weakness, speech is clear    Primary Care Physician   Jacquelin Francis    Discharge Orders      Home Care Referral      Reason for your hospital stay    Fall, A. fib     Follow-up and recommended labs and tests     Follow up with primary care provider, Jacquelin Francis, within 3-5 days for hospital follow- up.  The following labs/tests are recommended: BMP, INR.  Follow up with anticoagulation clinic with INR check 2/18 vs 2/19     Activity    Your activity upon discharge: activity as  tolerated     Diet    Follow this diet upon discharge: Orders Placed This Encounter      Regular Diet Adult     \    Discharge Medications   Current Discharge Medication List        CONTINUE these medications which have NOT CHANGED    Details   acetaminophen (TYLENOL) 500 MG tablet [ACETAMINOPHEN (TYLENOL) 500 MG TABLET] Take 2 tablets (1,000 mg total) by mouth 3 (three) times a day.  Refills: 0    Associated Diagnoses: Primary osteoarthritis of left knee      atorvastatin (LIPITOR) 40 MG tablet Take 20 mg by mouth daily      chlorthalidone (HYGROTEN) 25 MG tablet [CHLORTHALIDONE (HYGROTEN) 25 MG TABLET] Take 25 mg by mouth daily.      losartan (COZAAR) 100 MG tablet Take 50 mg by mouth every evening      metoprolol succinate ER (TOPROL XL) 100 MG 24 hr tablet Take 1 tablet (100 mg) by mouth daily  Qty: 30 tablet, Refills: 11    Associated Diagnoses: Paroxysmal atrial fibrillation (H)      MULTIVITAMIN (MULTIPLE VITAMIN ORAL) [MULTIVITAMIN (MULTIPLE VITAMIN ORAL)] Take 1 tablet by mouth daily.      omega 3-dha-epa-fish oil (FISH OIL) 1,000 mg (120 mg-180 mg) cap [OMEGA 3-DHA-EPA-FISH OIL (FISH OIL) 1,000 MG (120 MG-180 MG) CAP] Take 1 capsule by mouth daily.      spironolactone (ALDACTONE) 25 MG tablet Take 1 tablet (25 mg) by mouth daily  Qty: 90 tablet, Refills: 3    Associated Diagnoses: Pacemaker; Sick sinus syndrome (H); Paroxysmal atrial fibrillation (H); Atherosclerosis of native coronary artery of native heart without angina pectoris; Nonsustained ventricular tachycardia (H); Ejection fraction < 50%      warfarin ANTICOAGULANT (COUMADIN) 5 MG tablet Take 2.5 mg by mouth See Admin Instructions           Allergies   Allergies   Allergen Reactions    Lisinopril Cough    Indomethacin Rash    Naproxen Rash

## 2024-02-16 NOTE — PLAN OF CARE
PRIMARY DIAGNOSIS: GENERALIZED WEAKNESS    OUTPATIENT/OBSERVATION GOALS TO BE MET BEFORE DISCHARGE  1. Orthostatic performed: N/A    2. Tolerating PO medications: Yes    3. Return to near baseline physical activity: No    4. Cleared for discharge by consultants (if involved): No    Discharge Planner Nurse   Safe discharge environment identified: No  Barriers to discharge: Yes       Entered by: Jere Schumacher RN 02/15/2024 10:17 PM     Please review provider order for any additional goals.   Nurse to notify provider when observation goals have been met and patient is ready for discharge.Goal Outcome Evaluation:

## 2024-02-16 NOTE — PROGRESS NOTES
Occupational Therapy      02/16/24 0731   Appointment Info   Signing Clinician's Name / Credentials (OT) Holly Mancia OTR/L   Quick Adds   Quick Adds Certification   Living Environment   People in Home spouse   Current Living Arrangements assisted living   Home Accessibility no concerns   Living Environment Comments Patient reports wife has dementia   Self-Care   Equipment Currently Used at Home cane, straight   Fall history within last six months yes   Number of times patient has fallen within last six months 1   Activity/Exercise/Self-Care Comment Patient states he is independent with ADLs   General Information   Onset of Illness/Injury or Date of Surgery 02/15/24   Referring Physician Felipe Edwards DO   Patient/Family Therapy Goal Statement (OT) none stated   Additional Occupational Profile Info/Pertinent History of Current Problem Skip Newman is a 89 year old male with history of CAD, chronic systolic CHF, sick sinus syndrome with pacemaker in place, paroxysmal A-fib on warfarin, CKD 3, gout and chronic thrombocytopenia admitted on 2/15/2024 after being found down at home with mild rhabdo and troponin elevation.   Existing Precautions/Restrictions fall   Cognitive Status Examination   Orientation Status not oriented to person, place or time   Follows Commands follows one-step commands;25-49% accuracy  (patient is [place] and does not have hearing aides)   Cognitive Status Comments Patient stating it was ye day recently but the month was April. Patient impulsive with activity, easily distracted.   Range of Motion Comprehensive   General Range of Motion no range of motion deficits identified   Strength Comprehensive (MMT)   General Manual Muscle Testing (MMT) Assessment no strength deficits identified   Bed Mobility   Bed Mobility supine-sit   Supine-Sit [place] (Bed Mobility) minimum assist (75% patient effort)   Comment (Bed Mobility) HOB elevated, use of bedrail   Transfers   Transfers  sit-stand transfer   Sit-Stand Transfer   Sit-Stand [place] (Transfers) contact guard   Balance   Balance Comments Patient standing @ EOB with CGA and FWW-impulsive with activity.   Activities of Daily Living   BADL Assessment/Intervention lower body dressing;toileting   Lower Body Dressing Assessment/Training   Comment, (Lower Body Dressing) patient ambulated in room CGA and FWW-easily distracted   [place] Level (Lower Body Dressing) minimum assist (75% patient effort)  (underwear)   Toileting   Comment, (Toileting) Patient pulling out external catheter when PT/OT arrived, confused. OT assisted patient with urinal and travis-cares in bed   [place] Level (Toileting) moderate assist (50% patient effort)   Clinical Impression   Criteria for Skilled Therapeutic Interventions Met (OT) Yes, treatment indicated   OT Diagnosis decreased ADL independence   Influenced by the following impairments fall   OT Problem List-Impairments impacting ADL problems related to;activity tolerance impaired;balance;cognition   Assessment of Occupational Performance 1-3 Performance Deficits   Identified Performance Deficits dressing, toileting, cognition   Planned Therapy Interventions (OT) ADL retraining;cognition   Clinical Decision Making Complexity (OT) problem focused assessment/low complexity   Risk & Benefits of therapy have been explained evaluation/treatment results reviewed;care plan/treatment goals reviewed   OT Total Evaluation Time   OT Eval, Low Complexity Minutes (41468) 20   Therapy Certification   Medical Diagnosis Non-traumatic rhabdomyolysis   Start of Care Date 02/16/24   Certification date from 02/16/24   Certification date to 02/23/24   OT Goals   Therapy Frequency (OT) Daily   OT Predicted Duration/Target Date for Goal Attainment 02/23/24   OT Goals Hygiene/Grooming;Lower Body Dressing;Toilet Transfer/Toileting   OT: Hygiene/Grooming supervision/stand-by assist   OT: Lower Body Dressing  Supervision/stand-by assist   OT: Toilet Transfer/Toileting Supervision/stand-by assist;toilet transfer   Self-Care/Home Management   Self-Care/Home Mgmt/ADL, Compensatory, Meal Prep Minutes (15204) 15   Treatment Detail/Skilled Intervention Eval completed, treatment initiated. Patient standing @ sink for g/h and sponge bathing. Patient requires Min A at times dues to balance. Additionally, patient requirements reminders as patient will repeat same tasks that were already completed. Patient ambulated in hallway with CGA-Min A times due to pt being easily distracted and  needs re-oriented frequently. Patient was left in chair with call light and alarm on. RN notified of catheter being removed.   OT Discharge Planning   OT Plan toileting, cog assessment, trsfs   OT Discharge Recommendation (DC Rec) other (see comments)  (memory care)   OT Rationale for DC Rec Patient currently lives in assisted living with spouse who has dementia. Patient is demonstrating the need for increased supervision due to impulsivity and decreased awareness of deficits. If patient currently has plan in place to transition to memory care, patient may discharge back to [facility] with increased services for dressing, medications, bathing, and toileting. Discussed with PT and . If patient does not have plan in place to transition to memory care recommend increased supervision until this can be achieved.   OT Brief overview of current status CGA-Min A for trsfs, impulsive/confused    [facility]  OUTPATIENT OCCUPATIONAL THERAPY  EVALUATION  PLAN OF TREATMENT FOR OUTPATIENT REHABILITATION  (COMPLETE FOR INITIAL CLAIMS ONLY)  Patient's Last Name, First Name, M.I.  YOB: 1934  Skip Newman                          Provider's Name  [facility] Medical Record No.  [id number]                             Onset Date:  02/15/24   Start of Care Date:  (P) 02/16/24   Type:      ___PT   _X_OT   ___SLP Medical Diagnosis:  (P) Non-traumatic rhabdomyolysis                    OT Diagnosis:  (P) decreased ADL independence Visits from SOC:  1     See note for plan of treatment, functional goals and certification details    I CERTIFY THE NEED FOR THESE SERVICES FURNISHED UNDER        THIS PLAN OF TREATMENT AND WHILE UNDER MY CARE     (Physician co-signature of this document indicates review and certification of the therapy plan).

## 2024-02-16 NOTE — PLAN OF CARE
PRIMARY DIAGNOSIS: GENERALIZED WEAKNESS    OUTPATIENT/OBSERVATION GOALS TO BE MET BEFORE DISCHARGE  1. Orthostatic performed: N/A    2. Tolerating PO medications: Yes    3. Return to near baseline physical activity: Yes    4. Cleared for discharge by consultants (if involved): Yes    Discharge Planner Nurse   Safe discharge environment identified: Yes  Barriers to discharge: No    Patient is alert and orientated x 4.  Denies pain.  Assist of 1, walker and gait belt.

## 2024-02-16 NOTE — PLAN OF CARE
Patient discharged to home at 1120 via Private Car.  Accompanied by Son-in-Law and staff.  Discharge instructions were reviewed with patient and Son-in-law , opportunity offered to ask questions.    Prescriptions N/A.  Access discontinued: Yes  Care plan and education discontinued: Yes  Belongings were sent home with patient/family:  Clothing: Shirt(s): 1 shirt and Pants: 1 pair of pants, 1 pair of underwear and one pair of socks ..

## 2024-02-16 NOTE — PROGRESS NOTES
"   02/16/24 5828   Appointment Info   Signing Clinician's Name / Credentials (PT) Yen Sterling, PT, DPT   Quick Adds   Quick Adds Certification   Living Environment   People in Home spouse   Current Living Arrangements assisted living   Home Accessibility no concerns   Living Environment Comments Pt reports his wife has dementia.   Self-Care   Equipment Currently Used at Home cane, straight   Fall history within last six months yes   Number of times patient has fallen within last six months 1   Activity/Exercise/Self-Care Comment Pt independent with all ADLs at baseline.   General Information   Onset of Illness/Injury or Date of Surgery 02/15/24   Referring Physician Felipe Edwards DO   Patient/Family Therapy Goals Statement (PT) None stated.   Pertinent History of Current Problem (include personal factors and/or comorbidities that impact the POC) Per H&P: \"89 year old male with history of CAD, chronic systolic CHF, sick sinus syndrome with pacemaker in place, paroxysmal A-fib on warfarin, CKD 3, gout and chronic thrombocytopenia admitted on 2/15/2024 after being found down at home with mild rhabdo and troponin elevation.\"   Existing Precautions/Restrictions fall   Cognition   Orientation Status (Cognition) oriented to;person;situation   Safety Deficit (Cognition) impulsivity;insight into deficits/self-awareness;problem-solving;safety precautions awareness;moderate deficit   Cognitive Status Comments Patient appears confused throughout session. Oriented to person and situation, reports date as April and unable to state year. Disoriented to place as well. Patient appears to have difficulty following commands, however also hard of hearing.   Range of Motion (ROM)   Range of Motion ROM is WFL   Strength (Manual Muscle Testing)   Strength (Manual Muscle Testing) Deficits observed during functional mobility   Bed Mobility   Bed Mobility supine-sit   Supine-Sit [place] (Bed Mobility) minimum assist (75% " patient effort);contact guard;2 person assist   Bed Mobility Limitations cognitive deficits   Transfers   Transfers sit-stand transfer   Sit-Stand Transfer   Sit-Stand [place] (Transfers) contact guard   Assistive Device (Sit-Stand Transfers) walker, front-wheeled   Gait/Stairs (Locomotion)   [place] Level (Gait) minimum assist (75% patient effort);verbal cues   Assistive Device (Gait) walker, front-wheeled   Distance in Feet (Gait) 14'   Pattern (Gait) step-to   Deviations/Abnormal Patterns (Gait) helen decreased;gait speed decreased   Clinical Impression   Criteria for Skilled Therapeutic Intervention Yes, treatment indicated   PT Diagnosis (PT) impaired functional mobility   Influenced by the following impairments decreased strength, impaired balance, decreased safety awareness   Functional limitations due to impairments transfers, ambulation   Clinical Presentation (PT Evaluation Complexity) evolving   Clinical Presentation Rationale Clinical judgment   Clinical Decision Making (Complexity) moderate complexity   Planned Therapy Interventions (PT) balance training;bed mobility training;gait training;home exercise program;neuromuscular re-education;patient/family education;strengthening;transfer training   Risk & Benefits of therapy have been explained evaluation/treatment results reviewed;participants voiced agreement with care plan;participants included;patient   PT Total Evaluation Time   PT Eval, Moderate Complexity Minutes (57612) 10   Therapy Certification   Start of care date 02/16/24   Certification date from 02/16/24   Certification date to 02/23/24   Medical Diagnosis paroxysmal a-fib   Physical Therapy Goals   PT Frequency Daily   PT Predicted Duration/Target Date for Goal Attainment 02/23/24   PT Goals Bed Mobility;Transfers;Gait   PT: Bed Mobility Independent;Supine to/from sit   PT: Transfers Supervision/stand-by assist;Sit to/from stand;Assistive device   PT: Gait Supervision/stand-by  assist;Assistive device;Greater than 200 feet  (least restrictive assistive device)   Interventions   Interventions Quick Adds Gait Training   Gait Training   Gait Training Minutes (04305) 10   Treatment Detail/Skilled Intervention With FWW. Patient impulsive and easily distractible in the hallway. When patient is distracted, path deviates and patient becomes more unsteady. Patient uses cane at baseline.   Distance in Feet additional 150'   [place] Level (Gait Training) contact guard   Physical Assistance Level (Gait Training) verbal cues;1 person assist;nonverbal cues (demo/gestures)   Assistive Device (Gait Training) rolling walker   Gait Analysis Deviations decreased helen;decreased step length   Impairments (Gait Analysis/Training) balance impaired;strength decreased   PT Discharge Planning   PT Plan gait with cane vs FWW, balance assessment, standing ex   PT Discharge Recommendation (DC Rec) other (see comments)  (memory care)   PT Rationale for DC Rec Patient lives in an assisted living at baseline. Currently requiring stand-by to contact guard to transfers and mobility. Patient unsteady with mobility and at an increased fall risk partly due to impulsivity. Patient also has difficulty following commands throughout session and appears confused. Patient would ultimately benefit from a more supervised living environment such as memory care. During session, pt reports he and his wife have considered transitioning into memory care. Spoke with social work and discussed recommendations. If patient has long term plan to transition to memory care, [facility] until transition completed. To [facility], patient will need frequent daily in person checks and assist with dressing and bathing.   PT Brief overview of current status Supine > sit, min assist of 1. Sit <> stand, CGA. Ambulates 150' with contact guard, impulsive and easily distracted with ambulation.   PT Equipment Needed  at Discharge   (TBD, may benefit from trial of walker)   Total Session Time   Timed Code Treatment Minutes 10   Total Session Time (sum of timed and untimed services) 20     [facility]  OUTPATIENT PHYSICAL THERAPY EVALUATION  PLAN OF TREATMENT FOR OUTPATIENT REHABILITATION  (COMPLETE FOR INITIAL CLAIMS ONLY)  Patient's Last Name, First Name, M.I.  YOB: 1934  Skip Newman                        Provider's Name  [facility] Medical Record No.  [id number]                             Onset Date:  02/15/24   Start of Care Date:  02/16/24   Type:     _X_PT   ___OT   ___SLP Medical Diagnosis:  paroxysmal a-fib              PT Diagnosis:  impaired functional mobility Visits from SOC:  1     See note for plan of treatment, functional goals and certification details    I CERTIFY THE NEED FOR THESE SERVICES FURNISHED UNDER        THIS PLAN OF TREATMENT AND WHILE UNDER MY CARE     (Physician co-signature of this document indicates review and certification of the therapy plan).

## 2024-02-16 NOTE — PLAN OF CARE
Physical Therapy Discharge Summary    Reason for therapy discharge:    Discharged to assisted living facility    Progress towards therapy goal(s). See goals on Care Plan in [facility] electronic health record for goal details.  Goals partially met.  Barriers to achieving goals:   discharge on same date as initial evaluation.    Therapy recommendation(s):    Continued therapy is recommended.  Rationale/Recommendations:  recommending increased assistance and supervision at assisted living facility. Pt discharged with home PT.    Yen Sterling, PT  2/16/2024

## 2024-02-17 LAB
ATRIAL RATE - MUSE: 111 BPM
DIASTOLIC BLOOD PRESSURE - MUSE: 84 MMHG
INTERPRETATION ECG - MUSE: NORMAL
P AXIS - MUSE: NORMAL DEGREES
PR INTERVAL - MUSE: NORMAL MS
QRS DURATION - MUSE: 76 MS
QT - MUSE: 338 MS
QTC - MUSE: 459 MS
R AXIS - MUSE: 5 DEGREES
SYSTOLIC BLOOD PRESSURE - MUSE: 112 MMHG
T AXIS - MUSE: 95 DEGREES
VENTRICULAR RATE- MUSE: 111 BPM

## 2024-02-20 ENCOUNTER — PATIENT OUTREACH (OUTPATIENT)
Dept: CARE COORDINATION | Facility: CLINIC | Age: 89
End: 2024-02-20
Payer: MEDICARE

## 2024-02-20 NOTE — PROGRESS NOTES
Connected Care Resource Center:   [facility] Resource Center Contact  [facility]/Voicemail     Clinical Data: Post-Discharge Outreach     Outreach attempted x 2.  Left message on patient's voicemail, providing [facility]'s central phone number of [phone] ([phone]) for questions/concerns and/or to schedule an appt with an [facility] provider, if they do not have a PCP.      Plan:  [facility] will do no further outreaches at this time.       Evy Wilson MA  Connected Care Resource Center, [facility]    *Connected Care Resource Team does NOT follow patient ongoing. Referrals are identified based on internal discharge reports and the outreach is to ensure patient has an understanding of their discharge instructions.

## 2024-03-13 ENCOUNTER — APPOINTMENT (OUTPATIENT)
Dept: CT IMAGING | Facility: HOSPITAL | Age: 89
End: 2024-03-13
Payer: MEDICARE

## 2024-03-13 ENCOUNTER — HOSPITAL ENCOUNTER (OUTPATIENT)
Facility: HOSPITAL | Age: 89
Setting detail: OBSERVATION
Discharge: NURSING, REHAB OR LONG-TERM CARE | End: 2024-03-14
Attending: EMERGENCY MEDICINE | Admitting: EMERGENCY MEDICINE
Payer: MEDICARE

## 2024-03-13 ENCOUNTER — APPOINTMENT (OUTPATIENT)
Dept: RADIOLOGY | Facility: HOSPITAL | Age: 89
End: 2024-03-13
Payer: MEDICARE

## 2024-03-13 DIAGNOSIS — R53.1 GENERALIZED WEAKNESS: ICD-10-CM

## 2024-03-13 DIAGNOSIS — R79.89 ELEVATED BRAIN NATRIURETIC PEPTIDE (BNP) LEVEL: ICD-10-CM

## 2024-03-13 LAB
ALBUMIN SERPL BCG-MCNC: 4.1 G/DL (ref 3.5–5.2)
ALBUMIN UR-MCNC: NEGATIVE MG/DL
ALP SERPL-CCNC: 87 U/L (ref 40–150)
ALT SERPL W P-5'-P-CCNC: 28 U/L (ref 0–70)
ANION GAP SERPL CALCULATED.3IONS-SCNC: 12 MMOL/L (ref 7–15)
ANION GAP SERPL CALCULATED.3IONS-SCNC: 14 MMOL/L (ref 7–15)
APPEARANCE UR: CLEAR
AST SERPL W P-5'-P-CCNC: 36 U/L (ref 0–45)
BASOPHILS # BLD AUTO: 0 10E3/UL (ref 0–0.2)
BASOPHILS NFR BLD AUTO: 0 %
BILIRUB DIRECT SERPL-MCNC: <0.2 MG/DL (ref 0–0.3)
BILIRUB SERPL-MCNC: 0.6 MG/DL
BILIRUB UR QL STRIP: NEGATIVE
BUN SERPL-MCNC: 56.6 MG/DL (ref 8–23)
BUN SERPL-MCNC: 57.5 MG/DL (ref 8–23)
CALCIUM SERPL-MCNC: 9.4 MG/DL (ref 8.2–9.6)
CALCIUM SERPL-MCNC: 9.8 MG/DL (ref 8.2–9.6)
CHLORIDE SERPL-SCNC: 103 MMOL/L (ref 98–107)
CHLORIDE SERPL-SCNC: 104 MMOL/L (ref 98–107)
COLOR UR AUTO: YELLOW
CREAT SERPL-MCNC: 1.42 MG/DL (ref 0.67–1.17)
CREAT SERPL-MCNC: 1.53 MG/DL (ref 0.67–1.17)
DEPRECATED HCO3 PLAS-SCNC: 20 MMOL/L (ref 22–29)
DEPRECATED HCO3 PLAS-SCNC: 23 MMOL/L (ref 22–29)
EGFRCR SERPLBLD CKD-EPI 2021: 43 ML/MIN/1.73M2
EGFRCR SERPLBLD CKD-EPI 2021: 47 ML/MIN/1.73M2
EOSINOPHIL # BLD AUTO: 0.1 10E3/UL (ref 0–0.7)
EOSINOPHIL NFR BLD AUTO: 1 %
ERYTHROCYTE [DISTWIDTH] IN BLOOD BY AUTOMATED COUNT: 14.7 % (ref 10–15)
FLUAV RNA SPEC QL NAA+PROBE: NEGATIVE
FLUBV RNA RESP QL NAA+PROBE: NEGATIVE
GLUCOSE SERPL-MCNC: 112 MG/DL (ref 70–99)
GLUCOSE SERPL-MCNC: 115 MG/DL (ref 70–99)
GLUCOSE UR STRIP-MCNC: NEGATIVE MG/DL
HCT VFR BLD AUTO: 38 % (ref 40–53)
HGB BLD-MCNC: 12.7 G/DL (ref 13.3–17.7)
HGB UR QL STRIP: NEGATIVE
HOLD SPECIMEN: NORMAL
HYALINE CASTS: 1 /LPF
IMM GRANULOCYTES # BLD: 0 10E3/UL
IMM GRANULOCYTES NFR BLD: 0 %
INR PPP: 1.92 (ref 0.85–1.15)
KETONES UR STRIP-MCNC: NEGATIVE MG/DL
LEUKOCYTE ESTERASE UR QL STRIP: NEGATIVE
LYMPHOCYTES # BLD AUTO: 0.4 10E3/UL (ref 0.8–5.3)
LYMPHOCYTES NFR BLD AUTO: 5 %
MAGNESIUM SERPL-MCNC: 2 MG/DL (ref 1.7–2.3)
MCH RBC QN AUTO: 31.8 PG (ref 26.5–33)
MCHC RBC AUTO-ENTMCNC: 33.4 G/DL (ref 31.5–36.5)
MCV RBC AUTO: 95 FL (ref 78–100)
MONOCYTES # BLD AUTO: 0.3 10E3/UL (ref 0–1.3)
MONOCYTES NFR BLD AUTO: 4 %
NEUTROPHILS # BLD AUTO: 7.4 10E3/UL (ref 1.6–8.3)
NEUTROPHILS NFR BLD AUTO: 90 %
NITRATE UR QL: NEGATIVE
NRBC # BLD AUTO: 0 10E3/UL
NRBC BLD AUTO-RTO: 0 /100
NT-PROBNP SERPL-MCNC: ABNORMAL PG/ML (ref 0–1800)
PH UR STRIP: 5 [PH] (ref 5–7)
PLATELET # BLD AUTO: 97 10E3/UL (ref 150–450)
POTASSIUM SERPL-SCNC: 4.9 MMOL/L (ref 3.4–5.3)
POTASSIUM SERPL-SCNC: 5.7 MMOL/L (ref 3.4–5.3)
PROT SERPL-MCNC: 7.3 G/DL (ref 6.4–8.3)
RBC # BLD AUTO: 3.99 10E6/UL (ref 4.4–5.9)
RBC URINE: 1 /HPF
RSV RNA SPEC NAA+PROBE: NEGATIVE
SARS-COV-2 RNA RESP QL NAA+PROBE: NEGATIVE
SODIUM SERPL-SCNC: 138 MMOL/L (ref 135–145)
SODIUM SERPL-SCNC: 138 MMOL/L (ref 135–145)
SP GR UR STRIP: 1.02 (ref 1–1.03)
TROPONIN T SERPL HS-MCNC: 88 NG/L
UROBILINOGEN UR STRIP-MCNC: <2 MG/DL
WBC # BLD AUTO: 8.2 10E3/UL (ref 4–11)
WBC URINE: <1 /HPF

## 2024-03-13 PROCEDURE — 85025 COMPLETE CBC W/AUTO DIFF WBC: CPT

## 2024-03-13 PROCEDURE — 99223 1ST HOSP IP/OBS HIGH 75: CPT | Performed by: STUDENT IN AN ORGANIZED HEALTH CARE EDUCATION/TRAINING PROGRAM

## 2024-03-13 PROCEDURE — 81001 URINALYSIS AUTO W/SCOPE: CPT

## 2024-03-13 PROCEDURE — 80053 COMPREHEN METABOLIC PANEL: CPT

## 2024-03-13 PROCEDURE — 36415 COLL VENOUS BLD VENIPUNCTURE: CPT

## 2024-03-13 PROCEDURE — G0378 HOSPITAL OBSERVATION PER HR: HCPCS

## 2024-03-13 PROCEDURE — 70450 CT HEAD/BRAIN W/O DYE: CPT | Mod: MG

## 2024-03-13 PROCEDURE — 71046 X-RAY EXAM CHEST 2 VIEWS: CPT

## 2024-03-13 PROCEDURE — 99285 EMERGENCY DEPT VISIT HI MDM: CPT | Mod: 25

## 2024-03-13 PROCEDURE — 83880 ASSAY OF NATRIURETIC PEPTIDE: CPT

## 2024-03-13 PROCEDURE — 83735 ASSAY OF MAGNESIUM: CPT

## 2024-03-13 PROCEDURE — 84484 ASSAY OF TROPONIN QUANT: CPT

## 2024-03-13 PROCEDURE — 93005 ELECTROCARDIOGRAM TRACING: CPT

## 2024-03-13 PROCEDURE — 85610 PROTHROMBIN TIME: CPT

## 2024-03-13 PROCEDURE — 87637 SARSCOV2&INF A&B&RSV AMP PRB: CPT

## 2024-03-13 PROCEDURE — 82310 ASSAY OF CALCIUM: CPT

## 2024-03-13 RX ORDER — NICOTINE POLACRILEX 4 MG
15-30 LOZENGE BUCCAL
Status: DISCONTINUED | OUTPATIENT
Start: 2024-03-13 | End: 2024-03-13

## 2024-03-13 RX ORDER — PROCHLORPERAZINE MALEATE 5 MG
5 TABLET ORAL EVERY 6 HOURS PRN
Status: DISCONTINUED | OUTPATIENT
Start: 2024-03-13 | End: 2024-03-14 | Stop reason: HOSPADM

## 2024-03-13 RX ORDER — ACETAMINOPHEN 650 MG/1
650 SUPPOSITORY RECTAL EVERY 4 HOURS PRN
Status: DISCONTINUED | OUTPATIENT
Start: 2024-03-13 | End: 2024-03-14 | Stop reason: HOSPADM

## 2024-03-13 RX ORDER — DEXTROSE MONOHYDRATE 25 G/50ML
25-50 INJECTION, SOLUTION INTRAVENOUS
Status: DISCONTINUED | OUTPATIENT
Start: 2024-03-13 | End: 2024-03-13

## 2024-03-13 RX ORDER — SPIRONOLACTONE 25 MG/1
25 TABLET ORAL EVERY EVENING
COMMUNITY
End: 2024-09-20

## 2024-03-13 RX ORDER — ONDANSETRON 2 MG/ML
4 INJECTION INTRAMUSCULAR; INTRAVENOUS EVERY 6 HOURS PRN
Status: DISCONTINUED | OUTPATIENT
Start: 2024-03-13 | End: 2024-03-14 | Stop reason: HOSPADM

## 2024-03-13 RX ORDER — DEXTROSE MONOHYDRATE 25 G/50ML
25 INJECTION, SOLUTION INTRAVENOUS ONCE
Status: DISCONTINUED | OUTPATIENT
Start: 2024-03-13 | End: 2024-03-13

## 2024-03-13 RX ORDER — POLYETHYLENE GLYCOL 3350 17 G/17G
17 POWDER, FOR SOLUTION ORAL 2 TIMES DAILY PRN
Status: DISCONTINUED | OUTPATIENT
Start: 2024-03-13 | End: 2024-03-14 | Stop reason: HOSPADM

## 2024-03-13 RX ORDER — ACETAMINOPHEN 325 MG/1
650 TABLET ORAL EVERY 4 HOURS PRN
Status: DISCONTINUED | OUTPATIENT
Start: 2024-03-13 | End: 2024-03-14 | Stop reason: HOSPADM

## 2024-03-13 RX ORDER — ONDANSETRON 4 MG/1
4 TABLET, ORALLY DISINTEGRATING ORAL EVERY 6 HOURS PRN
Status: DISCONTINUED | OUTPATIENT
Start: 2024-03-13 | End: 2024-03-14 | Stop reason: HOSPADM

## 2024-03-13 RX ORDER — METOPROLOL SUCCINATE 100 MG/1
100 TABLET, EXTENDED RELEASE ORAL EVERY EVENING
COMMUNITY
End: 2024-07-23

## 2024-03-13 RX ORDER — CALCIUM GLUCONATE 94 MG/ML
1 INJECTION, SOLUTION INTRAVENOUS ONCE
Status: DISCONTINUED | OUTPATIENT
Start: 2024-03-13 | End: 2024-03-13

## 2024-03-13 RX ORDER — PROCHLORPERAZINE 25 MG
12.5 SUPPOSITORY, RECTAL RECTAL EVERY 12 HOURS PRN
Status: DISCONTINUED | OUTPATIENT
Start: 2024-03-13 | End: 2024-03-14 | Stop reason: HOSPADM

## 2024-03-13 RX ORDER — BISACODYL 10 MG
10 SUPPOSITORY, RECTAL RECTAL DAILY PRN
Status: DISCONTINUED | OUTPATIENT
Start: 2024-03-13 | End: 2024-03-14 | Stop reason: HOSPADM

## 2024-03-13 ASSESSMENT — ACTIVITIES OF DAILY LIVING (ADL)
ADLS_ACUITY_SCORE: 38

## 2024-03-13 ASSESSMENT — COLUMBIA-SUICIDE SEVERITY RATING SCALE - C-SSRS
1. IN THE PAST MONTH, HAVE YOU WISHED YOU WERE DEAD OR WISHED YOU COULD GO TO SLEEP AND NOT WAKE UP?: NO
2. HAVE YOU ACTUALLY HAD ANY THOUGHTS OF KILLING YOURSELF IN THE PAST MONTH?: NO
6. HAVE YOU EVER DONE ANYTHING, STARTED TO DO ANYTHING, OR PREPARED TO DO ANYTHING TO END YOUR LIFE?: NO

## 2024-03-13 NOTE — ED TRIAGE NOTES
Triage Assessment (Adult)       Row Name 03/13/24 1812          Triage Assessment    Airway WDL WDL        Respiratory WDL    Respiratory WDL WDL        Skin Circulation/Temperature WDL    Skin Circulation/Temperature WDL WDL        Cardiac WDL    Cardiac WDL WDL        Peripheral/Neurovascular WDL    Peripheral Neurovascular WDL WDL        Cognitive/Neuro/Behavioral WDL    Cognitive/Neuro/Behavioral WDL WDL                   Pt brought in by family as he is more weak and unable to walk today. He denies any pain. Lives with his wife in assisted living. Is more fidgety than usual with increased confusion.

## 2024-03-13 NOTE — ED PROVIDER NOTES
EMERGENCY DEPARTMENT ENCOUNTER      NAME: Skip Newman  AGE: 89 year old male  YOB: 1934  MRN: [id number]  EVALUATION DATE & TIME: 03/13/24 6:26 PM    PCP: Jacquelin Francis    ED PROVIDER: Katharina Nelson PA-C      CHIEF COMPLAINT:  Generalized Weakness and Altered Mental Status      FINAL IMPRESSION:  1. Generalized weakness    2. Elevated brain natriuretic peptide (BNP) level            ED COURSE & MEDICAL DECISION MAKING:  Pertinent Labs & Imaging studies reviewed. (See chart for details)    The patient is a 89-year-old male with a history of CAD status post stents 2001, 2012, 2014, hypertension, myocardial infarction, CKD, A-fib on warfarin, anemia, sick sinus syndrome status post Medtronic PPM presenting to the emergency department with his son-in-law and grandson for evaluation of generalized weakness and confusion onset today. No falls, no head impact. No headache, chest pain, dyspnea, fevers, urinary symptoms.     Initial vital signs reviewed and significant for temperature 99.1  F, mild tachycardia with heart rate 105 bpm, vital signs otherwise within normal limits.  On exam, patient is nontoxic-appearing resting comfortably in hospital bed in no acute distress.  Neuro examination reassuring, no focal deficits.  Breath sounds slightly diminished throughout.  No wheezing, rales, rhonchi.    Differential diagnosis includes UTI, pneumonia, ACS, arrhythmia, CHF, physical deconditioning, electrolyte abnormality, viral illness.  Neuro examination reassuring, nonfocal, therefore lower clinical suspicion for acute stroke however as patient is on warfarin with new confusion will obtain imaging of his head.  Low clinical suspicion for sepsis or bacteremia.    Laboratory studies obtained and were notable for hyperkalemia with potassium 5.7.  It was noted that the specimen was slightly hemolyzed, repeat BMP ordered.  BNP elevated at 13,725, provide chart review there is no prior BNP within our  system or through care everywhere.  Troponin ordered and pending.  Urinalysis not suggestive of UTI. Difficulty establishing IV access.  Noncon head CT and chest x-ray ordered and are pending at conclusion of my shift.    Plan for admission to hospital for generalized weakness and elevated BNP.  Patient typically ambulates independently, occasionally uses cane to ambulate and currently is unable to ambulate. The patient and family at the bedside verbalized understanding and is comfortable with this plan.     At the conclusion of the encounter I discussed the results of all of the tests and the disposition. The questions were answered. The patient or family acknowledged understanding and was agreeable with the care plan.       ED COURSE:  6:26 PM I met and introduced myself to the patient. I gathered initial history and performed an initial physical exam. We discussed options and plan for diagnostics and treatment here in the ED.  7:05 PM I have staffed the patient with Dr. Shakir Nolasco, ED physician, who has evaluated the patient and agrees with all aspects of today's care.   7:50 PM I updated the patient on his results.  Discussed plan for admission, patient and family agreeable.  9:15 PM I discussed the patient with the hospitalist, Dr. Nieves, who accepts the patient for admission.       Medical Decision Making  Obtained supplemental history:Supplemental history obtained?: Documented in chart and Family Member/Significant Other  Reviewed external records: External records reviewed?: Documented in chart and Inpatient Record: Admission 2/15 - 2/16/2024  Care impacted by chronic illness:Anticoagulated State, Chronic Kidney Disease, Heart Disease, and Hypertension  Care significantly affected by social determinants of health:N/A  Did you consider but not order tests?: Work up considered but not performed and documented in chart, if applicable  Did you interpret images independently?: Independent interpretation of ECG  and images noted in documentation, when applicable.  Consultation discussion with other provider:Did you involve another provider (consultant, MH, pharmacy, etc.)?: I discussed the care with another health care provider, see documentation for details.  Admit.      CRITICAL CARE:  None      MEDICATIONS GIVEN IN THE EMERGENCY:  Medications - No data to display      NEW PRESCRIPTIONS STARTED AT TODAY'S ER VISIT  New Prescriptions    No medications on file          =================================================================    HPI    Patient information was obtained from: Patient and family    Use of Intrepreter: N/A         Skip Newman is a 89 year old male with pertinent medical history of CAD status post stents 2001, 2012, 2014, hypertension, myocardial infarction, CKD, A-fib on warfarin, anemia, sick sinus syndrome status post Medtronic PPM presenting to the emergency department with his son-in-law and grandson for evaluation of generalized weakness and confusion onset today.     Patient reports feeling generally weak onset today.  He typically uses a cane to ambulate at baseline, otherwise is able to ambulate spontaneously and independently.  Today, patient has been unable to do much more than weight-bear independently.  No falls, no head impact. No headache, chest pain, dyspnea, fevers, chills, urinary symptoms, nausea, vomiting, diarrhea, medic easier, melena, hematemesis.     Per chart review, the patient was admitted 2/15/2024 - 2/16/2024 after being found down at home with mild rhabdomyolysis and elevated troponin.  CT head and C-spine negative for any acute pathology, pacemaker interrogation without concerning arrhythmia.  PT/OT evaluated the patient, recommended increased services including transition to memory care.      PAST MEDICAL HISTORY:  Past Medical History:   Diagnosis Date    A-fib (H)     Anemia     Arrhythmia     Arthritis     Chronic kidney disease     Claudication (H24)     Coronary  artery disease     Gout     Hearing loss     high frequency    History of transfusion     Hyperlipidemia     Hypertension     Impotence of organic origin     Kidney stone     recurrent    MI (myocardial infarction) (H)     Primary osteoarthritis of both knees     Sick sinus syndrome -- S/P Medtronic PPM     Skin cancer     Spinal stenosis, lumbar region, with neurogenic claudication     Syncope     Thrombocytopenia (H24)     VT (ventricular tachycardia) (H)        PAST SURGICAL HISTORY:  Past Surgical History:   Procedure Laterality Date    CARDIAC CATHETERIZATION      CORONARY STENT PLACEMENT  2001    LCx    HEMORRHOID SURGERY  1989    INSERT / REPLACE / REMOVE PACEMAKER  01/2009    Medtronic    OTHER SURGICAL HISTORY  11/2018    l3-l4 lumbar stenosis    TONSILLECTOMY      child    TOTAL HIP ARTHROPLASTY Left     [facility] TOTAL KNEE ARTHROPLASTY Left 7/8/2019    Procedure: LEFT TOTAL KNEE ARTHROPLASTY;  Surgeon: Bowen Padgett MD;  Location: [facility];  Service: Orthopedics    [facility] TOTAL KNEE ARTHROPLASTY Right 1/13/2020    Procedure: RIGHT TOTAL KNEE ARTHROPLASTY;  Surgeon: Bowen Padgett MD;  Location: [facility];  Service: Orthopedics    [facility] CORONARY STENT PERCUT, INITIAL VESSEL      Description: Cath Stent Placement;  Recorded: 10/01/2012;  Comments: Mid LCx    [facility] CORONARY STENT PERCUT, INITIAL VESSEL      Description: Cath Stent Placement;  Recorded: 10/21/2014;  Comments: Mid LCx       CURRENT MEDICATIONS:    Prior to Admission Medications   Prescriptions Last Dose Informant Patient Reported? Taking?   MULTIVITAMIN (MULTIPLE VITAMIN ORAL) 3/13/2024 at pm  Yes Yes   Sig: Take 1 tablet by mouth every evening   acetaminophen (TYLENOL) 500 MG tablet Past Month at unknown  No Yes   Sig: [ACETAMINOPHEN (TYLENOL) 500 MG TABLET] Take 2 tablets (1,000 mg total) by mouth 3 (three) times a day.   Patient taking differently: Take 1,000 mg by mouth every 6 hours as needed   atorvastatin (LIPITOR) 40 MG  tablet 3/13/2024 at pm  Yes Yes   Sig: Take 20 mg by mouth every evening   chlorthalidone (HYGROTEN) 25 MG tablet 3/13/2024 at pm  Yes Yes   Sig: Take 25 mg by mouth every evening   losartan (COZAAR) 100 MG tablet 3/13/2024 at pm  Yes Yes   Sig: Take 50 mg by mouth every evening   metoprolol succinate ER (TOPROL XL) 100 MG 24 hr tablet 3/13/2024 at pm  Yes Yes   Sig: Take 100 mg by mouth every evening   omega 3-dha-epa-fish oil (FISH OIL) 1,000 mg (120 mg-180 mg) cap 3/13/2024 at pm  Yes Yes   Sig: Take 1 capsule by mouth every evening   spironolactone (ALDACTONE) 25 MG tablet 3/13/2024 at pm  Yes Yes   Sig: Take 25 mg by mouth every evening   warfarin ANTICOAGULANT (COUMADIN) 5 MG tablet 3/13/2024 at pm  Yes Yes   Sig: Take 2.5 mg by mouth every evening      Facility-Administered Medications: None       ALLERGIES:  Allergies   Allergen Reactions    Lisinopril Cough    Indomethacin Rash    Naproxen Rash       FAMILY HISTORY:  Family History   Problem Relation Age of Onset    Heart Failure Mother     Heart Failure Father        SOCIAL HISTORY:  Social History     Tobacco Use    Smoking status: Never    Smokeless tobacco: Never   Substance Use Topics    Alcohol use: No    Drug use: Not Currently        VITALS:    First Vitals:  Patient Vitals for the past 24 hrs:   BP Temp Temp src Pulse Resp SpO2 Weight   03/13/24 2033 132/64 -- -- 96 -- 98 % --   03/13/24 1814 118/79 99.1  F (37.3  C) Oral 105 20 96 % 79.6 kg (175 lb 7.8 oz)       Patient Vitals for the past 24 hrs:   BP Temp Temp src Pulse Resp SpO2 Weight   03/13/24 2033 132/64 -- -- 96 -- 98 % --   03/13/24 1814 118/79 99.1  F (37.3  C) Oral 105 20 96 % 79.6 kg (175 lb 7.8 oz)         PHYSICAL EXAM  VITAL SIGNS: /64   Pulse 96   Temp 99.1  F (37.3  C) (Oral)   Resp 20   Wt 79.6 kg (175 lb 7.8 oz)   SpO2 98%   BMI 27.49 kg/m     Constitutional: Awake, alert, No acute distress.    HENT: Normocephalic, atraumatic.  No raccoon eyes or [place] sign.   Posterior pharynx within normal limits, uvula midline, mucous membranes moist.   Eyes: Conjunctiva normal. Visual fields full to confrontation.  Pulmonary: Breathing easily, clear and equal breath sounds.  No respiratory distress.  Cardiovascular:  Regular rate and rhythm, radial and posterior tibialis pulses present, symmetric, and within normal limits.   GI: Soft, nondistended, non tender, no rebound or guarding. Bowel sounds normal.  Extremities:  Moving all extremities spontaneously. No gross deformity. Extremities are warm and well perfused.  No peripheral edema.   Integument: Exposed areas of skin warm, dry, no rashes.  Neurologic: Alert & oriented to person, place and time.  GCS 15. Patient articulates well with no dysarthria.  and lower extremity strength 5/5 and symmetric. Distal sensation grossly intact in upper and lower extremities. Pronator drift, Finger-nose-finger, Straight leg raise, and heel to shin intact. No tremor.   Cranial Nerves:    CN2: Full visual fields.  CN3, 4 & 6: PERRL. EOMI without nystagmus.      CN 5: Sensation intact and symmetrical throughout V1, V2, V3 distribution.  CN7: Motor intact, face symmetric.  Able to raise eye brows, squint eyes shut, puff out cheeks, and smile symmetrically.  CN8: Hearing grossly intact to spoken voice.   CN9&10: Uvula midline, palate rises with phonation.   CN11: Shoulder shrug 5/5 intact bilaterally.   CN12: Tongue midline and moves freely from side to side.  Psychiatric: Affect normal, Judgment normal, Mood normal. No obvious hallucinations at this time.          RADIOLOGY/LAB:  Reviewed all pertinent imaging. Please see official radiology report.  All pertinent labs reviewed and interpreted.  Results for orders placed or performed during the hospital encounter of 03/13/24   Basic metabolic panel   Result Value Ref Range    Sodium 138 135 - 145 mmol/L    Potassium 5.7 (H) 3.4 - 5.3 mmol/L    Chloride 104 98 - 107 mmol/L    Carbon Dioxide (CO2) 20  (L) 22 - 29 mmol/L    Anion Gap 14 7 - 15 mmol/L    Urea Nitrogen 57.5 (H) 8.0 - 23.0 mg/dL    Creatinine 1.42 (H) 0.67 - 1.17 mg/dL    GFR Estimate 47 (L) >60 mL/min/1.73m2    Calcium 9.4 8.2 - 9.6 mg/dL    Glucose 115 (H) 70 - 99 mg/dL   Hepatic function panel   Result Value Ref Range    Protein Total 7.3 6.4 - 8.3 g/dL    Albumin 4.1 3.5 - 5.2 g/dL    Bilirubin Total 0.6 <=1.2 mg/dL    Alkaline Phosphatase 87 40 - 150 U/L    AST 36 0 - 45 U/L    ALT 28 0 - 70 U/L    Bilirubin Direct <0.20 0.00 - 0.30 mg/dL   Result Value Ref Range    Magnesium 2.0 1.7 - 2.3 mg/dL   Nt probnp inpatient (BNP)   Result Value Ref Range    N terminal Pro BNP Inpatient 13,725 (H) 0 - 1,800 pg/mL   UA with Microscopic reflex to Culture    Specimen: Urine, Clean Catch   Result Value Ref Range    Color Urine Yellow Colorless, Straw, Light Yellow, Yellow    Appearance Urine Clear Clear    Glucose Urine Negative Negative mg/dL    Bilirubin Urine Negative Negative    Ketones Urine Negative Negative mg/dL    Specific Gravity Urine 1.019 1.001 - 1.030    Blood Urine Negative Negative    pH Urine 5.0 5.0 - 7.0    Protein Albumin Urine Negative Negative mg/dL    Urobilinogen Urine <2.0 <2.0 mg/dL    Nitrite Urine Negative Negative    Leukocyte Esterase Urine Negative Negative    RBC Urine 1 <=2 /HPF    WBC Urine <1 <=5 /HPF    Hyaline Casts Urine 1 <=2 /LPF   CBC with platelets and differential   Result Value Ref Range    WBC Count 8.2 4.0 - 11.0 10e3/uL    RBC Count 3.99 (L) 4.40 - 5.90 10e6/uL    Hemoglobin 12.7 (L) 13.3 - 17.7 g/dL    Hematocrit 38.0 (L) 40.0 - 53.0 %    MCV 95 78 - 100 fL    MCH 31.8 26.5 - 33.0 pg    MCHC 33.4 31.5 - 36.5 g/dL    RDW 14.7 10.0 - 15.0 %    Platelet Count 97 (L) 150 - 450 10e3/uL    % Neutrophils 90 %    % Lymphocytes 5 %    % Monocytes 4 %    % Eosinophils 1 %    % Basophils 0 %    % Immature Granulocytes 0 %    NRBCs per 100 WBC 0 <1 /100    Absolute Neutrophils 7.4 1.6 - 8.3 10e3/uL    Absolute Lymphocytes  0.4 (L) 0.8 - 5.3 10e3/uL    Absolute Monocytes 0.3 0.0 - 1.3 10e3/uL    Absolute Eosinophils 0.1 0.0 - 0.7 10e3/uL    Absolute Basophils 0.0 0.0 - 0.2 10e3/uL    Absolute Immature Granulocytes 0.0 <=0.4 10e3/uL    Absolute NRBCs 0.0 10e3/uL         EKG:  Performed at: 1838 Impression: A-fib with RVR with premature ventricular or aberrantly conducted complexes.  Inferior infarct (cited on or before 12-October-2015).  Abnormal ECG.  When compared with ECG of 15-February-0073-9430, atrial fibrillation has replaced junctional rhythm Rate: 101 Rhythm: A-fib with RVR with premature ventricular or aberrantly conducted complexes Axis: *  -17  89 IN Interval: * QRS Interval: 84 QTc Interval: 459 ST Changes: Increased amplitude of T waves in anterior septal leads, No STEMI Comparison: 15-FEB-2024 1112 EKG results independently reviewed and interpreted by Dr. Shakir Nolasco, ED physician.               Katharina Nelson PA-C  Emergency Medicine  [facility] EMERGENCY DEPARTMENT  [facility] [facility] [address] [phone]  Dept: [phone]     Katharina Nelson PA-C  03/13/24 0529

## 2024-03-14 ENCOUNTER — APPOINTMENT (OUTPATIENT)
Dept: PHYSICAL THERAPY | Facility: HOSPITAL | Age: 89
End: 2024-03-14
Attending: STUDENT IN AN ORGANIZED HEALTH CARE EDUCATION/TRAINING PROGRAM
Payer: MEDICARE

## 2024-03-14 VITALS
HEART RATE: 91 BPM | HEIGHT: 67 IN | RESPIRATION RATE: 20 BRPM | OXYGEN SATURATION: 97 % | WEIGHT: 175.49 LBS | BODY MASS INDEX: 27.54 KG/M2 | SYSTOLIC BLOOD PRESSURE: 120 MMHG | DIASTOLIC BLOOD PRESSURE: 60 MMHG | TEMPERATURE: 97.8 F

## 2024-03-14 LAB
ANION GAP SERPL CALCULATED.3IONS-SCNC: 9 MMOL/L (ref 7–15)
BUN SERPL-MCNC: 53.7 MG/DL (ref 8–23)
CALCIUM SERPL-MCNC: 9 MG/DL (ref 8.2–9.6)
CHLORIDE SERPL-SCNC: 105 MMOL/L (ref 98–107)
CREAT SERPL-MCNC: 1.36 MG/DL (ref 0.67–1.17)
DEPRECATED HCO3 PLAS-SCNC: 22 MMOL/L (ref 22–29)
EGFRCR SERPLBLD CKD-EPI 2021: 49 ML/MIN/1.73M2
ERYTHROCYTE [DISTWIDTH] IN BLOOD BY AUTOMATED COUNT: 14.7 % (ref 10–15)
GLUCOSE SERPL-MCNC: 97 MG/DL (ref 70–99)
HCT VFR BLD AUTO: 35.3 % (ref 40–53)
HGB BLD-MCNC: 11.2 G/DL (ref 13.3–17.7)
INR PPP: 1.86 (ref 0.85–1.15)
MCH RBC QN AUTO: 31.2 PG (ref 26.5–33)
MCHC RBC AUTO-ENTMCNC: 31.7 G/DL (ref 31.5–36.5)
MCV RBC AUTO: 98 FL (ref 78–100)
PLATELET # BLD AUTO: 86 10E3/UL (ref 150–450)
POTASSIUM SERPL-SCNC: 4.2 MMOL/L (ref 3.4–5.3)
RBC # BLD AUTO: 3.59 10E6/UL (ref 4.4–5.9)
SODIUM SERPL-SCNC: 136 MMOL/L (ref 135–145)
WBC # BLD AUTO: 5 10E3/UL (ref 4–11)

## 2024-03-14 PROCEDURE — 80048 BASIC METABOLIC PNL TOTAL CA: CPT | Performed by: STUDENT IN AN ORGANIZED HEALTH CARE EDUCATION/TRAINING PROGRAM

## 2024-03-14 PROCEDURE — 36415 COLL VENOUS BLD VENIPUNCTURE: CPT | Performed by: STUDENT IN AN ORGANIZED HEALTH CARE EDUCATION/TRAINING PROGRAM

## 2024-03-14 PROCEDURE — 99239 HOSP IP/OBS DSCHRG MGMT >30: CPT | Performed by: HOSPITALIST

## 2024-03-14 PROCEDURE — G0378 HOSPITAL OBSERVATION PER HR: HCPCS

## 2024-03-14 PROCEDURE — 250N000013 HC RX MED GY IP 250 OP 250 PS 637: Performed by: STUDENT IN AN ORGANIZED HEALTH CARE EDUCATION/TRAINING PROGRAM

## 2024-03-14 PROCEDURE — 99222 1ST HOSP IP/OBS MODERATE 55: CPT | Performed by: CLINICAL NURSE SPECIALIST

## 2024-03-14 PROCEDURE — 85610 PROTHROMBIN TIME: CPT | Performed by: STUDENT IN AN ORGANIZED HEALTH CARE EDUCATION/TRAINING PROGRAM

## 2024-03-14 PROCEDURE — 96360 HYDRATION IV INFUSION INIT: CPT

## 2024-03-14 PROCEDURE — 97162 PT EVAL MOD COMPLEX 30 MIN: CPT | Mod: GP

## 2024-03-14 PROCEDURE — 258N000003 HC RX IP 258 OP 636: Performed by: STUDENT IN AN ORGANIZED HEALTH CARE EDUCATION/TRAINING PROGRAM

## 2024-03-14 PROCEDURE — 85027 COMPLETE CBC AUTOMATED: CPT | Performed by: STUDENT IN AN ORGANIZED HEALTH CARE EDUCATION/TRAINING PROGRAM

## 2024-03-14 PROCEDURE — 97116 GAIT TRAINING THERAPY: CPT | Mod: GP

## 2024-03-14 RX ORDER — METOPROLOL SUCCINATE 50 MG/1
100 TABLET, EXTENDED RELEASE ORAL EVERY EVENING
Status: DISCONTINUED | OUTPATIENT
Start: 2024-03-14 | End: 2024-03-14 | Stop reason: HOSPADM

## 2024-03-14 RX ORDER — LOSARTAN POTASSIUM 50 MG/1
50 TABLET ORAL EVERY EVENING
Status: DISCONTINUED | OUTPATIENT
Start: 2024-03-14 | End: 2024-03-14 | Stop reason: HOSPADM

## 2024-03-14 RX ORDER — ATORVASTATIN CALCIUM 10 MG/1
20 TABLET, FILM COATED ORAL EVERY EVENING
Status: DISCONTINUED | OUTPATIENT
Start: 2024-03-14 | End: 2024-03-14 | Stop reason: HOSPADM

## 2024-03-14 RX ORDER — LOPERAMIDE HCL 2 MG
2 CAPSULE ORAL 4 TIMES DAILY PRN
Status: DISCONTINUED | OUTPATIENT
Start: 2024-03-14 | End: 2024-03-14 | Stop reason: HOSPADM

## 2024-03-14 RX ORDER — SPIRONOLACTONE 25 MG/1
25 TABLET ORAL EVERY EVENING
Status: DISCONTINUED | OUTPATIENT
Start: 2024-03-14 | End: 2024-03-14 | Stop reason: HOSPADM

## 2024-03-14 RX ORDER — WARFARIN SODIUM 2.5 MG/1
2.5 TABLET ORAL ONCE
Status: COMPLETED | OUTPATIENT
Start: 2024-03-14 | End: 2024-03-14

## 2024-03-14 RX ORDER — WARFARIN SODIUM 2.5 MG/1
2.5 TABLET ORAL
Status: DISCONTINUED | OUTPATIENT
Start: 2024-03-14 | End: 2024-03-14 | Stop reason: HOSPADM

## 2024-03-14 RX ADMIN — LOPERAMIDE HYDROCHLORIDE 2 MG: 2 CAPSULE ORAL at 05:19

## 2024-03-14 RX ADMIN — SODIUM CHLORIDE, POTASSIUM CHLORIDE, SODIUM LACTATE AND CALCIUM CHLORIDE 500 ML: 600; 310; 30; 20 INJECTION, SOLUTION INTRAVENOUS at 01:57

## 2024-03-14 RX ADMIN — WARFARIN SODIUM 2.5 MG: 2.5 TABLET ORAL at 02:36

## 2024-03-14 ASSESSMENT — ACTIVITIES OF DAILY LIVING (ADL)
ADLS_ACUITY_SCORE: 44
ADLS_ACUITY_SCORE: 46
ADLS_ACUITY_SCORE: 46
ADLS_ACUITY_SCORE: 38
ADLS_ACUITY_SCORE: 46
ADLS_ACUITY_SCORE: 46
ADLS_ACUITY_SCORE: 44
ADLS_ACUITY_SCORE: 46

## 2024-03-14 NOTE — PROGRESS NOTES
Went through AVS with patient and grandson. Answered any questions/concerns the two had. Grandson transporting patient back to his facility. All belongings sent with patient, including hearing aid.     CHASE MALDONADO RN

## 2024-03-14 NOTE — MEDICATION SCRIBE - ADMISSION MEDICATION HISTORY
Medication Scribe Admission Medication History    Admission medication history is complete. The information provided in this note is only as accurate as the sources available at the time of the update.    Information Source(s): Patient and Prescription bottles via in-person    Pertinent Information: patient reports self management of medications.     Patient reports taking all of his medications in the early evening.     Warfarin:   2/15/24 AMH Note: Patient takes warfarin 2.5mg daily but yesterday was instructed to take 5mg due to a subtherapeutic INR. I spoke to the patient's daughter here at the hospital and then called home and spoke to the grandson who looked at Bill's pill box to confirm.      3/13/24 AMH Note: Patient unaware of warfarin dose. Patient's son was at bedside and called home and spoke to another family member who confirmed that in the last 3 days of doses for the week - patient had set up 2.5 mg (one half tablet of 5 mg) to be taken in the evening.     Changes made to PTA medication list:  Added: None  Deleted: None  Changed: None    Allergies reviewed with patient and updates made in EHR: yes    Medication History Completed By: Tani Davies 3/13/2024 8:01 PM    PTA Med List   Medication Sig Last Dose    acetaminophen (TYLENOL) 500 MG tablet [ACETAMINOPHEN (TYLENOL) 500 MG TABLET] Take 2 tablets (1,000 mg total) by mouth 3 (three) times a day. (Patient taking differently: Take 1,000 mg by mouth every 6 hours as needed) Past Month at unknown    atorvastatin (LIPITOR) 40 MG tablet Take 20 mg by mouth every evening 3/13/2024 at pm    chlorthalidone (HYGROTEN) 25 MG tablet Take 25 mg by mouth every evening 3/13/2024 at pm    losartan (COZAAR) 100 MG tablet Take 50 mg by mouth every evening 3/13/2024 at pm    metoprolol succinate ER (TOPROL XL) 100 MG 24 hr tablet Take 100 mg by mouth every evening 3/13/2024 at pm    MULTIVITAMIN (MULTIPLE VITAMIN ORAL) Take 1 tablet by mouth every evening 3/13/2024 at  pm    omega 3-dha-epa-fish oil (FISH OIL) 1,000 mg (120 mg-180 mg) cap Take 1 capsule by mouth every evening 3/13/2024 at pm    spironolactone (ALDACTONE) 25 MG tablet Take 25 mg by mouth every evening 3/13/2024 at pm    warfarin ANTICOAGULANT (COUMADIN) 5 MG tablet Take 2.5 mg by mouth every evening 3/13/2024 at pm

## 2024-03-14 NOTE — DISCHARGE SUMMARY
[facility] MEDICINE  DISCHARGE SUMMARY     Primary Care Physician: Jacquelin Francis  Admission Date: 3/13/2024   Discharge Provider: Lida Doss MD Discharge Date: 3/14/2024   Diet:   Active Diet and Nourishment Order   Procedures     Regular Diet Adult     Diet       Code Status: Full Code   Activity: DCACTIVITY: Activity as tolerated        Condition at Discharge: Stable     REASON FOR PRESENTATION(See Admission Note for Details)     Weakness    PRINCIPAL & ACTIVE DISCHARGE DIAGNOSES     Principal Problem:    Elevated brain natriuretic peptide (BNP) level  Active Problems:    Generalized weakness      PENDING LABS     Unresulted Labs Ordered in the Past 30 Days of this Admission       No orders found for last 31 day(s).              PROCEDURES ( this hospitalization only)          RECOMMENDATIONS TO OUTPATIENT PROVIDER FOR F/U VISIT     Follow-up Appointments     Follow-up and recommended labs and tests       Follow up with primary care provider, Jacquelin Francis, within 7 days   for hospital follow- up.  The following labs/tests are recommended: BMP.            DISPOSITION     Home with home care    SUMMARY OF HOSPITAL COURSE:      Skip is a 89-year-old male with history of heart failure, atrial fibrillation on warfarin, CAD was admitted with generalized weakness.  Patient had reported having loose watery stools prior to admission and not associated with any fever, chills or abdominal pain.  Patient was clinically dehydrated and likely suspected to have viral gastroenteritis.  Was treated with IV fluids, as needed loperamide.  Mild PRICILA noted, creatinine stable on discharge.  Workup also revealed patient to have BNP greater than 13,000, however patient did not appear fluid overload on exam and had no symptoms of shortness of breath.  Saturations remained stable on room air.  Patient's home cardiac meds were resumed on discharge.  Patient's daughter Rashmi was updated  on the plan over the phone.    .Patient stable to be discharged home with home care today. Please refer to discharge medications and instructions for more details.      Discharge Medications with Med changes:     Current Discharge Medication List        CONTINUE these medications which have NOT CHANGED    Details   acetaminophen (TYLENOL) 500 MG tablet [ACETAMINOPHEN (TYLENOL) 500 MG TABLET] Take 2 tablets (1,000 mg total) by mouth 3 (three) times a day.  Refills: 0    Associated Diagnoses: Primary osteoarthritis of left knee      atorvastatin (LIPITOR) 40 MG tablet Take 20 mg by mouth every evening      chlorthalidone (HYGROTEN) 25 MG tablet Take 25 mg by mouth every evening      losartan (COZAAR) 100 MG tablet Take 50 mg by mouth every evening      metoprolol succinate ER (TOPROL XL) 100 MG 24 hr tablet Take 100 mg by mouth every evening      MULTIVITAMIN (MULTIPLE VITAMIN ORAL) Take 1 tablet by mouth every evening      omega 3-dha-epa-fish oil (FISH OIL) 1,000 mg (120 mg-180 mg) cap Take 1 capsule by mouth every evening      spironolactone (ALDACTONE) 25 MG tablet Take 25 mg by mouth every evening      warfarin ANTICOAGULANT (COUMADIN) 5 MG tablet Take 2.5 mg by mouth every evening                   Rationale for medication changes:      See med rec        Consults       PHYSICAL THERAPY ADULT IP CONSULT  CARE MANAGEMENT / SOCIAL WORK IP CONSULT  PHARMACY TO DOSE WARFARIN  PALLIATIVE CARE ADULT IP CONSULT    Immunizations given this encounter     Most Recent Immunizations   Administered Date(s) Administered     COVID-19 12+ (2023-24) (MODERNA) 12/01/2023     COVID-19 MONOVALENT 12+ (Pfizer) 12/02/2021     Influenza Vaccine 65+ (FLUAD) 09/28/2023     Influenza Vaccine >6 months,quad, PF 09/30/2019           Anticoagulation Information      Recent INR results:   Recent Labs   Lab 03/14/24  0743 03/13/24 2053   INR 1.86* 1.92*     Warfarin doses (if applicable) or name of other anticoagulant:  Warfarin      SIGNIFICANT IMAGING FINDINGS     Results for orders placed or performed during the hospital encounter of 03/13/24   Head CT w/o contrast    Impression    IMPRESSION:  1.  No acute intracranial abnormality.   Chest XR,  PA & LAT    Impression    IMPRESSION: Pacemaker with leads over the RA and RV. Shallow inspiration. Lungs are clear. Moderate degenerative change both shoulders similar to previous.       SIGNIFICANT LABORATORY FINDINGS         Discharge Orders        Home Care Referral      Reason for your hospital stay    Weakness, viral gastroenteritis     Follow-up and recommended labs and tests     Follow up with primary care provider, Jacquelin Francis, within 7 days for hospital follow- up.  The following labs/tests are recommended: BMP.     Activity    Your activity upon discharge: activity as tolerated     Diet    Follow this diet upon discharge: Orders Placed This Encounter      Regular Diet Adult       Examination   Physical Exam   Temp:  [97.8  F (36.6  C)-99.4  F (37.4  C)] 97.8  F (36.6  C)  Pulse:  [] 91  Resp:  [20] 20  BP: (118-134)/(60-97) 120/60  SpO2:  [94 %-98 %] 97 %  Wt Readings from Last 1 Encounters:   03/13/24 79.6 kg (175 lb 7.8 oz)       General: Pleasant, elderly male in NAD  HEENT:EOMI, AT,NC,hard of hearing  CVS:RRR  RS:CTAB  Abd: Soft, NT,ND  Neurology:Grossly normal  Psy:Approrpiate affect        Please see EMR for more detailed significant labs, imaging, consultant notes etc.    Lida ULLOA MD, personally saw the patient today and spent greater than 30 minutes discharging this patient.    Lida Doss MD  [facility]    CC:Jacquelin Francis

## 2024-03-14 NOTE — PHARMACY-ANTICOAGULATION SERVICE
Clinical Pharmacy - Warfarin Dosing Consult     Pharmacy has been consulted to manage this patient s warfarin therapy.  Indication: Atrial Fibrillation  Therapy Goal: INR 2-3  Provider/Team: Hospitalist  Warfarin Prior to Admission: Yes  Warfarin PTA Regimen: 2.5 mg daily most days, may take 5 mg once daily ~once per week  Significant drug interactions: Both home meds: spironolactone may decrease anticoagulant effectiveness, acetaminophen may increase bleeding risk  Recent documented change in oral intake/nutrition: Unknown  Dose Comments: Patient had 3/13/24 dose, but as INR is slightly sub-therapeutic, will order an additional 2.5 mg.    INR   Date Value Ref Range Status   03/13/2024 1.92 (H) 0.85 - 1.15 Final   02/16/2024 1.49 (H) 0.85 - 1.15 Final       Recommend additional warfarin 2.5 mg today (understood that patient already had dose for 3/13/24).  Pharmacy will monitor Skip Newman daily and order warfarin doses to achieve specified goal.      Please contact pharmacy as soon as possible if the warfarin needs to be held for a procedure or if the warfarin goals change.

## 2024-03-14 NOTE — PROGRESS NOTES
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   General Information   Onset of Illness/Injury or Date of Surgery 03/13/24   Referring Physician Hubert Nieves MD   Patient/Family Therapy Goals Statement (PT) return home   Pertinent History of Current Problem (include personal factors and/or comorbidities that impact the POC) 89M presents with weakness; pmhx includes HFrEF (30%, 12/23),  atrial fibrillation (warfarin), CAD, thrombocytopenia; admitted to observation for generalised weakness, viral gastroenteritis.   Strength (Manual Muscle Testing)   Strength (Manual Muscle Testing) Deficits observed during functional mobility   Bed Mobility   Bed Mobility supine-sit;sit-supine   Supine-Sit [place] (Bed Mobility) supervision   Sit-Supine [place] (Bed Mobility) verbal cues   Assistive Device (Bed Mobility) bed rails   Transfers   Transfers sit-stand transfer   Sit-Stand Transfer   Sit-Stand [place] (Transfers) contact guard   Assistive Device (Sit-Stand Transfers) walker, front-wheeled   Gait/Stairs (Locomotion)   [place] Level (Gait) contact guard   Assistive Device (Gait) walker, front-wheeled   Distance in Feet (Gait) 15'   Pattern (Gait) step-through   Deviations/Abnormal Patterns (Gait) gait speed decreased   Clinical Impression   Criteria for Skilled Therapeutic Intervention Yes, treatment indicated   PT Diagnosis (PT)  Impaired functional mobility   Influenced by the following impairments Weakness, fatigue   Functional limitations due to impairments Impaired strength, gait, endurance   Clinical Presentation (PT Evaluation Complexity) stable   Clinical Presentation Rationale Presents as diagnosed   Clinical Decision Making (Complexity) moderate complexity   Planned Therapy Interventions (PT) balance training;bed mobility training;gait training;home exercise program;strengthening;transfer training   Risk & Benefits of therapy have been explained patient   PT Total Evaluation Time   PT Eval, Moderate Complexity Minutes (55178) 10   Therapy Certification   Start of care date 03/14/24   Certification date from 03/14/24   Certification date to 03/21/24   Medical Diagnosis Gen. Weakness   Physical Therapy Goals   PT Frequency Daily   PT Predicted Duration/Target Date for Goal Attainment 03/21/24   PT: Bed Mobility Independent;Supine to/from sit   PT: Transfers Modified independent;Sit to/from stand;Bed to/from chair;Assistive device   PT: Gait Supervision/stand-by assist;Rolling walker;Greater than 200 feet   PT Discharge Planning   PT Plan progress transfers, amb as to (SEC vs FWW), HEP   PT Discharge Recommendation (DC Rec) home with assist;home with home care physical therapy   PT Rationale for DC Rec Pt noted having home PT 2x/week currently. 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   PT Brief overview of current status Amb 265' with FWW and CGA.   PT Equipment Needed at Discharge walker, rolling  (pt may need FWW at d/c.)   Total Session Time   Total Session Time (sum of timed and untimed services) [address] [place] [address] Rehabilitation Services  OUTPATIENT PHYSICAL THERAPY EVALUATION  PLAN OF TREATMENT FOR OUTPATIENT REHABILITATION  (COMPLETE FOR INITIAL CLAIMS ONLY)  Patient's Last Name, First Name, M.I.  YOB: 1934  Skip Newman                         Provider's Name  [facility] Medical Record No.  [id number]                             Onset Date:  03/13/24   Start of Care Date:  03/14/24   Type:     _X_PT   ___OT   ___SLP Medical Diagnosis:  Gen. Weakness              PT Diagnosis:  Impaired functional mobility Visits from SOC:  1     See note for plan of treatment, functional goals and certification details    I CERTIFY THE NEED FOR THESE SERVICES FURNISHED UNDER        THIS PLAN OF TREATMENT AND WHILE UNDER MY CARE     (Physician co-signature of this document indicates review and certification of the therapy plan).                Emilia Kumar, PT, DPT  3/14/2024

## 2024-03-14 NOTE — CONSULTS
"Palliative Care Consultation Note  [facility]      Patient: Skip Newman  Date of Admission:  3/13/2024    Requesting Clinician / Team: Dr. Nieves  Reason for consult: \"POLST discussion and filing\"; goals of care      Recommendations & Counseling     GOALS OF CARE:   Restorative without limits  - FULL CODE, desires recommended evaluation, treatments and interventions.   Patient very eager to discharge home. Agreeable to home PT if needed.  Wishes to continue all current cares and treatments.  His ultimate goal is to continue to take care of his wife.  Was not interested in engaging in further goals of care discussion and found it distressing to discuss CODE STATUS.  Do not recommend completing POLST at this time. Recommend ongoing dicsussion with his family. Reviewed this with his daughter, Rashmi.    ADVANCE CARE PLANNING:  No health care directive on file. Per  informed consent policy, next of kin should be involved if patient becomes unable.  There is no POLST form on file, defer to patient and/or next of kin for decisions   Surrogate decision maker: patient identified daughterRashmi is primary decision maker and Rashmi confirms this.  Code status: Full Code    MEDICAL MANAGEMENT:   We are not actively managing symptoms at this time.    #General Weakness/Debility   Physical Therapy  Therapy Recommendations:anticipate discharge to home with home care services    #Diarrhea - 3/13 x9 stools. 3/14 one thus far.  Loperamide 2 mg 4 times a day as needed. One dose given this AM.    PSYCHOSOCIAL/SPIRITUAL:  Family daughterRashmi and her  and children. Patient helps care for his wife who has dementia.  Son, Yaniv also involved.     Palliative Care will follow peripherally. Thank you for the consult and allowing us to aid in the care of Skip Newman.    Updated Dr. Laz Willis, APRN, CNS, AOCNS  Securely message with [employer] (more info)  Text page via [facility] " Lewis/Denise       Palliative Summary/HPI     Skip Newman is a 89 year old male with a past medical history of atrial fibrillation, HFrEF (30%), HTN, HLD,  who presented on 3/13/2024 with 24-36 hours of diarrhea and generalized weakness. Admission BNP 13,725.  DaughterRashmi, reports that patient was scheduled to have outpatient echocardiogram today.  During visit today patient did have a run of ventricular tachycardia and was unable to interact or respond then quickly out of ventricular tachycardia and able to be sarcastic and communicate.    Today, the patient was seen for:  Patient and family support, goals of care discussion    Palliative Care Summary:   Met with daughterRashmi, via telephone and patient face-to-face with BASSAM Reynoso.   I introduced our role as an extra layer of support and how we help patients and families dealing with serious, potentially life-limiting illnesses. I explained the composition of the palliative care team.  Palliative care helps patients and families navigate their care while focusing on the whole person; providing emotional, social and spiritual support  Palliative care often assists with symptom management, information sharing about what to expect from the illness, available treatment options and what effect those options may have on the disease course, and provide effective communication and caring support.    Prognosis, Goals, & Planning:    Functional Status just prior to this current hospitalization:  has been hospitalized 2 time in the past 1 months for recurrent CHF exacerbations and generalized weakness.   There has been a decline in daily function including increasing weakness and has been reccoving home care PT at home follering 2/15 hospitalization.    ECOG2 (Ambulatory and capable of all selfcare but unable to carry out any work activities; may need help with IADLs up and about > 50% of waking hours)    Prognosis, Goals, and/or Advance Care  "Planning:  We discussed general treatment options (full/restorative, selective/conservatives, and comfort only/hospice). We then discussed how these specifically apply to patient.  Based on this discussion, Alen mckeon wishes to focus on the interventions to get him home where he can  continue to care for his wife of 63 years.   Not interested in discussing or engaging in code status discussion.  Interseted in discharging home ASAP - \"Like today\" per his report.    Code Status was addressed today:   Yes, We discussed potential risks and rationale of attempting cardiac resuscitation, intubation, and mechanical ventilation.  We also discussed probability of survival as well as quality of life implications.  Based on this discussion, patient or surrogate response/decision: FULL CODE. Patient reports \"maybe I'll leave and get hit by a bus and that'll be it.\"       Patient's decision making preferences: not assessed        Patient has decision-making capacity today for complex decisions:Intact            Coping, Meaning, & Spirituality:   Mood, coping, and/or meaning in the context of serious illness were addressed today: Yes    Social:   Living situation:lives in assisted living facility with wife of 63 years, Magdalena. Magdalena has dementia and is still able to ambulate, feed herself and do ADLs.  Important relationships/caregivers:wife, daughter, Rashmi and her family and son, Yaniv.  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     Past Medical History:  Past Medical History:   Diagnosis Date    A-fib (H)     Anemia     Arrhythmia     Arthritis     Chronic kidney disease     Claudication (H24)     Coronary artery disease     Gout     Hearing loss     high frequency    History of transfusion     Hyperlipidemia     Hypertension     Impotence of organic origin     Kidney stone     recurrent    MI (myocardial infarction) (H)     Primary osteoarthritis of both knees     Sick sinus syndrome -- S/P [employer] PPM  "    Skin cancer     Spinal stenosis, lumbar region, with neurogenic claudication     Syncope     Thrombocytopenia (H24)     VT (ventricular tachycardia) (H)         Past Surgical History:  Past Surgical History:   Procedure Laterality Date    CARDIAC CATHETERIZATION      CORONARY STENT PLACEMENT  2001    LCx    HEMORRHOID SURGERY  1989    INSERT / REPLACE / REMOVE PACEMAKER  01/2009    [employer]    OTHER SURGICAL HISTORY  11/2018    l3-l4 lumbar stenosis    TONSILLECTOMY      child    TOTAL HIP ARTHROPLASTY Left     ZZC TOTAL KNEE ARTHROPLASTY Left 7/8/2019    Procedure: LEFT TOTAL KNEE ARTHROPLASTY;  Surgeon: Bowen Padgett MD;  Location: [facility];  Service: Orthopedics    [facility] TOTAL KNEE ARTHROPLASTY Right 1/13/2020    Procedure: RIGHT TOTAL KNEE ARTHROPLASTY;  Surgeon: Bowen Padgett MD;  Location: [facility];  Service: Orthopedics    [facility] CORONARY STENT PERCUT, INITIAL VESSEL      Description: Cath Stent Placement;  Recorded: 10/01/2012;  Comments: Mid LCx    [facility] CORONARY STENT PERCUT, INITIAL VESSEL      Description: Cath Stent Placement;  Recorded: 10/21/2014;  Comments: Mid LCx         Family History:  Family History   Problem Relation Age of Onset    Heart Failure Mother     Heart Failure Father         Medications:  I have reviewed this patient's medication profile and medications from this hospitalization.     ROS:  Comprehensive ROS is reviewed and is negative except as here & per HPI:   Pain: denies  Dyspnea: denies  Anxiety: none  Nausea: none  Weakness/Fatigue: moderate  Constipation: none, previously with diarrhea which has subsided.    PHYSICAL EXAM:  Temp:  [97.8  F (36.6  C)-99.4  F (37.4  C)] 97.8  F (36.6  C)  Pulse:  [] 91  Resp:  [20] 20  BP: (118-134)/(60-97) 120/60  SpO2:  [94 %-98 %] 97 %  Wt Readings from Last 3 Encounters:   03/13/24 79.6 kg (175 lb 7.8 oz)   01/30/24 76.2 kg (168 lb)   11/28/23 73.5 kg (162 lb)      General appearance: alert, appears stated age,  cooperative, fatigued, and no distress  Head: Normocephalic, without obvious abnormality, atraumatic  Eyes: lids and lashes normal  Nose: no discharge  Throat: lips, mucosa, and tongue normal; teeth and gums normal  Lungs: non-labored, no accessory muscle use noted  Heart: Episode of ventricular tachycardia during visit.  Resolved and back into atrial fibrillation with PVCs  Abdomen: Soft, nondistended  Extremities: No edema or cyanosis.  Moves all extremities equally  Neurologic: Alert.  Hard of hearing.  Oriented x 4.    Data reviewed:  Results for orders placed or performed during the hospital encounter of 03/13/24 (from the past 24 hour(s))   [place] Draw *Canceled*    Narrative    The following orders were created for panel order [place] Draw.  Procedure                               Abnormality         Status                     ---------                               -----------         ------                       Please view results for these tests on the individual orders.   CBC with platelets differential    Narrative    The following orders were created for panel order CBC with platelets differential.  Procedure                               Abnormality         Status                     ---------                               -----------         ------                     CBC with platelets and d...[[id number]]  Abnormal            Final result                 Please view results for these tests on the individual orders.   Basic metabolic panel   Result Value Ref Range    Sodium 138 135 - 145 mmol/L    Potassium 5.7 (H) 3.4 - 5.3 mmol/L    Chloride 104 98 - 107 mmol/L    Carbon Dioxide (CO2) 20 (L) 22 - 29 mmol/L    Anion Gap 14 7 - 15 mmol/L    Urea Nitrogen 57.5 (H) 8.0 - 23.0 mg/dL    Creatinine 1.42 (H) 0.67 - 1.17 mg/dL    GFR Estimate 47 (L) >60 mL/min/1.73m2    Calcium 9.4 8.2 - 9.6 mg/dL    Glucose 115 (H) 70 - 99 mg/dL   Hepatic function panel   Result Value Ref Range    Protein Total 7.3 6.4 -  8.3 g/dL    Albumin 4.1 3.5 - 5.2 g/dL    Bilirubin Total 0.6 <=1.2 mg/dL    Alkaline Phosphatase 87 40 - 150 U/L    AST 36 0 - 45 U/L    ALT 28 0 - 70 U/L    Bilirubin Direct <0.20 0.00 - 0.30 mg/dL   Magnesium   Result Value Ref Range    Magnesium 2.0 1.7 - 2.3 mg/dL   Nt probnp inpatient (BNP)   Result Value Ref Range    N terminal Pro BNP Inpatient 13,725 (H) 0 - 1,800 pg/mL   CBC with platelets and differential   Result Value Ref Range    WBC Count 8.2 4.0 - 11.0 10e3/uL    RBC Count 3.99 (L) 4.40 - 5.90 10e6/uL    Hemoglobin 12.7 (L) 13.3 - 17.7 g/dL    Hematocrit 38.0 (L) 40.0 - 53.0 %    MCV 95 78 - 100 fL    MCH 31.8 26.5 - 33.0 pg    MCHC 33.4 31.5 - 36.5 g/dL    RDW 14.7 10.0 - 15.0 %    Platelet Count 97 (L) 150 - 450 10e3/uL    % Neutrophils 90 %    % Lymphocytes 5 %    % Monocytes 4 %    % Eosinophils 1 %    % Basophils 0 %    % Immature Granulocytes 0 %    NRBCs per 100 WBC 0 <1 /100    Absolute Neutrophils 7.4 1.6 - 8.3 10e3/uL    Absolute Lymphocytes 0.4 (L) 0.8 - 5.3 10e3/uL    Absolute Monocytes 0.3 0.0 - 1.3 10e3/uL    Absolute Eosinophils 0.1 0.0 - 0.7 10e3/uL    Absolute Basophils 0.0 0.0 - 0.2 10e3/uL    Absolute Immature Granulocytes 0.0 <=0.4 10e3/uL    Absolute NRBCs 0.0 10e3/uL   UA with Microscopic reflex to Culture    Specimen: Urine, Clean Catch   Result Value Ref Range    Color Urine Yellow Colorless, Straw, Light Yellow, Yellow    Appearance Urine Clear Clear    Glucose Urine Negative Negative mg/dL    Bilirubin Urine Negative Negative    Ketones Urine Negative Negative mg/dL    Specific Gravity Urine 1.019 1.001 - 1.030    Blood Urine Negative Negative    pH Urine 5.0 5.0 - 7.0    Protein Albumin Urine Negative Negative mg/dL    Urobilinogen Urine <2.0 <2.0 mg/dL    Nitrite Urine Negative Negative    Leukocyte Esterase Urine Negative Negative    RBC Urine 1 <=2 /HPF    WBC Urine <1 <=5 /HPF    Hyaline Casts Urine 1 <=2 /LPF    Narrative    Urine Culture not indicated  Urine Culture  not indicated  Urine Culture not indicated   Troponin T, High Sensitivity   Result Value Ref Range    Troponin T, High Sensitivity 88 (H) <=22 ng/L   INR   Result Value Ref Range    INR 1.92 (H) 0.85 - 1.15   Basic metabolic panel   Result Value Ref Range    Sodium 138 135 - 145 mmol/L    Potassium 4.9 3.4 - 5.3 mmol/L    Chloride 103 98 - 107 mmol/L    Carbon Dioxide (CO2) 23 22 - 29 mmol/L    Anion Gap 12 7 - 15 mmol/L    Urea Nitrogen 56.6 (H) 8.0 - 23.0 mg/dL    Creatinine 1.53 (H) 0.67 - 1.17 mg/dL    GFR Estimate 43 (L) >60 mL/min/1.73m2    Calcium 9.8 (H) 8.2 - 9.6 mg/dL    Glucose 112 (H) 70 - 99 mg/dL   [place] Draw    Narrative    The following orders were created for panel order [place] Draw.  Procedure                               Abnormality         Status                     ---------                               -----------         ------                     Extra Red Top Tube[[id number]]                               Final result                 Please view results for these tests on the individual orders.   Extra Red Top Tube   Result Value Ref Range    Hold Specimen JI    Symptomatic Influenza A/B, RSV, & SARS-CoV2 PCR (COVID-19) Nasopharyngeal    Specimen: Nasopharyngeal; Swab   Result Value Ref Range    Influenza A PCR Negative Negative    Influenza B PCR Negative Negative    RSV PCR Negative Negative    SARS CoV2 PCR Negative Negative    Narrative    Testing was performed using the Xpert Xpress CoV2/Flu/RSV Assay on the Video[employer] GeneXpert Instrument. This test should be ordered for the detection of SARS-CoV-2, influenza, and RSV viruses in individuals who meet clinical and/or epidemiological criteria. Test performance is unknown in asymptomatic patients. This test is for in vitro diagnostic use under the FDA EUA for laboratories certified under CLIA to perform high or moderate complexity testing. This test has not been FDA cleared or approved. A negative result does not rule out the presence of  PCR inhibitors in the specimen or target RNA in concentration below the limit of detection for the assay. If only one viral target is positive but coinfection with multiple targets is suspected, the sample should be re-tested with another FDA cleared, approved, or authorized test, if coinfection would change clinical management. This test was validated by the [facility]. These laboratories are certified under the Clinical Laboratory Improvement Amendments of 1988 (CLIA-88) as qualified to perform high complexity laboratory testing.   Head CT w/o contrast    Narrative    EXAM: CT HEAD W/O CONTRAST  LOCATION: [facility]  DATE: 3/13/2024    INDICATION: Confusion and generalized weakness.  COMPARISON: CT dated 02/15/2024.  TECHNIQUE: Routine CT Head without IV contrast. Multiplanar reformats. Dose reduction techniques were used.    FINDINGS:  INTRACRANIAL CONTENTS: No acute intracranial hemorrhage, extra-axial fluid collection, or mass effect. No evidence of an acute transcortical confluent infarct. 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 Grossly similar additional ill-defined bilateral cerebral white matter hypodensities, nonspecific although most likely the sequela of chronic microvascular ischemia. Mild generalized cerebral parenchymal volume loss.   No hydrocephalus.     VISUALIZED ORBITS/SINUSES/MASTOIDS: No acute intraorbital finding. No evidence of significant paranasal sinus or mastoid mucosal disease.     BONES/SOFT TISSUES: No acute abnormality.      Impression    IMPRESSION:  1.  No acute intracranial abnormality.   Chest XR,  PA & LAT    Narrative    EXAM: XR CHEST 2 VIEWS  LOCATION: [facility]  DATE: 3/13/2024    INDICATION: Cough. Concern for pneumonia.  COMPARISON: 06/13/2023 [facility] chest x-ray      Impression    IMPRESSION: Pacemaker with leads  over the RA and RV. Shallow inspiration. Lungs are clear. Moderate degenerative change both shoulders similar to previous.   Basic metabolic panel   Result Value Ref Range    Sodium 136 135 - 145 mmol/L    Potassium 4.2 3.4 - 5.3 mmol/L    Chloride 105 98 - 107 mmol/L    Carbon Dioxide (CO2) 22 22 - 29 mmol/L    Anion Gap 9 7 - 15 mmol/L    Urea Nitrogen 53.7 (H) 8.0 - 23.0 mg/dL    Creatinine 1.36 (H) 0.67 - 1.17 mg/dL    GFR Estimate 49 (L) >60 mL/min/1.73m2    Calcium 9.0 8.2 - 9.6 mg/dL    Glucose 97 70 - 99 mg/dL   CBC with platelets   Result Value Ref Range    WBC Count 5.0 4.0 - 11.0 10e3/uL    RBC Count 3.59 (L) 4.40 - 5.90 10e6/uL    Hemoglobin 11.2 (L) 13.3 - 17.7 g/dL    Hematocrit 35.3 (L) 40.0 - 53.0 %    MCV 98 78 - 100 fL    MCH 31.2 26.5 - 33.0 pg    MCHC 31.7 31.5 - 36.5 g/dL    RDW 14.7 10.0 - 15.0 %    Platelet Count 86 (L) 150 - 450 10e3/uL   INR   Result Value Ref Range    INR 1.86 (H) 0.85 - 1.15        70 MINUTES SPENT BY ME on the date of service doing chart review, history, exam, documentation & further activities per the note.

## 2024-03-14 NOTE — CONSULTS
"Care Management Initial Consult    General Information  Assessment completed with: Patient, Family, Bill and daughter Rashmi via phone  Type of CM/SW Visit: Initial Assessment    Primary Care Provider verified and updated as needed: Yes   Readmission within the last 30 days: current reason for admission unrelated to previous admission (2/15/24 - 2/16/24)   Return Category: New Diagnosis  Reason for Consult: discharge planning  Advance Care Planning: Advance Care Planning Reviewed: no concerns identified          Communication Assessment  Patient's communication style: spoken language (English or Bilingual)            Living Environment:   People in home: spouse, facility resident     Current living Arrangements: apartment, independent living facility ([facility] (wife is in the same room and is Assisted Living level of care).)      Able to return to prior arrangements: yes       Family/Social Support:  Care provided by: self, child(asif)  Provides care for: spouse  Marital Status:   Facility resident(s)/Staff, Wife, Children  Magdalena       Description of Support System: Supportive, Involved    Support Assessment: Adequate family and caregiver support, Adequate social supports, Patient communicates needs well met (\"Has a daughter and son and grandkids. Almost daily his daughter, son, son in law, granddaugher, grandson stop by to check on them.\")    Current Resources:   Patient receiving home care services: Yes  Skilled Home Care Services: Skilled Nursing, Physical Therapy, Occupational Therapy  Community Resources: Home Care  Equipment currently used at home: cane, straight, walker, rolling (\"He uses the cane most of the time. He also has a walker that he uses for long distances and when he goes to exercise at his facility\".)  Supplies currently used at home: Hearing Aid Batteries, Other (\"Still hard of hearing with the hearing aids. " "Glasses\")    Employment/Financial:  Employment Status: retired     Employment/ Comments: \"no  history\"  Financial Concerns:     Referral to Financial Worker: No       Does the patient's insurance plan have a 3 day qualifying hospital stay waiver?  No      Functional Status:  Prior to admission patient needed assistance:   Dependent ADLs:: Ambulation-cane, Independent  Dependent IADLs:: Transportation, Meal Preparation, Cleaning, Cooking, Shopping (\"He does most of their own laundry. He gets meals and light housekeeing from the facility. Family helps with other housekeeping and laundry needs and shopping and transportation\".)  Assesssment of Functional Status: At functional baseline    Mental Health Status:          Chemical Dependency Status:                Values/Beliefs:  Spiritual, Cultural Beliefs, Hoahaoism Practices, Values that affect care:                 Additional Information:  Alen lives at [facility] with his wife Magdalena. His wife gets some Assisted Living services, but he is the main caregiver for his wife with dementia. \"Grandson is staying with Alen's wife in the Assisted Living helping with her cares while Alen is in the hospital\".    I was alerted that the [facility] staff were \"concerned that he doesn't receive any cares and that he is the 24/7 care giver for his wife with severe dementia. They believe he needs a break\". I spoke to daughter Rashmi about this and she states, \"Alen doesn't get any Assisted Living services. They only help that the facility provides for him is meals and light housekeeping. At this time he doesn't need more help than that. It is also very important to him that he still help care for his wife. As a family we are watching this closely and are in discussion about moving Magdalena to Memory Care at some point for more help. She is supposed to be getting some Assisted Living services such as bathing and dressing assistance, but " "the hard thing for us as a family and for Alen is that they are always providing these cares differently or at different times. So the main issue is that Alen sees his wife needs something and he just provides that care and doesn't wait for the Assisted Living staff to come and help her. He has his daughter Rashmi and son Yaniv and grandkiamelia. Almost daily his daughter, son, son in law, granddaugher, or grandson stop by to check on them. We are all very involved in both of their cares and needs\".    He is independent with ADLs and gets help with most IADLs. \"He does most of their own laundry. He gets meals and light housekeeing from the facility. Family helps with other housekeeping and laundry needs and shopping and transportation\". \"He uses the cane most of the time. He also has a walker that he uses for long distances and when he goes to exercise at his facility\".     He also has help from [facility] RN, PT, OT; they need a fax upon his discharge. PT is recommending that he may need an increase in the PT services upon discharge.    Daughter to transport at discharge.    Rashmi daughter: [phone].    CM to follow for medical progression of care, discharge recommendations, and final discharge plan.    Julia Rosen RN    "

## 2024-03-14 NOTE — PLAN OF CARE
Problem: Diarrhea  Goal: Effective Diarrhea Management  Outcome: Not Progressing  Intervention: Manage Diarrhea  Recent Flowsheet Documentation  Taken 3/14/2024 0355 by Taryn Romero, RN  Perineal Care: absorbent brief/pad changed  Taken 3/14/2024 0000 by Taryn Romero, RN  Perineal Care: absorbent brief/pad changed   Goal Outcome Evaluation:       He has continued to be incontinent of loose stools thru out the night.  Dr cecilia.  ordered loperamide qid prn which was given at 05.    Problem: Dysrhythmia  Goal: Normalized Cardiac Rhythm  Outcome: Progressing   He continues in A-fib controlled in the 80s-90.  He has occasional PVCs.  He denies chest pain.

## 2024-03-14 NOTE — ED PROVIDER NOTES
Emergency Department Staff Physician Note     I had a face to face encounter with this patient seen by the Advanced Practice Provider ([place]).  I have seen, examined, and discussed the patient with the [place] and agree with their assessment and plan of management.    Relevant HPI:     Skip Newman is a 89 year old male who presents to the Emergency Department for evaluation of generalized weakness and altered mental status.     Per family, the patient has been increasingly weak and confused since earlier today. They state that last night he was fine and at his baseline. They describe his confusion as him asking questions repeatedly that he typically would know the answer to. Family also reports that the patient has been more weak, stating that he has been laying on the couch all day when he normally stands and walks around. No recent falls. The patient does not endorse any pain.    Denies fever, cough, congestion, nausea, vomiting, shortness of breath, headache, or any other complaints at this time.    I, Radha Geronimo, am serving as a scribe to document services personally performed by Luis Nolasco D.O., based on my observations and the provider's statements to me.   I, Luis Nolasco D.O., attest that Radha Geronimo was acting in a scribe capacity, has observed my performance of the services and has documented them in accordance with my direction.    ED Course:  7:05 PM I received the patient report from the [place], Katharina Nelson PA-C. I agree with their assessment and plan of management, and I will see the patient.  7:11 PM I met with the patient to introduce myself, gather additional history, perform my initial exam, and discuss the plan.     Brief Physical Exam:  VITAL SIGNS: /64   Pulse 96   Temp 99.1  F (37.3  C) (Oral)   Resp 20   Wt 79.6 kg (175 lb 7.8 oz)   SpO2 98%   BMI 27.49 kg/m      General Appearance: Well-appearing, well-nourished, no acute distress  Head:  Normocephalic, without  obvious abnormality, atraumatic  Eyes:  PERRL, conjunctiva/corneas clear, EOM's intact,  ENT:  Lips, mucosa, and tongue normal, membranes are moist without pallor  Neck:  Normal ROM, symmetrical, trachea midline    Cardio:  Regular rate and rhythm, no murmur, rub or gallop, 2+ pulses symmetric in all extremities  Pulm:  Clear to auscultation bilaterally, respirations unlabored,   Abdomen:  Soft, non-tender, no rebound or guarding.  Musculoskeletal: Full ROM, no edema, no cyanosis, good ROM of major joints  Integument:  Warm, Dry, No erythema, No rash.    Neurologic:  No focal deficits appreciated.  Ambulatory. Alert and oriented x2. Moves all extremities.   Psychiatric:  Affect normal, Judgment normal, Mood normal.        LABS  Results for orders placed or performed during the hospital encounter of 03/13/24 (from the past 24 hour(s))   [place] Draw *Canceled*    Narrative    The following orders were created for panel order [place] Draw.  Procedure                               Abnormality         Status                     ---------                               -----------         ------                       Please view results for these tests on the individual orders.   CBC with platelets differential    Narrative    The following orders were created for panel order CBC with platelets differential.  Procedure                               Abnormality         Status                     ---------                               -----------         ------                     CBC with platelets and d...[[id number]]  Abnormal            Final result                 Please view results for these tests on the individual orders.   Basic metabolic panel   Result Value Ref Range    Sodium 138 135 - 145 mmol/L    Potassium 5.7 (H) 3.4 - 5.3 mmol/L    Chloride 104 98 - 107 mmol/L    Carbon Dioxide (CO2) 20 (L) 22 - 29 mmol/L    Anion Gap 14 7 - 15 mmol/L    Urea Nitrogen 57.5 (H) 8.0 - 23.0 mg/dL    Creatinine 1.42 (H) 0.67 -  1.17 mg/dL    GFR Estimate 47 (L) >60 mL/min/1.73m2    Calcium 9.4 8.2 - 9.6 mg/dL    Glucose 115 (H) 70 - 99 mg/dL   Hepatic function panel   Result Value Ref Range    Protein Total 7.3 6.4 - 8.3 g/dL    Albumin 4.1 3.5 - 5.2 g/dL    Bilirubin Total 0.6 <=1.2 mg/dL    Alkaline Phosphatase 87 40 - 150 U/L    AST 36 0 - 45 U/L    ALT 28 0 - 70 U/L    Bilirubin Direct <0.20 0.00 - 0.30 mg/dL   Magnesium   Result Value Ref Range    Magnesium 2.0 1.7 - 2.3 mg/dL   Nt probnp inpatient (BNP)   Result Value Ref Range    N terminal Pro BNP Inpatient 13,725 (H) 0 - 1,800 pg/mL   CBC with platelets and differential   Result Value Ref Range    WBC Count 8.2 4.0 - 11.0 10e3/uL    RBC Count 3.99 (L) 4.40 - 5.90 10e6/uL    Hemoglobin 12.7 (L) 13.3 - 17.7 g/dL    Hematocrit 38.0 (L) 40.0 - 53.0 %    MCV 95 78 - 100 fL    MCH 31.8 26.5 - 33.0 pg    MCHC 33.4 31.5 - 36.5 g/dL    RDW 14.7 10.0 - 15.0 %    Platelet Count 97 (L) 150 - 450 10e3/uL    % Neutrophils 90 %    % Lymphocytes 5 %    % Monocytes 4 %    % Eosinophils 1 %    % Basophils 0 %    % Immature Granulocytes 0 %    NRBCs per 100 WBC 0 <1 /100    Absolute Neutrophils 7.4 1.6 - 8.3 10e3/uL    Absolute Lymphocytes 0.4 (L) 0.8 - 5.3 10e3/uL    Absolute Monocytes 0.3 0.0 - 1.3 10e3/uL    Absolute Eosinophils 0.1 0.0 - 0.7 10e3/uL    Absolute Basophils 0.0 0.0 - 0.2 10e3/uL    Absolute Immature Granulocytes 0.0 <=0.4 10e3/uL    Absolute NRBCs 0.0 10e3/uL   UA with Microscopic reflex to Culture    Specimen: Urine, Clean Catch   Result Value Ref Range    Color Urine Yellow Colorless, Straw, Light Yellow, Yellow    Appearance Urine Clear Clear    Glucose Urine Negative Negative mg/dL    Bilirubin Urine Negative Negative    Ketones Urine Negative Negative mg/dL    Specific Gravity Urine 1.019 1.001 - 1.030    Blood Urine Negative Negative    pH Urine 5.0 5.0 - 7.0    Protein Albumin Urine Negative Negative mg/dL    Urobilinogen Urine <2.0 <2.0 mg/dL    Nitrite Urine Negative  Negative    Leukocyte Esterase Urine Negative Negative    RBC Urine 1 <=2 /HPF    WBC Urine <1 <=5 /HPF    Hyaline Casts Urine 1 <=2 /LPF    Narrative    Urine Culture not indicated  Urine Culture not indicated  Urine Culture not indicated   Troponin T, High Sensitivity   Result Value Ref Range    Troponin T, High Sensitivity 88 (H) <=22 ng/L   INR   Result Value Ref Range    INR 1.92 (H) 0.85 - 1.15   Basic metabolic panel   Result Value Ref Range    Sodium 138 135 - 145 mmol/L    Potassium 4.9 3.4 - 5.3 mmol/L    Chloride 103 98 - 107 mmol/L    Carbon Dioxide (CO2) 23 22 - 29 mmol/L    Anion Gap 12 7 - 15 mmol/L    Urea Nitrogen 56.6 (H) 8.0 - 23.0 mg/dL    Creatinine 1.53 (H) 0.67 - 1.17 mg/dL    GFR Estimate 43 (L) >60 mL/min/1.73m2    Calcium 9.8 (H) 8.2 - 9.6 mg/dL    Glucose 112 (H) 70 - 99 mg/dL   [place] Draw    Narrative    The following orders were created for panel order [place] Draw.  Procedure                               Abnormality         Status                     ---------                               -----------         ------                     Extra Red Top Tube[[id number]]                               Final result                 Please view results for these tests on the individual orders.   Extra Red Top Tube   Result Value Ref Range    Hold Specimen [facility]    Symptomatic Influenza A/B, RSV, & SARS-CoV2 PCR (COVID-19) Nasopharyngeal    Specimen: Nasopharyngeal; Swab   Result Value Ref Range    Influenza A PCR Negative Negative    Influenza B PCR Negative Negative    RSV PCR Negative Negative    SARS CoV2 PCR Negative Negative    Narrative    Testing was performed using the Xpert Xpress CoV2/Flu/RSV Assay on the [employer] GeneXpert Instrument. This test should be ordered for the detection of SARS-CoV-2, influenza, and RSV viruses in individuals who meet clinical and/or epidemiological criteria. Test performance is unknown in asymptomatic patients. This test is for in vitro diagnostic use under  the FDA EUA for laboratories certified under CLIA to perform high or moderate complexity testing. This test has not been FDA cleared or approved. A negative result does not rule out the presence of PCR inhibitors in the specimen or target RNA in concentration below the limit of detection for the assay. If only one viral target is positive but coinfection with multiple targets is suspected, the sample should be re-tested with another FDA cleared, approved, or authorized test, if coinfection would change clinical management. This test was validated by the [facility]. These laboratories are certified under the Clinical Laboratory Improvement Amendments of 1988 (CLIA-88) as qualified to perform high complexity laboratory testing.   Head CT w/o contrast    Narrative    EXAM: CT HEAD W/O CONTRAST  LOCATION: [facility]  DATE: 3/13/2024    INDICATION: Confusion and generalized weakness.  COMPARISON: CT dated 02/15/2024.  TECHNIQUE: Routine CT Head without IV contrast. Multiplanar reformats. Dose reduction techniques were used.    FINDINGS:  INTRACRANIAL CONTENTS: No acute intracranial hemorrhage, extra-axial fluid collection, or mass effect. No evidence of an acute transcortical confluent infarct. 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 Grossly similar additional ill-defined bilateral cerebral white matter hypodensities, nonspecific although most likely the sequela of chronic microvascular ischemia. Mild generalized cerebral parenchymal volume loss.   No hydrocephalus.     VISUALIZED ORBITS/SINUSES/MASTOIDS: No acute intraorbital finding. No evidence of significant paranasal sinus or mastoid mucosal disease.     BONES/SOFT TISSUES: No acute abnormality.      Impression    IMPRESSION:  1.  No acute intracranial abnormality.   Chest XR,  PA & LAT    Narrative    EXAM: XR CHEST 2  VIEWS  LOCATION: [facility]  DATE: 3/13/2024    INDICATION: Cough. Concern for pneumonia.  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 Shallow inspiration. Lungs are clear. Moderate degenerative change both shoulders similar to previous.         RADIOLOGY  Chest XR,  PA & LAT   Final Result   IMPRESSION: Pacemaker with leads over the RA and RV. Shallow inspiration. Lungs are clear. Moderate degenerative change both shoulders similar to previous.      Head CT w/o contrast   Final Result   IMPRESSION:   1.  No acute intracranial abnormality.             EKG:    Rate: 101 bpm  Rhythm: Atrial fibrillation  Axis: Normal  Interval: Normal  Conduction: Normal  QRS: Normal  ST: Normal  T-wave: Normal  QT: Not prolonged  Comparison EKG: no significant change compared to 15 Feb 2024  Impression:  No acute ischemic change   I have independently reviewed and interpreted today's EKG, pending Cardiologist read       Impression / ED Plan:  I had a face to face encounter with this patient seen by the Advanced Practice Provider ([place]). I personally made/approved the management plan and take responsibility for the patient management. I personally saw patient and performed a substantive portion of the visit including all aspects of the medical decision making.     Skip Newman is a 89 year old male presents to the ED for evaluation of increased confusion.  Patient has been asking repetitive questions.  He is also being quite weak, and unable to ambulate and is much weaker than typical.  Initially we thought that the patient had hyperkalemia, with his initial labs showing an elevated potassium, however it appeared that this may be hemolyzed specimen therefore we did repeat it which did not show any evidence of hyperkalemia.  Labs do show an elevated troponin, however not within the positive range, therefore will require trending as well as  an elevated BNP.  Patient has been admitted for CHF in the past, and I am concerned for the potential for the same today.  CT imaging did not show any evidence of acute process, and there is no obvious infectious etiology including a UA that does not show evidence of infection to explain the patient's acute confusion.  However at this time, I do not believe we can safely discharge this patient home, will admit for further management and evaluation by the hospitalist service.    1. Generalized weakness    2. Elevated brain natriuretic peptide (BNP) level        Luis Nolasco D.O.  Emergency Medicine  [facility] EMERGENCY DEPARTMENT  [address] [place] [id number]  [phone]  Dept: [phone]       Luis Nolasco,   03/13/24 8314

## 2024-03-14 NOTE — H&P
[facility]    History and Physical - Hospitalist Service       Date of Admission:  3/13/2024    Assessment & Plan      Skip Newman is a 89M presents with weakness; pmhx includes HFrEF (30%, 12/23),  atrial fibrillation (warfarin), CAD, thrombocytopenia; admitted to observation for generalised weakness, viral gastroenteritis.    #generalised weakness  #viral gastroenteritis  Suspect related to viral gastroenteritis  -LR 500ml bolus  -loperamide PO PRN  -BMP trend  -PT reevaluations    #prerenal azotemia  -BMP trend  -LR 500ml bolus    #HFrEF (30%, 12/23)  NOT in exacerbation. Most recent ECHO on 12/23 shows 30% EF.   -no home lasix  -toprol 100mg PO qD  -ACEI/ARB: losartan 50mg PO qD  -Spironolactone 25mg PO every day  -no home nitrates    #normocytic anaemia  -CBC trend    #thrombocytopenia  -CBC trend    #HLD  -atorvastatin 40mg PO every day    #atrial fibrillation  Paced.  -toprol 100mg PO every day  -warfarin per pharmacy    #HTN  -HOLD chlorthalidone 25mg PO every day  -spironolactone 25mg PO every day  -losartan 50mg PO qD    #code status  Family at bedside indicate that there are significant concerns regarding Bill taking care of spouse (with advanced dementia) at home, and that he would not want to leave her uncared for. At this time, they indicate preferences to full code.  -palliative for POLST reevaluation       Observation Goals: -diagnostic tests and consults completed and resulted, -vital signs normal or at patient baseline, Nurse to notify provider when observation goals have been met and patient is ready for discharge.  Diet: Regular Diet Adult  DVT Prophylaxis: Warfarin  Chandler Catheter: Not present  Lines: None     Cardiac Monitoring: None  Code Status: Full Code    Clinically Significant Risk Factors Present on Admission        # Hyperkalemia: Highest K = 5.7 mmol/L in last 2 days, will monitor as appropriate          # Drug Induced Coagulation Defect: home medication  list includes an anticoagulant medication  # Thrombocytopenia: Lowest platelets = 97 in last 2 days, will monitor for bleeding   # Hypertension: Noted on problem list  # Chronic heart failure with reduced ejection fraction: last echo with EF <40%         # Financial/Environmental Concerns:     # Pacemaker present       Disposition Plan      Expected Discharge Date: 03/14/2024                  Hubert Nieves MD  Hospitalist Service  [facility]  Securely message with [employer] (more info)  Text page via [employer] Paging/Directory     ______________________________________________________________________    Chief Complaint   weakness    History is obtained from the patient and patient's daughter    History of Present Illness   Skip Newman is a 89M presents with weakness; pmhx includes HFrEF (30%, 12/23),  atrial fibrillation (warfarin), CAD, thrombocytopenia; admitted to observation for generalised weakness, viral gastroenteritis.    Presents with 24 to 36 hours of generalized weakness.  He started to develop loose watery stools in the past 24 hours.  He has not had any fever/chills, abdominal pain, hematochezia/melena.        Past Medical History    Past Medical History:   Diagnosis Date    A-fib (H)     Anemia     Arrhythmia     Arthritis     Chronic kidney disease     Claudication (H24)     Coronary artery disease     Gout     Hearing loss     high frequency    History of transfusion     Hyperlipidemia     Hypertension     Impotence of organic origin     Kidney stone     recurrent    MI (myocardial infarction) (H)     Primary osteoarthritis of both knees     Sick sinus syndrome -- S/P Medtronic PPM     Skin cancer     Spinal stenosis, lumbar region, with neurogenic claudication     Syncope     Thrombocytopenia (H24)     VT (ventricular tachycardia) (H)        Past Surgical History   Past Surgical History:   Procedure Laterality Date    CARDIAC CATHETERIZATION      CORONARY STENT PLACEMENT   2001    LCx    HEMORRHOID SURGERY  1989    INSERT / REPLACE / REMOVE PACEMAKER  01/2009    Medtronic    OTHER SURGICAL HISTORY  11/2018    l3-l4 lumbar stenosis    TONSILLECTOMY      child    TOTAL HIP ARTHROPLASTY Left     [facility] TOTAL KNEE ARTHROPLASTY Left 7/8/2019    Procedure: LEFT TOTAL KNEE ARTHROPLASTY;  Surgeon: Bowen Padgett MD;  Location[facility];  Service: Orthopedics    [facility] TOTAL KNEE ARTHROPLASTY Right 1/13/2020    Procedure: RIGHT TOTAL KNEE ARTHROPLASTY;  Surgeon: Bowen Padgett MD;  Location: [facility];  Service: Orthopedics    [facility] CORONARY STENT PERCUT, INITIAL VESSEL      Description: Cath Stent Placement;  Recorded: 10/01/2012;  Comments: Mid LCx    [facility] CORONARY STENT PERCUT, INITIAL VESSEL      Description: Cath Stent Placement;  Recorded: 10/21/2014;  Comments: Mid LCx       Prior to Admission Medications   Prior to Admission Medications   Prescriptions Last Dose Informant Patient Reported? Taking?   MULTIVITAMIN (MULTIPLE VITAMIN ORAL) 3/13/2024 at pm  Yes Yes   Sig: Take 1 tablet by mouth every evening   acetaminophen (TYLENOL) 500 MG tablet Past Month at unknown  No Yes   Sig: [ACETAMINOPHEN (TYLENOL) 500 MG TABLET] Take 2 tablets (1,000 mg total) by mouth 3 (three) times a day.   Patient taking differently: Take 1,000 mg by mouth every 6 hours as needed   atorvastatin (LIPITOR) 40 MG tablet 3/13/2024 at pm  Yes Yes   Sig: Take 20 mg by mouth every evening   chlorthalidone (HYGROTEN) 25 MG tablet 3/13/2024 at pm  Yes Yes   Sig: Take 25 mg by mouth every evening   losartan (COZAAR) 100 MG tablet 3/13/2024 at pm  Yes Yes   Sig: Take 50 mg by mouth every evening   metoprolol succinate ER (TOPROL XL) 100 MG 24 hr tablet 3/13/2024 at pm  Yes Yes   Sig: Take 100 mg by mouth every evening   omega 3-dha-epa-fish oil (FISH OIL) 1,000 mg (120 mg-180 mg) cap 3/13/2024 at pm  Yes Yes   Sig: Take 1 capsule by mouth every evening   spironolactone (ALDACTONE) 25 MG tablet 3/13/2024  at pm  Yes Yes   Sig: Take 25 mg by mouth every evening   warfarin ANTICOAGULANT (COUMADIN) 5 MG tablet 3/13/2024 at pm  Yes Yes   Sig: Take 2.5 mg by mouth every evening      Facility-Administered Medications: None        Review of Systems    The 10 point Review of Systems is negative other than noted in the HPI or here.      Physical Exam   Vital Signs: Temp: 99.4  F (37.4  C) Temp src: Oral BP: (!) 134/97 Pulse: 92   Resp: 20 SpO2: 97 % O2 Device: None (Room air)    Weight: 175 lbs 7.78 oz    Constitutional: awake, alert, cooperative, no apparent distress, and appears stated age  Respiratory: CTAB  Cardiovascular: irregularly irregular, no m/g/r  GI: soft, nontender, nondistended  Musculoskeletal: shoulder/elbow flexion/extension 5/5 bilaterally and symmetric    Medical Decision Making       45 MINUTES SPENT BY ME on the date of service doing chart review, history, exam, documentation & further activities per the note.  MANAGEMENT DISCUSSED with the following over the past 24 hours: patient, daughter, son in law   NOTE(S)/MEDICAL RECORDS REVIEWED over the past 24 hours: ED notes, outpatient FM        Data     I have personally reviewed the following data over the past 24 hrs:    8.2  \   12.7 (L)   / 97 (L)     138 103 56.6 (H) /  112 (H)   4.9 23 1.53 (H) \     ALT: 28 AST: 36 AP: 87 TBILI: 0.6   ALB: 4.1 TOT PROTEIN: 7.3 LIPASE: N/A     Trop: 88 (H) BNP: 13,725 (H)     INR:  1.92 (H) PTT:  N/A   D-dimer:  N/A Fibrinogen:  N/A       Imaging results reviewed over the past 24 hrs:   Recent Results (from the past 24 hour(s))   Head CT w/o contrast    Narrative    EXAM: CT HEAD W/O CONTRAST  LOCATION: [facility]  DATE: 3/13/2024    INDICATION: Confusion and generalized weakness.  COMPARISON: CT dated 02/15/2024.  TECHNIQUE: Routine CT Head without IV contrast. Multiplanar reformats. Dose reduction techniques were used.    FINDINGS:  INTRACRANIAL CONTENTS: No acute intracranial hemorrhage,  extra-axial fluid collection, or mass effect. No evidence of an acute transcortical confluent infarct. 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 Grossly similar additional ill-defined bilateral cerebral white matter hypodensities, nonspecific although most likely the sequela of chronic microvascular ischemia. Mild generalized cerebral parenchymal volume loss.   No hydrocephalus.     VISUALIZED ORBITS/SINUSES/MASTOIDS: No acute intraorbital finding. No evidence of significant paranasal sinus or mastoid mucosal disease.     BONES/SOFT TISSUES: No acute abnormality.      Impression    IMPRESSION:  1.  No acute intracranial abnormality.   Chest XR,  PA & LAT    Narrative    EXAM: XR CHEST 2 VIEWS  LOCATION: [facility]  DATE: 3/13/2024    INDICATION: Cough. Concern for pneumonia.  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 Shallow inspiration. Lungs are clear. Moderate degenerative change both shoulders similar to previous.

## 2024-03-14 NOTE — PLAN OF CARE
Physical Therapy Discharge Summary    Reason for therapy discharge:    Discharged to home with home therapy.    Progress towards therapy goal(s). See goals on Care Plan in [facility] electronic health record for goal details.  Goals not met.  Barriers to achieving goals:   discharge on same date as initial evaluation.    Therapy recommendation(s):    Continued therapy is recommended.  Rationale/Recommendations:  Cont home PT.      Emilia Kumar, PT, DPT  3/14/2024

## 2024-03-14 NOTE — DISCHARGE INSTRUCTIONS
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

## 2024-03-15 ENCOUNTER — PATIENT OUTREACH (OUTPATIENT)
Dept: CARE COORDINATION | Facility: CLINIC | Age: 89
End: 2024-03-15
Payer: MEDICARE

## 2024-03-15 LAB
ATRIAL RATE - MUSE: 288 BPM
DIASTOLIC BLOOD PRESSURE - MUSE: NORMAL MMHG
INTERPRETATION ECG - MUSE: NORMAL
P AXIS - MUSE: NORMAL DEGREES
PR INTERVAL - MUSE: NORMAL MS
QRS DURATION - MUSE: 84 MS
QT - MUSE: 354 MS
QTC - MUSE: 459 MS
R AXIS - MUSE: -17 DEGREES
SYSTOLIC BLOOD PRESSURE - MUSE: NORMAL MMHG
T AXIS - MUSE: 89 DEGREES
VENTRICULAR RATE- MUSE: 101 BPM

## 2024-03-15 NOTE — PROGRESS NOTES
"Clinic Care Coordination Contact  [facility]: Post-Discharge Note  SITUATION                                                      Admission:    Admission Date: 03/13/24   Reason for Admission: Generalized weakness, Altered mental status  Discharge:   Discharge Date: 03/14/24  Discharge Diagnosis: Generalized weakness, Elevated brain natriuretic peptide (BNP) level    BACKGROUND                                                      Per hospital discharge summary and inpatient provider notes:    Skip Newman is a 89 year old male with pertinent medical history of CAD status post stents 2001, 2012, 2014, hypertension, myocardial infarction, CKD, A-fib on warfarin, anemia, sick sinus syndrome status post Medtronic PPM presenting to the emergency department with his son-in-law and grandson for evaluation of generalized weakness and confusion onset today.      Patient reports feeling generally weak onset today.  He typically uses a cane to ambulate at baseline, otherwise is able to ambulate spontaneously and independently.  Today, patient has been unable to do much more than weight-bear independently.  No falls, no head impact. No headache, chest pain, dyspnea, fevers, chills, urinary symptoms, nausea, vomiting, diarrhea, medic easier, melena, hematemesis.      Per chart review, the patient was admitted 2/15/2024 - 2/16/2024 after being found down at home with mild rhabdomyolysis and elevated troponin.  CT head and C-spine negative for any acute pathology, pacemaker interrogation without concerning arrhythmia.  PT/OT evaluated the patient, recommended increased services including transition to memory care.    ASSESSMENT      Discharge Assessment  How are you doing now that you are home?: \"I struggle I guess my grandson's were here last night to watch things over. It's not a lot better but it could be worse I guess. Little chores are an effort for today.\"  How are your symptoms? (Red Flag symptoms escalate to " triage hotline per guidelines): Improved;Unchanged  Do you feel your condition is stable enough to be safe at home until your provider visit?: Yes  Does the patient have their discharge instructions? : Yes  Does the patient have questions regarding their discharge instructions? : No  Were you started on any new medications or were there changes to any of your previous medications? : No  Does the patient have all of their medications?: Yes  Do you have questions regarding any of your medications? : No  Do you have all of your needed medical supplies or equipment (DME)?  (i.e. oxygen tank, CPAP, cane, etc.): Yes  Discharge follow-up appointment scheduled within 14 calendar days? : Yes  Discharge Follow Up Appointment Date: 03/22/24  Discharge Follow Up Appointment Scheduled with?: Primary Care Provider    Post-op ([facility] CTA Only)  If the patient had a surgery or procedure, do they have any questions for a nurse?: No    PLAN                                                      Outpatient Plan:      Follow-up and recommended labs and tests  Follow up with primary care provider, Jacquelin Francis, within 7 days for hospital follow- up. The following labs/tests are recommended: BMP.    Additional Services:   Home Care Referral  Your provider has ordered home health services. If you have not been contacted within 2 days of your discharge please call the selected Home Care agency listed on your Discharge document. If a Home Care agency is NOT listed, please call  [phone].  Reason for Referral: Physical Therapy  Physical Therapy Eval and Treat for: Home Safety Assessment  Gait Training  Range of Motion  Is the patient homebound?: Yes  Homebound Status (describe the functional limitations that support this patient is confined to his/her home. Medicaid recipients are not required to be homebound.): Requires assistance of another person or specialized equipment is needed  I attest that I saw or will see the patient on this  date: 3/14/2024  Provider to follow patient: ALOK SHERIDAN    Future Appointments   Date Time Provider Department Center   4/30/2024 12:00 AM [facility] REMOTE DEVICE CHECK FROM HOME [facility]         For any urgent concerns, please contact our 24 hour nurse triage line: [phone] ([phone])         [facility] [place]  [phone]  Connected Care Resource [facility]                Alert-The patient is alert, awake and responds to voice. The patient is oriented to time, place, and person. The triage nurse is able to obtain subjective information.

## 2024-04-25 ENCOUNTER — HOSPITAL ENCOUNTER (OUTPATIENT)
Dept: CARDIOLOGY | Facility: HOSPITAL | Age: 89
Discharge: HOME | End: 2024-04-25
Attending: INTERNAL MEDICINE | Admitting: INTERNAL MEDICINE
Payer: MEDICARE

## 2024-04-25 DIAGNOSIS — I49.5 SICK SINUS SYNDROME (H): ICD-10-CM

## 2024-04-25 DIAGNOSIS — I48.0 PAROXYSMAL ATRIAL FIBRILLATION (H): ICD-10-CM

## 2024-04-25 DIAGNOSIS — I47.29 NONSUSTAINED VENTRICULAR TACHYCARDIA (H): ICD-10-CM

## 2024-04-25 DIAGNOSIS — R94.30 EJECTION FRACTION < 50%: ICD-10-CM

## 2024-04-25 DIAGNOSIS — I25.10 ATHEROSCLEROSIS OF NATIVE CORONARY ARTERY OF NATIVE HEART WITHOUT ANGINA PECTORIS: ICD-10-CM

## 2024-04-25 DIAGNOSIS — Z95.0 PACEMAKER: ICD-10-CM

## 2024-04-25 LAB — LVEF ECHO: NORMAL

## 2024-04-25 PROCEDURE — 93306 TTE W/DOPPLER COMPLETE: CPT | Mod: 26 | Performed by: INTERNAL MEDICINE

## 2024-04-25 PROCEDURE — 255N000002 HC RX 255 OP 636: Performed by: INTERNAL MEDICINE

## 2024-04-25 PROCEDURE — C8929 TTE W OR WO FOL WCON,DOPPLER: HCPCS

## 2024-04-25 RX ADMIN — PERFLUTREN 2 ML: 6.52 INJECTION, SUSPENSION INTRAVENOUS at 14:18

## 2024-04-29 DIAGNOSIS — Z95.5 HISTORY OF CORONARY ARTERY STENT PLACEMENT: ICD-10-CM

## 2024-04-29 DIAGNOSIS — R94.30 EJECTION FRACTION < 50%: Primary | ICD-10-CM

## 2024-04-29 DIAGNOSIS — I47.29 NONSUSTAINED VENTRICULAR TACHYCARDIA (H): ICD-10-CM

## 2024-04-30 ENCOUNTER — ANCILLARY PROCEDURE (OUTPATIENT)
Dept: CARDIOLOGY | Facility: CLINIC | Age: 89
End: 2024-04-30
Attending: INTERNAL MEDICINE
Payer: MEDICARE

## 2024-04-30 DIAGNOSIS — I49.5 SICK SINUS SYNDROME (H): ICD-10-CM

## 2024-04-30 DIAGNOSIS — Z95.0 PACEMAKER: ICD-10-CM

## 2024-04-30 LAB
MDC_IDC_LEAD_CONNECTION_STATUS: NORMAL
MDC_IDC_LEAD_CONNECTION_STATUS: NORMAL
MDC_IDC_LEAD_IMPLANT_DT: NORMAL
MDC_IDC_LEAD_IMPLANT_DT: NORMAL
MDC_IDC_LEAD_LOCATION: NORMAL
MDC_IDC_LEAD_LOCATION: NORMAL
MDC_IDC_LEAD_LOCATION_DETAIL_1: NORMAL
MDC_IDC_LEAD_LOCATION_DETAIL_1: NORMAL
MDC_IDC_LEAD_MFG: NORMAL
MDC_IDC_LEAD_MFG: NORMAL
MDC_IDC_LEAD_MODEL: NORMAL
MDC_IDC_LEAD_MODEL: NORMAL
MDC_IDC_LEAD_POLARITY_TYPE: NORMAL
MDC_IDC_LEAD_POLARITY_TYPE: NORMAL
MDC_IDC_LEAD_SERIAL: NORMAL
MDC_IDC_LEAD_SERIAL: NORMAL
MDC_IDC_MSMT_BATTERY_DTM: NORMAL
MDC_IDC_MSMT_BATTERY_IMPEDANCE: 1389 OHM
MDC_IDC_MSMT_BATTERY_REMAINING_LONGEVITY: 54 MO
MDC_IDC_MSMT_BATTERY_STATUS: NORMAL
MDC_IDC_MSMT_BATTERY_VOLTAGE: 2.77 V
MDC_IDC_MSMT_LEADCHNL_RA_IMPEDANCE_VALUE: 406 OHM
MDC_IDC_MSMT_LEADCHNL_RA_PACING_THRESHOLD_AMPLITUDE: 0.5 V
MDC_IDC_MSMT_LEADCHNL_RA_PACING_THRESHOLD_PULSEWIDTH: 0.4 MS
MDC_IDC_MSMT_LEADCHNL_RV_IMPEDANCE_VALUE: 465 OHM
MDC_IDC_MSMT_LEADCHNL_RV_PACING_THRESHOLD_AMPLITUDE: 1.12 V
MDC_IDC_MSMT_LEADCHNL_RV_PACING_THRESHOLD_PULSEWIDTH: 0.4 MS
MDC_IDC_PG_IMPLANT_DTM: NORMAL
MDC_IDC_PG_MFG: NORMAL
MDC_IDC_PG_MODEL: NORMAL
MDC_IDC_PG_SERIAL: NORMAL
MDC_IDC_PG_TYPE: NORMAL
MDC_IDC_SESS_CLINIC_NAME: NORMAL
MDC_IDC_SESS_DTM: NORMAL
MDC_IDC_SESS_TYPE: NORMAL
MDC_IDC_SET_BRADY_AT_MODE_SWITCH_MODE: NORMAL
MDC_IDC_SET_BRADY_AT_MODE_SWITCH_RATE: 175 {BEATS}/MIN
MDC_IDC_SET_BRADY_LOWRATE: 60 {BEATS}/MIN
MDC_IDC_SET_BRADY_MAX_SENSOR_RATE: 120 {BEATS}/MIN
MDC_IDC_SET_BRADY_MAX_TRACKING_RATE: 120 {BEATS}/MIN
MDC_IDC_SET_BRADY_MODE: NORMAL
MDC_IDC_SET_BRADY_PAV_DELAY_LOW: 150 MS
MDC_IDC_SET_BRADY_SAV_DELAY_LOW: 120 MS
MDC_IDC_SET_LEADCHNL_RA_PACING_AMPLITUDE: 1.5 V
MDC_IDC_SET_LEADCHNL_RA_PACING_CAPTURE_MODE: NORMAL
MDC_IDC_SET_LEADCHNL_RA_PACING_POLARITY: NORMAL
MDC_IDC_SET_LEADCHNL_RA_PACING_PULSEWIDTH: 0.4 MS
MDC_IDC_SET_LEADCHNL_RA_SENSING_POLARITY: NORMAL
MDC_IDC_SET_LEADCHNL_RA_SENSING_SENSITIVITY: 0.18 MV
MDC_IDC_SET_LEADCHNL_RV_PACING_AMPLITUDE: 1.62
MDC_IDC_SET_LEADCHNL_RV_PACING_CAPTURE_MODE: NORMAL
MDC_IDC_SET_LEADCHNL_RV_PACING_POLARITY: NORMAL
MDC_IDC_SET_LEADCHNL_RV_PACING_PULSEWIDTH: 0.4 MS
MDC_IDC_SET_LEADCHNL_RV_SENSING_POLARITY: NORMAL
MDC_IDC_SET_LEADCHNL_RV_SENSING_SENSITIVITY: 5.6 MV
MDC_IDC_SET_ZONE_DETECTION_INTERVAL: 342.86 MS
MDC_IDC_SET_ZONE_DETECTION_INTERVAL: 375 MS
MDC_IDC_SET_ZONE_STATUS: NORMAL
MDC_IDC_SET_ZONE_STATUS: NORMAL
MDC_IDC_SET_ZONE_TYPE: NORMAL
MDC_IDC_SET_ZONE_TYPE: NORMAL
MDC_IDC_SET_ZONE_VENDOR_TYPE: NORMAL
MDC_IDC_SET_ZONE_VENDOR_TYPE: NORMAL
MDC_IDC_STAT_AT_BURDEN_PERCENT: 100 %
MDC_IDC_STAT_AT_DTM_END: NORMAL
MDC_IDC_STAT_AT_DTM_START: NORMAL
MDC_IDC_STAT_AT_MODE_SW_COUNT: 3
MDC_IDC_STAT_BRADY_RA_PERCENT_PACED: 0.1 %
MDC_IDC_STAT_BRADY_RV_PERCENT_PACED: 0.1 %
MDC_IDC_STAT_EPISODE_RECENT_COUNT: 0
MDC_IDC_STAT_EPISODE_RECENT_COUNT: 3
MDC_IDC_STAT_EPISODE_RECENT_COUNT_DTM_END: NORMAL
MDC_IDC_STAT_EPISODE_RECENT_COUNT_DTM_END: NORMAL
MDC_IDC_STAT_EPISODE_RECENT_COUNT_DTM_START: NORMAL
MDC_IDC_STAT_EPISODE_RECENT_COUNT_DTM_START: NORMAL
MDC_IDC_STAT_EPISODE_TYPE: NORMAL
MDC_IDC_STAT_EPISODE_TYPE: NORMAL

## 2024-04-30 PROCEDURE — 93296 REM INTERROG EVL PM/IDS: CPT | Performed by: INTERNAL MEDICINE

## 2024-04-30 PROCEDURE — 93294 REM INTERROG EVL PM/LDLS PM: CPT | Performed by: INTERNAL MEDICINE

## 2024-06-13 ENCOUNTER — HOSPITAL ENCOUNTER (OUTPATIENT)
Dept: CARDIOLOGY | Facility: HOSPITAL | Age: 89
Discharge: HOME | End: 2024-06-13
Attending: INTERNAL MEDICINE
Payer: MEDICARE

## 2024-06-13 ENCOUNTER — HOSPITAL ENCOUNTER (OUTPATIENT)
Dept: NUCLEAR MEDICINE | Facility: HOSPITAL | Age: 89
Discharge: HOME | End: 2024-06-13
Attending: INTERNAL MEDICINE
Payer: MEDICARE

## 2024-06-13 DIAGNOSIS — I47.29 NONSUSTAINED VENTRICULAR TACHYCARDIA (H): ICD-10-CM

## 2024-06-13 DIAGNOSIS — Z95.5 HISTORY OF CORONARY ARTERY STENT PLACEMENT: ICD-10-CM

## 2024-06-13 DIAGNOSIS — R94.30 EJECTION FRACTION < 50%: ICD-10-CM

## 2024-06-13 LAB
CV STRESS CURRENT BP HE: NORMAL
CV STRESS CURRENT HR HE: 102
CV STRESS CURRENT HR HE: 107
CV STRESS CURRENT HR HE: 107
CV STRESS CURRENT HR HE: 115
CV STRESS CURRENT HR HE: 115
CV STRESS CURRENT HR HE: 116
CV STRESS CURRENT HR HE: 121
CV STRESS CURRENT HR HE: 122
CV STRESS CURRENT HR HE: 123
CV STRESS CURRENT HR HE: 98
CV STRESS DEVIATION TIME HE: NORMAL
CV STRESS ECHO PERCENT HR HE: NORMAL
CV STRESS EXERCISE STAGE HE: NORMAL
CV STRESS FINAL RESTING BP HE: NORMAL
CV STRESS FINAL RESTING HR HE: 107
CV STRESS MAX HR HE: 123
CV STRESS MAX TREADMILL GRADE HE: 0
CV STRESS MAX TREADMILL SPEED HE: 0
CV STRESS PEAK DIA BP HE: NORMAL
CV STRESS PEAK SYS BP HE: NORMAL
CV STRESS PHASE HE: NORMAL
CV STRESS PROTOCOL HE: NORMAL
CV STRESS RESTING PT POSITION HE: NORMAL
CV STRESS ST DEVIATION AMOUNT HE: NORMAL
CV STRESS ST DEVIATION ELEVATION HE: NORMAL
CV STRESS ST EVELATION AMOUNT HE: NORMAL
CV STRESS TEST TYPE HE: NORMAL
CV STRESS TOTAL STAGE TIME MIN 1 HE: NORMAL
NUC STRESS EJECTION FRACTION: 38 %
RATE PRESSURE PRODUCT: NORMAL
STRESS ECHO BASELINE DIASTOLIC HE: 80
STRESS ECHO BASELINE HR: 96
STRESS ECHO BASELINE SYSTOLIC BP: 150
STRESS ECHO CALCULATED PERCENT HR: 95 %
STRESS ECHO LAST STRESS DIASTOLIC BP: 84
STRESS ECHO LAST STRESS HR: 122
STRESS ECHO LAST STRESS SYSTOLIC BP: 140
STRESS ECHO TARGET HR: 130

## 2024-06-13 PROCEDURE — 343N000001 HC RX 343: Performed by: INTERNAL MEDICINE

## 2024-06-13 PROCEDURE — G1010 CDSM STANSON: HCPCS | Performed by: GENERAL ACUTE CARE HOSPITAL

## 2024-06-13 PROCEDURE — 78452 HT MUSCLE IMAGE SPECT MULT: CPT | Mod: ME

## 2024-06-13 PROCEDURE — A9500 TC99M SESTAMIBI: HCPCS | Performed by: INTERNAL MEDICINE

## 2024-06-13 PROCEDURE — 250N000011 HC RX IP 250 OP 636: Mod: JZ | Performed by: INTERNAL MEDICINE

## 2024-06-13 PROCEDURE — 93016 CV STRESS TEST SUPVJ ONLY: CPT | Performed by: STUDENT IN AN ORGANIZED HEALTH CARE EDUCATION/TRAINING PROGRAM

## 2024-06-13 PROCEDURE — 93018 CV STRESS TEST I&R ONLY: CPT | Performed by: GENERAL ACUTE CARE HOSPITAL

## 2024-06-13 PROCEDURE — 78452 HT MUSCLE IMAGE SPECT MULT: CPT | Mod: 26 | Performed by: GENERAL ACUTE CARE HOSPITAL

## 2024-06-13 PROCEDURE — 93017 CV STRESS TEST TRACING ONLY: CPT

## 2024-06-13 RX ORDER — AMINOPHYLLINE 25 MG/ML
50 INJECTION, SOLUTION INTRAVENOUS
Status: DISCONTINUED | OUTPATIENT
Start: 2024-06-13 | End: 2024-06-13 | Stop reason: HOSPADM

## 2024-06-13 RX ORDER — REGADENOSON 0.08 MG/ML
0.4 INJECTION, SOLUTION INTRAVENOUS ONCE
Status: COMPLETED | OUTPATIENT
Start: 2024-06-13 | End: 2024-06-13

## 2024-06-13 RX ADMIN — Medication 7.8 MILLICURIE: at 09:25

## 2024-06-13 RX ADMIN — Medication 31.6 MILLICURIE: at 12:13

## 2024-06-13 RX ADMIN — REGADENOSON 0.4 MG: 0.08 INJECTION, SOLUTION INTRAVENOUS at 10:55

## 2024-07-23 ENCOUNTER — OFFICE VISIT (OUTPATIENT)
Dept: CARDIOLOGY | Facility: CLINIC | Age: 89
End: 2024-07-23
Attending: INTERNAL MEDICINE
Payer: MEDICARE

## 2024-07-23 VITALS
WEIGHT: 168 LBS | BODY MASS INDEX: 26.37 KG/M2 | HEART RATE: 142 BPM | SYSTOLIC BLOOD PRESSURE: 118 MMHG | DIASTOLIC BLOOD PRESSURE: 80 MMHG | HEIGHT: 67 IN | RESPIRATION RATE: 17 BRPM

## 2024-07-23 DIAGNOSIS — I50.22 CHRONIC SYSTOLIC CONGESTIVE HEART FAILURE (H): ICD-10-CM

## 2024-07-23 DIAGNOSIS — Z95.0 PACEMAKER: ICD-10-CM

## 2024-07-23 DIAGNOSIS — I48.0 PAROXYSMAL ATRIAL FIBRILLATION (H): ICD-10-CM

## 2024-07-23 DIAGNOSIS — I49.5 SICK SINUS SYNDROME (H): ICD-10-CM

## 2024-07-23 DIAGNOSIS — I25.10 ATHEROSCLEROSIS OF NATIVE CORONARY ARTERY OF NATIVE HEART WITHOUT ANGINA PECTORIS: ICD-10-CM

## 2024-07-23 DIAGNOSIS — I47.29 NONSUSTAINED VENTRICULAR TACHYCARDIA (H): ICD-10-CM

## 2024-07-23 DIAGNOSIS — R94.30 EJECTION FRACTION < 50%: ICD-10-CM

## 2024-07-23 DIAGNOSIS — Z95.5 HISTORY OF CORONARY ARTERY STENT PLACEMENT: ICD-10-CM

## 2024-07-23 DIAGNOSIS — I48.91 ATRIAL FIBRILLATION, UNSPECIFIED TYPE (H): Primary | ICD-10-CM

## 2024-07-23 DIAGNOSIS — Z95.0 CARDIAC PACEMAKER IN SITU: Chronic | ICD-10-CM

## 2024-07-23 LAB
ATRIAL RATE - MUSE: 127 BPM
DIASTOLIC BLOOD PRESSURE - MUSE: NORMAL MMHG
INTERPRETATION ECG - MUSE: NORMAL
P AXIS - MUSE: 86 DEGREES
PR INTERVAL - MUSE: 96 MS
QRS DURATION - MUSE: 4 MS
QT - MUSE: 106 MS
QTC - MUSE: 186 MS
R AXIS - MUSE: 0 DEGREES
SYSTOLIC BLOOD PRESSURE - MUSE: NORMAL MMHG
T AXIS - MUSE: -77 DEGREES
VENTRICULAR RATE- MUSE: 186 BPM

## 2024-07-23 PROCEDURE — 99214 OFFICE O/P EST MOD 30 MIN: CPT | Performed by: INTERNAL MEDICINE

## 2024-07-23 PROCEDURE — 93000 ELECTROCARDIOGRAM COMPLETE: CPT | Performed by: GENERAL ACUTE CARE HOSPITAL

## 2024-07-23 RX ORDER — METOPROLOL SUCCINATE 100 MG/1
150 TABLET, EXTENDED RELEASE ORAL EVERY EVENING
Qty: 135 TABLET | Refills: 3 | Status: SHIPPED | OUTPATIENT
Start: 2024-07-23

## 2024-07-23 NOTE — PATIENT INSTRUCTIONS
Increase metoprolol to 150 mg daily.  It sounds like you need more help at home.  Try to get some walking in every day in a safe fashion.  Turn in 6 months

## 2024-07-23 NOTE — PROGRESS NOTES
Cardiology Clinic Office Note    Assessment / Plan:    1.  Coronary artery disease  2.  Persistent, likely permanent atrial fibrillation/flutter with largely controlled ventricular response based on device check 4/2024.  Rapid today, and for the last 3 months without significant symptoms.  Will increase metoprolol to 150 mg daily.  Device check due in 1 month.  3.  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. 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  He has a history of bursts of nonsustained ventricular tachycardia since 2015, status post pacemaker placement for sick sinus syndrome in 2009.  Echocardiogram 2020 showed an ejection fraction of 43% with inferior hypokinesis.  He developed atrial fibrillation with rapid ventricular response 11/2023, this was associated with a decrease in his ejection fraction in the 35% range.  He was hospitalized in March with weakness, no heart failure was suspected at that time, no tachycardia was noted.  BNP was 13,000.  Device check in April suggested 95% of the time his heart rate is less than 120. MPI in June showed an EF of 38% with fixed and reversible perfusion defects in the inferolateral wall.  He returns today for routine follow-up, accompanied by daughter and his wife.    He admits to fatigue helping to care for his wife in an assisted living setting, this does not allow him to sleep well.  He has not experienced any chest pain and has no awareness of his heart racing.  He denies shortness of breath.  His gait is unstable, using  his cane but he has had no falls.        ______________________________________________________________________    Problem List:  Patient Active Problem List   Diagnosis    Hyperlipidemia    Hypertension    CAD -- S/P Stents 2001, 2012, 2014    Nonsustained ventricular tachycardia (H)    SSS -- S/P dual chamber Pacemaker    Primary osteoarthritis of left knee    Gout    Recurrent kidney stones    Status post total left knee replacement    Primary osteoarthritis of right knee    Thrombocytopenia (H24)    Spinal stenosis, lumbar region, with neurogenic claudication    Pacemaker    MI (myocardial infarction) (H)    Hypertension    Coronary artery disease    Chronic kidney disease    Paroxysmal atrial fib -- on Warfarin    CKD (chronic kidney disease) stage 3    Elevated troponin    Subtherapeutic international normalized ratio (INR)    Fall-- found down, > 12 hrs    Non-traumatic rhabdomyolysis    Atrial fibrillation, unspecified type (H)    History of stroke    Chronic systolic CHF -- EF 30-35% on 12/21/23    Aortic regurgitation -- 2+ on Echo 12/21/23    Moderate Pulm HTN -- Echo 12/21/23 PAP 36 + RAP    Chronic Left rotator cuff tear    Generalized weakness    Elevated brain natriuretic peptide (BNP) level     Medical History:  Past Medical History:   Diagnosis Date    A-fib (H)     Anemia     Arrhythmia     Arthritis     Chronic kidney disease     Claudication (H24)     Coronary artery disease     Gout     Hearing loss     high frequency    History of transfusion     Hyperlipidemia     Hypertension     Impotence of organic origin     Kidney stone     recurrent    MI (myocardial infarction) (H)     Primary osteoarthritis of both knees     Sick sinus syndrome -- S/P [employer] PPM     Skin cancer     Spinal stenosis, lumbar region, with neurogenic claudication     Syncope     Thrombocytopenia (H24)     VT (ventricular tachycardia) (H)      Surgical History:  Past Surgical History:   Procedure Laterality Date     CARDIAC CATHETERIZATION      CORONARY STENT PLACEMENT  2001    LCx    HEMORRHOID SURGERY  1989    INSERT / REPLACE / REMOVE PACEMAKER  01/2009    Medtronic    OTHER SURGICAL HISTORY  11/2018    l3-l4 lumbar stenosis    TONSILLECTOMY      child    TOTAL HIP ARTHROPLASTY Left     [facility] TOTAL KNEE ARTHROPLASTY Left 7/8/2019    Procedure: LEFT TOTAL KNEE ARTHROPLASTY;  Surgeon: Bowen Padgett MD;  Location: [facility];  Service: Orthopedics    [facility] TOTAL KNEE ARTHROPLASTY Right 1/13/2020    Procedure: RIGHT TOTAL KNEE ARTHROPLASTY;  Surgeon: Bowen Padgett MD;  Location: [facility];  Service: Orthopedics    [facility] CORONARY STENT PERCUT, INITIAL VESSEL      Description: Cath Stent Placement;  Recorded: 10/01/2012;  Comments: Mid LCx    [facility] CORONARY STENT PERCUT, INITIAL VESSEL      Description: Cath Stent Placement;  Recorded: 10/21/2014;  Comments: Mid LCx     Social History:  Social History     Socioeconomic History    Marital status:      Spouse name: Not on file    Number of children: Not on file    Years of education: Not on file    Highest education level: Not on file   Occupational History    Not on file   Tobacco Use    Smoking status: Never    Smokeless tobacco: Never   Substance and Sexual Activity    Alcohol use: No    Drug use: Not Currently    Sexual activity: Not on file   Other Topics Concern    Not on file   Social History Narrative    Not on file     Social Determinants of Health     Financial Resource Strain: Low Risk  (8/18/2023)    [facility] [facility]    Financial Resource Strain     Difficulty of Paying Living Expenses: 3     Difficulty of Paying Living Expenses: Not on file   Food Insecurity: Food Insecurity Present (8/18/2023)    Received from [employer] [facility], [employer] [facility] [employer] [facility]    Food Insecurity     Worried About Running Out of Food in  the Last Year: 2   Transportation Needs: No Transportation Needs (8/18/2023)    [facility] [employer] [facility], [facility]    Transportation Needs     Lack of Transportation (Medical): 1   Physical Activity: Not on file   Stress: Not on file   Social Connections: Socially Isolated (8/18/2023)    [facility]    Social Connections     Frequency of Communication with Friends and Family: 4   Interpersonal Safety: Not on file   Housing Stability: High Risk (8/18/2023)    [facility], [facility]    Housing Stability     Unable to Pay for Housing in the Last Year: 3     Sleep History:  Sleeps in a recliner to be near his wife who gets up and wanders.  Exercise History:  Caring for his wife.  Walks with a cane    Review of Systems:   12 point review of systems otherwise negative       Please refer above for cardiac ROS details.         Family History:  Family History   Problem Relation Age of Onset    Heart Failure Mother     Heart Failure Father          Allergies:  Allergies   Allergen Reactions    Lisinopril Cough    Indomethacin Rash    Naproxen Rash     Medications:  Current Outpatient Medications   Medication Sig Dispense Refill    acetaminophen (TYLENOL) 500 MG tablet [ACETAMINOPHEN (TYLENOL) 500 MG TABLET] Take 2 tablets (1,000 mg total) by mouth 3 (three) times a day. (Patient taking differently: Take 1,000 mg by mouth every 6 hours as needed)  0    atorvastatin (LIPITOR) 40 MG tablet Take 20 mg by mouth every evening      chlorthalidone (HYGROTEN) 25 MG tablet Take 25 mg by mouth every evening      losartan (COZAAR) 100 MG tablet Take 50 mg by mouth every evening      metoprolol succinate ER (TOPROL XL) 100 MG 24 hr tablet Take 100 mg by mouth every evening      MULTIVITAMIN  "(MULTIPLE VITAMIN ORAL) Take 1 tablet by mouth every evening      omega 3-dha-epa-fish oil (FISH OIL) 1,000 mg (120 mg-180 mg) cap Take 1 capsule by mouth every evening      spironolactone (ALDACTONE) 25 MG tablet Take 25 mg by mouth every evening      warfarin ANTICOAGULANT (COUMADIN) 5 MG tablet Take 2.5 mg by mouth every evening         Objective:   Wt Readings from Last 3 Encounters:   07/23/24 76.2 kg (168 lb)   03/13/24 79.6 kg (175 lb 7.8 oz)   01/30/24 76.2 kg (168 lb)     Vital signs:  /80 (BP Location: Left arm, Patient Position: Sitting, Cuff Size: Adult Regular)   Pulse (!) 142   Resp 17   Ht 1.702 m (5' 7\")   Wt 76.2 kg (168 lb)   BMI 26.31 kg/m        Physical Exam:    General Appearance : Awake, Alert, No acute distress.  Frail, difficulty climbing up onto exam table.  HEENT: No Scleral icterus; the mucous membranes were pink and moist.  Conjunctivae not injected.  Hard of hearing  Neck:  No cervical bruits, jugular venous distention, or thyromegaly   Chest: The spine was straight. Chest wall vision well-healed  Lungs: Respirations unlabored; the lungs reveal few bibasilar crackles.  No wheezing   Cardiovascular:   Normal point of maximal impulse.  Auscultation reveals rapid, regular first and second heart sounds with no murmurs, rubs, or gallops.  Carotid, radial, and dorsalis pedal pulses are intact and symmetric.    Abdomen: No organomegaly, masses, bruits, or tenderness. Bowels sounds are present  Extremities:  No clubbing, cyanosis.  Trace pretibial edema  Skin: No rash, bruising  Musculoskeletal: No tenderness.  Neurologic: Alert and oriented ×3. Speech is fluent.  Repeats himself often     Lab Results    Chemistry/lipid CBC Cardiac Enzymes/BNP/TSH/INR   No results for input(s): \"CHOL\", \"HDL\", \"LDL\", \"TRIG\", \"CHOLHDLRATIO\" in the last 14935 hours.  No results for input(s): \"LDL\" in the last 27647 hours.  Recent Labs   Lab Test 03/14/24  0743      POTASSIUM 4.2   CHLORIDE 105 " "  CO2 22   GLC 97   BUN 53.7*   CR 1.36*   GFRESTIMATED 49*   JUAN 9.0     Recent Labs   Lab Test 03/14/24  0743 03/13/24 2053 03/13/24 1903   CR 1.36* 1.53* 1.42*     No results for input(s): \"A1C\" in the last 70176 hours.       Recent Labs   Lab Test 03/14/24  0743   WBC 5.0   HGB 11.2*   HCT 35.3*   MCV 98   PLT 86*     Recent Labs   Lab Test 03/14/24  0743 03/13/24 1903 02/16/24  0730   HGB 11.2* 12.7* 12.1*    No results for input(s): \"TROPONINI\" in the last 45816 hours.  Recent Labs   Lab Test 03/13/24 1903   NTBNPI 13,725*     No results for input(s): \"TSH\" in the last 65456 hours.  Recent Labs   Lab Test 03/14/24 0743 03/13/24 2053 02/16/24  0730   INR 1.86* 1.92* 1.49*        ECG today: Atrial fibrillation at 108 bpm inferior infarct.      Pharmacologic MPI 6/2024:    The regadenoson nuclear stress test is abnormal. There is a medium-sized area of nontransmural infarction in the inferior and inferolateral segment(s) of the left ventricle associated with a medium-sized area of mild travis-infarct ischemia. This appears to be in the left circumflex and/or right coronary artery distribution.    The left ventricular ejection fraction at stress is reduced at 38% with inferior and inferolateral wall hypokinesis.    The patient is at an intermediate risk of future cardiac ischemic events.    A prior study was conducted on 6/11/2021. Compared to the prior study, similar perfusion abnormalities are seen but there has been a decline in left ventricular systolic function.    Echocardiogram 12/2023:  The visual ejection fraction is 30-35%.  There is moderate global hypokinesia of the left ventricle.  There is inferior wall akinesis.  The right ventricle is normal in size and function.  There is moderate (2+) aortic regurgitation.  The right ventricular systolic pressure is approximated at 36mmHg plus the  right atrial pressure.          ANNA VELASQUEZ MD [facility]  [phone]    This note created using Dragon voice " recognition software.  Sound alike errors may have escaped editing.

## 2024-07-23 NOTE — LETTER
7/23/2024    Jacquelin Francis MD  [address]  [facility] [address]    RE: Skip Newman       Dear Colleague,     I had the pleasure of seeing Skip Newman in the [facility].    Cardiology Clinic Office Note    Assessment / Plan:    1.  Coronary artery disease  2.  Persistent, likely permanent atrial fibrillation/flutter with largely controlled ventricular response based on device check 4/2024.  Rapid today, and for the last 3 months without significant symptoms.  Will increase metoprolol to 150 mg daily.  Device check due in 1 month.  3.  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  He has a history of bursts of nonsustained ventricular tachycardia since 2015, status post pacemaker placement for sick sinus syndrome in 2009.  Echocardiogram 2020 showed an ejection fraction of 43% with inferior hypokinesis.  He developed atrial fibrillation with rapid ventricular response 11/2023, this was associated with a decrease in his ejection fraction in the 35% range.  He was hospitalized in March with weakness, no heart failure was suspected at that time, no tachycardia was noted.  BNP was 13,000.  Device check in April suggested 95% of the time his heart rate is less than 120. MPI in June showed an EF of 38% with fixed and reversible perfusion defects in the inferolateral wall.  He returns today for routine follow-up, accompanied by daughter and his wife.    He admits to fatigue helping to care for his wife in  an assisted living setting, this does not allow him to sleep well.  He has not experienced any chest pain and has no awareness of his heart racing.  He denies shortness of breath.  His gait is unstable, using his cane but he has had no falls.        ______________________________________________________________________    Problem List:  Patient Active Problem List   Diagnosis    Hyperlipidemia    Hypertension    CAD -- S/P Stents 2001, 2012, 2014    Nonsustained ventricular tachycardia (H)    SSS -- S/P dual chamber Pacemaker    Primary osteoarthritis of left knee    Gout    Recurrent kidney stones    Status post total left knee replacement    Primary osteoarthritis of right knee    Thrombocytopenia (H24)    Spinal stenosis, lumbar region, with neurogenic claudication    Pacemaker    MI (myocardial infarction) (H)    Hypertension    Coronary artery disease    Chronic kidney disease    Paroxysmal atrial fib -- on Warfarin    CKD (chronic kidney disease) stage 3    Elevated troponin    Subtherapeutic international normalized ratio (INR)    Fall-- found down, > 12 hrs    Non-traumatic rhabdomyolysis    Atrial fibrillation, unspecified type (H)    History of stroke    Chronic systolic CHF -- EF 30-35% on 12/21/23    Aortic regurgitation -- 2+ on Echo 12/21/23    Moderate Pulm HTN -- Echo 12/21/23 PAP 36 + RAP    Chronic Left rotator cuff tear    Generalized weakness    Elevated brain natriuretic peptide (BNP) level     Medical History:  Past Medical History:   Diagnosis Date    A-fib (H)     Anemia     Arrhythmia     Arthritis     Chronic kidney disease     Claudication (H24)     Coronary artery disease     Gout     Hearing loss     high frequency    History of transfusion     Hyperlipidemia     Hypertension     Impotence of organic origin     Kidney stone     recurrent    MI (myocardial infarction) (H)     Primary osteoarthritis of both knees     Sick sinus syndrome -- S/P [employer]tr[employer] PPM     Skin cancer     Spinal  stenosis, lumbar region, with neurogenic claudication     Syncope     Thrombocytopenia (H24)     VT (ventricular tachycardia) (H)      Surgical History:  Past Surgical History:   Procedure Laterality Date    CARDIAC CATHETERIZATION      CORONARY STENT PLACEMENT  2001    LCx    HEMORRHOID SURGERY  1989    INSERT / REPLACE / REMOVE PACEMAKER  01/2009    [employer]tr[employer]    OTHER SURGICAL HISTORY  11/2018    l3-l4 lumbar stenosis    TONSILLECTOMY      child    TOTAL HIP ARTHROPLASTY Left     ZZC TOTAL KNEE ARTHROPLASTY Left 7/8/2019    Procedure: LEFT TOTAL KNEE ARTHROPLASTY;  Surgeon: Bowen Padgett MD;  Location: [facility];  Service: Orthopedics    [facility] TOTAL KNEE ARTHROPLASTY Right 1/13/2020    Procedure: RIGHT TOTAL KNEE ARTHROPLASTY;  Surgeon: Bowen Padgett MD;  Location: [facility] OR;  Service: Orthopedics    [facility] CORONARY STENT PERCUT, INITIAL VESSEL      Description: Cath Stent Placement;  Recorded: 10/01/2012;  Comments: Mid LCx    [facility] CORONARY STENT PERCUT, INITIAL VESSEL      Description: Cath Stent Placement;  Recorded: 10/21/2014;  Comments: Mid LCx     Social History:  Social History     Socioeconomic History    Marital status:      Spouse name: Not on file    Number of children: Not on file    Years of education: Not on file    Highest education level: Not on file   Occupational History    Not on file   Tobacco Use    Smoking status: Never    Smokeless tobacco: Never   Substance and Sexual Activity    Alcohol use: No    Drug use: Not Currently    Sexual activity: Not on file   Other Topics Concern    Not on file   Social History Narrative    Not on file     Social Determinants of Health     Financial Resource Strain: Low Risk  (8/18/2023)    [facility], [facility]    Financial Resource Strain     Difficulty of Paying Living Expenses: 3     Difficulty of Paying Living Expenses: Not on file   Food  Insecurity: Food Insecurity Present (8/18/2023)    Received from [facility]note 3], [facility]    Food Insecurity     Worried About Running Out of Food in the Last Year: 2   Transportation Needs: No Transportation Needs (8/18/2023)    Received from [facility]note 3], [facility]note 3[facility]    Transportation Needs     Lack of Transportation (Medical): 1   Physical Activity: Not on file   Stress: Not on file   Social Connections: Socially Isolated (8/18/2023)    Received from [facility]note 3], [facility]note 3[facility]    Social Connections     Frequency of Communication with Friends and Family: 4   Interpersonal Safety: Not on file   Housing Stability: High Risk (8/18/2023)    Received from [facility]note 3], [facility]note 3[facility]    Housing Stability     Unable to Pay for Housing in the Last Year: 3     Sleep History:  Sleeps in a recliner to be near his wife who gets up and wanders.  Exercise History:  Caring for his wife.  Walks with a cane    Review of Systems:   12 point review of systems otherwise negative       Please refer above for cardiac ROS details.         Family History:  Family History   Problem Relation Age of Onset    Heart Failure Mother     Heart Failure Father          Allergies:  Allergies   Allergen Reactions    Lisinopril Cough    Indomethacin Rash    Naproxen Rash     Medications:  Current Outpatient Medications   Medication Sig Dispense Refill    acetaminophen (TYLENOL) 500 MG tablet [ACETAMINOPHEN (TYLENOL) 500 MG TABLET] Take 2 tablets (1,000 mg total) by mouth 3 (three) times a day. (Patient taking differently: Take 1,000 mg by mouth every 6 hours as needed)  0    atorvastatin (LIPITOR) 40 MG tablet Take 20 mg by mouth every evening      chlorthalidone (HYGROTEN) 25 MG tablet Take 25 mg  "by mouth every evening      losartan (COZAAR) 100 MG tablet Take 50 mg by mouth every evening      metoprolol succinate ER (TOPROL XL) 100 MG 24 hr tablet Take 100 mg by mouth every evening      MULTIVITAMIN (MULTIPLE VITAMIN ORAL) Take 1 tablet by mouth every evening      omega 3-dha-epa-fish oil (FISH OIL) 1,000 mg (120 mg-180 mg) cap Take 1 capsule by mouth every evening      spironolactone (ALDACTONE) 25 MG tablet Take 25 mg by mouth every evening      warfarin ANTICOAGULANT (COUMADIN) 5 MG tablet Take 2.5 mg by mouth every evening         Objective:   Wt Readings from Last 3 Encounters:   07/23/24 76.2 kg (168 lb)   03/13/24 79.6 kg (175 lb 7.8 oz)   01/30/24 76.2 kg (168 lb)     Vital signs:  /80 (BP Location: Left arm, Patient Position: Sitting, Cuff Size: Adult Regular)   Pulse (!) 142   Resp 17   Ht 1.702 m (5' 7\")   Wt 76.2 kg (168 lb)   BMI 26.31 kg/m        Physical Exam:    General Appearance : Awake, Alert, No acute distress.  Frail, difficulty climbing up onto exam table.  HEENT: No Scleral icterus; the mucous membranes were pink and moist.  Conjunctivae not injected.  Hard of hearing  Neck:  No cervical bruits, jugular venous distention, or thyromegaly   Chest: The spine was straight. Chest wall vision well-healed  Lungs: Respirations unlabored; the lungs reveal few bibasilar crackles.  No wheezing   Cardiovascular:   Normal point of maximal impulse.  Auscultation reveals rapid, regular first and second heart sounds with no murmurs, rubs, or gallops.  Carotid, radial, and dorsalis pedal pulses are intact and symmetric.    Abdomen: No organomegaly, masses, bruits, or tenderness. Bowels sounds are present  Extremities:  No clubbing, cyanosis.  Trace pretibial edema  Skin: No rash, bruising  Musculoskeletal: No tenderness.  Neurologic: Alert and oriented ×3. Speech is fluent.  Repeats himself often     Lab Results    Chemistry/lipid CBC Cardiac Enzymes/BNP/TSH/INR   No results for input(s): " "\"CHOL\", \"HDL\", \"LDL\", \"TRIG\", \"CHOLHDLRATIO\" in the last 34670 hours.  No results for input(s): \"LDL\" in the last 76060 hours.  Recent Labs   Lab Test 03/14/24  0743      POTASSIUM 4.2   CHLORIDE 105   CO2 22   GLC 97   BUN 53.7*   CR 1.36*   GFRESTIMATED 49*   JUAN 9.0     Recent Labs   Lab Test 03/14/24 0743 03/13/24 2053 03/13/24 1903   CR 1.36* 1.53* 1.42*     No results for input(s): \"A1C\" in the last 00157 hours.       Recent Labs   Lab Test 03/14/24  0743   WBC 5.0   HGB 11.2*   HCT 35.3*   MCV 98   PLT 86*     Recent Labs   Lab Test 03/14/24 0743 03/13/24 1903 02/16/24  0730   HGB 11.2* 12.7* 12.1*    No results for input(s): \"TROPONINI\" in the last 69551 hours.  Recent Labs   Lab Test 03/13/24 1903   NTBNPI 13,725*     No results for input(s): \"TSH\" in the last 48651 hours.  Recent Labs   Lab Test 03/14/24 0743 03/13/24 2053 02/16/24  0730   INR 1.86* 1.92* 1.49*        ECG today: Atrial fibrillation at 108 bpm inferior infarct.      Pharmacologic MPI 6/2024:    The regadenoson nuclear stress test is abnormal. There is a medium-sized area of nontransmural infarction in the inferior and inferolateral segment(s) of the left ventricle associated with a medium-sized area of mild travis-infarct ischemia. This appears to be in the left circumflex and/or right coronary artery distribution.    The left ventricular ejection fraction at stress is reduced at 38% with inferior and inferolateral wall hypokinesis.    The patient is at an intermediate risk of future cardiac ischemic events.    A prior study was conducted on 6/11/2021. Compared to the prior study, similar perfusion abnormalities are seen but there has been a decline in left ventricular systolic function.    Echocardiogram 12/2023:  The visual ejection fraction is 30-35%.  There is moderate global hypokinesia of the left ventricle.  There is inferior wall akinesis.  The right ventricle is normal in size and function.  There is moderate (2+) " aortic regurgitation.  The right ventricular systolic pressure is approximated at 36mmHg plus the  right atrial pressure.          ANNA VELASQUEZ MD [facility]  [phone]    This note created using Dragon voice recognition software.  Sound alike errors may have escaped editing.             Thank you for allowing me to participate in the care of your patient.      Sincerely,     Anna Velasquez MD     [facility]  cc:   Anna Velasquez MD  [address]  [place] [address]

## 2024-08-12 ENCOUNTER — ANCILLARY PROCEDURE (OUTPATIENT)
Dept: CARDIOLOGY | Facility: CLINIC | Age: 89
End: 2024-08-12
Attending: INTERNAL MEDICINE
Payer: MEDICARE

## 2024-08-12 DIAGNOSIS — Z95.0 PACEMAKER: ICD-10-CM

## 2024-08-12 DIAGNOSIS — I49.5 SICK SINUS SYNDROME (H): ICD-10-CM

## 2024-08-12 DIAGNOSIS — I48.0 PAROXYSMAL ATRIAL FIBRILLATION (H): ICD-10-CM

## 2024-08-12 LAB
MDC_IDC_LEAD_CONNECTION_STATUS: NORMAL
MDC_IDC_LEAD_CONNECTION_STATUS: NORMAL
MDC_IDC_LEAD_IMPLANT_DT: NORMAL
MDC_IDC_LEAD_IMPLANT_DT: NORMAL
MDC_IDC_LEAD_LOCATION: NORMAL
MDC_IDC_LEAD_LOCATION: NORMAL
MDC_IDC_LEAD_LOCATION_DETAIL_1: NORMAL
MDC_IDC_LEAD_LOCATION_DETAIL_1: NORMAL
MDC_IDC_LEAD_MFG: NORMAL
MDC_IDC_LEAD_MFG: NORMAL
MDC_IDC_LEAD_MODEL: NORMAL
MDC_IDC_LEAD_MODEL: NORMAL
MDC_IDC_LEAD_POLARITY_TYPE: NORMAL
MDC_IDC_LEAD_POLARITY_TYPE: NORMAL
MDC_IDC_LEAD_SERIAL: NORMAL
MDC_IDC_LEAD_SERIAL: NORMAL
MDC_IDC_MSMT_BATTERY_DTM: NORMAL
MDC_IDC_MSMT_BATTERY_IMPEDANCE: 1477 OHM
MDC_IDC_MSMT_BATTERY_REMAINING_LONGEVITY: 53 MO
MDC_IDC_MSMT_BATTERY_STATUS: NORMAL
MDC_IDC_MSMT_BATTERY_VOLTAGE: 2.76 V
MDC_IDC_MSMT_LEADCHNL_RA_IMPEDANCE_VALUE: 440 OHM
MDC_IDC_MSMT_LEADCHNL_RA_SENSING_INTR_AMPL: 2.8 MV
MDC_IDC_MSMT_LEADCHNL_RV_IMPEDANCE_VALUE: 476 OHM
MDC_IDC_MSMT_LEADCHNL_RV_SENSING_INTR_AMPL: 31 MV
MDC_IDC_PG_IMPLANT_DTM: NORMAL
MDC_IDC_PG_MFG: NORMAL
MDC_IDC_PG_MODEL: NORMAL
MDC_IDC_PG_SERIAL: NORMAL
MDC_IDC_PG_TYPE: NORMAL
MDC_IDC_SESS_CLINIC_NAME: NORMAL
MDC_IDC_SESS_DTM: NORMAL
MDC_IDC_SESS_TYPE: NORMAL
MDC_IDC_SET_BRADY_LOWRATE: 60 {BEATS}/MIN
MDC_IDC_SET_BRADY_MAX_SENSOR_RATE: 120 {BEATS}/MIN
MDC_IDC_SET_BRADY_MAX_TRACKING_RATE: 105 {BEATS}/MIN
MDC_IDC_SET_BRADY_MODE: NORMAL
MDC_IDC_SET_LEADCHNL_RV_PACING_AMPLITUDE: NORMAL
MDC_IDC_SET_LEADCHNL_RV_PACING_CAPTURE_MODE: NORMAL
MDC_IDC_SET_LEADCHNL_RV_PACING_POLARITY: NORMAL
MDC_IDC_SET_LEADCHNL_RV_PACING_PULSEWIDTH: 0.4 MS
MDC_IDC_SET_LEADCHNL_RV_SENSING_POLARITY: NORMAL
MDC_IDC_SET_LEADCHNL_RV_SENSING_SENSITIVITY: 5.6 MV
MDC_IDC_SET_ZONE_DETECTION_INTERVAL: 375 MS
MDC_IDC_SET_ZONE_STATUS: NORMAL
MDC_IDC_SET_ZONE_STATUS: NORMAL
MDC_IDC_SET_ZONE_TYPE: NORMAL
MDC_IDC_SET_ZONE_TYPE: NORMAL
MDC_IDC_SET_ZONE_VENDOR_TYPE: NORMAL
MDC_IDC_SET_ZONE_VENDOR_TYPE: NORMAL
MDC_IDC_STAT_AT_BURDEN_PERCENT: 85.3 %
MDC_IDC_STAT_AT_DTM_END: NORMAL
MDC_IDC_STAT_AT_DTM_START: NORMAL
MDC_IDC_STAT_AT_MODE_SW_COUNT: 2496
MDC_IDC_STAT_BRADY_DTM_END: NORMAL
MDC_IDC_STAT_BRADY_DTM_START: NORMAL
MDC_IDC_STAT_BRADY_RV_PERCENT_PACED: 9 %
MDC_IDC_STAT_EPISODE_RECENT_COUNT: 1
MDC_IDC_STAT_EPISODE_RECENT_COUNT: 1
MDC_IDC_STAT_EPISODE_RECENT_COUNT: NORMAL
MDC_IDC_STAT_EPISODE_RECENT_COUNT_DTM_END: NORMAL
MDC_IDC_STAT_EPISODE_RECENT_COUNT_DTM_START: NORMAL
MDC_IDC_STAT_EPISODE_TYPE: NORMAL

## 2024-08-12 PROCEDURE — 93288 INTERROG EVL PM/LDLS PM IP: CPT | Performed by: INTERNAL MEDICINE

## 2024-08-13 LAB
MDC_IDC_LEAD_CONNECTION_STATUS: NORMAL
MDC_IDC_LEAD_CONNECTION_STATUS: NORMAL
MDC_IDC_LEAD_IMPLANT_DT: NORMAL
MDC_IDC_LEAD_IMPLANT_DT: NORMAL
MDC_IDC_LEAD_LOCATION: NORMAL
MDC_IDC_LEAD_LOCATION: NORMAL
MDC_IDC_LEAD_LOCATION_DETAIL_1: NORMAL
MDC_IDC_LEAD_LOCATION_DETAIL_1: NORMAL
MDC_IDC_LEAD_MFG: NORMAL
MDC_IDC_LEAD_MFG: NORMAL
MDC_IDC_LEAD_MODEL: NORMAL
MDC_IDC_LEAD_MODEL: NORMAL
MDC_IDC_LEAD_POLARITY_TYPE: NORMAL
MDC_IDC_LEAD_POLARITY_TYPE: NORMAL
MDC_IDC_LEAD_SERIAL: NORMAL
MDC_IDC_LEAD_SERIAL: NORMAL
MDC_IDC_MSMT_BATTERY_DTM: NORMAL
MDC_IDC_MSMT_BATTERY_IMPEDANCE: 1451 OHM
MDC_IDC_MSMT_BATTERY_REMAINING_LONGEVITY: 65 MO
MDC_IDC_MSMT_BATTERY_STATUS: NORMAL
MDC_IDC_MSMT_BATTERY_VOLTAGE: 2.75 V
MDC_IDC_MSMT_LEADCHNL_RA_IMPEDANCE_VALUE: 67 OHM
MDC_IDC_MSMT_LEADCHNL_RV_IMPEDANCE_VALUE: 482 OHM
MDC_IDC_MSMT_LEADCHNL_RV_PACING_THRESHOLD_AMPLITUDE: 1.12 V
MDC_IDC_MSMT_LEADCHNL_RV_PACING_THRESHOLD_PULSEWIDTH: 0.4 MS
MDC_IDC_PG_IMPLANT_DTM: NORMAL
MDC_IDC_PG_MFG: NORMAL
MDC_IDC_PG_MODEL: NORMAL
MDC_IDC_PG_SERIAL: NORMAL
MDC_IDC_PG_TYPE: NORMAL
MDC_IDC_SESS_CLINIC_NAME: NORMAL
MDC_IDC_SESS_DTM: NORMAL
MDC_IDC_SESS_TYPE: NORMAL
MDC_IDC_SET_BRADY_LOWRATE: 60 {BEATS}/MIN
MDC_IDC_SET_BRADY_MAX_SENSOR_RATE: 120 {BEATS}/MIN
MDC_IDC_SET_BRADY_MAX_TRACKING_RATE: 105 {BEATS}/MIN
MDC_IDC_SET_BRADY_MODE: NORMAL
MDC_IDC_SET_LEADCHNL_RV_PACING_AMPLITUDE: 1.62
MDC_IDC_SET_LEADCHNL_RV_PACING_CAPTURE_MODE: NORMAL
MDC_IDC_SET_LEADCHNL_RV_PACING_POLARITY: NORMAL
MDC_IDC_SET_LEADCHNL_RV_PACING_PULSEWIDTH: 0.4 MS
MDC_IDC_SET_LEADCHNL_RV_SENSING_POLARITY: NORMAL
MDC_IDC_SET_LEADCHNL_RV_SENSING_SENSITIVITY: 5.6 MV
MDC_IDC_SET_ZONE_DETECTION_INTERVAL: 375 MS
MDC_IDC_SET_ZONE_STATUS: NORMAL
MDC_IDC_SET_ZONE_STATUS: NORMAL
MDC_IDC_SET_ZONE_TYPE: NORMAL
MDC_IDC_SET_ZONE_TYPE: NORMAL
MDC_IDC_SET_ZONE_VENDOR_TYPE: NORMAL
MDC_IDC_SET_ZONE_VENDOR_TYPE: NORMAL
MDC_IDC_STAT_AT_BURDEN_PERCENT: 0 %
MDC_IDC_STAT_AT_DTM_END: NORMAL
MDC_IDC_STAT_AT_DTM_START: NORMAL
MDC_IDC_STAT_BRADY_DTM_END: NORMAL
MDC_IDC_STAT_BRADY_DTM_START: NORMAL
MDC_IDC_STAT_BRADY_RV_PERCENT_PACED: 2 %
MDC_IDC_STAT_EPISODE_RECENT_COUNT: 0
MDC_IDC_STAT_EPISODE_RECENT_COUNT: 1
MDC_IDC_STAT_EPISODE_RECENT_COUNT_DTM_END: NORMAL
MDC_IDC_STAT_EPISODE_RECENT_COUNT_DTM_END: NORMAL
MDC_IDC_STAT_EPISODE_RECENT_COUNT_DTM_START: NORMAL
MDC_IDC_STAT_EPISODE_RECENT_COUNT_DTM_START: NORMAL
MDC_IDC_STAT_EPISODE_TYPE: NORMAL
MDC_IDC_STAT_EPISODE_TYPE: NORMAL

## 2024-08-13 PROCEDURE — 93296 REM INTERROG EVL PM/IDS: CPT | Performed by: INTERNAL MEDICINE

## 2024-08-13 PROCEDURE — 93294 REM INTERROG EVL PM/LDLS PM: CPT | Performed by: INTERNAL MEDICINE

## 2024-09-19 ENCOUNTER — TELEPHONE (OUTPATIENT)
Dept: CARDIOLOGY | Facility: CLINIC | Age: 89
End: 2024-09-19
Payer: MEDICARE

## 2024-09-19 DIAGNOSIS — I25.10 ATHEROSCLEROSIS OF NATIVE CORONARY ARTERY OF NATIVE HEART WITHOUT ANGINA PECTORIS: ICD-10-CM

## 2024-09-19 DIAGNOSIS — R94.30 EJECTION FRACTION < 50%: ICD-10-CM

## 2024-09-19 DIAGNOSIS — Z95.0 PACEMAKER: ICD-10-CM

## 2024-09-19 DIAGNOSIS — I49.5 SICK SINUS SYNDROME (H): ICD-10-CM

## 2024-09-19 DIAGNOSIS — I50.22 CHRONIC SYSTOLIC CONGESTIVE HEART FAILURE (H): ICD-10-CM

## 2024-09-19 DIAGNOSIS — N18.30 STAGE 3 CHRONIC KIDNEY DISEASE, UNSPECIFIED WHETHER STAGE 3A OR 3B CKD (H): ICD-10-CM

## 2024-09-19 DIAGNOSIS — I47.29 NONSUSTAINED VENTRICULAR TACHYCARDIA (H): ICD-10-CM

## 2024-09-19 DIAGNOSIS — I48.91 ATRIAL FIBRILLATION, UNSPECIFIED TYPE (H): ICD-10-CM

## 2024-09-19 DIAGNOSIS — I10 ESSENTIAL HYPERTENSION: Primary | Chronic | ICD-10-CM

## 2024-09-19 DIAGNOSIS — I15.9 SECONDARY HYPERTENSION: ICD-10-CM

## 2024-09-19 NOTE — TELEPHONE ENCOUNTER
Dr. Will,  This patient's losartan and spironolactone are being held by PCP for hyperkalemia.  Dr. Francis recommends follow up with cardiology in the next week or so.  Follow up is scheduled with you 12/2/2024.  Looks as though PCP is following with repeat labs.  Do you have any new orders or recommendations?  Thank you,  Sharon    Patient was seen in clinic by PCP 9/10/2024 for annual visit with abnormal lab results.   Telephone Encounter - Jacquelin Francis MD - 09/18/2024 4:12 PM CDT  Formatting of this note might be different from the original.  Please call patient regarding lab from 9/13.    Potassium is still high but improved.  Kidney function is still not back to his usual baseline but is mildly improved compared to lab from 9/10.    Please confirm he has continued to hold his spironolactone and losartan.    Any symptoms updates from him? Eg any palpitations, muscle weakness, feeling faint or fainting, shortness of breath, leg swelling that has developed since making these changes?    Please recommend he check BP daily right now.    If he is able to follow up with cardiology in the next week or so, would recommend this and could repeat labs at that time.    Otherwise, I recommend he go to the hospital to have labs drawn again this week, as a walk in, on Friday again (or tomorrow if Thursday works better).    For time being, I recommend he follow a LOW potassium diet and continue to hold his losartan and spironolactone for now.    Note, Alen may request daughter Rashmi also be contacted about this, as family coordinates rides for him.    Thank you,  Jacquelin Francis MD     POTASSIUM 5.6 (H) 3.5 - 5.1 mmol/L   09/13/2024 7:20 PM CDT [facility]       POTASSIUM 6.0 (H) 3.5 - 5.1 mmol/L   09/10/2024 7:55 PM CDT [facility]

## 2024-09-19 NOTE — TELEPHONE ENCOUNTER
[facility] Call Center    Phone Message    May a detailed message be left on voicemail: yes     Reason for Call: Other: Patient was seen by his primary MD and has high potasium levels along with high creatinine. Patient is scheduled for Dr Will's next available appointment. Should patient be seen sooner? Should patient have in clinic device check at follow up or wait until January when patient is due. Patient was taken off spirolactone and losartan to get those levels down.   Patient is going to try on monitor high blood pressure levels daily.     Action Taken: Other: cardio    Travel Screening: Not Applicable     Date of Service:

## 2024-09-20 NOTE — TELEPHONE ENCOUNTER
Please call patient to arrange RAC.  Thank you - KF    ----- Message -----   From: Roc Will MD   Sent: 9/19/2024   6:12 PM CDT   To: Sharon Moody RN     Discontinue spironolactone permanently.  Please set up rapid access clinic visit. [facility]

## 2024-09-23 NOTE — TELEPHONE ENCOUNTER
Christie Lopez Mandy L, RN3 days ago     CH  Hello,    Pt is scheduled with [facility] on 9/30/24 at . Remote scheduled prior on 9/26/24.    Thank you,  Kelin German, RN  [facility] Device  Pool - e3 days ago     MG  HI there, please see below for RAC request, should have remote prior to RAC to check for any arrhythmias -thanks!!

## 2024-09-27 ENCOUNTER — ANCILLARY PROCEDURE (OUTPATIENT)
Dept: CARDIOLOGY | Facility: CLINIC | Age: 89
End: 2024-09-27
Attending: INTERNAL MEDICINE
Payer: MEDICARE

## 2024-09-27 DIAGNOSIS — I48.0 PAROXYSMAL ATRIAL FIBRILLATION (H): ICD-10-CM

## 2024-09-27 DIAGNOSIS — Z95.0 PACEMAKER: ICD-10-CM

## 2024-09-27 DIAGNOSIS — I49.5 SICK SINUS SYNDROME (H): ICD-10-CM

## 2024-09-29 LAB
MDC_IDC_LEAD_CONNECTION_STATUS: NORMAL
MDC_IDC_LEAD_CONNECTION_STATUS: NORMAL
MDC_IDC_LEAD_IMPLANT_DT: NORMAL
MDC_IDC_LEAD_IMPLANT_DT: NORMAL
MDC_IDC_LEAD_LOCATION: NORMAL
MDC_IDC_LEAD_LOCATION: NORMAL
MDC_IDC_LEAD_LOCATION_DETAIL_1: NORMAL
MDC_IDC_LEAD_LOCATION_DETAIL_1: NORMAL
MDC_IDC_LEAD_MFG: NORMAL
MDC_IDC_LEAD_MFG: NORMAL
MDC_IDC_LEAD_MODEL: NORMAL
MDC_IDC_LEAD_MODEL: NORMAL
MDC_IDC_LEAD_POLARITY_TYPE: NORMAL
MDC_IDC_LEAD_POLARITY_TYPE: NORMAL
MDC_IDC_LEAD_SERIAL: NORMAL
MDC_IDC_LEAD_SERIAL: NORMAL
MDC_IDC_MSMT_BATTERY_DTM: NORMAL
MDC_IDC_MSMT_BATTERY_IMPEDANCE: 1503 OHM
MDC_IDC_MSMT_BATTERY_REMAINING_LONGEVITY: 63 MO
MDC_IDC_MSMT_BATTERY_STATUS: NORMAL
MDC_IDC_MSMT_BATTERY_VOLTAGE: 2.76 V
MDC_IDC_MSMT_LEADCHNL_RA_IMPEDANCE_VALUE: 67 OHM
MDC_IDC_MSMT_LEADCHNL_RV_IMPEDANCE_VALUE: 463 OHM
MDC_IDC_MSMT_LEADCHNL_RV_PACING_THRESHOLD_AMPLITUDE: 1.12 V
MDC_IDC_MSMT_LEADCHNL_RV_PACING_THRESHOLD_PULSEWIDTH: 0.4 MS
MDC_IDC_MSMT_LEADCHNL_RV_SENSING_INTR_AMPL: 16 MV
MDC_IDC_PG_IMPLANT_DTM: NORMAL
MDC_IDC_PG_MFG: NORMAL
MDC_IDC_PG_MODEL: NORMAL
MDC_IDC_PG_SERIAL: NORMAL
MDC_IDC_PG_TYPE: NORMAL
MDC_IDC_SESS_CLINIC_NAME: NORMAL
MDC_IDC_SESS_DTM: NORMAL
MDC_IDC_SESS_TYPE: NORMAL
MDC_IDC_SET_BRADY_LOWRATE: 60 {BEATS}/MIN
MDC_IDC_SET_BRADY_MAX_SENSOR_RATE: 120 {BEATS}/MIN
MDC_IDC_SET_BRADY_MAX_TRACKING_RATE: 105 {BEATS}/MIN
MDC_IDC_SET_BRADY_MODE: NORMAL
MDC_IDC_SET_LEADCHNL_RV_PACING_AMPLITUDE: 1.88
MDC_IDC_SET_LEADCHNL_RV_PACING_CAPTURE_MODE: NORMAL
MDC_IDC_SET_LEADCHNL_RV_PACING_POLARITY: NORMAL
MDC_IDC_SET_LEADCHNL_RV_PACING_PULSEWIDTH: 0.4 MS
MDC_IDC_SET_LEADCHNL_RV_SENSING_POLARITY: NORMAL
MDC_IDC_SET_LEADCHNL_RV_SENSING_SENSITIVITY: 5.6 MV
MDC_IDC_SET_ZONE_DETECTION_INTERVAL: 375 MS
MDC_IDC_SET_ZONE_STATUS: NORMAL
MDC_IDC_SET_ZONE_STATUS: NORMAL
MDC_IDC_SET_ZONE_TYPE: NORMAL
MDC_IDC_SET_ZONE_VENDOR_TYPE: NORMAL
MDC_IDC_SET_ZONE_VENDOR_TYPE: NORMAL
MDC_IDC_STAT_AT_BURDEN_PERCENT: 0 %
MDC_IDC_STAT_AT_DTM_END: NORMAL
MDC_IDC_STAT_AT_DTM_START: NORMAL
MDC_IDC_STAT_BRADY_DTM_END: NORMAL
MDC_IDC_STAT_BRADY_DTM_START: NORMAL
MDC_IDC_STAT_BRADY_RV_PERCENT_PACED: 2 %
MDC_IDC_STAT_EPISODE_RECENT_COUNT: 0
MDC_IDC_STAT_EPISODE_RECENT_COUNT: 1
MDC_IDC_STAT_EPISODE_RECENT_COUNT_DTM_END: NORMAL
MDC_IDC_STAT_EPISODE_RECENT_COUNT_DTM_END: NORMAL
MDC_IDC_STAT_EPISODE_RECENT_COUNT_DTM_START: NORMAL
MDC_IDC_STAT_EPISODE_RECENT_COUNT_DTM_START: NORMAL
MDC_IDC_STAT_EPISODE_TYPE: NORMAL
MDC_IDC_STAT_EPISODE_TYPE: NORMAL

## 2024-09-30 ENCOUNTER — OFFICE VISIT (OUTPATIENT)
Dept: CARDIOLOGY | Facility: CLINIC | Age: 89
End: 2024-09-30
Payer: MEDICARE

## 2024-09-30 VITALS
WEIGHT: 166 LBS | HEART RATE: 76 BPM | HEIGHT: 67 IN | DIASTOLIC BLOOD PRESSURE: 62 MMHG | RESPIRATION RATE: 16 BRPM | SYSTOLIC BLOOD PRESSURE: 120 MMHG | BODY MASS INDEX: 26.06 KG/M2

## 2024-09-30 DIAGNOSIS — I50.22 CHRONIC SYSTOLIC CONGESTIVE HEART FAILURE (H): Primary | ICD-10-CM

## 2024-09-30 DIAGNOSIS — I47.29 NONSUSTAINED VENTRICULAR TACHYCARDIA (H): ICD-10-CM

## 2024-09-30 DIAGNOSIS — I25.10 ATHEROSCLEROSIS OF NATIVE CORONARY ARTERY OF NATIVE HEART WITHOUT ANGINA PECTORIS: ICD-10-CM

## 2024-09-30 DIAGNOSIS — I48.91 ATRIAL FIBRILLATION, UNSPECIFIED TYPE (H): ICD-10-CM

## 2024-09-30 DIAGNOSIS — I10 ESSENTIAL HYPERTENSION: Chronic | ICD-10-CM

## 2024-09-30 DIAGNOSIS — Z95.0 PACEMAKER: ICD-10-CM

## 2024-09-30 DIAGNOSIS — I15.9 SECONDARY HYPERTENSION: ICD-10-CM

## 2024-09-30 DIAGNOSIS — I49.5 SICK SINUS SYNDROME (H): ICD-10-CM

## 2024-09-30 DIAGNOSIS — R94.30 EJECTION FRACTION < 50%: ICD-10-CM

## 2024-09-30 DIAGNOSIS — N18.30 STAGE 3 CHRONIC KIDNEY DISEASE, UNSPECIFIED WHETHER STAGE 3A OR 3B CKD (H): ICD-10-CM

## 2024-09-30 PROCEDURE — 99214 OFFICE O/P EST MOD 30 MIN: CPT | Performed by: INTERNAL MEDICINE

## 2024-09-30 NOTE — PATIENT INSTRUCTIONS
Skip Newman,    It was a [facility] to [facility].     My recommendations after this visit include:    Start Jardiance 10 mg a day  Keep track of your weight and leg swelling.  Contact us if there is more problems with either 1 of those     WSaul Chaudhari MD, FACC, GADIEL

## 2024-09-30 NOTE — LETTER
"9/30/2024    Jacquelin Francis MD  [address]  [facility] [address]    RE: Skip Newman       Dear Colleague,     I had the pleasure of seeing Skip Newman in the [facility].      Cardiology Progress Note     Assessment:  Chronic heart failure with reduced systolic function, probably mildly fluid overloaded  Permanent atrial fibrillation with controlled ventricular response  Permanent pacemaker, normal function  Coronary artery disease, no angina  Chronic kidney disease with hyperkalemia, improved after discontinuation of spironolactone and losartan    Plan:  He is at risk of worsening heart failure without losartan and spironolactone.  He was instructed to watch his weight leg swelling and shortness of breath.  Will see if he can start Jardiance 10 mg a day to help with heart failure  He will follow-up with Dr. Ann as previously scheduled    Subjective:   This is 89 year old male who comes in today to rapid access clinic for follow-up after abnormal blood test results.  He was found to be hyperkalemic and have worsening renal function during routine check by primary physician.  Spironolactone and losartan were both discontinued.  Renal function returned to baseline hyperkalemia resolved.  Overall he feels well.  He denies chest pains.  He is unaware of irregular heart rhythm.  He has not had syncope.  He thinks that he is weight is stable.  He denies PND orthopnea.    Review of Systems:   Negative other than history of present illness    Objective:   /62 (BP Location: Right arm, Patient Position: Sitting, Cuff Size: Adult Regular)   Pulse 76   Resp 16   Ht 1.702 m (5' 7\")   Wt 75.3 kg (166 lb)   BMI 26.00 kg/m    Physical Exam:  GENERAL: no distress  NECK: No JVD  LUNGS: Few crackles bilaterally  CARDIAC: irregular rhythm, S1 & S2 normal.  No heaves, thrills, gallops or murmurs.  ABDOMEN: flat, negative hepatosplenomegaly, soft and non-tender.  EXTREMITIES: No evidence " of cyanosis, clubbing 1+ edema.    Current Outpatient Medications   Medication Sig Dispense Refill     acetaminophen (TYLENOL) 500 MG tablet [ACETAMINOPHEN (TYLENOL) 500 MG TABLET] Take 2 tablets (1,000 mg total) by mouth 3 (three) times a day. (Patient taking differently: Take 1,000 mg by mouth every 6 hours as needed.)  0     atorvastatin (LIPITOR) 40 MG tablet Take 20 mg by mouth every evening       chlorthalidone (HYGROTEN) 25 MG tablet Take 25 mg by mouth every evening       empagliflozin (JARDIANCE) 10 MG TABS tablet Take 1 tablet (10 mg) by mouth daily. 30 tablet 5     metoprolol succinate ER (TOPROL XL) 100 MG 24 hr tablet Take 1.5 tablets (150 mg) by mouth every evening 135 tablet 3     MULTIVITAMIN (MULTIPLE VITAMIN ORAL) Take 1 tablet by mouth every evening       omega 3-dha-epa-fish oil (FISH OIL) 1,000 mg (120 mg-180 mg) cap Take 1 capsule by mouth every evening       warfarin ANTICOAGULANT (COUMADIN) 5 MG tablet Take 2.5 mg by mouth every evening       losartan (COZAAR) 100 MG tablet Take 50 mg by mouth every evening (Patient not taking: Reported on 9/30/2024)         Cardiographics:    Device interrogation: 9/27/2024 atrial fibrillation with controlled ventricular response  Echocardiogram: April 2024  Left ventricular function is decreased. The ejection fraction is 35-40%  (moderately reduced).  There is borderline concentric left ventricular hypertrophy.  There is moderate global hypokinesia of the left ventricle.  Normal right ventricle size and systolic function.  Stress Test: June 2024     The regadenoson nuclear stress test is abnormal. There is a medium-sized area of nontransmural infarction in the inferior and inferolateral segment(s) of the left ventricle associated with a medium-sized area of mild travis-infarct ischemia. This appears to be in the left circumflex and/or right coronary artery distribution.     The left ventricular ejection fraction at stress is reduced at 38% with inferior and  "inferolateral wall hypokinesis.     The patient is at an intermediate risk of future cardiac ischemic events.     A prior study was conducted on 6/11/2021. Compared to the prior study, similar perfusion abnormalities are seen but there has been a decline in left ventricular systolic function.     Lab Results    Chemistry/lipid CBC Cardiac Enzymes/BNP/TSH/INR   No results for input(s): \"CHOL\", \"HDL\", \"LDL\", \"TRIG\", \"CHOLHDLRATIO\" in the last 15085 hours.  No results for input(s): \"LDL\" in the last 37652 hours.  Recent Labs   Lab Test 03/14/24  0743      POTASSIUM 4.2   CHLORIDE 105   CO2 22   GLC 97   BUN 53.7*   CR 1.36*   GFRESTIMATED 49*   JUAN 9.0     Recent Labs   Lab Test 03/14/24  0743 03/13/24 2053 03/13/24  1903   CR 1.36* 1.53* 1.42*     No results for input(s): \"A1C\" in the last 34714 hours.       Recent Labs   Lab Test 03/14/24  0743   WBC 5.0   HGB 11.2*   HCT 35.3*   MCV 98   PLT 86*     Recent Labs   Lab Test 03/14/24  0743 03/13/24 1903 02/16/24  0730   HGB 11.2* 12.7* 12.1*    No results for input(s): \"TROPONINI\" in the last 45432 hours.  Recent Labs   Lab Test 03/13/24  1903   NTBNPI 13,725*     No results for input(s): \"TSH\" in the last 21392 hours.  Recent Labs   Lab Test 03/14/24  0743 03/13/24 2053 02/16/24  0730   INR 1.86* 1.92* 1.49*                         Thank you for allowing me to participate in the care of your patient.      Sincerely,     Portillo Chaudhari MD     [facility]  cc:   Roc Will MD  [address]  [place] [address]      "

## 2024-09-30 NOTE — PROGRESS NOTES
"    Cardiology Progress Note     Assessment:  Chronic heart failure with reduced systolic function, probably mildly fluid overloaded  Permanent atrial fibrillation with controlled ventricular response  Permanent pacemaker, normal function  Coronary artery disease, no angina  Chronic kidney disease with hyperkalemia, improved after discontinuation of spironolactone and losartan    Plan:  He is at risk of worsening heart failure without losartan and spironolactone.  He was instructed to watch his weight leg swelling and shortness of breath.  Will see if he can start Jardiance 10 mg a day to help with heart failure  He will follow-up with Dr. Ann as previously scheduled    Subjective:   This is 89 year old male who comes in today to rapid access clinic for follow-up after abnormal blood test results.  He was found to be hyperkalemic and have worsening renal function during routine check by primary physician.  Spironolactone and losartan were both discontinued.  Renal function returned to baseline hyperkalemia resolved.  Overall he feels well.  He denies chest pains.  He is unaware of irregular heart rhythm.  He has not had syncope.  He thinks that he is weight is stable.  He denies PND orthopnea.    Review of Systems:   Negative other than history of present illness    Objective:   /62 (BP Location: Right arm, Patient Position: Sitting, Cuff Size: Adult Regular)   Pulse 76   Resp 16   Ht 1.702 m (5' 7\")   Wt 75.3 kg (166 lb)   BMI 26.00 kg/m    Physical Exam:  GENERAL: no distress  NECK: No JVD  LUNGS: Few crackles bilaterally  CARDIAC: irregular rhythm, S1 & S2 normal.  No heaves, thrills, gallops or murmurs.  ABDOMEN: flat, negative hepatosplenomegaly, soft and non-tender.  EXTREMITIES: No evidence of cyanosis, clubbing 1+ edema.    Current Outpatient Medications   Medication Sig Dispense Refill    acetaminophen (TYLENOL) 500 MG tablet [ACETAMINOPHEN (TYLENOL) 500 MG TABLET] Take 2 tablets (1,000 mg " total) by mouth 3 (three) times a day. (Patient taking differently: Take 1,000 mg by mouth every 6 hours as needed.)  0    atorvastatin (LIPITOR) 40 MG tablet Take 20 mg by mouth every evening      chlorthalidone (HYGROTEN) 25 MG tablet Take 25 mg by mouth every evening      empagliflozin (JARDIANCE) 10 MG TABS tablet Take 1 tablet (10 mg) by mouth daily. 30 tablet 5    metoprolol succinate ER (TOPROL XL) 100 MG 24 hr tablet Take 1.5 tablets (150 mg) by mouth every evening 135 tablet 3    MULTIVITAMIN (MULTIPLE VITAMIN ORAL) Take 1 tablet by mouth every evening      omega 3-dha-epa-fish oil (FISH OIL) 1,000 mg (120 mg-180 mg) cap Take 1 capsule by mouth every evening      warfarin ANTICOAGULANT (COUMADIN) 5 MG tablet Take 2.5 mg by mouth every evening      losartan (COZAAR) 100 MG tablet Take 50 mg by mouth every evening (Patient not taking: Reported on 9/30/2024)         Cardiographics:    Device interrogation: 9/27/2024 atrial fibrillation with controlled ventricular response  Echocardiogram: April 2024  Left ventricular function is decreased. The ejection fraction is 35-40%  (moderately reduced).  There is borderline concentric left ventricular hypertrophy.  There is moderate global hypokinesia of the left ventricle.  Normal right ventricle size and systolic function.  Stress Test: June 2024    The regadenoson nuclear stress test is abnormal. There is a medium-sized area of nontransmural infarction in the inferior and inferolateral segment(s) of the left ventricle associated with a medium-sized area of mild travis-infarct ischemia. This appears to be in the left circumflex and/or right coronary artery distribution.    The left ventricular ejection fraction at stress is reduced at 38% with inferior and inferolateral wall hypokinesis.    The patient is at an intermediate risk of future cardiac ischemic events.    A prior study was conducted on 6/11/2021. Compared to the prior study, similar perfusion abnormalities  "are seen but there has been a decline in left ventricular systolic function.     Lab Results    Chemistry/lipid CBC Cardiac Enzymes/BNP/TSH/INR   No results for input(s): \"CHOL\", \"HDL\", \"LDL\", \"TRIG\", \"CHOLHDLRATIO\" in the last 86045 hours.  No results for input(s): \"LDL\" in the last 39740 hours.  Recent Labs   Lab Test 03/14/24  0743      POTASSIUM 4.2   CHLORIDE 105   CO2 22   GLC 97   BUN 53.7*   CR 1.36*   GFRESTIMATED 49*   JUAN 9.0     Recent Labs   Lab Test 03/14/24  0743 03/13/24 2053 03/13/24 1903   CR 1.36* 1.53* 1.42*     No results for input(s): \"A1C\" in the last 16692 hours.       Recent Labs   Lab Test 03/14/24  0743   WBC 5.0   HGB 11.2*   HCT 35.3*   MCV 98   PLT 86*     Recent Labs   Lab Test 03/14/24  0743 03/13/24 1903 02/16/24  0730   HGB 11.2* 12.7* 12.1*    No results for input(s): \"TROPONINI\" in the last 54750 hours.  Recent Labs   Lab Test 03/13/24 1903   NTBNPI 13,725*     No results for input(s): \"TSH\" in the last 76422 hours.  Recent Labs   Lab Test 03/14/24  0743 03/13/24 2053 02/16/24  0730   INR 1.86* 1.92* 1.49*                     "

## 2024-10-01 ENCOUNTER — MYC MEDICAL ADVICE (OUTPATIENT)
Dept: CARDIOLOGY | Facility: CLINIC | Age: 89
End: 2024-10-01
Payer: MEDICARE

## 2024-10-01 DIAGNOSIS — I50.22 CHRONIC SYSTOLIC CONGESTIVE HEART FAILURE (H): Primary | ICD-10-CM

## 2024-10-03 RX ORDER — DAPAGLIFLOZIN 10 MG/1
10 TABLET, FILM COATED ORAL DAILY
Qty: 90 TABLET | Refills: 1 | Status: SHIPPED | OUTPATIENT
Start: 2024-10-03

## 2024-10-03 NOTE — TELEPHONE ENCOUNTER
Jardiance discontinued; Farxiga sent through per order below. -[facility] [place]       Manuel Chaudhari MD   to Me       10/3/24  9:42 AM  yes  Me   to Manuel Chaudhari MD         10/3/24  9:03 AM  Send through Farxiga 10 mg daily and discontinue Jardiance? -mal

## 2024-11-20 ENCOUNTER — ANCILLARY PROCEDURE (OUTPATIENT)
Dept: CARDIOLOGY | Facility: CLINIC | Age: 89
End: 2024-11-20
Attending: INTERNAL MEDICINE
Payer: MEDICARE

## 2024-11-20 DIAGNOSIS — Z95.0 PACEMAKER: ICD-10-CM

## 2024-11-20 DIAGNOSIS — I49.5 SICK SINUS SYNDROME (H): ICD-10-CM

## 2024-11-20 DIAGNOSIS — I48.0 PAROXYSMAL ATRIAL FIBRILLATION (H): ICD-10-CM

## 2024-11-20 LAB
MDC_IDC_LEAD_CONNECTION_STATUS: NORMAL
MDC_IDC_LEAD_CONNECTION_STATUS: NORMAL
MDC_IDC_LEAD_IMPLANT_DT: NORMAL
MDC_IDC_LEAD_IMPLANT_DT: NORMAL
MDC_IDC_LEAD_LOCATION: NORMAL
MDC_IDC_LEAD_LOCATION: NORMAL
MDC_IDC_LEAD_LOCATION_DETAIL_1: NORMAL
MDC_IDC_LEAD_LOCATION_DETAIL_1: NORMAL
MDC_IDC_LEAD_MFG: NORMAL
MDC_IDC_LEAD_MFG: NORMAL
MDC_IDC_LEAD_MODEL: NORMAL
MDC_IDC_LEAD_MODEL: NORMAL
MDC_IDC_LEAD_POLARITY_TYPE: NORMAL
MDC_IDC_LEAD_POLARITY_TYPE: NORMAL
MDC_IDC_LEAD_SERIAL: NORMAL
MDC_IDC_LEAD_SERIAL: NORMAL
MDC_IDC_MSMT_BATTERY_DTM: NORMAL
MDC_IDC_MSMT_BATTERY_IMPEDANCE: 1561 OHM
MDC_IDC_MSMT_BATTERY_REMAINING_LONGEVITY: 61 MO
MDC_IDC_MSMT_BATTERY_STATUS: NORMAL
MDC_IDC_MSMT_BATTERY_VOLTAGE: 2.76 V
MDC_IDC_MSMT_LEADCHNL_RA_IMPEDANCE_VALUE: 67 OHM
MDC_IDC_MSMT_LEADCHNL_RV_IMPEDANCE_VALUE: 451 OHM
MDC_IDC_MSMT_LEADCHNL_RV_PACING_THRESHOLD_AMPLITUDE: 1.25 V
MDC_IDC_MSMT_LEADCHNL_RV_PACING_THRESHOLD_PULSEWIDTH: 0.4 MS
MDC_IDC_PG_IMPLANT_DTM: NORMAL
MDC_IDC_PG_MFG: NORMAL
MDC_IDC_PG_MODEL: NORMAL
MDC_IDC_PG_SERIAL: NORMAL
MDC_IDC_PG_TYPE: NORMAL
MDC_IDC_SESS_CLINIC_NAME: NORMAL
MDC_IDC_SESS_DTM: NORMAL
MDC_IDC_SESS_TYPE: NORMAL
MDC_IDC_SET_BRADY_LOWRATE: 60 {BEATS}/MIN
MDC_IDC_SET_BRADY_MAX_SENSOR_RATE: 120 {BEATS}/MIN
MDC_IDC_SET_BRADY_MAX_TRACKING_RATE: 105 {BEATS}/MIN
MDC_IDC_SET_BRADY_MODE: NORMAL
MDC_IDC_SET_LEADCHNL_RV_PACING_AMPLITUDE: 1.88
MDC_IDC_SET_LEADCHNL_RV_PACING_CAPTURE_MODE: NORMAL
MDC_IDC_SET_LEADCHNL_RV_PACING_POLARITY: NORMAL
MDC_IDC_SET_LEADCHNL_RV_PACING_PULSEWIDTH: 0.4 MS
MDC_IDC_SET_LEADCHNL_RV_SENSING_POLARITY: NORMAL
MDC_IDC_SET_LEADCHNL_RV_SENSING_SENSITIVITY: 5.6 MV
MDC_IDC_SET_ZONE_DETECTION_INTERVAL: 375 MS
MDC_IDC_SET_ZONE_STATUS: NORMAL
MDC_IDC_SET_ZONE_STATUS: NORMAL
MDC_IDC_SET_ZONE_TYPE: NORMAL
MDC_IDC_SET_ZONE_TYPE: NORMAL
MDC_IDC_SET_ZONE_VENDOR_TYPE: NORMAL
MDC_IDC_SET_ZONE_VENDOR_TYPE: NORMAL
MDC_IDC_STAT_AT_BURDEN_PERCENT: 0 %
MDC_IDC_STAT_AT_DTM_END: NORMAL
MDC_IDC_STAT_AT_DTM_START: NORMAL
MDC_IDC_STAT_BRADY_DTM_END: NORMAL
MDC_IDC_STAT_BRADY_DTM_START: NORMAL
MDC_IDC_STAT_BRADY_RV_PERCENT_PACED: 5 %
MDC_IDC_STAT_EPISODE_RECENT_COUNT: 0
MDC_IDC_STAT_EPISODE_RECENT_COUNT: 112
MDC_IDC_STAT_EPISODE_RECENT_COUNT_DTM_END: NORMAL
MDC_IDC_STAT_EPISODE_RECENT_COUNT_DTM_END: NORMAL
MDC_IDC_STAT_EPISODE_RECENT_COUNT_DTM_START: NORMAL
MDC_IDC_STAT_EPISODE_RECENT_COUNT_DTM_START: NORMAL
MDC_IDC_STAT_EPISODE_TYPE: NORMAL
MDC_IDC_STAT_EPISODE_TYPE: NORMAL

## 2024-12-02 ENCOUNTER — OFFICE VISIT (OUTPATIENT)
Dept: CARDIOLOGY | Facility: CLINIC | Age: 89
End: 2024-12-02
Payer: MEDICARE

## 2024-12-02 VITALS
DIASTOLIC BLOOD PRESSURE: 72 MMHG | HEART RATE: 84 BPM | RESPIRATION RATE: 16 BRPM | WEIGHT: 165 LBS | BODY MASS INDEX: 25.9 KG/M2 | HEIGHT: 67 IN | SYSTOLIC BLOOD PRESSURE: 138 MMHG

## 2024-12-02 DIAGNOSIS — I48.0 PAROXYSMAL ATRIAL FIBRILLATION (H): ICD-10-CM

## 2024-12-02 DIAGNOSIS — Z95.0 PACEMAKER: ICD-10-CM

## 2024-12-02 DIAGNOSIS — R94.30 EJECTION FRACTION < 50%: ICD-10-CM

## 2024-12-02 DIAGNOSIS — I47.29 NONSUSTAINED VENTRICULAR TACHYCARDIA (H): ICD-10-CM

## 2024-12-02 DIAGNOSIS — Z95.0 CARDIAC PACEMAKER IN SITU: Chronic | ICD-10-CM

## 2024-12-02 DIAGNOSIS — Z95.5 HISTORY OF CORONARY ARTERY STENT PLACEMENT: ICD-10-CM

## 2024-12-02 DIAGNOSIS — I50.22 CHRONIC SYSTOLIC CONGESTIVE HEART FAILURE (H): ICD-10-CM

## 2024-12-02 DIAGNOSIS — I49.5 SICK SINUS SYNDROME (H): ICD-10-CM

## 2024-12-02 DIAGNOSIS — I48.91 ATRIAL FIBRILLATION, UNSPECIFIED TYPE (H): ICD-10-CM

## 2024-12-02 DIAGNOSIS — I25.10 ATHEROSCLEROSIS OF NATIVE CORONARY ARTERY OF NATIVE HEART WITHOUT ANGINA PECTORIS: ICD-10-CM

## 2024-12-02 PROCEDURE — 99214 OFFICE O/P EST MOD 30 MIN: CPT | Performed by: INTERNAL MEDICINE

## 2024-12-02 RX ORDER — LOSARTAN POTASSIUM 25 MG/1
25 TABLET ORAL DAILY
Qty: 90 TABLET | Refills: 3 | Status: SHIPPED | OUTPATIENT
Start: 2024-12-02

## 2024-12-02 RX ORDER — SPIRONOLACTONE 25 MG/1
1 TABLET ORAL
COMMUNITY
Start: 2024-11-16

## 2024-12-02 NOTE — LETTER
12/2/2024    Jacquelin Francis MD  [address]  [facility] [address]    RE: Skip ROBERTS Trent       Dear Colleague,     I had the pleasure of seeing Skip Newman in the [facility].    Cardiology Clinic Office Note    Assessment / Plan:    1.  Coronary artery disease with travis-infarct ischemia noted on nuclear stress test but no anginal symptoms.  Will plan to continue to manage medically.  2.  Persistent atrial fibrillation with episodes of rapid ventricular response asymptomatic.  Will plan to continue present dose of metoprolol.  3.  Essential hypertension with ischemic cardiomyopathy.  Mild renal insufficiency also would benefit from resumption of losartan.  Intolerant of Farxiga, Xarelto was cost prohibitive.  Will resume low-dose losartan 25 mg daily and check basic metabolic profile in 1 week.        Plan follow-up in 1 year.        ______________________________________________________________________    Subjective:    I had the opportunity to see Skip Newman at the [facility]. 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  He has a history of bursts of nonsustained ventricular tachycardia since 2015, status post pacemaker placement for sick sinus syndrome in 2009.  Echocardiogram 2020 showed an ejection fraction of 43% with inferior hypokinesis.  He developed atrial fibrillation with rapid ventricular response 11/2023, this was associated with a decrease in his ejection fraction in the 35% range.  He was hospitalized 3/2024 with weakness, no heart failure was suspected at that time, no tachycardia was noted.  BNP was 13,000.  Device check in April suggested 95% of the time his heart rate is less than 120. MPI in June showed an EF of 38%  with fixed and reversible perfusion defects in the inferolateral wall.  He returns today for routine follow-up, accompanied by daughter.     Earlier this fall he had an episode of hyperkalemia, spironolactone and losartan were discontinued.  He subsequent had improvement in his potassium level and eventually spironolactone was resumed.  He had tolerated it for years prior to this episode.  At this point he feels well.  And in home caretaker is assisting in caring for his wife, allowing him to be more active within the community where he lives.  He is doing more walking.  He feels well.  He has no awareness of lightheadedness or dizziness or chest pain.  He otherwise offers no complaints today.    Device interrogation 11/20/2024 shows: Since 9/27/2024 112 VHR episode, mostly marker channel only, appears to be RVR during AFib. Per review of the strips the length of time the rapid ventricular response appears to be largely less than 30 seconds    ______________________________________________________________________    Problem List:  Patient Active Problem List   Diagnosis     Hyperlipidemia     Hypertension     CAD -- S/P Stents 2001, 2012, 2014     Nonsustained ventricular tachycardia (H)     SSS -- S/P dual chamber Pacemaker     Primary osteoarthritis of left knee     Gout     Recurrent kidney stones     Status post total left knee replacement     Primary osteoarthritis of right knee     Thrombocytopenia (H)     Spinal stenosis, lumbar region, with neurogenic claudication     Pacemaker     MI (myocardial infarction) (H)     Hypertension     Coronary artery disease     Chronic kidney disease     Paroxysmal atrial fib -- on Warfarin     CKD (chronic kidney disease) stage 3     Elevated troponin     Subtherapeutic international normalized ratio (INR)     Fall-- found down, > 12 hrs     Non-traumatic rhabdomyolysis     Atrial fibrillation, unspecified type (H)     History of stroke     Chronic systolic CHF -- EF 30-35%  on 12/21/23     Aortic regurgitation -- 2+ on Echo 12/21/23     Moderate Pulm HTN -- Echo 12/21/23 PAP 36 + RAP     Chronic Left rotator cuff tear     Generalized weakness     Elevated brain natriuretic peptide (BNP) level     Medical History:  Past Medical History:   Diagnosis Date     A-fib (H)      Anemia      Arrhythmia      Arthritis      Chronic kidney disease      Claudication (H)      Coronary artery disease      Gout      Hearing loss     high frequency     History of transfusion      Hyperlipidemia      Hypertension      Impotence of organic origin      Kidney stone     recurrent     MI (myocardial infarction) (H)      Primary osteoarthritis of both knees      Sick sinus syndrome -- S/P [employer]tr[employer] PPM      Skin cancer      Spinal stenosis, lumbar region, with neurogenic claudication      Syncope      Thrombocytopenia (H)      VT (ventricular tachycardia) (H)      Surgical History:  Past Surgical History:   Procedure Laterality Date     CARDIAC CATHETERIZATION       CORONARY STENT PLACEMENT  2001    LCx     HEMORRHOID SURGERY  1989     INSERT / REPLACE / REMOVE PACEMAKER  01/2009    Medtronic     OTHER SURGICAL HISTORY  11/2018    l3-l4 lumbar stenosis     TONSILLECTOMY      child     TOTAL HIP ARTHROPLASTY Left      [facility] TOTAL KNEE ARTHROPLASTY Left 7/8/2019    Procedure: LEFT TOTAL KNEE ARTHROPLASTY;  Surgeon: Bowen Padgett MD;  Location: [facility] OR;  Service: Orthopedics     [facility] TOTAL KNEE ARTHROPLASTY Right 1/13/2020    Procedure: RIGHT TOTAL KNEE ARTHROPLASTY;  Surgeon: Bowen Padgett MD;  Location: [facility] OR;  Service: Orthopedics     [facility] CORONARY STENT PERCUT, INITIAL VESSEL      Description: Cath Stent Placement;  Recorded: 10/01/2012;  Comments: Mid LCx     [facility] CORONARY STENT PERCUT, INITIAL VESSEL      Description: Cath Stent Placement;  Recorded: 10/21/2014;  Comments: Mid LCx     Social History:  Social History     Socioeconomic History     Marital status:       Spouse name: Not on file     Number of children: Not on file     Years of education: Not on file     Highest education level: Not on file   Occupational History     Not on file   Tobacco Use     Smoking status: Never     Smokeless tobacco: Never   Substance and Sexual Activity     Alcohol use: No     Drug use: Not Currently     Sexual activity: Not on file   Other Topics Concern     Not on file   Social History Narrative     Not on file     Social Drivers of Health     Financial Resource Strain: Low Risk  (8/18/2023)    [facility]    Financial Resource Strain      Difficulty of Paying Living Expenses: 3      Difficulty of Paying Living Expenses: Not on file   Food Insecurity: Food Insecurity Present (8/18/2023)    [facility] [employer] [facility] [employer] [facility]    Food Insecurity      Worried About Running Out of Food in the Last Year: 2   Transportation Needs: No Transportation Needs (8/18/2023)    [facility] [employer] [facility]    Transportation Needs      Lack of Transportation (Medical): 1   Physical Activity: Not on file   Stress: Not on file   Social Connections: Unknown (8/18/2024)    [facility]    Social Connections      Frequency of Communication with Friends and Family: Not on file   Interpersonal Safety: Not on file   Housing Stability: High Risk (8/18/2023)    [facility] [employer] [facility] [employer] [facility]    Housing Stability      Unable to Pay for Housing in the Last Year: 3       Exercise History:  Walks without assistance    Review of Systems:      Positive for shortness of breath with activity, easy bleeding.  12 point review of systems otherwise negative     Please refer  above for cardiac ROS details.         Family History:  Family History   Problem Relation Age of Onset     Heart Failure Mother      Heart Failure Father          Allergies:  Allergies   Allergen Reactions     Lisinopril Cough     Indomethacin Rash     Naproxen Rash     Medications:  Current Outpatient Medications   Medication Sig Dispense Refill     acetaminophen (TYLENOL) 500 MG tablet [ACETAMINOPHEN (TYLENOL) 500 MG TABLET] Take 2 tablets (1,000 mg total) by mouth 3 (three) times a day. (Patient taking differently: Take 1,000 mg by mouth every 6 hours as needed.)  0     atorvastatin (LIPITOR) 40 MG tablet Take 20 mg by mouth every evening       chlorthalidone (HYGROTEN) 25 MG tablet Take 25 mg by mouth every evening       metoprolol succinate ER (TOPROL XL) 100 MG 24 hr tablet Take 1.5 tablets (150 mg) by mouth every evening 135 tablet 3     MULTIVITAMIN (MULTIPLE VITAMIN ORAL) Take 1 tablet by mouth every evening       omega 3-dha-epa-fish oil (FISH OIL) 1,000 mg (120 mg-180 mg) cap Take 1 capsule by mouth every evening       spironolactone (ALDACTONE) 25 MG tablet Take 1 tablet by mouth daily at 2 pm.       warfarin ANTICOAGULANT (COUMADIN) 5 MG tablet Take 2.5 mg by mouth every evening         Objective:   Wt Readings from Last 3 Encounters:   12/02/24 74.8 kg (165 lb)   09/30/24 75.3 kg (166 lb)   07/23/24 76.2 kg (168 lb)     Vital signs:  Wt 74.8 kg (165 lb)   BMI 25.84 kg/m        Physical Exam:    General Appearance : Awake, Alert, No acute distress  HEENT: No Scleral icterus; the mucous membranes were pink and moist.  Conjunctivae not injected  Neck:  No cervical bruits, jugular venous distention, or thyromegaly   Chest: The spine was straight. Chest wall Pacer site without erythema warmth or tenderness  Lungs: Respirations unlabored; the lungs are clear to auscultation.  No wheezing   Cardiovascular:   Normal point of maximal impulse.  Auscultation revealsIrregular first and second heart sounds with no  "murmurs, rubs, or gallops.  Carotid, radial, and dorsalis pedal pulses are intact and symmetric.    Abdomen: No organomegaly, masses, bruits, or tenderness. Bowels sounds are present  Extremities:  No clubbing, cyanosis. No edema  Skin: No rash, bruising  Musculoskeletal: No tenderness.  Neurologic: Alert and oriented ×3. Speech is fluent.     Lab Results    Chemistry/lipid CBC Cardiac Enzymes/BNP/TSH/INR   No results for input(s): \"CHOL\", \"HDL\", \"LDL\", \"TRIG\", \"CHOLHDLRATIO\" in the last 03299 hours.  No results for input(s): \"LDL\" in the last 69263 hours.  Recent Labs   Lab Test 03/14/24  0743      POTASSIUM 4.2   CHLORIDE 105   CO2 22   GLC 97   BUN 53.7*   CR 1.36*   GFRESTIMATED 49*   JUAN 9.0     Recent Labs   Lab Test 03/14/24 0743 03/13/24 2053 03/13/24 1903   CR 1.36* 1.53* 1.42*     No results for input(s): \"A1C\" in the last 58474 hours.       Recent Labs   Lab Test 03/14/24  0743   WBC 5.0   HGB 11.2*   HCT 35.3*   MCV 98   PLT 86*     Recent Labs   Lab Test 03/14/24  0743 03/13/24 1903 02/16/24  0730   HGB 11.2* 12.7* 12.1*    No results for input(s): \"TROPONINI\" in the last 29633 hours.  Recent Labs   Lab Test 03/13/24 1903   NTBNPI 13,725*     No results for input(s): \"TSH\" in the last 02347 hours.  Recent Labs   Lab Test 03/14/24  0743 03/13/24 2053 02/16/24  0730   INR 1.86* 1.92* 1.49*            Pharmacologic nuclear stress test 6/2024:     The regadenoson nuclear stress test is abnormal. There is a medium-sized area of nontransmural infarction in the inferior and inferolateral segment(s) of the left ventricle associated with a medium-sized area of mild travis-infarct ischemia. This appears to be in the left circumflex and/or right coronary artery distribution.     The left ventricular ejection fraction at stress is reduced at 38% with inferior and inferolateral wall hypokinesis.     The patient is at an intermediate risk of future cardiac ischemic events.     A prior study was conducted on " 6/11/2021. Compared to the prior study, similar perfusion abnormalities are seen but there has been a decline in left ventricular systolic function.    Echocardiogram 4/2024:  Left ventricular function is decreased. The ejection fraction is 35-40%  (moderately reduced).  There is borderline concentric left ventricular hypertrophy.  There is moderate global hypokinesia of the left ventricle.  Normal right ventricle size and systolic function.      ANNA VELASQUEZ MD [facility]  [phone]    This note created using Dragon voice recognition software.  Sound alike errors may have escaped editing.             Thank you for allowing me to participate in the care of your patient.      Sincerely,     Anna Velasquez MD     [facility]  cc:   Anna Velasquez MD  [address]  [place] [address]

## 2024-12-02 NOTE — PATIENT INSTRUCTIONS
Restart losartan at a decreased dose, 25 mg daily.  We will check a basic metabolic profile in 1 week's time to make sure your potassium is not climbing again.  Continue your efforts at getting some walking in every day.  Follow-up 1 year with pacemaker check.

## 2024-12-02 NOTE — PROGRESS NOTES
Cardiology Clinic Office Note    Assessment / Plan:    1.  Coronary artery disease with travis-infarct ischemia noted on nuclear stress test but no anginal symptoms.  Will plan to continue to manage medically.  2.  Persistent atrial fibrillation with episodes of rapid ventricular response asymptomatic.  Will plan to continue present dose of metoprolol.  3.  Essential hypertension with ischemic cardiomyopathy.  Mild renal insufficiency also would benefit from resumption of losartan.  Intolerant of Farxiga, Xarelto was cost prohibitive.  Will resume low-dose losartan 25 mg daily and check basic metabolic profile in 1 week.        Plan follow-up in 1 year.        ______________________________________________________________________    Subjective:    I had the opportunity to see Skip Newman at the [facility]. 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  He has a history of bursts of nonsustained ventricular tachycardia since 2015, status post pacemaker placement for sick sinus syndrome in 2009.  Echocardiogram 2020 showed an ejection fraction of 43% with inferior hypokinesis.  He developed atrial fibrillation with rapid ventricular response 11/2023, this was associated with a decrease in his ejection fraction in the 35% range.  He was hospitalized 3/2024 with weakness, no heart failure was suspected at that time, no tachycardia was noted.  BNP was 13,000.  Device check in April suggested 95% of the time his heart rate is less than 120. MPI in June showed an EF of 38% with fixed and reversible perfusion defects in the inferolateral wall.  He returns today for routine follow-up, accompanied by daughter.     Earlier this fall he had an episode of hyperkalemia, spironolactone and  losartan were discontinued.  He subsequent had improvement in his potassium level and eventually spironolactone was resumed.  He had tolerated it for years prior to this episode.  At this point he feels well.  And in home caretaker is assisting in caring for his wife, allowing him to be more active within the community where he lives.  He is doing more walking.  He feels well.  He has no awareness of lightheadedness or dizziness or chest pain.  He otherwise offers no complaints today.    Device interrogation 11/20/2024 shows: Since 9/27/2024 112 VHR episode, mostly marker channel only, appears to be RVR during AFib. Per review of the strips the length of time the rapid ventricular response appears to be largely less than 30 seconds    ______________________________________________________________________    Problem List:  Patient Active Problem List   Diagnosis    Hyperlipidemia    Hypertension    CAD -- S/P Stents 2001, 2012, 2014    Nonsustained ventricular tachycardia (H)    SSS -- S/P dual chamber Pacemaker    Primary osteoarthritis of left knee    Gout    Recurrent kidney stones    Status post total left knee replacement    Primary osteoarthritis of right knee    Thrombocytopenia (H)    Spinal stenosis, lumbar region, with neurogenic claudication    Pacemaker    MI (myocardial infarction) (H)    Hypertension    Coronary artery disease    Chronic kidney disease    Paroxysmal atrial fib -- on Warfarin    CKD (chronic kidney disease) stage 3    Elevated troponin    Subtherapeutic international normalized ratio (INR)    Fall-- found down, > 12 hrs    Non-traumatic rhabdomyolysis    Atrial fibrillation, unspecified type (H)    History of stroke    Chronic systolic CHF -- EF 30-35% on 12/21/23    Aortic regurgitation -- 2+ on Echo 12/21/23    Moderate Pulm HTN -- Echo 12/21/23 PAP 36 + RAP    Chronic Left rotator cuff tear    Generalized weakness    Elevated brain natriuretic peptide (BNP) level     Medical  History:  Past Medical History:   Diagnosis Date    A-fib (H)     Anemia     Arrhythmia     Arthritis     Chronic kidney disease     Claudication (H)     Coronary artery disease     Gout     Hearing loss     high frequency    History of transfusion     Hyperlipidemia     Hypertension     Impotence of organic origin     Kidney stone     recurrent    MI (myocardial infarction) (H)     Primary osteoarthritis of both knees     Sick sinus syndrome -- S/P [employer] PPM     Skin cancer     Spinal stenosis, lumbar region, with neurogenic claudication     Syncope     Thrombocytopenia (H)     VT (ventricular tachycardia) (H)      Surgical History:  Past Surgical History:   Procedure Laterality Date    CARDIAC CATHETERIZATION      CORONARY STENT PLACEMENT  2001    LCx    HEMORRHOID SURGERY  1989    INSERT / REPLACE / REMOVE PACEMAKER  01/2009    [employer]    OTHER SURGICAL HISTORY  11/2018    l3-l4 lumbar stenosis    TONSILLECTOMY      child    TOTAL HIP ARTHROPLASTY Left     [facility] TOTAL KNEE ARTHROPLASTY Left 7/8/2019    Procedure: LEFT TOTAL KNEE ARTHROPLASTY;  Surgeon: Bowen Padgett MD;  Location: [facility];  Service: Orthopedics    [facility] TOTAL KNEE ARTHROPLASTY Right 1/13/2020    Procedure: RIGHT TOTAL KNEE ARTHROPLASTY;  Surgeon: Bowen Padgett MD;  Location: [facility];  Service: Orthopedics    [facility] CORONARY STENT PERCUT, INITIAL VESSEL      Description: Cath Stent Placement;  Recorded: 10/01/2012;  Comments: Mid LCx    [facility] CORONARY STENT PERCUT, INITIAL VESSEL      Description: Cath Stent Placement;  Recorded: 10/21/2014;  Comments: Mid LCx     Social History:  Social History     Socioeconomic History    Marital status:      Spouse name: Not on file    Number of children: Not on file    Years of education: Not on file    Highest education level: Not on file   Occupational History    Not on file   Tobacco Use    Smoking status: Never    Smokeless tobacco: Never   Substance and Sexual Activity     Alcohol use: No    Drug use: Not Currently    Sexual activity: Not on file   Other Topics Concern    Not on file   Social History Narrative    Not on file     Social Drivers of Health     Financial Resource Strain: Low Risk  (8/18/2023)    [facility] from [employer] [facility], [facility]    Financial Resource Strain     Difficulty of Paying Living Expenses: 3     Difficulty of Paying Living Expenses: Not on file   Food Insecurity: Food Insecurity Present (8/18/2023)    [facility] [employer], [facility] [employer] [facility]    Food Insecurity     Worried About Running Out of Food in the Last Year: 2   Transportation Needs: No Transportation Needs (8/18/2023)    [facility] [employer] [facility]    Transportation Needs     Lack of Transportation (Medical): 1   Physical Activity: Not on file   Stress: Not on file   Social Connections: Unknown (8/18/2024)    Received from [facility] [employer] [facility]    Social Connections     Frequency of Communication with Friends and Family: Not on file   Interpersonal Safety: Not on file   Housing Stability: High Risk (8/18/2023)    [facility] [facility] [employer] [facility]    Housing Stability     Unable to Pay for Housing in the Last Year: 3       Exercise History:  Walks without assistance    Review of Systems:      Positive for shortness of breath with activity, easy bleeding.  12 point review of systems otherwise negative     Please refer above for cardiac ROS details.         Family History:  Family History   Problem Relation Age of Onset    Heart Failure Mother     Heart Failure Father          Allergies:  Allergies   Allergen Reactions    Lisinopril Cough    Indomethacin Rash    Naproxen Rash     Medications:  Current  Outpatient Medications   Medication Sig Dispense Refill    acetaminophen (TYLENOL) 500 MG tablet [ACETAMINOPHEN (TYLENOL) 500 MG TABLET] Take 2 tablets (1,000 mg total) by mouth 3 (three) times a day. (Patient taking differently: Take 1,000 mg by mouth every 6 hours as needed.)  0    atorvastatin (LIPITOR) 40 MG tablet Take 20 mg by mouth every evening      chlorthalidone (HYGROTEN) 25 MG tablet Take 25 mg by mouth every evening      metoprolol succinate ER (TOPROL XL) 100 MG 24 hr tablet Take 1.5 tablets (150 mg) by mouth every evening 135 tablet 3    MULTIVITAMIN (MULTIPLE VITAMIN ORAL) Take 1 tablet by mouth every evening      omega 3-dha-epa-fish oil (FISH OIL) 1,000 mg (120 mg-180 mg) cap Take 1 capsule by mouth every evening      spironolactone (ALDACTONE) 25 MG tablet Take 1 tablet by mouth daily at 2 pm.      warfarin ANTICOAGULANT (COUMADIN) 5 MG tablet Take 2.5 mg by mouth every evening         Objective:   Wt Readings from Last 3 Encounters:   12/02/24 74.8 kg (165 lb)   09/30/24 75.3 kg (166 lb)   07/23/24 76.2 kg (168 lb)     Vital signs:  Wt 74.8 kg (165 lb)   BMI 25.84 kg/m        Physical Exam:    General Appearance : Awake, Alert, No acute distress  HEENT: No Scleral icterus; the mucous membranes were pink and moist.  Conjunctivae not injected  Neck:  No cervical bruits, jugular venous distention, or thyromegaly   Chest: The spine was straight. Chest wall Pacer site without erythema warmth or tenderness  Lungs: Respirations unlabored; the lungs are clear to auscultation.  No wheezing   Cardiovascular:   Normal point of maximal impulse.  Auscultation revealsIrregular first and second heart sounds with no murmurs, rubs, or gallops.  Carotid, radial, and dorsalis pedal pulses are intact and symmetric.    Abdomen: No organomegaly, masses, bruits, or tenderness. Bowels sounds are present  Extremities:  No clubbing, cyanosis. No edema  Skin: No rash, bruising  Musculoskeletal: No tenderness.  Neurologic:  "Alert and oriented ×3. Speech is fluent.     Lab Results    Chemistry/lipid CBC Cardiac Enzymes/BNP/TSH/INR   No results for input(s): \"CHOL\", \"HDL\", \"LDL\", \"TRIG\", \"CHOLHDLRATIO\" in the last 28279 hours.  No results for input(s): \"LDL\" in the last 09674 hours.  Recent Labs   Lab Test 03/14/24  0743      POTASSIUM 4.2   CHLORIDE 105   CO2 22   GLC 97   BUN 53.7*   CR 1.36*   GFRESTIMATED 49*   JUAN 9.0     Recent Labs   Lab Test 03/14/24 0743 03/13/24 2053 03/13/24 1903   CR 1.36* 1.53* 1.42*     No results for input(s): \"A1C\" in the last 49394 hours.       Recent Labs   Lab Test 03/14/24  0743   WBC 5.0   HGB 11.2*   HCT 35.3*   MCV 98   PLT 86*     Recent Labs   Lab Test 03/14/24  0743 03/13/24 1903 02/16/24  0730   HGB 11.2* 12.7* 12.1*    No results for input(s): \"TROPONINI\" in the last 56631 hours.  Recent Labs   Lab Test 03/13/24 1903   NTBNPI 13,725*     No results for input(s): \"TSH\" in the last 35102 hours.  Recent Labs   Lab Test 03/14/24  0743 03/13/24 2053 02/16/24  0730   INR 1.86* 1.92* 1.49*            Pharmacologic nuclear stress test 6/2024:    The regadenoson nuclear stress test is abnormal. There is a medium-sized area of nontransmural infarction in the inferior and inferolateral segment(s) of the left ventricle associated with a medium-sized area of mild travis-infarct ischemia. This appears to be in the left circumflex and/or right coronary artery distribution.    The left ventricular ejection fraction at stress is reduced at 38% with inferior and inferolateral wall hypokinesis.    The patient is at an intermediate risk of future cardiac ischemic events.    A prior study was conducted on 6/11/2021. Compared to the prior study, similar perfusion abnormalities are seen but there has been a decline in left ventricular systolic function.    Echocardiogram 4/2024:  Left ventricular function is decreased. The ejection fraction is 35-40%  (moderately reduced).  There is borderline concentric " left ventricular hypertrophy.  There is moderate global hypokinesia of the left ventricle.  Normal right ventricle size and systolic function.      ANNA VELASQUEZ MD [facility]  [phone]    This note created using Dragon voice recognition software.  Sound alike errors may have escaped editing.

## 2024-12-21 ENCOUNTER — APPOINTMENT (OUTPATIENT)
Dept: CT IMAGING | Facility: HOSPITAL | Age: 89
End: 2024-12-21
Attending: EMERGENCY MEDICINE
Payer: MEDICARE

## 2024-12-21 ENCOUNTER — HOSPITAL ENCOUNTER (EMERGENCY)
Facility: HOSPITAL | Age: 89
Discharge: HOME | End: 2024-12-21
Attending: EMERGENCY MEDICINE | Admitting: EMERGENCY MEDICINE
Payer: MEDICARE

## 2024-12-21 ENCOUNTER — APPOINTMENT (OUTPATIENT)
Dept: RADIOLOGY | Facility: HOSPITAL | Age: 89
End: 2024-12-21
Attending: EMERGENCY MEDICINE
Payer: MEDICARE

## 2024-12-21 VITALS
HEIGHT: 67 IN | WEIGHT: 165.34 LBS | TEMPERATURE: 98.1 F | RESPIRATION RATE: 14 BRPM | HEART RATE: 99 BPM | BODY MASS INDEX: 25.95 KG/M2 | SYSTOLIC BLOOD PRESSURE: 132 MMHG | DIASTOLIC BLOOD PRESSURE: 66 MMHG | OXYGEN SATURATION: 96 %
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
## 2024-12-21 DIAGNOSIS — R79.1 SUPRATHERAPEUTIC INR: ICD-10-CM
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
## 2024-12-21 LAB
ANION GAP SERPL CALCULATED.3IONS-SCNC: 13 MMOL/L (ref 7–15)
BASOPHILS # BLD AUTO: 0 10E3/UL (ref 0–0.2)
BASOPHILS NFR BLD AUTO: 0 %
BUN SERPL-MCNC: 65.9 MG/DL (ref 8–23)
CALCIUM SERPL-MCNC: 9.6 MG/DL (ref 8.8–10.4)
CHLORIDE SERPL-SCNC: 106 MMOL/L (ref 98–107)
CREAT SERPL-MCNC: 1.63 MG/DL (ref 0.67–1.17)
EGFRCR SERPLBLD CKD-EPI 2021: 40 ML/MIN/1.73M2
EOSINOPHIL # BLD AUTO: 0 10E3/UL (ref 0–0.7)
EOSINOPHIL NFR BLD AUTO: 0 %
ERYTHROCYTE [DISTWIDTH] IN BLOOD BY AUTOMATED COUNT: 14.1 % (ref 10–15)
FLUAV RNA SPEC QL NAA+PROBE: NEGATIVE
FLUBV RNA RESP QL NAA+PROBE: NEGATIVE
GLUCOSE SERPL-MCNC: 151 MG/DL (ref 70–99)
HCO3 SERPL-SCNC: 21 MMOL/L (ref 22–29)
HCT VFR BLD AUTO: 39.6 % (ref 40–53)
HGB BLD-MCNC: 13.3 G/DL (ref 13.3–17.7)
IMM GRANULOCYTES # BLD: 0.1 10E3/UL
IMM GRANULOCYTES NFR BLD: 1 %
INR PPP: 3.9 (ref 0.85–1.15)
LYMPHOCYTES # BLD AUTO: 1.2 10E3/UL (ref 0.8–5.3)
LYMPHOCYTES NFR BLD AUTO: 10 %
MCH RBC QN AUTO: 31.7 PG (ref 26.5–33)
MCHC RBC AUTO-ENTMCNC: 33.6 G/DL (ref 31.5–36.5)
MCV RBC AUTO: 94 FL (ref 78–100)
MONOCYTES # BLD AUTO: 0.8 10E3/UL (ref 0–1.3)
MONOCYTES NFR BLD AUTO: 7 %
NEUTROPHILS # BLD AUTO: 9.5 10E3/UL (ref 1.6–8.3)
NEUTROPHILS NFR BLD AUTO: 82 %
NRBC # BLD AUTO: 0 10E3/UL
NRBC BLD AUTO-RTO: 0 /100
PLATELET # BLD AUTO: 112 10E3/UL (ref 150–450)
POTASSIUM SERPL-SCNC: 5.2 MMOL/L (ref 3.4–5.3)
RBC # BLD AUTO: 4.2 10E6/UL (ref 4.4–5.9)
RSV RNA SPEC NAA+PROBE: NEGATIVE
SARS-COV-2 RNA RESP QL NAA+PROBE: NEGATIVE
SODIUM SERPL-SCNC: 140 MMOL/L (ref 135–145)
WBC # BLD AUTO: 11.6 10E3/UL (ref 4–11)

## 2024-12-21 PROCEDURE — 70450 CT HEAD/BRAIN W/O DYE: CPT | Mod: MA

## 2024-12-21 PROCEDURE — 99285 EMERGENCY DEPT VISIT HI MDM: CPT | Mod: 25

## 2024-12-21 PROCEDURE — 71250 CT THORAX DX C-: CPT | Mod: MA

## 2024-12-21 PROCEDURE — 72125 CT NECK SPINE W/O DYE: CPT | Mod: MA

## 2024-12-21 PROCEDURE — 85025 COMPLETE CBC W/AUTO DIFF WBC: CPT | Performed by: EMERGENCY MEDICINE

## 2024-12-21 PROCEDURE — 85610 PROTHROMBIN TIME: CPT | Performed by: EMERGENCY MEDICINE

## 2024-12-21 PROCEDURE — 93005 ELECTROCARDIOGRAM TRACING: CPT | Performed by: EMERGENCY MEDICINE

## 2024-12-21 PROCEDURE — 80048 BASIC METABOLIC PNL TOTAL CA: CPT | Performed by: EMERGENCY MEDICINE

## 2024-12-21 PROCEDURE — 36415 COLL VENOUS BLD VENIPUNCTURE: CPT | Performed by: EMERGENCY MEDICINE

## 2024-12-21 PROCEDURE — 87637 SARSCOV2&INF A&B&RSV AMP PRB: CPT | Performed by: EMERGENCY MEDICINE

## 2024-12-21 PROCEDURE — 73080 X-RAY EXAM OF ELBOW: CPT | Mod: RT

## 2024-12-21 ASSESSMENT — ACTIVITIES OF DAILY LIVING (ADL)
ADLS_ACUITY_SCORE: 51

## 2024-12-21 NOTE — ED NOTES
Bed: [place] [address]  Expected date:   Expected time:   Means of arrival:   Comments:  Sarah, 89M, fall

## 2024-12-21 NOTE — DISCHARGE INSTRUCTIONS
Alen's INR is 3.9 today which is slightly high. Please call your primary care medical team to ask them if he should skip his coumadin tomorrow and then restart Monday.

## 2024-12-21 NOTE — ED TRIAGE NOTES
BIBA from home pt states he fell this morning around and denies pain. Pt on warfarin for afib.  Does not remember the fall.     Triage Assessment (Adult)       Row Name 12/21/24 1133          Triage Assessment    Airway WDL WDL        Respiratory WDL    Respiratory WDL WDL        Skin Circulation/Temperature WDL    Skin Circulation/Temperature WDL WDL        Cardiac WDL    Cardiac WDL WDL        Peripheral/Neurovascular WDL    Peripheral Neurovascular WDL WDL        Cognitive/Neuro/Behavioral WDL    Cognitive/Neuro/Behavioral WDL WDL

## 2024-12-21 NOTE — ED PROVIDER NOTES
EMERGENCY DEPARTMENT ENCOUNTER      NAME: Skip Newman  AGE: 89 year old male  YOB: 1934  MRN: [id number]  EVALUATION DATE & TIME: 12/21/2024 11:31 AM    PCP: Jacquelin Francis    ED PROVIDER: Mariela Tolbert M.D.      Chief Complaint   Patient presents with    Fall         FINAL IMPRESSION:  1. Fall at home, initial encounter    2. Supratherapeutic INR          ED COURSE & MEDICAL DECISION MAKING:    ED Course as of 12/21/24 1455   Sat Dec 21, 2024   1146 Patient BIB EMS s/p possible fall, with dementia, doesn't recall falling, unknown what happened, with fading scabbed wound wihtout focal tenderness to right elbow and patient on coumadin with examinaiton otherwise reassuring. Patient ok with plans to check screening labs given dementia and age 89, CT head and c-spine and CAP wihtout contrast with known chronic CKD to ensure no acute traumatic pathology given limitation 2/2 dementia is present in HPI. Trauma alert called.    1220 Family of patient now at bedside and reports he was indeed found on ground at home and thus staff called 911.   1425 Viral PCR negative and normal BMP And CBC, INR 3.9 slightly supratherapeutic. CT head and c-spine, CAP normal and XR elbow without traumatic pathology. Patient feeling in good spirits and at baseline per family earlier, will reassess now.   1453 Pt reassessed and at baseline per family who notes they feel he would be safe for discharge to home. Patient discharged after being provided with extensive anticipatory guidance and given return precautions, importance of PMD follow-up emphasized.        Pertinent Labs & Imaging studies reviewed. (See chart for details)    Medical Decision Making  Obtained supplemental history:Supplemental history obtained?: Documented in chart, Family Member/Significant Other, and EMS  Reviewed external records: External records reviewed?: No  Care impacted by chronic illness:Documented in Chart  Did you consider but not order tests?:  "Work up considered but not performed and documented in chart, if applicable  Did you interpret images independently?: Independent interpretation of ECG and images noted in documentation, when applicable.  Consultation discussion with other provider:Did you involve another provider (consultant, MH, pharmacy, etc.)?: No  Discharge. No recommendations on prescription strength medication(s). I considered admission, but discharged patient after significant clinical improvement.    MIPS: Adult Minor Head Trauma:Age 65 years or older, Currently taking anticoagulant medications: warfarin or other novel anticoagulant medications, and Loss of consciousness with short term memory deficits      At the conclusion of the encounter I discussed the results of all of the tests and the disposition. The questions were answered. The patient or family acknowledged understanding and was agreeable with the care plan.     MEDICATIONS GIVEN IN THE EMERGENCY:  Medications - No data to display    NEW PRESCRIPTIONS STARTED AT TODAY'S ER VISIT  New Prescriptions    No medications on file          =================================================================    HPI      Skip Newman is a 89 year old male with PMHx of atrial fibrillation and SSS with PPM with prior VT, hearing loss, anemia requiring transfusion, CKD, CAD who presents to the ED today via EMS with \"fall\".     He reports he doesn't recall falling, says he was up and walking around today with his wife in assisted living, was not found on the floor, not sure who called 911, doesn't recall if he fell or not overnight. Bedside RN who took triage report from EMS notes EMS did not mention any of those things and has no further information re: who called EMS or whether he actually fell or not.     Patient denies any body pain. No recent fevers or illness, wants to ultimately go back to assisted living where he cares for his wife with dementia. He says he has no loss of sensation or " strength. Medic had bandaged right elbow.       REVIEW OF SYSTEMS   All other systems reviewed and are negative except as noted above in HPI.    PAST MEDICAL HISTORY:  Past Medical History:   Diagnosis Date    A-fib (H)     Anemia     Arrhythmia     Arthritis     Chronic kidney disease     Claudication (H)     Coronary artery disease     Gout     Hearing loss     high frequency    History of transfusion     Hyperlipidemia     Hypertension     Impotence of organic origin     Kidney stone     recurrent    MI (myocardial infarction) (H)     Primary osteoarthritis of both knees     Sick sinus syndrome -- S/P [employer] PPM     Skin cancer     Spinal stenosis, lumbar region, with neurogenic claudication     Syncope     Thrombocytopenia (H)     VT (ventricular tachycardia) (H)        PAST SURGICAL HISTORY:  Past Surgical History:   Procedure Laterality Date    CARDIAC CATHETERIZATION      CORONARY STENT PLACEMENT  2001    LCx    HEMORRHOID SURGERY  1989    INSERT / REPLACE / REMOVE PACEMAKER  01/2009    [employer]    OTHER SURGICAL HISTORY  11/2018    l3-l4 lumbar stenosis    TONSILLECTOMY      child    TOTAL HIP ARTHROPLASTY Left     [facility] TOTAL KNEE ARTHROPLASTY Left 7/8/2019    Procedure: LEFT TOTAL KNEE ARTHROPLASTY;  Surgeon: oBwen Padgett MD;  Location: [facility];  Service: Orthopedics    [facility] TOTAL KNEE ARTHROPLASTY Right 1/13/2020    Procedure: RIGHT TOTAL KNEE ARTHROPLASTY;  Surgeon: Bowen Padgett MD;  Location: [facility];  Service: Orthopedics    [facility] CORONARY STENT PERCUT, INITIAL VESSEL      Description: Cath Stent Placement;  Recorded: 10/01/2012;  Comments: Mid LCx    [facility] CORONARY STENT PERCUT, INITIAL VESSEL      Description: Cath Stent Placement;  Recorded: 10/21/2014;  Comments: Mid LCx       CURRENT MEDICATIONS:    acetaminophen (TYLENOL) 500 MG tablet  atorvastatin (LIPITOR) 40 MG tablet  chlorthalidone (HYGROTEN) 25 MG tablet  metoprolol succinate ER (TOPROL XL) 100 MG 24 hr  tablet  MULTIVITAMIN (MULTIPLE VITAMIN ORAL)  omega 3-dha-epa-fish oil (FISH OIL) 1,000 mg (120 mg-180 mg) cap  spironolactone (ALDACTONE) 25 MG tablet  warfarin ANTICOAGULANT (COUMADIN) 5 MG tablet        ALLERGIES:  Allergies   Allergen Reactions    Lisinopril Cough    Indomethacin Rash    Naproxen Rash       FAMILY HISTORY:  Family History   Problem Relation Age of Onset    Heart Failure Mother     Heart Failure Father        SOCIAL HISTORY:   Social History     Socioeconomic History    Marital status:    Tobacco Use    Smoking status: Never    Smokeless tobacco: Never   Substance and Sexual Activity    Alcohol use: No    Drug use: Not Currently     Social Drivers of Health     Financial Resource Strain: Low Risk  (8/18/2023)    [facility] [employer] [facility]    Financial Resource Strain     Difficulty of Paying Living Expenses: 3   Food Insecurity: Food Insecurity Present (8/18/2023)    [facility]    Food Insecurity     Worried About Running Out of Food in the Last Year: 2   Transportation Needs: No Transportation Needs (8/18/2023)    Received from [facility], [employer] [facility]    Transportation Needs     Lack of Transportation (Medical): 1    Received from [facility]    Social Connections   Housing Stability: High Risk (8/18/2023)    Received from [facility], [employer] [facility]    Housing Stability     Unable to Pay for Housing in the Last Year: 3       VITALS:  Patient Vitals for the past 24 hrs:   BP Temp Temp src Pulse Resp SpO2 Height Weight   12/21/24 1430 132/66 -- -- 99 -- 96 % -- --   12/21/24 1330 -- -- -- 82 -- 97 % -- --   12/21/24 1315 -- -- -- 85 14 99 % -- --   12/21/24  "1300 -- -- -- 80 16 98 % -- --   12/21/24 1230 134/65 -- -- 85 16 98 % -- --   12/21/24 1215 (!) 143/63 -- -- 86 16 100 % -- --   12/21/24 1200 128/75 -- -- 86 27 97 % -- --   12/21/24 1145 132/67 -- -- 88 16 94 % -- --   12/21/24 1141 -- -- -- -- -- -- 1.702 m (5' 7\") 75 kg (165 lb 5.5 oz)   12/21/24 1137 (!) 152/90 98.1  F (36.7  C) Oral 98 20 98 % -- --       PHYSICAL EXAM    VITAL SIGNS: /66   Pulse 99   Temp 98.1  F (36.7  C) (Oral)   Resp 14   Ht 1.702 m (5' 7\")   Wt 75 kg (165 lb 5.5 oz)   SpO2 96%   BMI 25.90 kg/m     GENERAL: Awake, alert.  In no acute distress. GCS 15  HEENT: Normocephalic, atraumatic.  Pupils equal, round and reactive.  Conjunctiva normal.  EOMI. No garcias sign, no racoon eyes, no mastoid tenderness, no hemotympanum, no facial instability, no nasal bridge pain, no nasal septal hematoma, no intraoral lacerations, no loose teeth, no mandible pain or deformity  NECK: No stridor or apparent deformity. No midline pain to palpation.  PULMONARY: Symmetrical breath sounds without distress.  Lungs clear to auscultation bilaterally without wheezes, rhonchi or rales.  CARDIO: Regular rate and rhythm.  No significant murmur, rub or gallop.  Radial pulses strong and symmetrical.  THORAX: No focal chest wall deformity or crepitus  BACK: No focal tenderness or deformity to each vertebral level in midline  ABDOMINAL: Abdomen soft, non-distended and non-tender to palpation.  No CVAT, BL.  EXTREMITIES: No lower extremity swelling or edema. Pelvis stable and without focal tenderness. Bilateral pedal pulses 2+ and equal.  NEURO: Alert and oriented to person, place and time.  Cranial nerves grossly intact.  No focal motor deficit. Sensation globally intact.  PSYCH: Normal mood and affect  SKIN: Right forearm fading bruise and scabbed approximated skin tear    LAB:  All pertinent labs reviewed and interpreted.  Results for orders placed or performed during the hospital encounter of 12/21/24   CT " Head w/o Contrast    Impression    IMPRESSION:  HEAD CT:  1.  No CT evidence for acute intracranial process.  2.  Right posterior parietal scalp contusion without associated fracture.  3.  Brain atrophy and presumed chronic ischemic changes similar to the previous exam.    CERVICAL SPINE CT:  1.  No CT evidence for acute fracture or post traumatic subluxation.  2.  Unchanged multilevel cervical spondylosis.   CT Cervical Spine w/o Contrast    Impression    IMPRESSION:  HEAD CT:  1.  No CT evidence for acute intracranial process.  2.  Right posterior parietal scalp contusion without associated fracture.  3.  Brain atrophy and presumed chronic ischemic changes similar to the previous exam.    CERVICAL SPINE CT:  1.  No CT evidence for acute fracture or post traumatic subluxation.  2.  Unchanged multilevel cervical spondylosis.   CT Chest Abdomen Pelvis w/o Contrast    Impression    IMPRESSION:  No acute traumatic finding in the chest, abdomen or pelvis.     Elbow XR, G/E 3 views, right    Impression    IMPRESSION: No acute fracture or subluxation. Joint spaces are preserved. No joint effusion. No erosion. Corticated heterotopic ossification adjacent to the olecranon and lateral epicondyle. Spurring of the medial epicondyle and radial tuberosity.   Vascular calcifications. Intravenous catheter overlies the antecubital fossa.   Result Value Ref Range    INR 3.90 (H) 0.85 - 1.15   Basic metabolic panel   Result Value Ref Range    Sodium 140 135 - 145 mmol/L    Potassium 5.2 3.4 - 5.3 mmol/L    Chloride 106 98 - 107 mmol/L    Carbon Dioxide (CO2) 21 (L) 22 - 29 mmol/L    Anion Gap 13 7 - 15 mmol/L    Urea Nitrogen 65.9 (H) 8.0 - 23.0 mg/dL    Creatinine 1.63 (H) 0.67 - 1.17 mg/dL    GFR Estimate 40 (L) >60 mL/min/1.73m2    Calcium 9.6 8.8 - 10.4 mg/dL    Glucose 151 (H) 70 - 99 mg/dL   Influenza A/B, RSV and SARS-CoV2 PCR (COVID-19) Nasopharyngeal    Specimen: Nasopharyngeal; Swab   Result Value Ref Range    Influenza A  PCR Negative Negative    Influenza B PCR Negative Negative    RSV PCR Negative Negative    SARS CoV2 PCR Negative Negative   CBC with platelets and differential   Result Value Ref Range    WBC Count 11.6 (H) 4.0 - 11.0 10e3/uL    RBC Count 4.20 (L) 4.40 - 5.90 10e6/uL    Hemoglobin 13.3 13.3 - 17.7 g/dL    Hematocrit 39.6 (L) 40.0 - 53.0 %    MCV 94 78 - 100 fL    MCH 31.7 26.5 - 33.0 pg    MCHC 33.6 31.5 - 36.5 g/dL    RDW 14.1 10.0 - 15.0 %    Platelet Count 112 (L) 150 - 450 10e3/uL    % Neutrophils 82 %    % Lymphocytes 10 %    % Monocytes 7 %    % Eosinophils 0 %    % Basophils 0 %    % Immature Granulocytes 1 %    NRBCs per 100 WBC 0 <1 /100    Absolute Neutrophils 9.5 (H) 1.6 - 8.3 10e3/uL    Absolute Lymphocytes 1.2 0.8 - 5.3 10e3/uL    Absolute Monocytes 0.8 0.0 - 1.3 10e3/uL    Absolute Eosinophils 0.0 0.0 - 0.7 10e3/uL    Absolute Basophils 0.0 0.0 - 0.2 10e3/uL    Absolute Immature Granulocytes 0.1 <=0.4 10e3/uL    Absolute NRBCs 0.0 10e3/uL       RADIOLOGY:  Reviewed all pertinent imaging. Please see official radiology report.  Elbow XR, G/E 3 views, right   Final Result   IMPRESSION: No acute fracture or subluxation. Joint spaces are preserved. No joint effusion. No erosion. Corticated heterotopic ossification adjacent to the olecranon and lateral epicondyle. Spurring of the medial epicondyle and radial tuberosity.    Vascular calcifications. Intravenous catheter overlies the antecubital fossa.      CT Chest Abdomen Pelvis w/o Contrast   Final Result   IMPRESSION:   No acute traumatic finding in the chest, abdomen or pelvis.         CT Cervical Spine w/o Contrast   Final Result   IMPRESSION:   HEAD CT:   1.  No CT evidence for acute intracranial process.   2.  Right posterior parietal scalp contusion without associated fracture.   3.  Brain atrophy and presumed chronic ischemic changes similar to the previous exam.      CERVICAL SPINE CT:   1.  No CT evidence for acute fracture or post traumatic  subluxation.   2.  Unchanged multilevel cervical spondylosis.      CT Head w/o Contrast   Final Result   IMPRESSION:   HEAD CT:   1.  No CT evidence for acute intracranial process.   2.  Right posterior parietal scalp contusion without associated fracture.   3.  Brain atrophy and presumed chronic ischemic changes similar to the previous exam.      CERVICAL SPINE CT:   1.  No CT evidence for acute fracture or post traumatic subluxation.   2.  Unchanged multilevel cervical spondylosis.            EKG:    Reviewed and interpreted as: 1137 paced rhyhtm with occasional PACs, paced baseline rhythm, HR 99, morphologically similar to prior form July 23, 2024      I have independently reviewed and interpreted the EKG(s) documented above.       Mariela Tolbert MD  12/21/24 [id number]

## 2024-12-22 LAB
ATRIAL RATE - MUSE: 87 BPM
DIASTOLIC BLOOD PRESSURE - MUSE: 90 MMHG
INTERPRETATION ECG - MUSE: NORMAL
P AXIS - MUSE: 72 DEGREES
PR INTERVAL - MUSE: 176 MS
QRS DURATION - MUSE: 80 MS
QT - MUSE: 384 MS
QTC - MUSE: 492 MS
R AXIS - MUSE: -19 DEGREES
SYSTOLIC BLOOD PRESSURE - MUSE: 152 MMHG
T AXIS - MUSE: 103 DEGREES
VENTRICULAR RATE- MUSE: 99 BPM

## 2025-01-01 ENCOUNTER — APPOINTMENT (OUTPATIENT)
Dept: RADIOLOGY | Facility: HOSPITAL | Age: OVER 89
DRG: 100 | End: 2025-01-01
Attending: INTERNAL MEDICINE
Payer: MEDICARE

## 2025-01-01 ENCOUNTER — ANCILLARY PROCEDURE (OUTPATIENT)
Dept: CARDIOLOGY | Facility: CLINIC | Age: OVER 89
End: 2025-01-01
Attending: INTERNAL MEDICINE
Payer: MEDICARE

## 2025-01-01 ENCOUNTER — APPOINTMENT (OUTPATIENT)
Dept: CT IMAGING | Facility: HOSPITAL | Age: OVER 89
DRG: 100 | End: 2025-01-01
Attending: PSYCHIATRY & NEUROLOGY
Payer: MEDICARE

## 2025-01-01 ENCOUNTER — RESULTS FOLLOW-UP (OUTPATIENT)
Dept: CARDIOLOGY | Facility: CLINIC | Age: OVER 89
End: 2025-01-01

## 2025-01-01 ENCOUNTER — APPOINTMENT (OUTPATIENT)
Dept: RADIOLOGY | Facility: HOSPITAL | Age: OVER 89
DRG: 100 | End: 2025-01-01
Attending: PSYCHIATRY & NEUROLOGY
Payer: MEDICARE

## 2025-01-01 ENCOUNTER — APPOINTMENT (OUTPATIENT)
Dept: PHYSICAL THERAPY | Facility: HOSPITAL | Age: OVER 89
DRG: 100 | End: 2025-01-01
Attending: PSYCHIATRY & NEUROLOGY
Payer: MEDICARE

## 2025-01-01 ENCOUNTER — APPOINTMENT (OUTPATIENT)
Dept: CT IMAGING | Facility: HOSPITAL | Age: OVER 89
DRG: 100 | End: 2025-01-01
Attending: EMERGENCY MEDICINE
Payer: MEDICARE

## 2025-01-01 ENCOUNTER — DOCUMENTATION ONLY (OUTPATIENT)
Dept: CARDIOLOGY | Facility: CLINIC | Age: OVER 89
End: 2025-01-01
Payer: MEDICARE

## 2025-01-01 ENCOUNTER — APPOINTMENT (OUTPATIENT)
Dept: RADIOLOGY | Facility: HOSPITAL | Age: OVER 89
DRG: 100 | End: 2025-01-01
Attending: EMERGENCY MEDICINE
Payer: MEDICARE

## 2025-01-01 ENCOUNTER — APPOINTMENT (OUTPATIENT)
Dept: RADIOLOGY | Facility: HOSPITAL | Age: OVER 89
DRG: 100 | End: 2025-01-01
Attending: HOSPITALIST
Payer: MEDICARE

## 2025-01-01 ENCOUNTER — APPOINTMENT (OUTPATIENT)
Dept: ULTRASOUND IMAGING | Facility: HOSPITAL | Age: OVER 89
DRG: 100 | End: 2025-01-01
Payer: MEDICARE

## 2025-01-01 ENCOUNTER — APPOINTMENT (OUTPATIENT)
Dept: NEUROLOGY | Facility: HOSPITAL | Age: OVER 89
DRG: 100 | End: 2025-01-01
Attending: PSYCHIATRY & NEUROLOGY
Payer: MEDICARE

## 2025-01-01 ENCOUNTER — DOCUMENTATION ONLY (OUTPATIENT)
Dept: CARE COORDINATION | Facility: CLINIC | Age: OVER 89
End: 2025-01-01
Payer: MEDICARE

## 2025-01-01 ENCOUNTER — HOSPITAL ENCOUNTER (INPATIENT)
Facility: HOSPITAL | Age: OVER 89
LOS: 2 days | End: 2025-07-31
Attending: FAMILY MEDICINE | Admitting: FAMILY MEDICINE

## 2025-01-01 DIAGNOSIS — I49.5 SICK SINUS SYNDROME (H): ICD-10-CM

## 2025-01-01 DIAGNOSIS — I48.0 PAROXYSMAL ATRIAL FIBRILLATION (H): ICD-10-CM

## 2025-01-01 DIAGNOSIS — Z95.0 PACEMAKER: ICD-10-CM

## 2025-01-01 PROCEDURE — 73020 X-RAY EXAM OF SHOULDER: CPT | Mod: LT

## 2025-01-01 PROCEDURE — 72125 CT NECK SPINE W/O DYE: CPT

## 2025-01-01 PROCEDURE — 73020 X-RAY EXAM OF SHOULDER: CPT | Mod: RT,5CC

## 2025-01-01 PROCEDURE — 73030 X-RAY EXAM OF SHOULDER: CPT | Mod: LT

## 2025-01-01 PROCEDURE — 62328 DX LMBR SPI PNXR W/FLUOR/CT: CPT

## 2025-01-01 PROCEDURE — 70450 CT HEAD/BRAIN W/O DYE: CPT

## 2025-01-01 PROCEDURE — 93975 VASCULAR STUDY: CPT

## 2025-01-01 PROCEDURE — 95816 EEG AWAKE AND DROWSY: CPT

## 2025-01-01 PROCEDURE — 73090 X-RAY EXAM OF FOREARM: CPT | Mod: RT,5CC

## 2025-01-01 PROCEDURE — 110N000005 HC R&B HOSPICE, ACCENT

## 2025-01-01 PROCEDURE — 73090 X-RAY EXAM OF FOREARM: CPT | Mod: LT

## 2025-01-01 PROCEDURE — 72131 CT LUMBAR SPINE W/O DYE: CPT

## 2025-01-01 PROCEDURE — 95816 EEG AWAKE AND DROWSY: CPT | Mod: 26 | Performed by: PSYCHIATRY & NEUROLOGY

## 2025-01-01 PROCEDURE — 71045 X-RAY EXAM CHEST 1 VIEW: CPT

## 2025-01-02 ENCOUNTER — APPOINTMENT (OUTPATIENT)
Dept: CT IMAGING | Facility: HOSPITAL | Age: OVER 89
DRG: 068 | End: 2025-01-02
Attending: EMERGENCY MEDICINE
Payer: MEDICARE

## 2025-01-02 ENCOUNTER — HOSPITAL ENCOUNTER (INPATIENT)
Facility: HOSPITAL | Age: OVER 89
DRG: 068 | End: 2025-01-02
Attending: EMERGENCY MEDICINE
Payer: MEDICARE

## 2025-01-02 VITALS
HEART RATE: 79 BPM | HEIGHT: 67 IN | SYSTOLIC BLOOD PRESSURE: 120 MMHG | BODY MASS INDEX: 25.43 KG/M2 | WEIGHT: 162 LBS | RESPIRATION RATE: 14 BRPM | OXYGEN SATURATION: 98 % | DIASTOLIC BLOOD PRESSURE: 69 MMHG | TEMPERATURE: 97.6 F

## 2025-01-02 DIAGNOSIS — I65.01 OCCLUSION OF RIGHT VERTEBRAL ARTERY: ICD-10-CM

## 2025-01-02 DIAGNOSIS — Z86.73 HISTORY OF STROKE: Primary | ICD-10-CM

## 2025-01-02 DIAGNOSIS — I21.9 MYOCARDIAL INFARCTION, UNSPECIFIED MI TYPE, UNSPECIFIED ARTERY (H): ICD-10-CM

## 2025-01-02 DIAGNOSIS — E87.5 HYPERKALEMIA: ICD-10-CM

## 2025-01-02 DIAGNOSIS — R42 INTERMITTENT VERTIGO: ICD-10-CM

## 2025-01-02 LAB
ANION GAP SERPL CALCULATED.3IONS-SCNC: 12 MMOL/L (ref 7–15)
BASOPHILS # BLD AUTO: 0 10E3/UL (ref 0–0.2)
BASOPHILS NFR BLD AUTO: 0 %
BUN SERPL-MCNC: 67.5 MG/DL (ref 8–23)
CALCIUM SERPL-MCNC: 9.2 MG/DL (ref 8.8–10.4)
CHLORIDE SERPL-SCNC: 102 MMOL/L (ref 98–107)
CREAT SERPL-MCNC: 1.68 MG/DL (ref 0.67–1.17)
EGFRCR SERPLBLD CKD-EPI 2021: 38 ML/MIN/1.73M2
EOSINOPHIL # BLD AUTO: 0.2 10E3/UL (ref 0–0.7)
EOSINOPHIL NFR BLD AUTO: 2 %
ERYTHROCYTE [DISTWIDTH] IN BLOOD BY AUTOMATED COUNT: 14.6 % (ref 10–15)
GLUCOSE SERPL-MCNC: 109 MG/DL (ref 70–99)
HCO3 SERPL-SCNC: 20 MMOL/L (ref 22–29)
HCT VFR BLD AUTO: 39.4 % (ref 40–53)
HGB BLD-MCNC: 13.3 G/DL (ref 13.3–17.7)
IMM GRANULOCYTES # BLD: 0.1 10E3/UL
IMM GRANULOCYTES NFR BLD: 1 %
INR PPP: 3.32 (ref 0.85–1.15)
LYMPHOCYTES # BLD AUTO: 2.7 10E3/UL (ref 0.8–5.3)
LYMPHOCYTES NFR BLD AUTO: 32 %
MAGNESIUM SERPL-MCNC: 2.5 MG/DL (ref 1.7–2.3)
MCH RBC QN AUTO: 32 PG (ref 26.5–33)
MCHC RBC AUTO-ENTMCNC: 33.8 G/DL (ref 31.5–36.5)
MCV RBC AUTO: 95 FL (ref 78–100)
MONOCYTES # BLD AUTO: 0.9 10E3/UL (ref 0–1.3)
MONOCYTES NFR BLD AUTO: 10 %
NEUTROPHILS # BLD AUTO: 4.7 10E3/UL (ref 1.6–8.3)
NEUTROPHILS NFR BLD AUTO: 55 %
NRBC # BLD AUTO: 0 10E3/UL
NRBC BLD AUTO-RTO: 0 /100
PLATELET # BLD AUTO: 134 10E3/UL (ref 150–450)
POTASSIUM SERPL-SCNC: 5.6 MMOL/L (ref 3.4–5.3)
RBC # BLD AUTO: 4.16 10E6/UL (ref 4.4–5.9)
SODIUM SERPL-SCNC: 134 MMOL/L (ref 135–145)
WBC # BLD AUTO: 8.6 10E3/UL (ref 4–11)

## 2025-01-02 PROCEDURE — 80061 LIPID PANEL: CPT | Performed by: STUDENT IN AN ORGANIZED HEALTH CARE EDUCATION/TRAINING PROGRAM

## 2025-01-02 PROCEDURE — 120N000001 HC R&B MED SURG/OB

## 2025-01-02 PROCEDURE — 85004 AUTOMATED DIFF WBC COUNT: CPT | Performed by: EMERGENCY MEDICINE

## 2025-01-02 PROCEDURE — 83735 ASSAY OF MAGNESIUM: CPT | Performed by: EMERGENCY MEDICINE

## 2025-01-02 PROCEDURE — 70496 CT ANGIOGRAPHY HEAD: CPT

## 2025-01-02 PROCEDURE — 99223 1ST HOSP IP/OBS HIGH 75: CPT | Mod: AI | Performed by: STUDENT IN AN ORGANIZED HEALTH CARE EDUCATION/TRAINING PROGRAM

## 2025-01-02 PROCEDURE — 83036 HEMOGLOBIN GLYCOSYLATED A1C: CPT | Performed by: STUDENT IN AN ORGANIZED HEALTH CARE EDUCATION/TRAINING PROGRAM

## 2025-01-02 PROCEDURE — 84100 ASSAY OF PHOSPHORUS: CPT | Performed by: STUDENT IN AN ORGANIZED HEALTH CARE EDUCATION/TRAINING PROGRAM

## 2025-01-02 PROCEDURE — 84484 ASSAY OF TROPONIN QUANT: CPT | Performed by: STUDENT IN AN ORGANIZED HEALTH CARE EDUCATION/TRAINING PROGRAM

## 2025-01-02 PROCEDURE — 36415 COLL VENOUS BLD VENIPUNCTURE: CPT | Performed by: EMERGENCY MEDICINE

## 2025-01-02 PROCEDURE — 85610 PROTHROMBIN TIME: CPT | Performed by: EMERGENCY MEDICINE

## 2025-01-02 PROCEDURE — 250N000011 HC RX IP 250 OP 636: Performed by: EMERGENCY MEDICINE

## 2025-01-02 PROCEDURE — 80048 BASIC METABOLIC PNL TOTAL CA: CPT | Performed by: EMERGENCY MEDICINE

## 2025-01-02 PROCEDURE — 96360 HYDRATION IV INFUSION INIT: CPT | Mod: 59

## 2025-01-02 PROCEDURE — 99285 EMERGENCY DEPT VISIT HI MDM: CPT | Mod: 25

## 2025-01-02 PROCEDURE — 258N000003 HC RX IP 258 OP 636: Performed by: EMERGENCY MEDICINE

## 2025-01-02 PROCEDURE — 85014 HEMATOCRIT: CPT | Performed by: EMERGENCY MEDICINE

## 2025-01-02 PROCEDURE — 82310 ASSAY OF CALCIUM: CPT | Performed by: EMERGENCY MEDICINE

## 2025-01-02 RX ORDER — IOPAMIDOL 755 MG/ML
75 INJECTION, SOLUTION INTRAVASCULAR ONCE
Status: COMPLETED | OUTPATIENT
Start: 2025-01-02 | End: 2025-01-02

## 2025-01-02 RX ADMIN — IOPAMIDOL 75 ML: 755 INJECTION, SOLUTION INTRAVENOUS at 22:31

## 2025-01-02 RX ADMIN — SODIUM CHLORIDE 500 ML: 9 INJECTION, SOLUTION INTRAVENOUS at 21:29

## 2025-01-03 ENCOUNTER — APPOINTMENT (OUTPATIENT)
Dept: CT IMAGING | Facility: HOSPITAL | Age: OVER 89
DRG: 068 | End: 2025-01-03
Attending: STUDENT IN AN ORGANIZED HEALTH CARE EDUCATION/TRAINING PROGRAM
Payer: MEDICARE

## 2025-01-03 ENCOUNTER — APPOINTMENT (OUTPATIENT)
Dept: ULTRASOUND IMAGING | Facility: HOSPITAL | Age: OVER 89
DRG: 068 | End: 2025-01-03
Attending: STUDENT IN AN ORGANIZED HEALTH CARE EDUCATION/TRAINING PROGRAM
Payer: MEDICARE

## 2025-01-03 ENCOUNTER — ANCILLARY PROCEDURE (OUTPATIENT)
Dept: CARDIOLOGY | Facility: CLINIC | Age: OVER 89
End: 2025-01-03
Attending: INTERNAL MEDICINE
Payer: MEDICARE

## 2025-01-03 ENCOUNTER — APPOINTMENT (OUTPATIENT)
Dept: CARDIOLOGY | Facility: HOSPITAL | Age: OVER 89
DRG: 068 | End: 2025-01-03
Attending: STUDENT IN AN ORGANIZED HEALTH CARE EDUCATION/TRAINING PROGRAM
Payer: MEDICARE

## 2025-01-03 DIAGNOSIS — I48.0 PAROXYSMAL ATRIAL FIBRILLATION (H): ICD-10-CM

## 2025-01-03 DIAGNOSIS — I49.5 SICK SINUS SYNDROME (H): ICD-10-CM

## 2025-01-03 DIAGNOSIS — Z95.0 PACEMAKER: ICD-10-CM

## 2025-01-03 LAB
CHOLEST SERPL-MCNC: 89 MG/DL
EST. AVERAGE GLUCOSE BLD GHB EST-MCNC: 134 MG/DL
GLUCOSE BLDC GLUCOMTR-MCNC: 100 MG/DL (ref 70–99)
GLUCOSE BLDC GLUCOMTR-MCNC: 123 MG/DL (ref 70–99)
GLUCOSE BLDC GLUCOMTR-MCNC: 124 MG/DL (ref 70–99)
GLUCOSE BLDC GLUCOMTR-MCNC: 125 MG/DL (ref 70–99)
HBA1C MFR BLD: 6.3 %
HDLC SERPL-MCNC: 28 MG/DL
HOLD SPECIMEN: NORMAL
INR PPP: 3.12 (ref 0.85–1.15)
LDLC SERPL CALC-MCNC: 31 MG/DL
NONHDLC SERPL-MCNC: 61 MG/DL
PHOSPHATE SERPL-MCNC: 3.9 MG/DL (ref 2.5–4.5)
TRIGL SERPL-MCNC: 152 MG/DL
TROPONIN T SERPL HS-MCNC: 107 NG/L
TROPONIN T SERPL HS-MCNC: 118 NG/L
TROPONIN T SERPL HS-MCNC: 94 NG/L

## 2025-01-03 PROCEDURE — 120N000001 HC R&B MED SURG/OB

## 2025-01-03 PROCEDURE — C8929 TTE W OR WO FOL WCON,DOPPLER: HCPCS

## 2025-01-03 PROCEDURE — 99223 1ST HOSP IP/OBS HIGH 75: CPT | Performed by: PSYCHIATRY & NEUROLOGY

## 2025-01-03 PROCEDURE — 84484 ASSAY OF TROPONIN QUANT: CPT | Performed by: STUDENT IN AN ORGANIZED HEALTH CARE EDUCATION/TRAINING PROGRAM

## 2025-01-03 PROCEDURE — 93306 TTE W/DOPPLER COMPLETE: CPT | Mod: 26 | Performed by: INTERNAL MEDICINE

## 2025-01-03 PROCEDURE — 250N000013 HC RX MED GY IP 250 OP 250 PS 637: Performed by: STUDENT IN AN ORGANIZED HEALTH CARE EDUCATION/TRAINING PROGRAM

## 2025-01-03 PROCEDURE — 93880 EXTRACRANIAL BILAT STUDY: CPT

## 2025-01-03 PROCEDURE — 999N000226 HC STATISTIC SLP IP EVAL DEFER

## 2025-01-03 PROCEDURE — 70450 CT HEAD/BRAIN W/O DYE: CPT

## 2025-01-03 PROCEDURE — 258N000003 HC RX IP 258 OP 636: Performed by: STUDENT IN AN ORGANIZED HEALTH CARE EDUCATION/TRAINING PROGRAM

## 2025-01-03 PROCEDURE — 99232 SBSQ HOSP IP/OBS MODERATE 35: CPT | Performed by: STUDENT IN AN ORGANIZED HEALTH CARE EDUCATION/TRAINING PROGRAM

## 2025-01-03 PROCEDURE — 99418 PROLNG IP/OBS E/M EA 15 MIN: CPT | Performed by: PSYCHIATRY & NEUROLOGY

## 2025-01-03 PROCEDURE — 82962 GLUCOSE BLOOD TEST: CPT

## 2025-01-03 PROCEDURE — 999N000208 ECHOCARDIOGRAM COMPLETE

## 2025-01-03 PROCEDURE — 36415 COLL VENOUS BLD VENIPUNCTURE: CPT | Performed by: STUDENT IN AN ORGANIZED HEALTH CARE EDUCATION/TRAINING PROGRAM

## 2025-01-03 PROCEDURE — 85610 PROTHROMBIN TIME: CPT | Performed by: STUDENT IN AN ORGANIZED HEALTH CARE EDUCATION/TRAINING PROGRAM

## 2025-01-03 PROCEDURE — 255N000002 HC RX 255 OP 636: Performed by: STUDENT IN AN ORGANIZED HEALTH CARE EDUCATION/TRAINING PROGRAM

## 2025-01-03 RX ORDER — WARFARIN SODIUM 1 MG/1
1 TABLET ORAL
Status: DISCONTINUED | OUTPATIENT
Start: 2025-01-03 | End: 2025-01-03

## 2025-01-03 RX ORDER — LIDOCAINE 40 MG/G
CREAM TOPICAL
Status: DISCONTINUED | OUTPATIENT
Start: 2025-01-03 | End: 2025-01-04 | Stop reason: HOSPADM

## 2025-01-03 RX ORDER — ACETAMINOPHEN 325 MG/10.15ML
650 LIQUID ORAL EVERY 4 HOURS PRN
Status: DISCONTINUED | OUTPATIENT
Start: 2025-01-03 | End: 2025-01-04 | Stop reason: HOSPADM

## 2025-01-03 RX ORDER — SPIRONOLACTONE 25 MG/1
25 TABLET ORAL DAILY
Status: DISCONTINUED | OUTPATIENT
Start: 2025-01-03 | End: 2025-01-04 | Stop reason: HOSPADM

## 2025-01-03 RX ORDER — ATORVASTATIN CALCIUM 10 MG/1
20 TABLET, FILM COATED ORAL EVERY EVENING
Status: DISCONTINUED | OUTPATIENT
Start: 2025-01-03 | End: 2025-01-04 | Stop reason: HOSPADM

## 2025-01-03 RX ORDER — ONDANSETRON 2 MG/ML
4 INJECTION INTRAMUSCULAR; INTRAVENOUS EVERY 6 HOURS PRN
Status: DISCONTINUED | OUTPATIENT
Start: 2025-01-03 | End: 2025-01-04 | Stop reason: HOSPADM

## 2025-01-03 RX ORDER — ONDANSETRON 4 MG/1
4 TABLET, ORALLY DISINTEGRATING ORAL EVERY 6 HOURS PRN
Status: DISCONTINUED | OUTPATIENT
Start: 2025-01-03 | End: 2025-01-04 | Stop reason: HOSPADM

## 2025-01-03 RX ORDER — ACETAMINOPHEN 650 MG/1
650 SUPPOSITORY RECTAL EVERY 4 HOURS PRN
Status: DISCONTINUED | OUTPATIENT
Start: 2025-01-03 | End: 2025-01-04 | Stop reason: HOSPADM

## 2025-01-03 RX ORDER — SODIUM CHLORIDE 9 MG/ML
INJECTION, SOLUTION INTRAVENOUS CONTINUOUS
Status: DISCONTINUED | OUTPATIENT
Start: 2025-01-03 | End: 2025-01-04

## 2025-01-03 RX ORDER — SENNOSIDES 8.6 MG
8.6 TABLET ORAL 2 TIMES DAILY PRN
Status: DISCONTINUED | OUTPATIENT
Start: 2025-01-03 | End: 2025-01-04 | Stop reason: HOSPADM

## 2025-01-03 RX ORDER — ACETAMINOPHEN 325 MG/1
650 TABLET ORAL EVERY 4 HOURS PRN
Status: DISCONTINUED | OUTPATIENT
Start: 2025-01-03 | End: 2025-01-04 | Stop reason: HOSPADM

## 2025-01-03 RX ORDER — ACETAMINOPHEN 500 MG
500 TABLET ORAL EVERY 6 HOURS PRN
COMMUNITY

## 2025-01-03 RX ORDER — CHLORTHALIDONE 25 MG/1
25 TABLET ORAL EVERY EVENING
Status: DISCONTINUED | OUTPATIENT
Start: 2025-01-03 | End: 2025-01-04 | Stop reason: HOSPADM

## 2025-01-03 RX ORDER — WARFARIN SODIUM 2 MG/1
2 TABLET ORAL
Status: COMPLETED | OUTPATIENT
Start: 2025-01-03 | End: 2025-01-03

## 2025-01-03 RX ADMIN — WARFARIN SODIUM 2 MG: 2 TABLET ORAL at 19:19

## 2025-01-03 RX ADMIN — PERFLUTREN 2 ML: 6.52 INJECTION, SUSPENSION INTRAVENOUS at 13:30

## 2025-01-03 RX ADMIN — SODIUM CHLORIDE: 9 INJECTION, SOLUTION INTRAVENOUS at 16:46

## 2025-01-03 RX ADMIN — SODIUM CHLORIDE: 9 INJECTION, SOLUTION INTRAVENOUS at 03:21

## 2025-01-03 RX ADMIN — ATORVASTATIN CALCIUM 20 MG: 10 TABLET, FILM COATED ORAL at 19:20

## 2025-01-03 ASSESSMENT — ACTIVITIES OF DAILY LIVING (ADL)
ADLS_ACUITY_SCORE: 61
ADLS_ACUITY_SCORE: 51
ADLS_ACUITY_SCORE: 65
ADLS_ACUITY_SCORE: 67
ADLS_ACUITY_SCORE: 61
ADLS_ACUITY_SCORE: 51
ADLS_ACUITY_SCORE: 68
ADLS_ACUITY_SCORE: 61
ADLS_ACUITY_SCORE: 68
ADLS_ACUITY_SCORE: 61
ADLS_ACUITY_SCORE: 67
ADLS_ACUITY_SCORE: 68
ADLS_ACUITY_SCORE: 68
ADLS_ACUITY_SCORE: 59
ADLS_ACUITY_SCORE: 51
ADLS_ACUITY_SCORE: 67
ADLS_ACUITY_SCORE: 66
ADLS_ACUITY_SCORE: 61
ADLS_ACUITY_SCORE: 61
ADLS_ACUITY_SCORE: 68
ADLS_ACUITY_SCORE: 61

## 2025-01-03 NOTE — CONSULTS
"[facility]    Stroke Telephone Note    I was called by David Mckenzie on 01/02/25 regarding patient Skip Newman. The patient is a 89 year old male with past medical hx of Afib on coumadin and Pacemaker HLD HTN presents to ED with intermittent episodes of vertiginous symptoms for 2-3 days there is some positional component to the dizziness as well.     Vitals  BP: 120/69   Pulse: 79   Resp: 14   Temp: 97.6  F (36.4  C)   Weight: 73.5 kg (162 lb)    Imaging Findings  CT head: no acute abnormalities  CTA head/neck: in comparison to CTA in 2020 there is new R vert occlusion intracranial and extracranial     Impression  Vertigo  Vert artery occlusion    89 year old male with hx of Afib on coumadin presents with vertiginous symptoms with CTA findings of new R vert occlusion ( compared to 2020 scan). Its hard to say whether this represent an acute occlusion or rather chronic since there has been no imaging to compare in the interval. Given new vessel imaging findings and symptoms localizing to posterior circulation it will be prudent to treat this as a stroke.    Recommendations  - Use orderset: \"Ischemic Stroke Routine Admission\" or \"Ischemic Stroke No Thrombolytics/No Thrombectomy ICU Admission\"  - Place Neurology IP Stroke Consult order   - Neurochecks and Vital Signs every 4 hours   - Permissive HTN; goal SBP < 220 mmHg  - Continue coumadin  - Statin: PTA statin   - TTE  - Repeat Ct head in 24 hours  - Telemetry, EKG  - Bedside Glucose Monitoring  - A1c, Lipid Panel, Troponin x 3  - PT/OT/SLP  - Stroke Education  - Euthermia, Euglycemia    My recommendations are based on the information provided over the phone by Skip Newman's in-person providers. They are not intended to replace the clinical judgment of his in-person providers. I was not requested to personally see or examine the patient at this time.     Sudheer Villalta MD  Vascular Neurology    To page me or covering stroke neurology " "team member, click here: [facility]  Choose \"On Call\" tab at top, then select \"NEUROLOGY/ALL SITES\" from middle drop-down box, press Enter, then look for \"stroke\" or \"telestroke\" for your site.         "

## 2025-01-03 NOTE — PLAN OF CARE
Problem: Stroke, Ischemic (Includes Transient Ischemic Attack)  Goal: Optimal Cerebral Tissue Perfusion  Outcome: Progressing     Problem: Stroke, Ischemic (Includes Transient Ischemic Attack)  Goal: Optimal Functional Ability  Outcome: Progressing  Intervention: Optimize Functional Ability     Pt initially refusing cares, however after a couple hours of sleep he woke up and allowed cares.  Pt alert and oriented x3, Eek.  NIH scored 2 for slurred speech and left arm weakness although pt reports he has a bad shoulder.  Dysphagia screen completed and BG checked.  Severe dizziness noted with position changes.  Plan for repeat head CT today.

## 2025-01-03 NOTE — PROGRESS NOTES
"Pt refusing all cares.  Writer had approached pt while he was sleeping to obtain vitals again, administer medications, complete an assessment, start telemetry and pt refusing everything.  Patient yelling \"Leave me alone I need to sleep to get well!\".  RN explained rationale for why we should get these things done.  Pt appears confused but TATYANA full orientation due to patient agitation and ignoring questions.  Pt replies \"let me be or I will hit or kick you!\".  RN left the bedside and pt went back to sleep.  MD updated on pt's refusal.  Will reassess / reattempt cares later.    "

## 2025-01-03 NOTE — ED NOTES
Helped pt eat breakfast he ate one egg, a bowl of cheerios, one banana and one bite of muffin. Pt drank two Ojs and one cup of water

## 2025-01-03 NOTE — H&P
[facility]    History and Physical - Hospitalist Service       Date of Admission:  1/2/2025    Assessment & Plan    Assessment:  Skip Newman is a 89 year old male with a past medical history of hypertension, A-fib on warfarin presented to the ER for evaluation of concerns of dehydration, patient was seen at [facility] urgent care on 1/2/2025 for dizziness and diarrhea and positional vertigo when he stands, patient was eventually referred to ED for further evaluation, CTA head and neck was performed which showed No CT evidence for acute intracranial process. 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 The distal V2 and V3 segments of the right vertebral artery were patent on the prior study. ED provider discussed with stroke neurology who recommended admission, repeating CT scan of the head in the morning as patient has MRI incompatible pacemaker in place as per ED provider, continue warfarin, as per ED provider patient not a thrombectomy candidate based on duration of symptoms. Patient is going to be admitted for further evaluation of possible CVA    Problem list and plan:  Dizziness/vertigo rule out acute stroke  Vertebral artery occlusion  Patient presented to the ER for evaluation of concerns of dehydration, Patient was seen at [facility] urgent care on 1/2/2025 for dizziness and diarrhea and positional vertigo when he stands  Patient was eventually referred to ED for further evaluation  As per family he is at baseline neurologically regarding gait and speech and facial symmetry and strength  CTA head and neck was performed which showed No CT evidence for acute intracranial process. 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 The distal V2 and V3 segments of the right vertebral artery were  patent on the prior study.  Please review full imaging for details  ED provider discussed with stroke neurology who recommended admission, repeating CT scan of the head in the morning as patient has MRI incompatible pacemaker in place as per ED provider, continue warfarin, as per ED provider patient not a thrombectomy candidate based on duration of symptoms.   Follow-up neurology for further recommendations  Aspiration and fall precautions  Follow-up repeat CT scan of the head  Follow-up echocardiogram  Monitor on telemetry monitoring  Continue with neurochecks    Hyponatremia possibly secondary to hypovolemia  Gentle IV hydration  Monitor sodium levels closely    Hyperkalemia possibly in setting of PRICILA  Monitor potassium levels closely  Prescribed a dose of Lokelma    PRICILA on CKD stage III  Normal anion gap metabolic acidosis  Baseline creatinine around 1.4  Current creatinine 1.68  Gentle IV hydration  Monitor renal function closely  Monitor for worsening acidosis    Electrolyte abnormality/hypermagnesemia  Monitor magnesium levels closely    Mild hyperglycemia most likely reactive  Follow-up hemoglobin A1c  Monitor blood sugar level intermittently    Chronic thrombocytopenia  Platelet count appeared to be chronically low but better than baseline  Monitor CBC closely    History of hypertension  Initially presented with elevated blood pressure which has since improved  Will be holding blood pressure medication for now for permissive control of blood pressure  Monitor vital signs as per protocol  Add blood pressure medication once appropriate  Will be holding Aldactone, chlorthalidone, metoprolol for now for permissive control of blood pressure and also holding Aldactone for hyperkalemia    History of hyperlipidemia  Continue with atorvastatin    History of A-fib  Secondary hypercoagulable state  Supratherapeutic INR at 3.32  Holding metoprolol for now for permissive control of blood pressure, can restart  "tomorrow  Continue with warfarin as per pharmacy dosing    DVT PPx  Intermittent pneumatic compression  Warfarin with supratherapeutic INR    CODE STATUS  Full code as per previously ordered CODE STATUS, patient was sleeping as it was early morning and did not wanted to be bothered, would advise discussing CODE STATUS in the morning once patient is more alert and awake        Diet: NPO for Medical/Clinical Reasons Except for: Meds  DVT Prophylaxis: Pneumatic Compression Devices  Chandler Catheter: Not present  Lines: None     Cardiac Monitoring: ACTIVE order. Indication: Stroke, acute (48 hours)  Code Status: Full Code  Mental status: Patient was sleeping and did not want to be bothered, most of the history was obtained from chart review and discussion with the ER physician  Clinically Significant Risk Factors Present on Admission        # Hyperkalemia: Highest K = 5.6 mmol/L in last 2 days, will monitor as appropriate  # Hyponatremia: Lowest Na = 134 mmol/L in last 2 days, will monitor as appropriate          # Drug Induced Coagulation Defect: home medication list includes an anticoagulant medication    # Hypertension: Noted on problem list  # Chronic heart failure with reduced ejection fraction: last echo with EF <40%          # Overweight: Estimated body mass index is 25.37 kg/m  as calculated from the following:    Height as of this encounter: 1.702 m (5' 7\").    Weight as of this encounter: 73.5 kg (162 lb).         # Financial/Environmental Concerns:     # Pacemaker present     Disposition Plan     Medically Ready for Discharge: Anticipated in 2-4 Days     Saad J. Gondal, MD  Hospitalist Service  [facility]  Securely message with [employer] (more info)  Text page via [employer] Paging/Directory     ______________________________________________________________________    Chief Complaint   Dizziness, diarrhea    History is obtained from chart review    History of Present Illness   Skip Newman " is a 89 year old male with a past medical history of hypertension, A-fib on warfarin presented to the ER for evaluation of concerns of dehydration, patient was seen at [facility] urgent care on 1/2/2025 for dizziness and diarrhea and positional vertigo when he stands, patient was eventually referred to ED for further evaluation, as per family he is at baseline neurologically regarding gait and speech and facial symmetry and strength, in the ER vitals were significant for elevated blood pressure which has since improved, labs significant for hyponatremia, hyperkalemia, normal anion gap metabolic acidosis, hypermagnesemia, mild hyperglycemia, PRICILA with a elevated creatinine at 1.68 and a baseline of around 1.4, patient also had chronic thrombocytopenia which appears to be better than baseline, INR was 3.32, CTA head and neck was performed which showed No CT evidence for acute intracranial process. Brain atrophy, small old lacunar infarcts left thalamus, left external capsule and corona radiata, and right corona radiata, and presumed chronic microvascular ischemic changes as above.  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 Mild irregularity of the posterior cerebral arteries likely due to intracranial atherosclerotic disease with no high-grade stenoses. No anterior circulation occlusion or high-grade stenosis. 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 The distal V2 and V3 segments of the right vertebral artery were patent on the prior study. Stable calcified atherosclerotic plaque left vertebral artery origin with at least moderate stenosis. Irregular calcified atherosclerotic plaque left subclavian artery with approximately 50% stenosis at the origin. Additional scattered atherosclerotic plaque in the neck vessels without hemodynamically significant stenosis.  ED provider discussed with stroke neurology who recommended admission, repeating CT scan of the head  in the morning as patient has MRI incompatible pacemaker in place as per ED provider, continue warfarin, as per ED provider patient not a thrombectomy candidate based on duration of symptoms. Patient is going to be admitted for further evaluation of possible CVA      Past Medical History    Past Medical History:   Diagnosis Date    A-fib (H)     Anemia     Arrhythmia     Arthritis     Chronic kidney disease     Claudication (H)     Coronary artery disease     Gout     Hearing loss     high frequency    History of transfusion     Hyperlipidemia     Hypertension     Impotence of organic origin     Kidney stone     recurrent    MI (myocardial infarction) (H)     Primary osteoarthritis of both knees     Sick sinus syndrome -- S/P [employer] PPM     Skin cancer     Spinal stenosis, lumbar region, with neurogenic claudication     Syncope     Thrombocytopenia (H)     VT (ventricular tachycardia) (H)        Past Surgical History   Past Surgical History:   Procedure Laterality Date    CARDIAC CATHETERIZATION      CORONARY STENT PLACEMENT  2001    LCx    HEMORRHOID SURGERY  1989    INSERT / REPLACE / REMOVE PACEMAKER  01/2009    [employer]    OTHER SURGICAL HISTORY  11/2018    l3-l4 lumbar stenosis    TONSILLECTOMY      child    TOTAL HIP ARTHROPLASTY Left     [facility] TOTAL KNEE ARTHROPLASTY Left 7/8/2019    Procedure: LEFT TOTAL KNEE ARTHROPLASTY;  Surgeon: Boewn Padgett MD;  Location: [facility];  Service: Orthopedics    [facility] TOTAL KNEE ARTHROPLASTY Right 1/13/2020    Procedure: RIGHT TOTAL KNEE ARTHROPLASTY;  Surgeon: Bowen Padgett MD;  Location: [facility];  Service: Orthopedics    [facility] CORONARY STENT PERCUT, INITIAL VESSEL      Description: Cath Stent Placement;  Recorded: 10/01/2012;  Comments: Mid LCx    [facility] CORONARY STENT PERCUT, INITIAL VESSEL      Description: Cath Stent Placement;  Recorded: 10/21/2014;  Comments: Mid LCx       Prior to Admission Medications   Prior to Admission Medications    Prescriptions Last Dose Informant Patient Reported? Taking?   MULTIVITAMIN (MULTIPLE VITAMIN ORAL) 1/1/2025 Evening Daughter Yes Yes   Sig: Take 1 tablet by mouth every evening   acetaminophen (TYLENOL) 500 MG tablet  Daughter Yes Yes   Sig: Take 500 mg by mouth every 6 hours as needed for mild pain.   atorvastatin (LIPITOR) 40 MG tablet 1/1/2025 Evening Daughter Yes Yes   Sig: Take 20 mg by mouth every evening   chlorthalidone (HYGROTEN) 25 MG tablet 1/1/2025 Evening Daughter Yes Yes   Sig: Take 25 mg by mouth every evening   metoprolol succinate ER (TOPROL XL) 100 MG 24 hr tablet 1/1/2025 Evening Daughter No Yes   Sig: Take 1.5 tablets (150 mg) by mouth every evening   omega 3-dha-epa-fish oil (FISH OIL) 1,000 mg (120 mg-180 mg) cap 1/1/2025 Evening Daughter Yes Yes   Sig: Take 1 capsule by mouth every evening   spironolactone (ALDACTONE) 25 MG tablet 1/1/2025 Evening Daughter Yes Yes   Sig: Take 1 tablet by mouth daily at 2 pm.   warfarin ANTICOAGULANT (COUMADIN) 5 MG tablet 1/1/2025 Daughter Yes Yes   Sig: Take 2.5 mg by mouth every evening      Facility-Administered Medications: None        Review of Systems    The 10 point Review of Systems is negative other than noted in the HPI or here.  Dizziness, diarrhea    Physical Exam   Vital Signs: Temp: 97.6  F (36.4  C) Temp src: Oral BP: 120/69 Pulse: 79   Resp: 14 SpO2: 98 % O2 Device: None (Room air)    Weight: 162 lbs 0 oz    GENERAL: Patient was seen and examined at bedside with no acute distress  HENT:  Head is normocephalic, atraumatic.   EXT: no edema    SKIN:  No acute rashes.   NEUROLOGIC:  Grossly nonfocal.      Medical Decision Making       80 MINUTES SPENT BY ME on the date of service doing chart review, history, exam, documentation & further activities per the note.      Data     I have personally reviewed the following data over the past 24 hrs:    8.6  \   13.3   / 134 (L)     134 (L) 102 67.5 (H) /  109 (H)   5.6 (H) 20 (L) 1.68 (H) \     Trop: 94  (H) BNP: N/A     TSH: N/A T4: N/A A1C: 6.3 (H)     INR:  3.32 (H) PTT:  N/A   D-dimer:  N/A Fibrinogen:  N/A       Imaging results reviewed over the past 24 hrs:   Recent Results (from the past 24 hours)   CTA Head Neck with Contrast    Addendum: 1/2/2025    COMMUNICATION ADDENDUM:  Findings were discussed with Dr. Mckenzie on 1/2/2025 11:08 PM CST.    END ADDENDUM      Narrative    EXAM: CTA HEAD NECK W CONTRAST  LOCATION: [facility]  DATE: 1/2/2025    INDICATION: dizziness  COMPARISON: Head CT and CTA of the head and neck dated 07/17/2020 and head CT dated 12/21/2024  CONTRAST: isovue 370 75ml  TECHNIQUE: Head and neck CT angiogram with IV contrast. Noncontrast head CT followed by axial helical CT images of the head and neck vessels obtained during the arterial phase of intravenous contrast administration. Axial 2D reconstructed images and   multiplanar 3D MIP reconstructed images of the head and neck vessels were performed by the technologist. Dose reduction techniques were used. All stenosis measurements made according to NASCET criteria unless otherwise specified.    FINDINGS:   NONCONTRAST HEAD CT:   INTRACRANIAL CONTENTS: No intracranial hemorrhage, extraaxial collection, or mass effect.  No CT evidence of acute infarct. Mild to moderate presumed chronic small vessel ischemic changes. Stable small old lacunar infarcts left thalamus, left external   capsule, and corona radiata and right corona radiata. Mild to moderate generalized volume loss. No hydrocephalus.     VISUALIZED ORBITS/SINUSES/MASTOIDS: Prior bilateral cataract surgery. Visualized portions of the orbits are otherwise unremarkable. No paranasal sinus mucosal disease. No middle ear or mastoid effusion.    BONES/SOFT TISSUES: No acute abnormality.    HEAD CTA:  ANTERIOR CIRCULATION: Heavily calcified atherosclerotic plaque distal internal carotid arteries. No stenosis/occlusion, aneurysm, or high flow vascular malformation.  Standard [place] anatomy.    POSTERIOR CIRCULATION: Right vertebral artery occlusion appears new since the prior study. Left vertebral artery and basilar artery appear widely patent. Mild irregularity of the posterior cerebral arteries, right greater than left, likely due to   intracranial atherosclerotic disease with no high-grade stenoses. No aneurysm or high flow vascular malformation.     DURAL VENOUS SINUSES: Expected enhancement of the major dural venous sinuses.    NECK CTA:  RIGHT CAROTID: Scattered calcified atherosclerotic plaque in the common carotid artery and carotid bifurcation without stenosis. No dissection.    LEFT CAROTID: Scattered predominant calcified atherosclerotic plaque in the common carotid artery and carotid bifurcation without stenosis. No dissection.    VERTEBRAL ARTERIES: The right vertebral artery is now completely occluded. Flow was previously detected in the distal V2 and V3 segments of the right vertebral artery. Stable calcified atherosclerotic plaque at the left vertebral artery origin with at   least moderate stenosis. Remainder of the left vertebral artery appears widely patent.    AORTIC ARCH: Moderate irregular calcified atherosclerotic plaque left subclavian artery with approximately 50% stenosis at the origin. Additional scattered calcified atherosclerotic plaque involving the aortic arch and proximal great vessels without   hemodynamically significant stenosis. Origins of the brachiocephalic and left common carotid arteries are in close proximity from the aortic arch.    NONVASCULAR STRUCTURES: Degenerative changes cervical and upper thoracic spine.      Impression    IMPRESSION:   HEAD CT:  1.  No CT evidence for acute intracranial process.  2.  Brain atrophy, small old lacunar infarcts left thalamus, left external capsule and corona radiata, and right corona radiata, and presumed chronic microvascular ischemic changes as above.    HEAD CTA:   1.  New occlusion of  the intracranial segment of the right vertebral artery.  2.  Mild irregularity of the posterior cerebral arteries likely due to intracranial atherosclerotic disease with no high-grade stenoses.  3.  No anterior circulation occlusion or high-grade stenosis.    NECK CTA:  1.  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 The distal V2 and V3 segments of the right vertebral artery were patent on the prior study.  2.  Stable calcified atherosclerotic plaque left vertebral artery origin with at least moderate stenosis.  3.  Irregular calcified atherosclerotic plaque left subclavian artery with approximately 50% stenosis at the origin.  4.  Additional scattered atherosclerotic plaque in the neck vessels without hemodynamically significant stenosis.

## 2025-01-03 NOTE — PLAN OF CARE
Speech Language Pathology: Orders received. Chart reviewed, met with patient, and discussed with care team.? Speech Language Pathology not indicated due to no concern for dysphagia, aphasia, dysarthria, patient has passed nursing dysphagia screen and tolerated breakfast well.? Defer discharge recommendations to care team.? Will complete orders.

## 2025-01-03 NOTE — ED NOTES
Assisted patient to edge of bed to use urinal. Staff assistance x2. Patient was able to urinate, but then stated that he needed to have a bowel movement. Wheelchair was procured and patient was wheeled to the bathroom. Patient was successful in having a BM. Patient brought back to his room via wheelchair. Brief was changed. Patient's T-shirt was changed. Patient more comfortable. This time it went better. Patient did not have any threats of violence or violent behavior. Patient  also said thank you. The difference is patient is in a room( not the hallway) and his daughter is present.

## 2025-01-03 NOTE — ED NOTES
[facility]  Expected date:   Expected time:   Means of arrival:   Comments:  EVELIN pt

## 2025-01-03 NOTE — ED NOTES
Patient is yelling that he has to pee. Patient is a [facility] patient. Attempted to assist patient to a sitting position so he could use the urinal. Was unable to do so by myself. Required the assistance of an additional staff member. Additional staff member came to assist. Patient was aggressive towards staff, threatened violence and attempted to swing at staff. Eventually, patient was able to be redirected. And was assisted with the urinal. Patient to be moved to a room once one is available.

## 2025-01-03 NOTE — CONSULTS
NEUROLOGY CONSULTATION NOTE     Skip Newman,  3/6/1934, MRN [id number] Date: 1/3/2025     [facility]   Code status:  Full Code   PCP: Jacquelin Francis, [phone]      ASSESSMENT & PLAN   Diagnosis code: Presumed stroke      Initial Head CT negative for acute stroke; Repeat CT today again with no acute findings - I still suspect we could be missing a small stroke though his positional symptoms could also be due to peripheral vestibular dysfunction  CTA shows new Rt vert occlusion (compared with 2020 imaging)  No MRI due to PPM incompatibility  Continue PTA coumadin (Afib) and statin, though LDL is below goal of 40-70, so the statin could be discontinued and re-evaluated through primary care provider after discharge  HbA1c: 6.3% (goal with stroke is <7%  Continue permissive HTN, Telemetry. Echo is pending/Device check  Neuro checks Q4H  Physical therapy/occupational therapy to evaluate and treat vertigo  Carotid arteries look ok, no significant stenosis  Possible dehydration/electrolyte derangement - management per Hospitalist  Neurology will follow along. Spoke with daughter about plan and she agrees       Chief Complaint   Patient presents with    Dizziness        HISTORY OF PRESENT ILLNESS     We have been requested by Dr. Gondal to evaluate Skip Newman who is a 89 year old male for vertigo episodes. He has history of Afib on coumadin/with PPM, HTN, HLD. He presented on  with intermittent vertigo x 2-3 days. Stroke team initially consulted.     Bimal's daughter, Rashmi, is at bedside. I know the family (patient's wife is patient of mine in clinic). Daughter relays that Bimal is back to his usual joking self, but is still complaining of dizziness that comes and goes. I watch him get into bed with nursing staff and he reports dizziness with the position change that improves after a couple of minutes. Bimal has underlying dementia, history of strokes. He also had a fall just before [place]. Head  CT showed nothing acute at that time.     PAST MEDICAL & SURGICAL HISTORY     Medical History  Past Medical History:   Diagnosis Date    A-fib (H)     Anemia     Arrhythmia     Arthritis     Chronic kidney disease     Claudication (H)     Coronary artery disease     Gout     Hearing loss     high frequency    History of transfusion     Hyperlipidemia     Hypertension     Impotence of organic origin     Kidney stone     recurrent    MI (myocardial infarction) (H)     Primary osteoarthritis of both knees     Sick sinus syndrome -- S/P [employer] PPM     Skin cancer     Spinal stenosis, lumbar region, with neurogenic claudication     Syncope     Thrombocytopenia (H)     VT (ventricular tachycardia) (H)      Surgical History  Past Surgical History:   Procedure Laterality Date    CARDIAC CATHETERIZATION      CORONARY STENT PLACEMENT  2001    [facility]    HEMORRHOID SURGERY  1989    INSERT / REPLACE / REMOVE PACEMAKER  01/2009    [employer]    OTHER SURGICAL HISTORY  11/2018    l3-l4 lumbar stenosis    TONSILLECTOMY      child    TOTAL HIP ARTHROPLASTY Left     [facility] TOTAL KNEE ARTHROPLASTY Left 7/8/2019    Procedure: LEFT TOTAL KNEE ARTHROPLASTY;  Surgeon: Bowen Padgett MD;  Location: [facility];  Service: Orthopedics    [facility] TOTAL KNEE ARTHROPLASTY Right 1/13/2020    Procedure: RIGHT TOTAL KNEE ARTHROPLASTY;  Surgeon: Bowen Padgett MD;  Location: [facility];  Service: Orthopedics    [facility] CORONARY STENT PERCUT, INITIAL VESSEL      Description: Cath Stent Placement;  Recorded: 10/01/2012;  Comments: [facility] CORONARY STENT PERCUT, INITIAL VESSEL      Description: Cath Stent Placement;  Recorded: 10/21/2014;  Comments: [facility]        SOCIAL HISTORY     Social History      FAMILY HISTORY     Reviewed, and family history includes Heart Failure in his father and mother.     ALLERGIES     Allergies   Allergen Reactions    Lisinopril Cough    Indomethacin Rash    Naproxen Rash        REVIEW OF SYSTEMS      Review of systems not obtained due to patient factors.     HOME & HOSPITAL MEDICATIONS     Prior to Admission Medications  (Not in a hospital admission)      Hospital Medications  Current Facility-Administered Medications   Medication Dose Route Frequency Provider Last Rate Last Admin    atorvastatin (LIPITOR) tablet 20 mg  20 mg Oral QPM Gondal, Saad J, MD        [Held by provider] chlorthalidone (HYGROTON) tablet 25 mg  25 mg Oral QPM Gondal, Saad J, MD        [Held by provider] metoprolol succinate ER (TOPROL XL) 24 hr tablet 150 mg  150 mg Oral QPM Gondal, Saad J, MD        sodium chloride (PF) 0.9% PF flush 3 mL  3 mL Intracatheter Q8H Gondal, Saad J, MD        [Held by provider] spironolactone (ALDACTONE) tablet 25 mg  25 mg Oral Daily Gondal, Saad J, MD        warfarin ANTICOAGULANT (COUMADIN) tablet 2 mg  2 mg Oral ONCE at 18:00 Gondal, Saad J, MD        Warfarin Dose Required Daily - Pharmacist Managed  1 each Oral See Admin Instructions Gondal, Saad J, MD            PHYSICAL EXAM     Vital signs  Temp:  [97.3  F (36.3  C)-97.6  F (36.4  C)] 97.3  F (36.3  C)  Pulse:  [77-81] 77  Resp:  [14-23] 23  BP: (110-163)/(59-72) 163/69  SpO2:  [97 %-100 %] 100 %    Weight:   [unfilled]    General Physical Exam: Patient is alert and oriented to self. Vital signs were reviewed and are documented in EMR. Several scabs of varying ages on forearms.  Neurological Exam:  Mental status: Attentive, interactive - jokes a lot, confused (underlying dementia)  Speech: Fluent.  Cranial nerves:  NO nystagmus on my exam. And otherwise CNs appear intact  Motor: Moves all extremities without difficulty, but need help to ambulate (a little unsteady).  Reflexes: Toes are equivocal bilaterally.  Sensory: Intact to light touch throughout.  Coordination: Finger nose finger is okay.   Gait: Assist of two to get from wheelchair to bed.     DIAGNOSTIC STUDIES     Pertinent Radiology   Radiology Results: Personally reviewed  image/s    CT/CTA  IMPRESSION:   HEAD CT:  1.  No CT evidence for acute intracranial process.  2.  Brain atrophy, small old lacunar infarcts left thalamus, left external capsule and corona radiata, and right corona radiata, and presumed chronic microvascular ischemic changes as above.     HEAD CTA:   1.  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  2.  Mild irregularity of the posterior cerebral arteries likely due to intracranial atherosclerotic disease with no high-grade stenoses.  3.  No anterior circulation occlusion or high-grade stenosis.     NECK CTA:  1.  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 The distal V2 and V3 segments of the right vertebral artery were patent on the prior study.  2.  Stable calcified atherosclerotic plaque left vertebral artery origin with at least moderate stenosis.  3.  Irregular calcified atherosclerotic plaque left subclavian artery with approximately 50% stenosis at the origin.  4.  Additional scattered atherosclerotic plaque in the neck vessels without hemodynamically significant stenosis.    Repeat CT (1/3/25)  FINDINGS:  INTRACRANIAL CONTENTS: No intracranial hemorrhage, extraaxial collection, or mass effect.  No CT evidence of acute infarct. Small old infarctions in the left basal ganglia and left thalamus. Mild to moderate presumed chronic small vessel ischemic   changes. Normal ventricles and sulci.      VISUALIZED ORBITS/SINUSES/MASTOIDS: No intraorbital abnormality. No paranasal sinus mucosal disease. No middle ear or mastoid effusion.     BONES/SOFT TISSUES: No acute abnormality.                                                                      IMPRESSION:  1.  No acute intracranial abnormality.  Carotid US  CONCLUSION:  1.  On the right there is a moderate amount of atheromatous plaque.  Peak systolic velocities in the ICA are 86 cm/s which correspond to the less than 50% stenosis range based on NASCET criteria.  2.  On the left  there is a moderate amount of atheromatous plaque.  Peak systolic velocities in the ICA are 115 cm/s which correspond to the less than 50% stenosis range based on NASCET criteria.  3.  Antegrade flow within the left vertebral artery. Right vertebral artery is not visualized and cannot be evaluated    Pertinent Labs   Lab Results: personally reviewed.   Recent Results (from the past 24 hours)   Basic metabolic panel    Collection Time: 01/02/25  7:14 PM   Result Value Ref Range    Sodium 134 (L) 135 - 145 mmol/L    Potassium 5.6 (H) 3.4 - 5.3 mmol/L    Chloride 102 98 - 107 mmol/L    Carbon Dioxide (CO2) 20 (L) 22 - 29 mmol/L    Anion Gap 12 7 - 15 mmol/L    Urea Nitrogen 67.5 (H) 8.0 - 23.0 mg/dL    Creatinine 1.68 (H) 0.67 - 1.17 mg/dL    GFR Estimate 38 (L) >60 mL/min/1.73m2    Calcium 9.2 8.8 - 10.4 mg/dL    Glucose 109 (H) 70 - 99 mg/dL   Magnesium    Collection Time: 01/02/25  7:14 PM   Result Value Ref Range    Magnesium 2.5 (H) 1.7 - 2.3 mg/dL   CBC with platelets and differential    Collection Time: 01/02/25  7:14 PM   Result Value Ref Range    WBC Count 8.6 4.0 - 11.0 10e3/uL    RBC Count 4.16 (L) 4.40 - 5.90 10e6/uL    Hemoglobin 13.3 13.3 - 17.7 g/dL    Hematocrit 39.4 (L) 40.0 - 53.0 %    MCV 95 78 - 100 fL    MCH 32.0 26.5 - 33.0 pg    MCHC 33.8 31.5 - 36.5 g/dL    RDW 14.6 10.0 - 15.0 %    Platelet Count 134 (L) 150 - 450 10e3/uL    % Neutrophils 55 %    % Lymphocytes 32 %    % Monocytes 10 %    % Eosinophils 2 %    % Basophils 0 %    % Immature Granulocytes 1 %    NRBCs per 100 WBC 0 <1 /100    Absolute Neutrophils 4.7 1.6 - 8.3 10e3/uL    Absolute Lymphocytes 2.7 0.8 - 5.3 10e3/uL    Absolute Monocytes 0.9 0.0 - 1.3 10e3/uL    Absolute Eosinophils 0.2 0.0 - 0.7 10e3/uL    Absolute Basophils 0.0 0.0 - 0.2 10e3/uL    Absolute Immature Granulocytes 0.1 <=0.4 10e3/uL    Absolute NRBCs 0.0 10e3/uL   Troponin T, High Sensitivity    Collection Time: 01/02/25  7:14 PM   Result Value Ref Range    Troponin T,  High Sensitivity 94 (H) <=22 ng/L   Hemoglobin A1c    Collection Time: 01/02/25  7:14 PM   Result Value Ref Range    Estimated Average Glucose 134 (H) <117 mg/dL    Hemoglobin A1C 6.3 (H) <5.7 %   Lipid panel reflex to direct LDL: Non-fasting    Collection Time: 01/02/25  7:14 PM   Result Value Ref Range    Cholesterol 89 <200 mg/dL    Triglycerides 152 (H) <150 mg/dL    Direct Measure HDL 28 (L) >=40 mg/dL    LDL Cholesterol Calculated 31 <100 mg/dL    Non HDL Cholesterol 61 <130 mg/dL   Phosphorus    Collection Time: 01/02/25  7:14 PM   Result Value Ref Range    Phosphorus 3.9 2.5 - 4.5 mg/dL   INR    Collection Time: 01/02/25 10:57 PM   Result Value Ref Range    INR 3.32 (H) 0.85 - 1.15   Glucose by meter    Collection Time: 01/03/25  3:00 AM   Result Value Ref Range    GLUCOSE BY METER POCT 123 (H) 70 - 99 mg/dL   Glucose by meter    Collection Time: 01/03/25  7:56 AM   Result Value Ref Range    GLUCOSE BY METER POCT 100 (H) 70 - 99 mg/dL       Total time spent for face to face visit, reviewing labs/imaging studies, counseling and coordination of care was: 1 Hour 30 Minutes. More than 50% of this time was spent on counseling and coordination of care.    Dragon software used in the dictation of this note.    Machelle Casillas MD  [facility] Neu[facility] - [address], [address]  [place] [address]  [phone]

## 2025-01-03 NOTE — ED PROVIDER NOTES
EMERGENCY DEPARTMENT ENCOUNTER      NAME: Skip Newman  AGE: 89 year old male  YOB: 1934  MRN: [id number]  EVALUATION DATE & TIME: No admission date for patient encounter.    PCP: Jacquelin Francis    ED PROVIDER: David Mckenzie MD      Chief Complaint   Patient presents with    Dizziness         FINAL IMPRESSION:  1. Hyperkalemia    2. Intermittent vertigo    3. Occlusion of right vertebral artery          ED COURSE & MEDICAL DECISION MAKIN:22 PM I talked to Dr. Villalta Stroke Neurology regarding patient. He recommends admission and to continued warfarin. I also spoke to [facility] radiology regarding patient.   11:23 PM I updated patient.   11:43 PM I talked to hospitalist Dr. Gondal regarding patients admission.   Pertinent Labs & Imaging studies reviewed. (See chart for details)  89 year old male presents to the Emergency Department for evaluation of positional dizziness x 2 to 3 days.    No current symptoms but he tried standing up he feels dizzy.  Denies chest pain headache or abdominal pain.  Recently had diarrhea.  Additional history provided by patient's daughter at bedside.    Has MRI noncompatible pacemaker    Reviewed urgent care nursing notes from visit today in the [facility] system.   ED Course as of 25 2355   Thu  Patient is here with intermittent vertiginous symptoms with standing.  Recently seen after a fall and had supratherapeutic INR and negative CT head.  Symptoms have been ongoing for few days   2215 No other focal neurological deficit noted.  Gait at baseline.  Speech at baseline.  No focal neurological deficit noted   2215 Potassium(!): 5.6  Unclear etiology.  Losartan was recently stopped.  Possibly mild dehydration since did have diarrhea recently.  IV fluids ordered   5 Magnesium(!): 2.5  IV fluids ordered   5 Urea Nitrogen(!): 67.5   2215 Creatinine(!): 1.68  Chronic renal insufficiency   2216 Stroke seems less likely but is 89 will obtain  CTA head and neck.  Has MRI noncompatible pacemaker   2216 We have to 3 days of symptoms should see stroke on CT imaging I did not feel need to be admitted for repeat CT if negative   2216 Denies chest pain   2217 Orthostatic pressures ordered but patient is in the lobby secondary to boarding crisis   2307 Radiology called me regarding complete occlusion of his vertebral artery that is new.  Patient symptoms are going on for couple days but will reach out to stroke neuro.  INR is pending but recently was supratherapeutic   2321 I spoke with stroke neuro, they recommend repeat head CT tomorrow. Keep the warfarin going. Treat as stroke.    2333 Patient has vertigo when he stands up and moves his neck likely from his right vertebral artery occlusion.  Not a thrombectomy candidate based on duration of symptoms.  Did discuss with stroke neuro recommends admission repeat head CT in the morning   2333 Patient and patient's family updated     No new medications.  Losartan was recently stopped due to hyperkalemia.  Here mildly hyperkalemic.  On exam appears dehydrated.  Patient is hard of hearing.      Medical Decision Making  Obtained supplemental history:Supplemental history obtained?: Documented in chart  Reviewed external records: External records reviewed?: Documented in chart and Outpatient Record: Patient was seen at [facility] on 01/02/25   Care impacted by chronic illness:Cancer/Chemotherapy, Chronic Kidney Disease, Hyperlipidemia, Hypertension, and Other: VT, MI, Gout, Arrythmia, Anemia, claudication, CAD, A-FIB, Arthritis, and thrombocytopenia.   Did you consider but not order tests?: Work up considered but not performed and documented in chart, if applicable  Did you interpret images independently?: Independent interpretation of ECG and images noted in documentation, when applicable.  Consultation discussion with other provider:Did you involve another provider (consultant, ,  pharmacy, etc.)?: I discussed the care with another health care provider, see documentation for details.  Admit.    MIPS: Not Applicable            At the conclusion of the encounter I discussed the results of all of the tests and the disposition. The questions were answered. The patient or family acknowledged understanding and was agreeable with the care plan.         MEDICATIONS GIVEN IN THE EMERGENCY:  Medications   sodium zirconium cyclosilicate (LOKELMA) packet 10 g (has no administration in time range)   sodium chloride 0.9% BOLUS 500 mL (0 mLs Intravenous Stopped 1/2/25 2230)   iopamidol (ISOVUE-370) solution 75 mL (75 mLs Intravenous $Given 1/2/25 2231)       NEW PRESCRIPTIONS STARTED AT TODAY'S ER VISIT  New Prescriptions    No medications on file          =================================================================    TRIAGE ASSESSMENT:  Patient arrives to triage for evaluation of room spinning dizziness with position change and turning his head. Symptoms for 2-3 days. Disoriented to time. Also reports 2 days of diarrhea that seems to have resolved.             HPI    Patient information was obtained from: Daughter and patient        Skip Newman is a 89 year old male with a pertinent history of A-fib on anticoagulation, MI, stroke who presents to this ED for evaluation of concern for dehydration a  Per chart review, patient was seen at [facility] on 01/02/25 for dizziness and diarrhea. nd positional vertigo when he stands.      No headache.  No chest pain.  Did have diarrhea recently.  Seen in urgent care today and referred here.  Family says that his twitching of his cheeks have been chronic.    Family speech.  Family says he is at baseline neurologically regarding gait and speech and facial symmetry and strength    Recently had losartan dose stopped.      REVIEW OF SYSTEMS   Review of Systems       PAST MEDICAL HISTORY:  Past Medical History:   Diagnosis Date     A-fib (H)     Anemia     Arrhythmia     Arthritis     Chronic kidney disease     Claudication (H)     Coronary artery disease     Gout     Hearing loss     high frequency    History of transfusion     Hyperlipidemia     Hypertension     Impotence of organic origin     Kidney stone     recurrent    MI (myocardial infarction) (H)     Primary osteoarthritis of both knees     Sick sinus syndrome -- S/P Medtronic PPM     Skin cancer     Spinal stenosis, lumbar region, with neurogenic claudication     Syncope     Thrombocytopenia (H)     VT (ventricular tachycardia) (H)        PAST SURGICAL HISTORY:  Past Surgical History:   Procedure Laterality Date    CARDIAC CATHETERIZATION      CORONARY STENT PLACEMENT  2001    LCx    HEMORRHOID SURGERY  1989    INSERT / REPLACE / REMOVE PACEMAKER  01/2009    Medtronic    OTHER SURGICAL HISTORY  11/2018    l3-l4 lumbar stenosis    TONSILLECTOMY      child    TOTAL HIP ARTHROPLASTY Left     [facility] TOTAL KNEE ARTHROPLASTY Left 7/8/2019    Procedure: LEFT TOTAL KNEE ARTHROPLASTY;  Surgeon: Bowen Padgett MD;  Location: [facility];  Service: Orthopedics    [facility] TOTAL KNEE ARTHROPLASTY Right 1/13/2020    Procedure: RIGHT TOTAL KNEE ARTHROPLASTY;  Surgeon: Bowen Padgett MD;  Location: [facility];  Service: Orthopedics    [facility] CORONARY STENT PERCUT, INITIAL VESSEL      Description: Cath Stent Placement;  Recorded: 10/01/2012;  Comments: Mid LCx    [facility] CORONARY STENT PERCUT, INITIAL VESSEL      Description: Cath Stent Placement;  Recorded: 10/21/2014;  Comments: Mid LCx           CURRENT MEDICATIONS:    acetaminophen (TYLENOL) 500 MG tablet  atorvastatin (LIPITOR) 40 MG tablet  chlorthalidone (HYGROTEN) 25 MG tablet  metoprolol succinate ER (TOPROL XL) 100 MG 24 hr tablet  MULTIVITAMIN (MULTIPLE VITAMIN ORAL)  omega 3-dha-epa-fish oil (FISH OIL) 1,000 mg (120 mg-180 mg) cap  spironolactone (ALDACTONE) 25 MG tablet  warfarin ANTICOAGULANT (COUMADIN) 5 MG  "tablet        ALLERGIES:  Allergies   Allergen Reactions    Lisinopril Cough    Indomethacin Rash    Naproxen Rash       FAMILY HISTORY:  Family History   Problem Relation Age of Onset    Heart Failure Mother     Heart Failure Father        SOCIAL HISTORY:   Social History     Socioeconomic History    Marital status:    Tobacco Use    Smoking status: Never    Smokeless tobacco: Never   Substance and Sexual Activity    Alcohol use: No    Drug use: Not Currently     Social Drivers of Health     Financial Resource Strain: Low Risk  (8/18/2023)    [facility]    Financial Resource Strain     Difficulty of Paying Living Expenses: 3   Food Insecurity: Food Insecurity Present (8/18/2023)    Received from [facility], [facility]    Food Insecurity     Worried About Running Out of Food in the Last Year: 2   Transportation Needs: No Transportation Needs (8/18/2023)    [facility]    Transportation Needs     Lack of Transportation (Medical): 1    Received from [facility]    Social Connections   Housing Stability: High Risk (8/18/2023)    Received from [facility], [facility]    Housing Stability     Unable to Pay for Housing in the Last Year: 3       VITALS:  /69   Pulse 79   Temp 97.6  F (36.4  C) (Oral)   Resp 14   Ht 1.702 m (5' 7\")   Wt 73.5 kg (162 lb)   SpO2 98%   BMI 25.37 kg/m      PHYSICAL EXAM      Vitals: /69   Pulse 79   Temp 97.6  F (36.4  C) (Oral)   Resp 14   Ht 1.702 m (5' 7\")   Wt 73.5 kg (162 lb)   SpO2 98%   BMI 25.37 kg/m    General: Appears in no acute distress, awake, alert, interactive.  Eyes: Conjunctivae non-injected. " Sclera anicteric.  Extraocular wounds are intact  HENT: Atraumatic.  Wearing hearing aids.  No significant ceruminosis.  TMs dull.  Dry tongue.  Tongue midline.  Neck: Supple.  Respiratory/Chest: Respiration unlabored.  Heart: Cardiac device, regular rate and rhythm  Abdomen: non distended  Musculoskeletal: Frozen shoulder left arm.  No ataxia.  Symmetric strength arms and legs.  Skin: Normal color. No rash or diaphoresis.  Neurologic: Face symmetric, moves all extremities spontaneously. Speech clear.  Patient has twitching to his cheek  Psychiatric: Oriented to person, place, and time. Affect appropriate.    LAB:  All pertinent labs reviewed and interpreted.  Results for orders placed or performed during the hospital encounter of 01/02/25   CTA Head Neck with Contrast    Impression    IMPRESSION:   HEAD CT:  1.  No CT evidence for acute intracranial process.  2.  Brain atrophy, small old lacunar infarcts left thalamus, left external capsule and corona radiata, and right corona radiata, and presumed chronic microvascular ischemic changes as above.    HEAD CTA:   1.  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  2.  Mild irregularity of the posterior cerebral arteries likely due to intracranial atherosclerotic disease with no high-grade stenoses.  3.  No anterior circulation occlusion or high-grade stenosis.    NECK CTA:  1.  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 The distal V2 and V3 segments of the right vertebral artery were patent on the prior study.  2.  Stable calcified atherosclerotic plaque left vertebral artery origin with at least moderate stenosis.  3.  Irregular calcified atherosclerotic plaque left subclavian artery with approximately 50% stenosis at the origin.  4.  Additional scattered atherosclerotic plaque in the neck vessels without hemodynamically significant stenosis.   Basic metabolic panel   Result Value Ref Range    Sodium 134 (L) 135 - 145 mmol/L     Potassium 5.6 (H) 3.4 - 5.3 mmol/L    Chloride 102 98 - 107 mmol/L    Carbon Dioxide (CO2) 20 (L) 22 - 29 mmol/L    Anion Gap 12 7 - 15 mmol/L    Urea Nitrogen 67.5 (H) 8.0 - 23.0 mg/dL    Creatinine 1.68 (H) 0.67 - 1.17 mg/dL    GFR Estimate 38 (L) >60 mL/min/1.73m2    Calcium 9.2 8.8 - 10.4 mg/dL    Glucose 109 (H) 70 - 99 mg/dL   Result Value Ref Range    Magnesium 2.5 (H) 1.7 - 2.3 mg/dL   CBC with platelets and differential   Result Value Ref Range    WBC Count 8.6 4.0 - 11.0 10e3/uL    RBC Count 4.16 (L) 4.40 - 5.90 10e6/uL    Hemoglobin 13.3 13.3 - 17.7 g/dL    Hematocrit 39.4 (L) 40.0 - 53.0 %    MCV 95 78 - 100 fL    MCH 32.0 26.5 - 33.0 pg    MCHC 33.8 31.5 - 36.5 g/dL    RDW 14.6 10.0 - 15.0 %    Platelet Count 134 (L) 150 - 450 10e3/uL    % Neutrophils 55 %    % Lymphocytes 32 %    % Monocytes 10 %    % Eosinophils 2 %    % Basophils 0 %    % Immature Granulocytes 1 %    NRBCs per 100 WBC 0 <1 /100    Absolute Neutrophils 4.7 1.6 - 8.3 10e3/uL    Absolute Lymphocytes 2.7 0.8 - 5.3 10e3/uL    Absolute Monocytes 0.9 0.0 - 1.3 10e3/uL    Absolute Eosinophils 0.2 0.0 - 0.7 10e3/uL    Absolute Basophils 0.0 0.0 - 0.2 10e3/uL    Absolute Immature Granulocytes 0.1 <=0.4 10e3/uL    Absolute NRBCs 0.0 10e3/uL   Result Value Ref Range    INR 3.32 (H) 0.85 - 1.15       RADIOLOGY:  Reviewed all pertinent imaging. Please see official radiology report.  CTA Head Neck with Contrast   Final Result   Addendum (preliminary) 1 of 1   COMMUNICATION ADDENDUM:   Findings were discussed with Dr. Mckenzie on 1/2/2025 11:08 PM CST.      END ADDENDUM      Final   IMPRESSION:    HEAD CT:   1.  No CT evidence for acute intracranial process.   2.  Brain atrophy, small old lacunar infarcts left thalamus, left external capsule and corona radiata, and right corona radiata, and presumed chronic microvascular ischemic changes as above.      HEAD CTA:    1.  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   2.  Mild  irregularity of the posterior cerebral arteries likely due to intracranial atherosclerotic disease with no high-grade stenoses.   3.  No anterior circulation occlusion or high-grade stenosis.      NECK CTA:   1.  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 The distal V2 and V3 segments of the right vertebral artery were patent on the prior study.   2.  Stable calcified atherosclerotic plaque left vertebral artery origin with at least moderate stenosis.   3.  Irregular calcified atherosclerotic plaque left subclavian artery with approximately 50% stenosis at the origin.   4.  Additional scattered atherosclerotic plaque in the neck vessels without hemodynamically significant stenosis.              David Mckenzie MD  [facility] EMERGENCY DEPARTMENT  [address] [place] [id number]  [phone]        David Mckenzie MD  01/02/25 1757

## 2025-01-03 NOTE — ED NOTES
Critical lab value of 118 called by Lab @ this time. Prosper Messaged Doc Beth this result @ this time.

## 2025-01-03 NOTE — ED TRIAGE NOTES
Patient arrives to triage for evaluation of room spinning dizziness with position change and turning his head. Symptoms for 2-3 days. Disoriented to time. Also reports 2 days of diarrhea that seems to have resolved.

## 2025-01-03 NOTE — MEDICATION SCRIBE - ADMISSION MEDICATION HISTORY
Medication Scribe Admission Medication History    Admission medication history is complete. The information provided in this note is only as accurate as the sources available at the time of the update.    Information Source(s): Family member via in-person    Pertinent Information: PTA med list updated per patient's daughter.  Per patient's daughter, patient's Warfarin dose have been adjusted recently from 5 mg on Wednesdays and 2.5 mg all other days to 2.5 mg daily.    Changes made to PTA medication list:  Added: None  Deleted: None  Changed: None    Allergies reviewed with patient and updates made in EHR: yes    Medication History Completed By: Jaxon Hobbs 1/3/2025 12:38 AM    PTA Med List   Medication Sig Last Dose/Taking    acetaminophen (TYLENOL) 500 MG tablet Take 500 mg by mouth every 6 hours as needed for mild pain. Taking As Needed    atorvastatin (LIPITOR) 40 MG tablet Take 20 mg by mouth every evening 1/1/2025 Evening    chlorthalidone (HYGROTEN) 25 MG tablet Take 25 mg by mouth every evening 1/1/2025 Evening    metoprolol succinate ER (TOPROL XL) 100 MG 24 hr tablet Take 1.5 tablets (150 mg) by mouth every evening 1/1/2025 Evening    MULTIVITAMIN (MULTIPLE VITAMIN ORAL) Take 1 tablet by mouth every evening 1/1/2025 Evening    omega 3-dha-epa-fish oil (FISH OIL) 1,000 mg (120 mg-180 mg) cap Take 1 capsule by mouth every evening 1/1/2025 Evening    spironolactone (ALDACTONE) 25 MG tablet Take 1 tablet by mouth daily at 2 pm. 1/1/2025 Evening    warfarin ANTICOAGULANT (COUMADIN) 5 MG tablet Take 2.5 mg by mouth every evening 1/1/2025

## 2025-01-03 NOTE — H&P
"[facility]    History and Physical - Hospitalist Service       Date of Admission:  1/2/2025    Assessment & Plan      Skip Newman is a 89 year old male admitted on 1/2/2025. He ***    ***        Diet:  ***  DVT Prophylaxis: {DVT PROPHYLAXIS:033390}  Chandler Catheter: Not present  Lines: None     Cardiac Monitoring: None  Code Status:  ***    Clinically Significant Risk Factors Present on Admission   { TIP  This section helps capture the illness of the patient on admission.     - Review diagnoses highlighted in blue; right click, edit & delete if not appropriate   - If blank, no additional diagnoses identified   :77631}     # Hyperkalemia: Highest K = 5.6 mmol/L in last 2 days, will monitor as appropriate  # Hyponatremia: Lowest Na = 134 mmol/L in last 2 days, will monitor as appropriate        # Drug Induced Coagulation Defect: home medication list includes an anticoagulant medication    # Hypertension: Noted on problem list  # Chronic heart failure with reduced ejection fraction: last echo with EF <40%          # Overweight: Estimated body mass index is 25.37 kg/m  as calculated from the following:    Height as of this encounter: 1.702 m (5' 7\").    Weight as of this encounter: 73.5 kg (162 lb).       # Financial/Environmental Concerns:     # Pacemaker present       Disposition Plan   {TIP  It is advised to update the Medical Readiness for Discharge [MRD] daily, until the patient is 'Ready Now.' Last Documentation-    . Use the SmartList below to update for today:865857}  Medically Ready for Discharge: {TIME; MEDICALLY READY FOR DISCHARGE:20246687}           Saad J. Gondal, MD  Hospitalist Service  [facility]  Securely message with [employer] (more info)  Text page via [facility] Paging/Directory     ______________________________________________________________________    Chief Complaint   ***    {History obtained from:5928988}    History of Present Illness   Skip ROBERTS" Trent is a 89 year old male who ***      Past Medical History    Past Medical History:   Diagnosis Date     A-fib (H)      Anemia      Arrhythmia      Arthritis      Chronic kidney disease      Claudication (H)      Coronary artery disease      Gout      Hearing loss     high frequency     History of transfusion      Hyperlipidemia      Hypertension      Impotence of organic origin      Kidney stone     recurrent     MI (myocardial infarction) (H)      Primary osteoarthritis of both knees      Sick sinus syndrome -- S/P [employer]tronic PPM      Skin cancer      Spinal stenosis, lumbar region, with neurogenic claudication      Syncope      Thrombocytopenia (H)      VT (ventricular tachycardia) (H)        Past Surgical History   Past Surgical History:   Procedure Laterality Date     CARDIAC CATHETERIZATION       CORONARY STENT PLACEMENT  2001    LCx     HEMORRHOID SURGERY  1989     INSERT / REPLACE / REMOVE PACEMAKER  01/2009    [employer]     OTHER SURGICAL HISTORY  11/2018    l3-l4 lumbar stenosis     TONSILLECTOMY      child     TOTAL HIP ARTHROPLASTY Left      [facility] TOTAL KNEE ARTHROPLASTY Left 7/8/2019    Procedure: LEFT TOTAL KNEE ARTHROPLASTY;  Surgeon: Bowen Padgett MD;  Location: [facility];  Service: Orthopedics     [facility] TOTAL KNEE ARTHROPLASTY Right 1/13/2020    Procedure: RIGHT TOTAL KNEE ARTHROPLASTY;  Surgeon: Bowen Padgett MD;  Location: [facility];  Service: Orthopedics     [facility] CORONARY STENT PERCUT, INITIAL VESSEL      Description: Cath Stent Placement;  Recorded: 10/01/2012;  Comments: Mid LCx     [facility] CORONARY STENT PERCUT, INITIAL VESSEL      Description: Cath Stent Placement;  Recorded: 10/21/2014;  Comments: Mid LCx       Prior to Admission Medications   Prior to Admission Medications   Prescriptions Last Dose Informant Patient Reported? Taking?   MULTIVITAMIN (MULTIPLE VITAMIN ORAL)   Yes No   Sig: Take 1 tablet by mouth every evening   acetaminophen (TYLENOL) 500 MG tablet   No No  "  Sig: [ACETAMINOPHEN (TYLENOL) 500 MG TABLET] Take 2 tablets (1,000 mg total) by mouth 3 (three) times a day.   Patient taking differently: Take 1,000 mg by mouth every 6 hours as needed.   atorvastatin (LIPITOR) 40 MG tablet   Yes No   Sig: Take 20 mg by mouth every evening   chlorthalidone (HYGROTEN) 25 MG tablet   Yes No   Sig: Take 25 mg by mouth every evening   metoprolol succinate ER (TOPROL XL) 100 MG 24 hr tablet   No No   Sig: Take 1.5 tablets (150 mg) by mouth every evening   omega 3-dha-epa-fish oil (FISH OIL) 1,000 mg (120 mg-180 mg) cap   Yes No   Sig: Take 1 capsule by mouth every evening   spironolactone (ALDACTONE) 25 MG tablet   Yes No   Sig: Take 1 tablet by mouth daily at 2 pm.   warfarin ANTICOAGULANT (COUMADIN) 5 MG tablet   Yes No   Sig: Take 2.5 mg by mouth every evening      Facility-Administered Medications: None      {Additional Note Sections (OPTIONAL)  :824299}     Physical Exam   Vital Signs: Temp: 97.6  F (36.4  C) Temp src: Oral BP: 120/69 Pulse: 79   Resp: 14 SpO2: 98 % O2 Device: None (Room air)    Weight: 162 lbs 0 oz    {Recommend personal SmartPhrase or Notewriter for exam (OPTIONAL)    :201294}    Medical Decision Making   { TIP   MDM Calculator    MDM grid (w/ times)    Coding Support Chat  Billing is now based on time OR medical decision making complexity. Medical decision making included in your A&P does NOT need to be re-documented here.    :20953}    {Time  :458524::\"*** MINUTES SPENT BY ME on the date of service doing chart review, history, exam, documentation & further activities per the note.\"}      Data   {INSERT LABS/IMAGING (OPTIONAL)   :133061}  "

## 2025-01-03 NOTE — PROGRESS NOTES
[facility]    Medicine Progress Note - Hospitalist Service    Date of Admission:  1/2/2025    Assessment & Plan   Skip Newman is a 89 year old male with a past medical history of CAD, CKD, atrial fibrillation who was admitted on 1/2/25 after presenting to [facility] ED for Vertebral Artery Occlusion causing dizziness.            #Dizziness/Vertigo  #Vertebral Artery Occlusion  - CTA Head/Neck showing No CT evidence for acute intracranial process and Complete occlusion of the right vertebral artery.   - Repeat CT 1/3/25 stable  - Echo showing EF 25-30%, no LV thrombus  - Carotid US 1/3/25 with some stenosis but less than 50% bilaterally.  Plan  - Neurology following, appreciate recommendations.  - Unable to perform MRI with PPM  restriction - repeat CT Head.  - Cardiac monitor  - PT/OT pending  - Continue home atorvastatin 40mg daily  - Holding home BP medications for permissive hypertension for now.    #History of Heart Failure  - Patient has     #Chronic thrombocytopenia  Platelet count appeared to be chronically low but better than baseline  Monitor CBC closely    #History of CAD  - Continue home atorvastatin 40mg daily    #History of A-fib  #Secondary hypercoagulable state  Supratherapeutic INR at 3.32  Holding metoprolol for now for permissive control of blood pressure, can restart tomorrow  Continue with warfarin as per pharmacy dosing            Diet: Regular Diet Adult    DVT Prophylaxis: Warfarin and Pneumatic Compression Devices  Chandler Catheter: Not present  Lines: None     Cardiac Monitoring: ACTIVE order. Indication: Stroke, acute (48 hours)  Code Status: Full Code      Clinically Significant Risk Factors Present on Admission        # Hyperkalemia: Highest K = 5.6 mmol/L in last 2 days, will monitor as appropriate  # Hyponatremia: Lowest Na = 134 mmol/L in last 2 days, will monitor as appropriate        # Drug Induced Coagulation Defect: home medication list includes an anticoagulant  "medication    # Hypertension: Noted on problem list  # Chronic heart failure with reduced ejection fraction: last echo with EF <40%          # Overweight: Estimated body mass index is 25.37 kg/m  as calculated from the following:    Height as of this encounter: 1.702 m (5' 7\").    Weight as of this encounter: 73.5 kg (162 lb).       # Financial/Environmental Concerns:     # Pacemaker present       Social Drivers of Health    Food Insecurity: Food Insecurity Present (8/18/2023)    Received from [facility], [employer]  [facility]    Food Insecurity     Worried About Running Out of Food in the Last Year: 2   Depression: At risk (9/10/2024)    [facility] [employer] [facility]    PHQ-2     PHQ-2 TOTAL SCORE: 4   Housing Stability: High Risk (8/18/2023)    [facility], [employer] & [facility]    Housing Stability     Unable to Pay for Housing in the Last Year: 3    Received from [facility]    Social Connections          Disposition Plan     Medically Ready for Discharge: Anticipated Tomorrow             Grant Beth MD  Hospitalist Service  [facility]  Securely message with [email or web address] (more info)  Text page via [employer] - Parking Tickets Paging/Directory   ______________________________________________________________________    Interval History   - Patient still with dizziness upon standing or with movements.      Physical Exam   Vital Signs: Temp: 97.6  F (36.4  C) Temp src: Oral BP: 135/62 Pulse: 81   Resp: 16 SpO2: 99 % O2 Device: None (Room air)    Weight: 162 lbs 0 oz    General: Alert and Oriented x3. Answers questions appropriately. Follows commands.  Eyes:EOMI. PERRL. Normal Conjunctivae.  HENT: Normocephalic. Atraumatic.  Resp: Breath sounds equal throughout. No crackles. No wheezing.  Cardio: Regular rate and rhythm. No " murmurs. No friction rub.  Abd: Soft. Non-distended. Non-tender. Bowel sounds normal. No rebound tenderness.  MSK: No areas of ecchymosis or erythema.  Neuro:   - CN 2-12 intact. Visual fields intact  - Strength 5/5 in all 4 extremities  Skin:No rashes. No jaundice.       Medical Decision Making       45 MINUTES SPENT BY ME on the date of service doing chart review, history, exam, documentation & further activities per the note.  MANAGEMENT DISCUSSED with the following over the past 24 hours: RN   Tests ORDERED & REVIEWED in the past 24 hours:  - BMP  - CBC  - Magnesium  - Phosphorus  Medical complexity over the past 24 hours:  - Prescription DRUG MANAGEMENT performed  - Treatment limited by SOCIAL DETERMINANTS OF HEALTH      Data     I have personally reviewed the following data over the past 24 hrs:    8.6  \   13.3   / 134 (L)     134 (L) 102 67.5 (H) /  124 (H)   5.6 (H) 20 (L) 1.68 (H) \     Trop: 107 (HH) BNP: N/A     TSH: N/A T4: N/A A1C: 6.3 (H)     INR:  3.12 (H) PTT:  N/A   D-dimer:  N/A Fibrinogen:  N/A       Imaging results reviewed over the past 24 hrs:   Recent Results (from the past 24 hours)   CTA Head Neck with Contrast    Addendum: 1/2/2025    COMMUNICATION ADDENDUM:  Findings were discussed with Dr. Mckenzie on 1/2/2025 11:08 PM CST.    END ADDENDUM      Narrative    EXAM: CTA HEAD NECK W CONTRAST  LOCATION: [facility]  DATE: 1/2/2025    INDICATION: dizziness  COMPARISON: Head CT and CTA of the head and neck dated 07/17/2020 and head CT dated 12/21/2024  CONTRAST: isovue 370 75ml  TECHNIQUE: Head and neck CT angiogram with IV contrast. Noncontrast head CT followed by axial helical CT images of the head and neck vessels obtained during the arterial phase of intravenous contrast administration. Axial 2D reconstructed images and   multiplanar 3D MIP reconstructed images of the head and neck vessels were performed by the technologist. Dose reduction techniques were used. All  stenosis measurements made according to NASCET criteria unless otherwise specified.    FINDINGS:   NONCONTRAST HEAD CT:   INTRACRANIAL CONTENTS: No intracranial hemorrhage, extraaxial collection, or mass effect.  No CT evidence of acute infarct. Mild to moderate presumed chronic small vessel ischemic changes. Stable small old lacunar infarcts left thalamus, left external   capsule, and corona radiata and right corona radiata. Mild to moderate generalized volume loss. No hydrocephalus.     VISUALIZED ORBITS/SINUSES/MASTOIDS: Prior bilateral cataract surgery. Visualized portions of the orbits are otherwise unremarkable. No paranasal sinus mucosal disease. No middle ear or mastoid effusion.    BONES/SOFT TISSUES: No acute abnormality.    HEAD CTA:  ANTERIOR CIRCULATION: Heavily calcified atherosclerotic plaque distal internal carotid arteries. No stenosis/occlusion, aneurysm, or high flow vascular malformation. Standard [place] anatomy.    POSTERIOR CIRCULATION: Right vertebral artery occlusion appears new since the prior study. Left vertebral artery and basilar artery appear widely patent. Mild irregularity of the posterior cerebral arteries, right greater than left, likely due to   intracranial atherosclerotic disease with no high-grade stenoses. No aneurysm or high flow vascular malformation.     DURAL VENOUS SINUSES: Expected enhancement of the major dural venous sinuses.    NECK CTA:  RIGHT CAROTID: Scattered calcified atherosclerotic plaque in the common carotid artery and carotid bifurcation without stenosis. No dissection.    LEFT CAROTID: Scattered predominant calcified atherosclerotic plaque in the common carotid artery and carotid bifurcation without stenosis. No dissection.    VERTEBRAL ARTERIES: The right vertebral artery is now completely occluded. Flow was previously detected in the distal V2 and V3 segments of the right vertebral artery. Stable calcified atherosclerotic plaque at the left  vertebral artery origin with at   least moderate stenosis. Remainder of the left vertebral artery appears widely patent.    AORTIC ARCH: Moderate irregular calcified atherosclerotic plaque left subclavian artery with approximately 50% stenosis at the origin. Additional scattered calcified atherosclerotic plaque involving the aortic arch and proximal great vessels without   hemodynamically significant stenosis. Origins of the brachiocephalic and left common carotid arteries are in close proximity from the aortic arch.    NONVASCULAR STRUCTURES: Degenerative changes cervical and upper thoracic spine.      Impression    IMPRESSION:   HEAD CT:  1.  No CT evidence for acute intracranial process.  2.  Brain atrophy, small old lacunar infarcts left thalamus, left external capsule and corona radiata, and right corona radiata, and presumed chronic microvascular ischemic changes as above.    HEAD CTA:   1.  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  2.  Mild irregularity of the posterior cerebral arteries likely due to intracranial atherosclerotic disease with no high-grade stenoses.  3.  No anterior circulation occlusion or high-grade stenosis.    NECK CTA:  1.  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 The distal V2 and V3 segments of the right vertebral artery were patent on the prior study.  2.  Stable calcified atherosclerotic plaque left vertebral artery origin with at least moderate stenosis.  3.  Irregular calcified atherosclerotic plaque left subclavian artery with approximately 50% stenosis at the origin.  4.  Additional scattered atherosclerotic plaque in the neck vessels without hemodynamically significant stenosis.   US Carotid Bilateral    Narrative    [place] [facility]  LOCATION: [facility]  DATE: 1/3/2025    INDICATION: some stenosis on CTA head  TECHNIQUE: Duplex exam performed utilizing 2D gray-scale imaging, Doppler interrogation with  color-flow and spectral waveform analysis.  COMPARISON: None.    FINDINGS:  RIGHT: There is a moderate amount of atheromatous plaque.     LEFT: There is a moderate amount of atheromatous plaque.     Antegrade flow within the left vertebral artery. Right vertebral artery is not visualized.    VELOCITY CHART:   The following velocities were obtained in the RIGHT carotid system.  CCA: 67 cm/s  ICA: 86 cm/s  ECA: 146 cm/s  ICA/CCA: PS 1.3    The following velocities were obtained in the LEFT carotid system.  CCA: 95 cm/s  ICA: 115 cm/s  ECA: 100 cm/s  ICA/CCA: PS 1.2                             Impression    CONCLUSION:  1.  On the right there is a moderate amount of atheromatous plaque.  Peak systolic velocities in the ICA are 86 cm/s which correspond to the less than 50% stenosis range based on NASCET criteria.  2.  On the left there is a moderate amount of atheromatous plaque.  Peak systolic velocities in the ICA are 115 cm/s which correspond to the less than 50% stenosis range based on NASCET criteria.  3.  Antegrade flow within the left vertebral artery. Right vertebral artery is not visualized and cannot be evaluated.    Evaluation based on velocities and NASCET criteria.   CT Head w/o Contrast    Narrative    EXAM: CT HEAD W/O CONTRAST  LOCATION: [facility]  DATE: 1/3/2025    INDICATION: dizziness  COMPARISON: CTA head neck from 1/2/2024..  TECHNIQUE: Routine CT Head without IV contrast. Multiplanar reformats. Dose reduction techniques were used.    FINDINGS:  INTRACRANIAL CONTENTS: No intracranial hemorrhage, extraaxial collection, or mass effect.  No CT evidence of acute infarct. Small old infarctions in the left basal ganglia and left thalamus. Mild to moderate presumed chronic small vessel ischemic   changes. Normal ventricles and sulci.     VISUALIZED ORBITS/SINUSES/MASTOIDS: No intraorbital abnormality. No paranasal sinus mucosal disease. No middle ear or mastoid  effusion.    BONES/SOFT TISSUES: No acute abnormality.      Impression    IMPRESSION:  1.  No acute intracranial abnormality.   Echocardiogram Complete - For age > 60 yrs    Narrative    [id number]  [id number]  [id number]  [id number]^GONDAL^ANEL^[place] [address]     Name: EDUARDO GENAO  MRN: [id number]  : 1934  Study Date: 2025 01:03 PM  Age: 89 yrs  Gender: Male  Patient Location: [facility]  Reason For Study: Cerebrovascular Incident  Ordering Physician: GONDAL, SAAD J  Performed By: JULIANE     BSA: 1.8 m2  Height: 67 in  Weight: 162 lb  HR: 82  BP: 163/69 mmHg  ______________________________________________________________________________  Procedure  Echocardiogram with two-dimensional, color and spectral Doppler. Definity (NDC  #[id number]) given intravenously.  ______________________________________________________________________________  Interpretation Summary     1. Technically difficult study.  2. The left ventricle is normal in size. Left ventricular systolic performance  is moderate to severely reduced. The ejection fraction is estimated to be 25-  30%.  3. There is moderate to severe global reduction in left ventricular systolic  performance.  4. There is mild aortic insufficiency.  5. Mild right ventricular enlargement with mildly reduced right ventricular  systolic performance.  6. There is moderate left atrial enlargement.     When compared to the prior real-time echocardiogram dated 2024, the  ejection fraction appears slightly more reduced on the current study.  ______________________________________________________________________________  Left ventricle:  The left ventricle is normal in size. Left ventricular systolic performance is  moderate to severely reduced. The ejection fraction is estimated to be 25-30%.  There is moderate to severe global reduction in left ventricular systolic  performance. There is mild concentric increase in left  ventricular wall  thickness.     Assessment of left atrial pressure (LAP): The cumulative findings are  indeterminate in evaluating left atrial pressure.     Right ventricle:  Mild right ventricular enlargement with mildly reduced right ventricular  systolic performance.     Left atrium:  There is moderate left atrial enlargement.     Right atrium:  The right atrium is poorly visualized.     IVC:  The IVC is of normal caliber.     Aortic valve:  The aortic valve is comprised of three cusps. There is no significant aortic  stenosis. There is mild aortic insufficiency.     Mitral valve:  The mitral valve appears morphologically normal. There is mild mitral  insufficiency.     Tricuspid valve:  The tricuspid valve is grossly morphologically normal. There is trace-mild  tricuspid insufficiency.     Pulmonic valve:  The pulmonic valve is grossly morphologically normal.     Thoracic aorta:  The aortic root and proximal ascending aorta are of normal dimension.     Pericardium:  There is no significant pericardial effusion.  ______________________________________________________________________________  ______________________________________________________________________________  MMode/2D Measurements & Calculations  IVSd: 1.5 cm  LVIDd: 4.7 cm  LVIDs: 3.8 cm  LVPWd: 1.2 cm  FS: 19.3 %  LV mass(C)d: 238.7 grams  LV mass(C)dI: 129.1 grams/m2  Ao root diam: 3.2 cm  asc Aorta Diam: 3.3 cm  LVOT diam: 2.4 cm  LVOT area: 4.5 cm2  Ao root diam index Ht(cm/m): 1.9  Ao root diam index BSA (cm/m2): 1.7  Asc Ao diam index BSA (cm/m2): 1.8  Asc Ao diam index Ht(cm/m): 1.9  EF Biplane: 42.7 %  LA Volume (BP): 85.5 ml     LA Volume Index (BP): 46.2 ml/m2  LA Volume Indexed (AL/bp): 49.0 ml/m2  RV Base: 4.2 cm  RWT: 0.49     Doppler Measurements & Calculations  MV E max alyssa: 96.4 cm/sec  MV dec slope: 571.3 cm/sec2  MV dec time: 0.17 sec  Ao V2 max: 140.3 cm/sec  Ao max P.0 mmHg  Ao V2 mean: 107.0 cm/sec  Ao mean P.0 mmHg  Ao  V2 VTI: 30.7 cm  DRE(I,D): 2.0 cm2  DRE(V,D): 2.1 cm2  LV V1 max P.6 mmHg  LV V1 max: 63.8 cm/sec  LV V1 VTI: 13.4 cm  SV(LVOT): 60.8 ml  SI(LVOT): 32.9 ml/m2  PA V2 max: 81.4 cm/sec  PA max P.7 mmHg  PA acc time: 0.08 sec  TR max dale: 238.0 cm/sec  TR max P.7 mmHg  AV Dale Ratio (DI): 0.45  DRE Index (cm2/m2): 1.1  E/E' av.1  Lateral E/e': 12.0  Medial E/e': 16.1  RV S Dale: 7.6 cm/sec     ______________________________________________________________________________  Report approved by: Orlando Villasenor MD on 2025 02:29 PM

## 2025-01-04 ENCOUNTER — APPOINTMENT (OUTPATIENT)
Dept: PHYSICAL THERAPY | Facility: HOSPITAL | Age: OVER 89
DRG: 068 | End: 2025-01-04
Attending: STUDENT IN AN ORGANIZED HEALTH CARE EDUCATION/TRAINING PROGRAM
Payer: MEDICARE

## 2025-01-04 VITALS
DIASTOLIC BLOOD PRESSURE: 70 MMHG | WEIGHT: 162 LBS | OXYGEN SATURATION: 96 % | SYSTOLIC BLOOD PRESSURE: 169 MMHG | HEART RATE: 82 BPM | RESPIRATION RATE: 18 BRPM | HEIGHT: 67 IN | TEMPERATURE: 97.5 F | BODY MASS INDEX: 25.43 KG/M2

## 2025-01-04 PROBLEM — I48.0 PAROXYSMAL ATRIAL FIBRILLATION (H): Status: ACTIVE | Noted: 2020-06-01

## 2025-01-04 LAB
ANION GAP SERPL CALCULATED.3IONS-SCNC: 10 MMOL/L (ref 7–15)
BUN SERPL-MCNC: 41.2 MG/DL (ref 8–23)
CALCIUM SERPL-MCNC: 8.5 MG/DL (ref 8.8–10.4)
CHLORIDE SERPL-SCNC: 107 MMOL/L (ref 98–107)
CREAT SERPL-MCNC: 1.38 MG/DL (ref 0.67–1.17)
EGFRCR SERPLBLD CKD-EPI 2021: 49 ML/MIN/1.73M2
ERYTHROCYTE [DISTWIDTH] IN BLOOD BY AUTOMATED COUNT: 14.6 % (ref 10–15)
GLUCOSE SERPL-MCNC: 93 MG/DL (ref 70–99)
HCO3 SERPL-SCNC: 22 MMOL/L (ref 22–29)
HCT VFR BLD AUTO: 35.5 % (ref 40–53)
HGB BLD-MCNC: 12.2 G/DL (ref 13.3–17.7)
INR PPP: 3.01 (ref 0.85–1.15)
MAGNESIUM SERPL-MCNC: 2.2 MG/DL (ref 1.7–2.3)
MCH RBC QN AUTO: 31.8 PG (ref 26.5–33)
MCHC RBC AUTO-ENTMCNC: 34.4 G/DL (ref 31.5–36.5)
MCV RBC AUTO: 92 FL (ref 78–100)
PHOSPHATE SERPL-MCNC: 2.4 MG/DL (ref 2.5–4.5)
PLATELET # BLD AUTO: 100 10E3/UL (ref 150–450)
POTASSIUM SERPL-SCNC: 4.6 MMOL/L (ref 3.4–5.3)
RBC # BLD AUTO: 3.84 10E6/UL (ref 4.4–5.9)
SODIUM SERPL-SCNC: 139 MMOL/L (ref 135–145)
WBC # BLD AUTO: 9.5 10E3/UL (ref 4–11)

## 2025-01-04 PROCEDURE — 84100 ASSAY OF PHOSPHORUS: CPT | Performed by: STUDENT IN AN ORGANIZED HEALTH CARE EDUCATION/TRAINING PROGRAM

## 2025-01-04 PROCEDURE — 82310 ASSAY OF CALCIUM: CPT | Performed by: STUDENT IN AN ORGANIZED HEALTH CARE EDUCATION/TRAINING PROGRAM

## 2025-01-04 PROCEDURE — 85014 HEMATOCRIT: CPT | Performed by: STUDENT IN AN ORGANIZED HEALTH CARE EDUCATION/TRAINING PROGRAM

## 2025-01-04 PROCEDURE — 99239 HOSP IP/OBS DSCHRG MGMT >30: CPT | Performed by: STUDENT IN AN ORGANIZED HEALTH CARE EDUCATION/TRAINING PROGRAM

## 2025-01-04 PROCEDURE — 83735 ASSAY OF MAGNESIUM: CPT | Performed by: STUDENT IN AN ORGANIZED HEALTH CARE EDUCATION/TRAINING PROGRAM

## 2025-01-04 PROCEDURE — 97161 PT EVAL LOW COMPLEX 20 MIN: CPT | Mod: GP

## 2025-01-04 PROCEDURE — 36415 COLL VENOUS BLD VENIPUNCTURE: CPT | Performed by: STUDENT IN AN ORGANIZED HEALTH CARE EDUCATION/TRAINING PROGRAM

## 2025-01-04 PROCEDURE — 250N000013 HC RX MED GY IP 250 OP 250 PS 637: Performed by: STUDENT IN AN ORGANIZED HEALTH CARE EDUCATION/TRAINING PROGRAM

## 2025-01-04 PROCEDURE — 80048 BASIC METABOLIC PNL TOTAL CA: CPT | Performed by: STUDENT IN AN ORGANIZED HEALTH CARE EDUCATION/TRAINING PROGRAM

## 2025-01-04 PROCEDURE — 97530 THERAPEUTIC ACTIVITIES: CPT | Mod: GP

## 2025-01-04 PROCEDURE — 85610 PROTHROMBIN TIME: CPT | Performed by: STUDENT IN AN ORGANIZED HEALTH CARE EDUCATION/TRAINING PROGRAM

## 2025-01-04 PROCEDURE — 99233 SBSQ HOSP IP/OBS HIGH 50: CPT | Performed by: PSYCHIATRY & NEUROLOGY

## 2025-01-04 RX ORDER — ASPIRIN 81 MG/1
81 TABLET, CHEWABLE ORAL DAILY
Status: DISCONTINUED | OUTPATIENT
Start: 2025-01-04 | End: 2025-01-04 | Stop reason: HOSPADM

## 2025-01-04 RX ORDER — ASPIRIN 81 MG/1
81 TABLET, CHEWABLE ORAL DAILY
Qty: 30 TABLET | Refills: 1 | Status: SHIPPED | OUTPATIENT
Start: 2025-01-04 | End: 2025-03-05

## 2025-01-04 RX ORDER — WARFARIN SODIUM 2.5 MG/1
2.5 TABLET ORAL
Status: DISCONTINUED | OUTPATIENT
Start: 2025-01-04 | End: 2025-01-04 | Stop reason: HOSPADM

## 2025-01-04 RX ADMIN — ACETAMINOPHEN 650 MG: 325 TABLET ORAL at 04:21

## 2025-01-04 ASSESSMENT — ACTIVITIES OF DAILY LIVING (ADL)
ADLS_ACUITY_SCORE: 59
ADLS_ACUITY_SCORE: 59
ADLS_ACUITY_SCORE: 58
ADLS_ACUITY_SCORE: 59
ADLS_ACUITY_SCORE: 58

## 2025-01-04 NOTE — PLAN OF CARE
"Goal Outcome Evaluation:      Daughter \"Rashmi\" at bedside and some family most of the evening.  Pt confused, alert, agitated and verbally inappropriate at times.  Disoriented to time and situation. Using the urinal w/ assistance.  Denies pain and medication, but his whole body especially L shoulder are tender to touch and will yell out during repositioning.    Tele- AF w/ PVC's.  Pt refusing most of the NIHSS checks and only cooperated w/ a portion of the assessment.  Mild dysarthria and mild sensory loss in R upper and lower extremities.  BG stable at 124. Pts daughter requesting to \"let him rest and not to check HS BG or put SCD's on the pt. \"  NS running at 75mL/hr.  Lab to come to check Magnesium level and BMP for hyperkalemia and hypermagnesemia.       Cardiac device check done by KENDRICK RN and in the pts chart.        Problem: Adult Inpatient Plan of Care  Goal: Plan of Care Review  Description: The Plan of Care Review/Shift note should be completed every shift.  The Outcome Evaluation is a brief statement about your assessment that the patient is improving, declining, or no change.  This information will be displayed automatically on your shift  note.  Outcome: Progressing  Flowsheets (Taken 1/3/2025 9709)  Plan of Care Reviewed With:   patient   family     Problem: Adult Inpatient Plan of Care  Goal: Absence of Hospital-Acquired Illness or Injury  Intervention: Prevent and Manage VTE (Venous Thromboembolism) Risk  Recent Flowsheet Documentation  Taken 1/3/2025 1718 by Danielle Walsh, RN  VTE Prevention/Management: (takes warfarin)   SCDs off (sequential compression devices)   patient refused intervention   other (see comments)     Problem: Adult Inpatient Plan of Care  Goal: Optimal Comfort and Wellbeing  Outcome: Progressing     Problem: Skin Injury Risk Increased  Goal: Skin Health and Integrity  Intervention: Plan: Nurse Driven Intervention: Moisture Management  Recent Flowsheet Documentation  Taken " 1/3/2025 2000 by Danielle Walsh, RN  Moisture Interventions: Incontinence pad  Bathing/Skin Care: patient refused  Taken 1/3/2025 1718 by Danielle Walsh, RN  Moisture Interventions: Incontinence pad     Problem: Skin Injury Risk Increased  Goal: Skin Health and Integrity  Intervention: Optimize Skin Protection  Recent Flowsheet Documentation  Taken 1/3/2025 1718 by Danielle Walsh, RN  Activity Management: activity adjusted per tolerance           Plan of Care Reviewed With: patient, family

## 2025-01-04 NOTE — DISCHARGE INSTRUCTIONS
You were hospitalized for further workup of your intermittent dizziness.  Imaging of your head including multiple CTs showed an occlusion of your right vertebral artery, but did not show any active areas of ischemia/strokes in the brain.  You were monitored on cardiac telemetry which only showed known history of premature ventricular contractions and few runs of nonsustained ventricular tachycardia as we talked about.  You had an echo or ultrasound of the heart which showed known heart failure with EF 25 to 30% and known wall motion abnormalities unchanged from previous.  Ultrasound of the carotid arteries in your neck was negative for significant stenosis.  You were started on aspirin 81 mg daily for likely small stroke not seen on imaging.  Follow-up with your primary care provider in 1-2 weeks for posthospitalization visit.  You should also follow-up with your outpatient cardiologist as previously scheduled.  Follow-up with neurology in 2-3 weeks for further management of medication and stroke education.

## 2025-01-04 NOTE — PLAN OF CARE
Physical Therapy Discharge Summary    Reason for therapy discharge:    Discharged to [facility]    Progress towards therapy goal(s). See goals on Care Plan in [facility] electronic health record for goal details.  Goals partly met    Therapy recommendation(s):    Recommend continued PT at TCU

## 2025-01-04 NOTE — PROGRESS NOTES
"DO time, situation- confused. VSS on RA, denies pain. Skin tender to touch, increased tenderness with decreased ROM to Left shoulder d/t \"frozen shoulder\" per pt + family. NIH scores 1,0 for very mild dysarthria- unsure if baseline or altered. Up with SBA + GBW. Discharged with family via wheelchair at approx 1355. No acute events reported at this time.    Patric Avina RN  "

## 2025-01-04 NOTE — PLAN OF CARE
Problem: Stroke, Ischemic (Includes Transient Ischemic Attack)  Goal: Optimal Cerebral Tissue Perfusion  Outcome: Progressing   Goal Outcome Evaluation:       Patient alert/ confused, disoriented to time and situation. Denies  pain. L shoulder tender to touch, NIH not complete, pt refuses to do some of the requested stuff. On tele, A fib with PVCs. Frequent rounding.

## 2025-01-04 NOTE — CONSULTS
Care Management Initial Consult    General Information  Assessment completed with: Patient, Family, Pt and dtr Rashmi  Type of CM/SW Visit: Initial Assessment    Primary Care Provider verified and updated as needed: Yes   Readmission within the last 30 days: no previous admission in last 30 days      Reason for Consult: discharge planning  Advance Care Planning:            Communication Assessment  Patient's communication style: spoken language (English or Bilingual)    Hearing Difficulty or Deaf: yes   Wear Glasses or Blind: no    Cognitive  Cognitive/Neuro/Behavioral: .WDL except, level of consciousness, orientation, speech  Level of Consciousness: confused  Arousal Level: opens eyes spontaneously  Orientation: disoriented to, time, situation  Mood/Behavior: anxious  Best Language: 1 - Mild to moderate (very mild)  Speech: spontaneous, clear    Living Environment:   People in home: other (see comments) (lives in AL)     Current living Arrangements: assisted living  Name of Facility: [facility]   Able to return to prior arrangements: yes       Family/Social Support:  Care provided by: other (see comments) (AL staff)  Provides care for: no one  Marital Status:   Support system: Children          Description of Support System: Supportive    Support Assessment: Adequate family and caregiver support    Current Resources:   Patient receiving home care services:          Community Resources:    Equipment currently used at home: walker, rolling  Supplies currently used at home: Hearing Aid Batteries, Other    Employment/Financial:  Employment Status: retired        Financial Concerns: none   Referral to Financial Worker: No       Does the patient's insurance plan have a 3 day qualifying hospital stay waiver?  No    Lifestyle & Psychosocial Needs:  Social Drivers of Health     Food Insecurity: Food Insecurity Present (8/18/2023)    [facility] [facility]    Food Insecurity     Worried About Running Out of Food in the Last Year: 2   Depression: At risk (9/10/2024)    Received from [facility]    PHQ-2     PHQ-2 TOTAL SCORE: 4   Housing Stability: High Risk (8/18/2023)    Received from [facility], [facility]    Housing Stability     Unable to Pay for Housing in the Last Year: 3   Tobacco Use: Low Risk  (1/2/2025)    Received from [facility]    Patient History     Smoking Tobacco Use: Never     Smokeless Tobacco Use: Never     Passive Exposure: Not on file   Financial Resource Strain: Low Risk  (8/18/2023)    Received from [facility], [facility]    Financial Resource Strain     Difficulty of Paying Living Expenses: 3     Difficulty of Paying Living Expenses: Not on file   Alcohol Use: Not on file   Transportation Needs: No Transportation Needs (8/18/2023)    Received from [facility], [facility]    Transportation Needs     Lack of Transportation (Medical): 1   Physical Activity: Not on file   Interpersonal Safety: Not on file   Stress: Not on file   Social Connections: Unknown (8/18/2024)    Received from [facility]    Social Connections     Frequency of Communication with Friends and Family: Not on file   Health Literacy: Not on file       Functional Status:  Prior to admission patient needed assistance:   Dependent ADLs:: Ambulation-cane, Independent, walker, WC for long distance  Dependent IADLs:: Transportation, Meal Preparation, Cleaning, Cooking, Shopping       Discussed  Partnership in Safe Discharge Planning  document with patient/family: No    Additional Information:  RNCM met with pt's family at pt s bedside to review the roll of care  management and to complete initial assessment. Daughter, Rashmi is patient s primary family contact. HCD on file. Per Rashmi pt lives at [facility] located in [place]. Patient receives support from AL staff and family. Pt is dependent with ADL using cane or 4WW for ambulation and wheel chair for long distance. . Patient receives family support with IADLs.     [facility] provides in house therapy for residents of [place]. RNCM submitted HC referral for pt.     Next Steps: Assist with discharge planning as needed. Follow up to confirm HC referral can be accepted.     Carmen Hassan RN

## 2025-01-04 NOTE — PHARMACY-CONSULT NOTE
"Pharmacy Consult to evaluate for medication related stroke core measures    Skip Newman, 89 year old male admitted for intermittent episodes of vertiginous symptoms for 2-3 days there is some positional component to the dizziness on 1/2/2025.    Thrombolytic was not given because of Time from onset contraindications    VTE Prophylaxis  warfarin given on 1/3/25 as appropriate prior to end of hospital day 2.    Antithrombotic: warfarin started on 1/3/25, as appropriate by end of hospital day 2. Continue antithrombotic therapy on discharge to meet quality measures, unless contraindicated.    Anticoagulation if history of A-fib/flutter: Patient on warfarin; continue anticoagulation on discharge to meet quality measures, unless contraindicated.    LDL Cholesterol Calculated   Date Value Ref Range Status   01/02/2025 31 <100 mg/dL Final     Lipitor ordered but with LDL of 31 may not be needed.    Recommendations: per neuro note \"though LDL is below goal of 40-70, so the statin could be discontinued and re-evaluated through primary care provider after discharge\"    Thank you for the consult.    Mary Ann Hays [facility] 1/3/2025 10:00 PM     "

## 2025-01-04 NOTE — PROGRESS NOTES
NEUROLOGY PROGRESS NOTE     ASSESSMENT & PLAN   Visit diagnosis: Rule out stroke    Episodic vertigo-rule out stroke  Rule out lightheadedness    Head CT X 2 negative for acute stroke  CT angiogram negative for large vessel occlusion/stenosis though does show new right vertebral artery occlusion.  Given that the distal segments are patent this should not be causing the episodic vertigo  Echocardiogram shows global decreased ejection fraction which could be causing the dizziness.  Recommend further evaluation by cardiology  Cardiac monitoring  Continue PTA Coumadin and statin.  LDL goal less than 70.  Blood pressure can be normalized.  Avoid over reduction blood pressure  PT/OT  Trial of vestibular rehab.  Discussed with physical therapist.    Neurology Discharge Planning : Per primary team.     Patient Active Problem List    Diagnosis Date Noted    Hyperkalemia 01/02/2025     Priority: Medium    Intermittent vertigo 01/02/2025     Priority: Medium    Occlusion of right vertebral artery 01/02/2025     Priority: Medium    Generalized weakness 03/13/2024     Priority: Medium    Elevated brain natriuretic peptide (BNP) level 03/13/2024     Priority: Medium    Aortic regurgitation -- 2+ on Echo 12/21/23 02/16/2024     Priority: Medium    Moderate Pulm HTN -- Echo 12/21/23 PAP 36 + RAP 02/16/2024     Priority: Medium    Chronic Left rotator cuff tear 02/16/2024     Priority: Medium    CKD (chronic kidney disease) stage 3 02/15/2024     Priority: Medium    Elevated troponin 02/15/2024     Priority: Medium    Subtherapeutic international normalized ratio (INR) 02/15/2024     Priority: Medium    Fall-- found down, > 12 hrs 02/15/2024     Priority: Medium    Non-traumatic rhabdomyolysis 02/15/2024     Priority: Medium    Atrial fibrillation, unspecified type (H) 02/15/2024     Priority: Medium    History of stroke 02/15/2024     Priority: Medium    Chronic systolic CHF -- EF 30-35% on 12/21/23 02/15/2024     Priority: Medium     Paroxysmal atrial fib -- on Warfarin 06/01/2020     Priority: Medium    Primary osteoarthritis of right knee 01/13/2020     Priority: Medium    Thrombocytopenia (H)      Priority: Medium    Spinal stenosis, lumbar region, with neurogenic claudication      Priority: Medium    Pacemaker      Priority: Medium     Medtronic        MI (myocardial infarction) (H)      Priority: Medium    Hypertension      Priority: Medium    Coronary artery disease      Priority: Medium    Chronic kidney disease      Priority: Medium    Primary osteoarthritis of left knee 07/08/2019     Priority: Medium    Recurrent kidney stones 07/08/2019     Priority: Medium     Overview:   last stone 1993        Status post total left knee replacement 07/08/2019     Priority: Medium     7/08/19 @ [facility] ; Dr Bowen Padgett        Hypertension      Priority: Medium     Created by Conversion  Replacement Utility updated for latest IMO load        CAD -- S/P Stents 2001, 2012, 2014      Priority: Medium     Created by Conversion  [facility] Annotation: Oct  1 2012  7:40AM - Roc Will: Angiogram   8/08   showed severe disease RCA, Moderate disease Cx, Chronic total occlusion   with   collaterals OM1 - Unable to do PCI in proximal rt. PDA  Replacement Utility updated for latest IMO load        SSS -- S/P dual chamber Pacemaker 03/07/2017     Priority: Medium     Medtronic Adapta, dual chamber pacemaker   DOI: 10/27/15        Hyperlipidemia      Priority: Medium     Created by Conversion        Nonsustained ventricular tachycardia (H) 01/19/2016     Priority: Medium    Gout 03/26/2008     Priority: Medium     Overview:   BILATERAL ANKLES 3/08- RX PREDNISONE  On chronic allopurinol (2019)         Medical History  Past Medical History:   Diagnosis Date    A-fib (H)     Anemia     Arrhythmia     Arthritis     Chronic kidney disease     Claudication (H)     Coronary artery disease     Gout     Hearing loss     high frequency    History of transfusion   "   Hyperlipidemia     Hypertension     Impotence of organic origin     Kidney stone     recurrent    MI (myocardial infarction) (H)     Primary osteoarthritis of both knees     Sick sinus syndrome -- S/P Medtronic PPM     Skin cancer     Spinal stenosis, lumbar region, with neurogenic claudication     Syncope     Thrombocytopenia (H)     VT (ventricular tachycardia) (H)         SUBJECTIVE     Patient seen in follow-up with Dr. Casillas.      1/4/24  No further complaints.  Continues to complain of vertigo mainly with standing but nothing in the chair.  This might be more lightheadedness even though he complains of vertigo.  Is working with physical therapy today.  Potential discharge later today.     OBJECTIVE     Vital signs in last 24 hours  Temp:  [97.5  F (36.4  C)-98.2  F (36.8  C)] 97.5  F (36.4  C)  Pulse:  [77-84] 82  Resp:  [18-21] 18  BP: (140-169)/(64-70) 169/70  SpO2:  [96 %-99 %] 96 %      Review of Systems   No new complaints    PHYSICAL EXAMINATION  VITALS: BP (!) 169/70 (BP Location: Left arm)   Pulse 82   Temp 97.5  F (36.4  C) (Oral)   Resp 18   Ht 1.702 m (5' 7\")   Wt 73.5 kg (162 lb)   SpO2 96%   BMI 25.37 kg/m    GENERAL -Health appearing, No apparent distress  EYES- No scleral icterus, no eyelid droop, Pupils - see Neuro section  HEENT - Normocephalic, atraumatic, Hearing grossly intact; Oral mucosa moist and pink in color. External Ears and nose intact.   Neck - supple   PULM - Good spontaneous respiratory effort;   CV-extremities warm to touch  MSK- Gait - see Neuro section; Strength and tone- see Neuro section; Range of motion grossly intact.  PSYCH -cooperative    Neurological  Mental status - Patient is awake and grossly oriented to self, place and time. Attention span is normal. Language is fluent and follows commands appropriately.   Cranial nerves - CN II-XII intact. Pupils are reactive and symmetric; EOMI, VFIT, NLF symmetric  Motor - There is no pronator drift. Motor exam shows " 5/5 strength in all extremities.  Tone - Tone is symmetric bilaterally in upper and lower extremities.  Reflexes - Reflexes are symmetric/decreased.  Sensation - Sensory exam is grossly intact to light touch, pain.  Coordination - Finger to nose and heel to shin is without dysmetria.  Gait and station --able to ambulate with assistance.  Formal gait testing cannot be done due to safety concerns from ongoing medical issues       DIAGNOSTIC STUDIES       Pertinent Radiology   HEAD CT:  1.  No CT evidence for acute intracranial process.  2.  Brain atrophy, small old lacunar infarcts left thalamus, left external capsule and corona radiata, and right corona radiata, and presumed chronic microvascular ischemic changes as above.     HEAD CTA:   1.  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  2.  Mild irregularity of the posterior cerebral arteries likely due to intracranial atherosclerotic disease with no high-grade stenoses.  3.  No anterior circulation occlusion or high-grade stenosis.     NECK CTA:  1.  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 The distal V2 and V3 segments of the right vertebral artery were patent on the prior study.  2.  Stable calcified atherosclerotic plaque left vertebral artery origin with at least moderate stenosis.  3.  Irregular calcified atherosclerotic plaque left subclavian artery with approximately 50% stenosis at the origin.  4.  Additional scattered atherosclerotic plaque in the neck vessels without hemodynamically significant stenosis.    Carotid US  CONCLUSION:  1.  On the right there is a moderate amount of atheromatous plaque.  Peak systolic velocities in the ICA are 86 cm/s which correspond to the less than 50% stenosis range based on NASCET criteria.  2.  On the left there is a moderate amount of atheromatous plaque.  Peak systolic velocities in the ICA are 115 cm/s which correspond to the less than 50% stenosis range based on NASCET  criteria.  3.  Antegrade flow within the left vertebral artery. Right vertebral artery is not visualized and cannot be evaluated.    CT head  IMPRESSION:  1.  No acute intracranial abnormality.      ECHO  nterpretation Summary     1. Technically difficult study.  2. The left ventricle is normal in size. Left ventricular systolic performance  is moderate to severely reduced. The ejection fraction is estimated to be 25-  30%.  3. There is moderate to severe global reduction in left ventricular systolic  performance.  4. There is mild aortic insufficiency.  5. Mild right ventricular enlargement with mildly reduced right ventricular  systolic performance.  6. There is moderate left atrial enlargement.     When compared to the prior real-time echocardiogram dated 25 April 2024, the  ejection fraction appears slightly more reduced on the current study.    Component      Latest Ref Rng 1/2/2025  7:14 PM   Cholesterol      <200 mg/dL 89    Triglycerides      <150 mg/dL 152 (H)    HDL Cholesterol      >=40 mg/dL 28 (L)    LDL Cholesterol Calculated      <100 mg/dL 31    Non HDL Cholesterol      <130 mg/dL 61    Estimated Average Glucose      <117 mg/dL 134 (H)    Hemoglobin A1C      <5.7 % 6.3 (H)       Legend:  (H) High  (L) Low    Recent Results (from the past 24 hours)   Glucose by meter    Collection Time: 01/03/25 12:47 PM   Result Value Ref Range    GLUCOSE BY METER POCT 124 (H) 70 - 99 mg/dL   Troponin T, High Sensitivity    Collection Time: 01/03/25  2:58 PM   Result Value Ref Range    Troponin T, High Sensitivity 107 (HH) <=22 ng/L   Glucose by meter    Collection Time: 01/03/25  6:41 PM   Result Value Ref Range    GLUCOSE BY METER POCT 125 (H) 70 - 99 mg/dL   Basic metabolic panel    Collection Time: 01/04/25  7:03 AM   Result Value Ref Range    Sodium 139 135 - 145 mmol/L    Potassium 4.6 3.4 - 5.3 mmol/L    Chloride 107 98 - 107 mmol/L    Carbon Dioxide (CO2) 22 22 - 29 mmol/L    Anion Gap 10 7 - 15 mmol/L     Urea Nitrogen 41.2 (H) 8.0 - 23.0 mg/dL    Creatinine 1.38 (H) 0.67 - 1.17 mg/dL    GFR Estimate 49 (L) >60 mL/min/1.73m2    Calcium 8.5 (L) 8.8 - 10.4 mg/dL    Glucose 93 70 - 99 mg/dL   CBC with platelets    Collection Time: 01/04/25  7:03 AM   Result Value Ref Range    WBC Count 9.5 4.0 - 11.0 10e3/uL    RBC Count 3.84 (L) 4.40 - 5.90 10e6/uL    Hemoglobin 12.2 (L) 13.3 - 17.7 g/dL    Hematocrit 35.5 (L) 40.0 - 53.0 %    MCV 92 78 - 100 fL    MCH 31.8 26.5 - 33.0 pg    MCHC 34.4 31.5 - 36.5 g/dL    RDW 14.6 10.0 - 15.0 %    Platelet Count 100 (L) 150 - 450 10e3/uL   Magnesium    Collection Time: 01/04/25  7:03 AM   Result Value Ref Range    Magnesium 2.2 1.7 - 2.3 mg/dL   Phosphorus    Collection Time: 01/04/25  7:03 AM   Result Value Ref Range    Phosphorus 2.4 (L) 2.5 - 4.5 mg/dL   INR    Collection Time: 01/04/25  7:03 AM   Result Value Ref Range    INR 3.01 (H) 0.85 - 1.15         HOSPITAL MEDICATIONS     Current Facility-Administered Medications   Medication Dose Route Frequency Provider Last Rate Last Admin    aspirin (ASA) chewable tablet 81 mg  81 mg Oral Daily Grant Beth MD        atorvastatin (LIPITOR) tablet 20 mg  20 mg Oral QPM Gondal, Saad J, MD   20 mg at 01/03/25 1920    chlorthalidone (HYGROTON) tablet 25 mg  25 mg Oral QPM Grant Beth MD        metoprolol succinate ER (TOPROL XL) 24 hr tablet 150 mg  150 mg Oral QPM Grant Beth MD        sodium chloride (PF) 0.9% PF flush 3 mL  3 mL Intracatheter Q8H Gondal, Saad J, MD   3 mL at 01/04/25 0901    spironolactone (ALDACTONE) tablet 25 mg  25 mg Oral Daily Grant Beth MD        warfarin ANTICOAGULANT (COUMADIN) tablet 2.5 mg  2.5 mg Oral ONCE at 18:00 Grant Beth MD        Warfarin Dose Required Daily - Pharmacist Managed  1 each Oral See Admin Instructions Gondal, Saad J, MD            Total time spent for face to face visit, reviewing labs/imaging studies, counseling and coordination of care was: Over 50 min More  than 50% of this time was spent on counseling and coordination of care.    Counseling patient and family.  Reviewing chart.  Patient new to me.  Reviewing previous testing.    Sage Jaramillo MD  Neurologist  [facility]  Tel:- [phone]

## 2025-01-04 NOTE — DISCHARGE SUMMARY
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    Weight as of this encounter: 73.5 kg (162 lb).       Follow-ups Needed After Discharge   Follow-up Appointments       Follow Up      Follow-up with your primary care provider in 1-2 weeks for posthospitalization visit.  You should also follow-up with your outpatient cardiologist as previously scheduled.  Follow-up with neurology in 2-3 weeks for further management of medication and stroke education.        Hospital Follow-up with Existing Primary Care Provider (PCP)      Please see details below         Schedule Primary Care visit within: 14 Days               Discharge Disposition   Discharged to home with [facility] care services  Condition at discharge: Stable    Hospital Course   Skip Newman is a 89 year old male with a past medical history of CAD, CKD, atrial fibrillation who was admitted on 1/2/25 after presenting to [facility] ED for Vertebral Artery Occlusion causing dizziness.    In the ED patient had stable vital signs including stable blood pressure, normal oxygen saturations on room air, HR 70s-80s.  Labs significant for WBC 8.6, creatinine 1.68 slightly above baseline, troponins elevated but flat in the 90s-100s.  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  Otherwise CT head and C-spine were negative for acute fracture or " other abnormality.    Neurology was consulted on admission, initially planned for MRI of the brain however patient's pacemaker was not MRI compatible.  Patient had a repeat CT of the head on 1/3/2025 which was stable without any new signs of infarction.  Patient also had an ultrasound of the carotid arteries with less than 50% stenosis bilaterally.  Echo TTE showed known heart failure with EF 25-30% and no LV thrombus.  Cardiac monitor showed frequent PVCs with few runs of NSVT -this is known and patient already on metoprolol.    PT/OT consulted and patient was going to try vestibular rehab.  Patient continued on atorvastatin 40 mg daily, and metoprolol  mg daily.  I added daily aspirin 81 mg daily for vertebral artery disease and stenosis seen on CTA head and neck.  Patient to follow-up with neurology in 2-3 weeks for further medication management.      #Dizziness/Vertigo  #Vertebral Artery Occlusion  - CTA Head/Neck showing No CT evidence for acute intracranial process and Complete occlusion of the right vertebral artery.   - Repeat CT 1/3/25 stable  - Echo showing EF 25-30%, no LV thrombus  - Carotid US 1/3/25 with some stenosis but less than 50% bilaterally.  Plan  - Neurology following, appreciate recommendations.  - Unable to perform MRI with PPM  restriction - repeat CT Head.  - Continue home atorvastatin 40mg daily  -Restart home BP medications on discharge  -Starting aspirin 81 mg daily  -Follow-up with neurology as above    #History of HFrEF - Not in Exacerbation  - Patient has history of heart failure with a EF 30-40% on past echos.  -Follows with cardiology outpatient  -Continue same heart failure medications on discharge  -We discussed intervention for elevated but flat troponins and patient/family not interested at this time.     #Chronic thrombocytopenia  Platelet count appeared to be chronically low but better than baseline  Monitor CBC closely    #History of CAD  - Continue home atorvastatin  40mg daily  -Starting aspirin as above    #History of Atrial Fibrillation  #Frequent PVCs with NSVT  Supratherapeutic INR at 3.32  Continue with warfarin as per pharmacy dosing  Continue home dose metoprolol.      Consultations This Hospital Stay   SPEECH LANGUAGE PATH ADULT IP CONSULT  PHARMACY IP CONSULT  PHARMACY IP CONSULT  PHARMACY IP CONSULT  PHYSICAL THERAPY ADULT IP CONSULT  OCCUPATIONAL THERAPY ADULT IP CONSULT  REHAB ADMISSIONS LIAISON IP CONSULT  CARE MANAGEMENT / SOCIAL WORK IP CONSULT  NEUROLOGY IP CONSULT  PHARMACY TO DOSE WARFARIN  SMOKING CESSATION PROGRAM IP CONSULT    Code Status   Full Code    Time Spent on this Encounter   I, Grant Beth MD, personally saw the patient today and spent greater than 30 minutes discharging this patient.       Grant Beth MD  [address] [address]  Phone: [phone]  Fax: [phone]  ______________________________________________________________________    Physical Exam   Vital Signs: Temp: 97.5  F (36.4  C) Temp src: Oral BP: (!) 169/70 Pulse: 82   Resp: 18 SpO2: 96 % O2 Device: None (Room air)    Weight: 162 lbs 0 oz    General: Alert and Oriented x3. Answers questions appropriately. Follows commands.  Eyes:EOMI. PERRL. Normal Conjunctivae.  HENT: Normocephalic. Atraumatic.  Resp: Breath sounds equal throughout. No crackles. No wheezing.  Cardio: Regular rate and rhythm. No murmurs. No friction rub.  Abd: Soft. Non-distended. Non-tender. Bowel sounds normal. No rebound tenderness.  MSK: No areas of ecchymosis or erythema.  Neuro:   - CN 2-12 intact. Visual fields intact  - Strength 5/5 in all 4 extremities  Skin:No rashes. No jaundice.        Primary Care Physician   Jacquelin Francis    Discharge Orders      Adult Neurology  Referral      Home Care Referral      Reason for your hospital stay    You were hospitalized for further workup of your intermittent dizziness.  Imaging of  your head including multiple CTs showed an occlusion of your right vertebral artery, but did not show any active areas of ischemia/strokes in the brain.  You were monitored on cardiac telemetry which only showed known history of premature ventricular contractions and few runs of nonsustained ventricular tachycardia as we talked about.  You had an echo or ultrasound of the heart which showed known heart failure with EF 25 to 30% and known wall motion abnormalities unchanged from previous.  Ultrasound of the carotid arteries in your neck was negative for significant stenosis.     Activity    Your activity upon discharge: activity as tolerated     Follow Up    Follow-up with your primary care provider in 1-2 weeks for posthospitalization visit.  You should also follow-up with your outpatient cardiologist as previously scheduled.  Follow-up with neurology in 2-3 weeks for further management of medication and stroke education.     Diet    Follow this diet upon discharge: Current Diet:Orders Placed This Encounter      Regular Diet Adult     Hospital Follow-up with Existing Primary Care Provider (PCP)    Please see details below            Significant Results and Procedures   Results for orders placed or performed during the hospital encounter of 01/02/25   CTA Head Neck with Contrast    Addendum: 1/2/2025    COMMUNICATION ADDENDUM:  Findings were discussed with Dr. Mckenzie on 1/2/2025 11:08 PM CST.    END ADDENDUM      Narrative    EXAM: CTA HEAD NECK W CONTRAST  LOCATION: [facility]  DATE: 1/2/2025    INDICATION: dizziness  COMPARISON: Head CT and CTA of the head and neck dated 07/17/2020 and head CT dated 12/21/2024  CONTRAST: isovue 370 75ml  TECHNIQUE: Head and neck CT angiogram with IV contrast. Noncontrast head CT followed by axial helical CT images of the head and neck vessels obtained during the arterial phase of intravenous contrast administration. Axial 2D reconstructed images and    multiplanar 3D MIP reconstructed images of the head and neck vessels were performed by the technologist. Dose reduction techniques were used. All stenosis measurements made according to NASCET criteria unless otherwise specified.    FINDINGS:   NONCONTRAST HEAD CT:   INTRACRANIAL CONTENTS: No intracranial hemorrhage, extraaxial collection, or mass effect.  No CT evidence of acute infarct. Mild to moderate presumed chronic small vessel ischemic changes. Stable small old lacunar infarcts left thalamus, left external   capsule, and corona radiata and right corona radiata. Mild to moderate generalized volume loss. No hydrocephalus.     VISUALIZED ORBITS/SINUSES/MASTOIDS: Prior bilateral cataract surgery. Visualized portions of the orbits are otherwise unremarkable. No paranasal sinus mucosal disease. No middle ear or mastoid effusion.    BONES/SOFT TISSUES: No acute abnormality.    HEAD CTA:  ANTERIOR CIRCULATION: Heavily calcified atherosclerotic plaque distal internal carotid arteries. No stenosis/occlusion, aneurysm, or high flow vascular malformation. Standard [place] anatomy.    POSTERIOR CIRCULATION: Right vertebral artery occlusion appears new since the prior study. Left vertebral artery and basilar artery appear widely patent. Mild irregularity of the posterior cerebral arteries, right greater than left, likely due to   intracranial atherosclerotic disease with no high-grade stenoses. No aneurysm or high flow vascular malformation.     DURAL VENOUS SINUSES: Expected enhancement of the major dural venous sinuses.    NECK CTA:  RIGHT CAROTID: Scattered calcified atherosclerotic plaque in the common carotid artery and carotid bifurcation without stenosis. No dissection.    LEFT CAROTID: Scattered predominant calcified atherosclerotic plaque in the common carotid artery and carotid bifurcation without stenosis. No dissection.    VERTEBRAL ARTERIES: The right vertebral artery is now completely occluded.  Flow was previously detected in the distal V2 and V3 segments of the right vertebral artery. Stable calcified atherosclerotic plaque at the left vertebral artery origin with at   least moderate stenosis. Remainder of the left vertebral artery appears widely patent.    AORTIC ARCH: Moderate irregular calcified atherosclerotic plaque left subclavian artery with approximately 50% stenosis at the origin. Additional scattered calcified atherosclerotic plaque involving the aortic arch and proximal great vessels without   hemodynamically significant stenosis. Origins of the brachiocephalic and left common carotid arteries are in close proximity from the aortic arch.    NONVASCULAR STRUCTURES: Degenerative changes cervical and upper thoracic spine.      Impression    IMPRESSION:   HEAD CT:  1.  No CT evidence for acute intracranial process.  2.  Brain atrophy, small old lacunar infarcts left thalamus, left external capsule and corona radiata, and right corona radiata, and presumed chronic microvascular ischemic changes as above.    HEAD CTA:   1.  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  2.  Mild irregularity of the posterior cerebral arteries likely due to intracranial atherosclerotic disease with no high-grade stenoses.  3.  No anterior circulation occlusion or high-grade stenosis.    NECK CTA:  1.  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 The distal V2 and V3 segments of the right vertebral artery were patent on the prior study.  2.  Stable calcified atherosclerotic plaque left vertebral artery origin with at least moderate stenosis.  3.  Irregular calcified atherosclerotic plaque left subclavian artery with approximately 50% stenosis at the origin.  4.  Additional scattered atherosclerotic plaque in the neck vessels without hemodynamically significant stenosis.   CT Head w/o Contrast    Narrative    EXAM: CT HEAD W/O CONTRAST  LOCATION: [facility]  DATE:  1/3/2025    INDICATION: dizziness  COMPARISON: CTA head neck from 1/2/2024..  TECHNIQUE: Routine CT Head without IV contrast. Multiplanar reformats. Dose reduction techniques were used.    FINDINGS:  INTRACRANIAL CONTENTS: No intracranial hemorrhage, extraaxial collection, or mass effect.  No CT evidence of acute infarct. Small old infarctions in the left basal ganglia and left thalamus. Mild to moderate presumed chronic small vessel ischemic   changes. Normal ventricles and sulci.     VISUALIZED ORBITS/SINUSES/MASTOIDS: No intraorbital abnormality. No paranasal sinus mucosal disease. No middle ear or mastoid effusion.    BONES/SOFT TISSUES: No acute abnormality.      Impression    IMPRESSION:  1.  No acute intracranial abnormality.   US Carotid Bilateral    Narrative    [facility]  LOCATION: [facility]  DATE: 1/3/2025    INDICATION: some stenosis on CTA head  TECHNIQUE: Duplex exam performed utilizing 2D gray-scale imaging, Doppler interrogation with color-flow and spectral waveform analysis.  COMPARISON: None.    FINDINGS:  RIGHT: There is a moderate amount of atheromatous plaque.     LEFT: There is a moderate amount of atheromatous plaque.     Antegrade flow within the left vertebral artery. Right vertebral artery is not visualized.    VELOCITY CHART:   The following velocities were obtained in the RIGHT carotid system.  CCA: 67 cm/s  ICA: 86 cm/s  ECA: 146 cm/s  ICA/CCA: PS 1.3    The following velocities were obtained in the LEFT carotid system.  CCA: 95 cm/s  ICA: 115 cm/s  ECA: 100 cm/s  ICA/CCA: PS 1.2                             Impression    CONCLUSION:  1.  On the right there is a moderate amount of atheromatous plaque.  Peak systolic velocities in the ICA are 86 cm/s which correspond to the less than 50% stenosis range based on NASCET criteria.  2.  On the left there is a moderate amount of atheromatous plaque.  Peak systolic velocities in the ICA are 115 cm/s which  correspond to the less than 50% stenosis range based on NASCET criteria.  3.  Antegrade flow within the left vertebral artery. Right vertebral artery is not visualized and cannot be evaluated.    Evaluation based on velocities and NASCET criteria.   Echocardiogram Complete - For age > 60 yrs    Narrative    [id number]  [id number]  [id number]  [id number]^GONDAL^ANEL^[place] [address]     Name: EDUARDO GENAO  MRN: [id number]  : 1934  Study Date: 2025 01:03 PM  Age: 89 yrs  Gender: Male  Patient Location: [facility]  Reason For Study: Cerebrovascular Incident  Ordering Physician: GONDAL, SAAD J  Performed By: JULIANE     BSA: 1.8 m2  Height: 67 in  Weight: 162 lb  HR: 82  BP: 163/69 mmHg  ______________________________________________________________________________  Procedure  Echocardiogram with two-dimensional, color and spectral Doppler. Definity (NDC  #[id number]) given intravenously.  ______________________________________________________________________________  Interpretation Summary     1. Technically difficult study.  2. The left ventricle is normal in size. Left ventricular systolic performance  is moderate to severely reduced. The ejection fraction is estimated to be 25-  30%.  3. There is moderate to severe global reduction in left ventricular systolic  performance.  4. There is mild aortic insufficiency.  5. Mild right ventricular enlargement with mildly reduced right ventricular  systolic performance.  6. There is moderate left atrial enlargement.     When compared to the prior real-time echocardiogram dated 2024, the  ejection fraction appears slightly more reduced on the current study.  ______________________________________________________________________________  Left ventricle:  The left ventricle is normal in size. Left ventricular systolic performance is  moderate to severely reduced. The ejection fraction is estimated to be 25-30%.  There  is moderate to severe global reduction in left ventricular systolic  performance. There is mild concentric increase in left ventricular wall  thickness.     Assessment of left atrial pressure (LAP): The cumulative findings are  indeterminate in evaluating left atrial pressure.     Right ventricle:  Mild right ventricular enlargement with mildly reduced right ventricular  systolic performance.     Left atrium:  There is moderate left atrial enlargement.     Right atrium:  The right atrium is poorly visualized.     IVC:  The IVC is of normal caliber.     Aortic valve:  The aortic valve is comprised of three cusps. There is no significant aortic  stenosis. There is mild aortic insufficiency.     Mitral valve:  The mitral valve appears morphologically normal. There is mild mitral  insufficiency.     Tricuspid valve:  The tricuspid valve is grossly morphologically normal. There is trace-mild  tricuspid insufficiency.     Pulmonic valve:  The pulmonic valve is grossly morphologically normal.     Thoracic aorta:  The aortic root and proximal ascending aorta are of normal dimension.     Pericardium:  There is no significant pericardial effusion.  ______________________________________________________________________________  ______________________________________________________________________________  MMode/2D Measurements & Calculations  IVSd: 1.5 cm  LVIDd: 4.7 cm  LVIDs: 3.8 cm  LVPWd: 1.2 cm  FS: 19.3 %  LV mass(C)d: 238.7 grams  LV mass(C)dI: 129.1 grams/m2  Ao root diam: 3.2 cm  asc Aorta Diam: 3.3 cm  LVOT diam: 2.4 cm  LVOT area: 4.5 cm2  Ao root diam index Ht(cm/m): 1.9  Ao root diam index BSA (cm/m2): 1.7  Asc Ao diam index BSA (cm/m2): 1.8  Asc Ao diam index Ht(cm/m): 1.9  EF Biplane: 42.7 %  LA Volume (BP): 85.5 ml     LA Volume Index (BP): 46.2 ml/m2  LA Volume Indexed (AL/bp): 49.0 ml/m2  RV Base: 4.2 cm  RWT: 0.49     Doppler Measurements & Calculations  MV E max alyssa: 96.4 cm/sec  MV dec slope: 571.3  cm/sec2  MV dec time: 0.17 sec  Ao V2 max: 140.3 cm/sec  Ao max P.0 mmHg  Ao V2 mean: 107.0 cm/sec  Ao mean P.0 mmHg  Ao V2 VTI: 30.7 cm  DRE(I,D): 2.0 cm2  DRE(V,D): 2.1 cm2  LV V1 max P.6 mmHg  LV V1 max: 63.8 cm/sec  LV V1 VTI: 13.4 cm  SV(LVOT): 60.8 ml  SI(LVOT): 32.9 ml/m2  PA V2 max: 81.4 cm/sec  PA max P.7 mmHg  PA acc time: 0.08 sec  TR max dlae: 238.0 cm/sec  TR max P.7 mmHg  AV Dale Ratio (DI): 0.45  DRE Index (cm2/m2): 1.1  E/E' av.1  Lateral E/e': 12.0  Medial E/e': 16.1  RV S Dale: 7.6 cm/sec     ______________________________________________________________________________  Report approved by: Orlando Villasenor MD on 2025 02:29 PM             Discharge Medications   Current Discharge Medication List        START taking these medications    Details   aspirin (ASA) 81 MG chewable tablet Take 1 tablet (81 mg) by mouth daily.  Qty: 30 tablet, Refills: 1    Associated Diagnoses: Occlusion of right vertebral artery; History of stroke; Myocardial infarction, unspecified MI type, unspecified artery (H)           CONTINUE these medications which have NOT CHANGED    Details   acetaminophen (TYLENOL) 500 MG tablet Take 500 mg by mouth every 6 hours as needed for mild pain.      atorvastatin (LIPITOR) 40 MG tablet Take 20 mg by mouth every evening      chlorthalidone (HYGROTEN) 25 MG tablet Take 25 mg by mouth every evening      metoprolol succinate ER (TOPROL XL) 100 MG 24 hr tablet Take 1.5 tablets (150 mg) by mouth every evening  Qty: 135 tablet, Refills: 3    Associated Diagnoses: Pacemaker; Sick sinus syndrome (H); Paroxysmal atrial fibrillation (H); Atherosclerosis of native coronary artery of native heart without angina pectoris; Nonsustained ventricular tachycardia (H); Ejection fraction < 50%; History of coronary artery stent placement; Atrial fibrillation, unspecified type (H); Chronic systolic congestive heart failure (H); Cardiac pacemaker in situ      MULTIVITAMIN  (MULTIPLE VITAMIN ORAL) Take 1 tablet by mouth every evening      omega 3-dha-epa-fish oil (FISH OIL) 1,000 mg (120 mg-180 mg) cap Take 1 capsule by mouth every evening      spironolactone (ALDACTONE) 25 MG tablet Take 1 tablet by mouth daily at 2 pm.      warfarin ANTICOAGULANT (COUMADIN) 5 MG tablet Take 2.5 mg by mouth every evening           Allergies   Allergies   Allergen Reactions    Lisinopril Cough    Indomethacin Rash    Naproxen Rash

## 2025-01-04 NOTE — PROGRESS NOTES
"Pts daughter \"Rashmi\" requesting that we let the pt sleep and not to check 2200 BG check.  Pts BP stable at 1841 check 125  "

## 2025-01-06 ENCOUNTER — PATIENT OUTREACH (OUTPATIENT)
Dept: CARE COORDINATION | Facility: CLINIC | Age: OVER 89
End: 2025-01-06
Payer: MEDICARE

## 2025-01-06 LAB
MDC_IDC_LEAD_CONNECTION_STATUS: NORMAL
MDC_IDC_LEAD_CONNECTION_STATUS: NORMAL
MDC_IDC_LEAD_IMPLANT_DT: NORMAL
MDC_IDC_LEAD_IMPLANT_DT: NORMAL
MDC_IDC_LEAD_LOCATION: NORMAL
MDC_IDC_LEAD_LOCATION: NORMAL
MDC_IDC_LEAD_LOCATION_DETAIL_1: NORMAL
MDC_IDC_LEAD_LOCATION_DETAIL_1: NORMAL
MDC_IDC_LEAD_MFG: NORMAL
MDC_IDC_LEAD_MFG: NORMAL
MDC_IDC_LEAD_MODEL: NORMAL
MDC_IDC_LEAD_MODEL: NORMAL
MDC_IDC_LEAD_POLARITY_TYPE: NORMAL
MDC_IDC_LEAD_POLARITY_TYPE: NORMAL
MDC_IDC_LEAD_SERIAL: NORMAL
MDC_IDC_LEAD_SERIAL: NORMAL
MDC_IDC_MSMT_BATTERY_DTM: NORMAL
MDC_IDC_MSMT_BATTERY_IMPEDANCE: 1533 OHM
MDC_IDC_MSMT_BATTERY_REMAINING_LONGEVITY: 62 MO
MDC_IDC_MSMT_BATTERY_STATUS: NORMAL
MDC_IDC_MSMT_BATTERY_VOLTAGE: 2.76 V
MDC_IDC_MSMT_LEADCHNL_RA_IMPEDANCE_VALUE: 67 OHM
MDC_IDC_MSMT_LEADCHNL_RV_IMPEDANCE_VALUE: 451 OHM
MDC_IDC_MSMT_LEADCHNL_RV_PACING_THRESHOLD_AMPLITUDE: 1.25 V
MDC_IDC_MSMT_LEADCHNL_RV_PACING_THRESHOLD_PULSEWIDTH: 0.4 MS
MDC_IDC_PG_IMPLANT_DTM: NORMAL
MDC_IDC_PG_MFG: NORMAL
MDC_IDC_PG_MODEL: NORMAL
MDC_IDC_PG_SERIAL: NORMAL
MDC_IDC_PG_TYPE: NORMAL
MDC_IDC_SESS_CLINIC_NAME: NORMAL
MDC_IDC_SESS_DTM: NORMAL
MDC_IDC_SESS_TYPE: NORMAL
MDC_IDC_SET_BRADY_LOWRATE: 60 {BEATS}/MIN
MDC_IDC_SET_BRADY_MAX_SENSOR_RATE: 120 {BEATS}/MIN
MDC_IDC_SET_BRADY_MAX_TRACKING_RATE: 105 {BEATS}/MIN
MDC_IDC_SET_BRADY_MODE: NORMAL
MDC_IDC_SET_LEADCHNL_RV_PACING_AMPLITUDE: 1.88
MDC_IDC_SET_LEADCHNL_RV_PACING_CAPTURE_MODE: NORMAL
MDC_IDC_SET_LEADCHNL_RV_PACING_POLARITY: NORMAL
MDC_IDC_SET_LEADCHNL_RV_PACING_PULSEWIDTH: 0.4 MS
MDC_IDC_SET_LEADCHNL_RV_SENSING_POLARITY: NORMAL
MDC_IDC_SET_LEADCHNL_RV_SENSING_SENSITIVITY: 5.6 MV
MDC_IDC_SET_ZONE_DETECTION_INTERVAL: 375 MS
MDC_IDC_SET_ZONE_STATUS: NORMAL
MDC_IDC_SET_ZONE_STATUS: NORMAL
MDC_IDC_SET_ZONE_TYPE: NORMAL
MDC_IDC_SET_ZONE_TYPE: NORMAL
MDC_IDC_SET_ZONE_VENDOR_TYPE: NORMAL
MDC_IDC_SET_ZONE_VENDOR_TYPE: NORMAL
MDC_IDC_STAT_AT_BURDEN_PERCENT: 0 %
MDC_IDC_STAT_AT_DTM_END: NORMAL
MDC_IDC_STAT_AT_DTM_START: NORMAL
MDC_IDC_STAT_BRADY_DTM_END: NORMAL
MDC_IDC_STAT_BRADY_DTM_START: NORMAL
MDC_IDC_STAT_BRADY_RV_PERCENT_PACED: 6 %
MDC_IDC_STAT_EPISODE_RECENT_COUNT: 0
MDC_IDC_STAT_EPISODE_RECENT_COUNT_DTM_END: NORMAL
MDC_IDC_STAT_EPISODE_RECENT_COUNT_DTM_START: NORMAL
MDC_IDC_STAT_EPISODE_TYPE: NORMAL

## 2025-01-06 NOTE — PROGRESS NOTES
Connected Care Resource Center    Background: Transitional Care Management program identified per system criteria and reviewed by [facility] team for possible outreach.    Assessment: Upon chart review, [facility] Team member will not proceed with patient outreach related to this episode of Transitional Care Management program due to reason below:    Patient has discharged to a Memory Care, Long-term Care, Assisted Living or Group Home where patient is receiving on-site support with their daily cares, including support with hospital follow up plan.    Plan: Transitional Care Management episode addressed appropriately per reason noted above.      Lidia Telles MA  Connected Care Resource [place], [facility]    *Connected Care Resource Team does NOT follow patient ongoing. Referrals are identified based on internal discharge reports and the outreach is to ensure patient has an understanding of their discharge instructions.

## 2025-01-08 ENCOUNTER — TELEPHONE (OUTPATIENT)
Dept: NEUROLOGY | Facility: CLINIC | Age: OVER 89
End: 2025-01-08
Payer: MEDICARE

## 2025-01-08 NOTE — TELEPHONE ENCOUNTER
Health Call Center    Phone Message    May a detailed message be left on voicemail: yes     Reason for Call: Appointment Intake    Referring Provider Name: Grant Beth MD  Diagnosis and/or Symptoms: Occlusion of right vertebral artery [I65.01]    Please review and assist with scheduling with Dr. Casillas    Action Taken: Other: Neurology    Travel Screening: Not Applicable     Date of Service:

## 2025-01-09 ENCOUNTER — TELEPHONE (OUTPATIENT)
Dept: NEUROLOGY | Facility: CLINIC | Age: OVER 89
End: 2025-01-09
Payer: MEDICARE

## 2025-01-09 NOTE — TELEPHONE ENCOUNTER
Returned call to [facility] office, Dr. Skinner is out of office today. Informed that Dr. Casillas would review her question upon return to clinic and we will reach out at that time with her input.     Dr. Casillas, patients primary provider asking the following, please advise. Patient was recently seen inpatient, and you have follow up scheduled in Feb.     Provider is requesting Dr Casillas input on putting the patient on 81 mg aspirin in addition to the Warfarin the patient is taking. Adriana states the patient was recently discharged and the discharge instructions state to have the patient on both medications, however the patient continues to have orthostatic symptoms, has a higher fall risk and fell again yesterday, so Dr Francis is concerned about the patient being on too many blood thinners. Requesting call back to discuss, call back #[phone]       Tien MIKE RN, BSN  [facility]

## 2025-01-09 NOTE — TELEPHONE ENCOUNTER
[facility] Call Center    Phone Message    May a detailed message be left on voicemail: yes     Reason for Call: Other:       Adriana, with Sarah Kasper calling on behalf of Dr. Jacquelin Francis. Provider is requesting Dr Casillas input on putting the patient on 81 mg aspirin in addition to the Warfarin the patient is taking. Adriana states the patient was recently discharged and the discharge instructions state to have the patient on both medications, however the patient continues to have orthostatic symptoms, has a higher fall risk and fell again yesterday, so Dr Francis is concerned about the patient being on too many blood thinners. Requesting call back to discuss, call back #[phone]    Action Taken: Message routed to:  Other: [facility]    Travel Screening: Not Applicable

## 2025-01-09 NOTE — TELEPHONE ENCOUNTER
There is no clear indication for aspirin 81 mg in addition to the Coumadin though I would defer to Dr. Casillas knows the patient and the family.

## 2025-01-10 ENCOUNTER — TELEPHONE (OUTPATIENT)
Dept: CARDIOLOGY | Facility: CLINIC | Age: OVER 89
End: 2025-01-10
Payer: MEDICARE

## 2025-01-10 NOTE — TELEPHONE ENCOUNTER
Please review recent hospitalization records and address question of whether patient should continue ASA 81mg daily, along with Warfarin and urgency of follow-up ([facility], [place], new card.).  mg

## 2025-01-10 NOTE — TELEPHONE ENCOUNTER
Return call to patient's daughter Rashmi who explained that patient was recently hospitalized and it was recommended he start ASA 81mg daily which PCP wants cardiologist to address whether patient should be taking ASA since he is already on Warfarin - Rashmi does not recall why hospitalist recommended it.    Rashmi also explained that patient has been experiencing orthostatic hypotension and inquired if meds should be adjusted or if patient should sched follow-up - confirmed patient taking Metoprolol Succ, Chlorthalidone, Spironolactone per med list.    Informed Rashmi that question r.e. ASA and timing of follow-up would be forwarded to Dr. Will to address - understanding verbalized.  mg

## 2025-01-10 NOTE — TELEPHONE ENCOUNTER
[facility] Call Center    Phone Message    May a detailed message be left on voicemail: yes     Reason for Call: Other: Pt Daughter would like  a call back  to discuss pt hospital visit as they want him on Asprin every day and pcp stated to have cardio team handle that as well as bp update     Action Taken: Other: Cardio    Travel Screening: Not Applicable     Date of Service:

## 2025-01-13 NOTE — TELEPHONE ENCOUNTER
Return call to patient's daughter Rashmi - informed her of Dr. iWll's response / recommendations - Rashmi verbalized understanding after further questions / concerns addressed - instructed Rashmi to follow-up with PCP rsofia. orthostatic hypotension and reassured her that PCP can refer patient to [facility] if needed - Rashmi agreed to also follow-up with neurology r.ankit. ASA.  mg

## 2025-01-13 NOTE — TELEPHONE ENCOUNTER
Msg rec'd 1-10-25 @ 1726:  Roc Will MD Gorshe, Maureen, RN  Neurologist recommended aspirin in addition to warfarin.  Would defer to their judgment given his vertebral artery disease. cmc

## 2025-01-13 NOTE — TELEPHONE ENCOUNTER
Returned call and spoke with representative for [facility].    Relayed the following message to Dr. Francis care team from Dr. Casillas:    I do not think Bill needs to be on the baby aspirin.     Amirah Hawkins RN, BSN  [facility]

## 2025-01-16 ENCOUNTER — TELEPHONE (OUTPATIENT)
Dept: CARDIOLOGY | Facility: CLINIC | Age: OVER 89
End: 2025-01-16
Payer: MEDICARE

## 2025-01-16 NOTE — TELEPHONE ENCOUNTER
Received return call from Dr. Francis, pt's PCP.     Dr. Francis stated the following concerns after seeing pt for a post hospital visit on 1/8/25 for ongoing symptoms related to orthostatic hypotension.     Dr. Francis stated pt continues to have positive orthostatics accompanied by dizziness and vision changes. Pt is following with neurology as well.     Based upon recent Echo showing EF 25-35%, hx of Afib, and cardiac history in general, Dr. Francis is wanting pt to follow-up with his primary Cardiologist, Dr. Will, next available for ongoing management of his cardiac medications, particularly the diuretic. She is not comfortable making any medication adjustments given recent test findings and ongoing symptoms.     Dr. Francis does not feel the visit needs to be urgent but is wanting follow-up within the next couple weeks.    Informed Dr. Francis will reach out to daughter, Rashmi, with update and have pt scheduled with Dr. Will next available. MISAELS

## 2025-01-16 NOTE — TELEPHONE ENCOUNTER
----- Message from Contreras Schumacher sent at 1/16/2025  9:10 AM CST -----  Hello ,       I received a call from patient PCP at [facility] wanting to speak with someone on [facility] team to discuss medications and care for thee patient she would like a call back at [phone] and her name is Jacquelin Francis       Jessica Chairez

## 2025-01-16 NOTE — TELEPHONE ENCOUNTER
Unsuccessful attempt to contact PCP on # provided - no answer, VM full.      Phone call to PCP clinic - left msg with Julia requesting call back from PCP.  mg

## 2025-01-16 NOTE — TELEPHONE ENCOUNTER
Phone call to Rashmi (C2C) with updates and recommendations from PCP. Understanding verbalized. Transferred to scheduling to arrange next available with [facility]. Noted appt pending 1/28/25. LMS

## 2025-01-28 ENCOUNTER — OFFICE VISIT (OUTPATIENT)
Dept: CARDIOLOGY | Facility: CLINIC | Age: OVER 89
End: 2025-01-28
Attending: INTERNAL MEDICINE
Payer: MEDICARE

## 2025-01-28 VITALS
HEART RATE: 72 BPM | RESPIRATION RATE: 16 BRPM | BODY MASS INDEX: 25.9 KG/M2 | DIASTOLIC BLOOD PRESSURE: 64 MMHG | HEIGHT: 67 IN | WEIGHT: 165 LBS | SYSTOLIC BLOOD PRESSURE: 132 MMHG

## 2025-01-28 DIAGNOSIS — I48.0 PAROXYSMAL ATRIAL FIBRILLATION (H): ICD-10-CM

## 2025-01-28 DIAGNOSIS — Z95.0 PACEMAKER: Primary | ICD-10-CM

## 2025-01-28 DIAGNOSIS — I49.5 SICK SINUS SYNDROME (H): ICD-10-CM

## 2025-01-28 DIAGNOSIS — I50.22 CHRONIC SYSTOLIC CONGESTIVE HEART FAILURE (H): ICD-10-CM

## 2025-01-28 DIAGNOSIS — I48.91 ATRIAL FIBRILLATION, UNSPECIFIED TYPE (H): ICD-10-CM

## 2025-01-28 DIAGNOSIS — Z95.0 CARDIAC PACEMAKER IN SITU: Chronic | ICD-10-CM

## 2025-01-28 DIAGNOSIS — I25.10 ATHEROSCLEROSIS OF NATIVE CORONARY ARTERY OF NATIVE HEART WITHOUT ANGINA PECTORIS: ICD-10-CM

## 2025-01-28 DIAGNOSIS — I47.29 NONSUSTAINED VENTRICULAR TACHYCARDIA (H): ICD-10-CM

## 2025-01-28 DIAGNOSIS — Z95.5 HISTORY OF CORONARY ARTERY STENT PLACEMENT: ICD-10-CM

## 2025-01-28 DIAGNOSIS — R94.30 EJECTION FRACTION < 50%: ICD-10-CM

## 2025-01-28 PROCEDURE — 99214 OFFICE O/P EST MOD 30 MIN: CPT | Performed by: INTERNAL MEDICINE

## 2025-01-28 PROCEDURE — G2211 COMPLEX E/M VISIT ADD ON: HCPCS | Performed by: INTERNAL MEDICINE

## 2025-01-28 RX ORDER — HYDRALAZINE HYDROCHLORIDE 10 MG/1
10 TABLET, FILM COATED ORAL 3 TIMES DAILY
Qty: 270 TABLET | Refills: 3 | Status: SHIPPED | OUTPATIENT
Start: 2025-01-28

## 2025-01-28 RX ORDER — NITROGLYCERIN 40 MG/1
1 PATCH TRANSDERMAL DAILY
Qty: 90 PATCH | Refills: 3 | Status: SHIPPED | OUTPATIENT
Start: 2025-01-28

## 2025-01-28 NOTE — PATIENT INSTRUCTIONS
Be safe when you are walking, to avoid falls.  Start hydralazine 10 mg 3 times daily  Start nitroglycerin patch 0.2 mg topically each morning, off at bedtime.  We will schedule an overnight oximetry study to look to see whether your oxygen drops at night.  This may mean you require supplemental oxygen.  This may help to keep you more alert

## 2025-01-28 NOTE — LETTER
1/28/2025    Jacquelin Francis MD  [address]  [facility] [address]    RE: Skip ROBERTS Trent       Dear Colleague,     I had the pleasure of seeing Skip Newman in the [facility].    Cardiology Clinic Office Note    Assessment / Plan:    1.  Ischemic cardiomyopathy with gradually declining ejection fraction on most recent assessment.  Decreased RV systolic function, daytime sleepiness may be suggestive of sleep disordered breathing.  Would check overnight oximetry study.  Will add low-dose hydralazine/nitroglycerin patch combination given progressive renal insufficiency.  2.  Persistent, likely permanent atrial fibrillation.  Reasonable rate control on most recent pacemaker assessment  3.  Coronary artery disease with largely fixed defect noted less than a year ago at site of chronic total occlusion of PDA.  No anginal symptoms.    Follow-up 3 months    ______________________________________________________________________    Subjective:    I had the opportunity to see Skip Newman at the [facility]. Skip Newman is a 89 year old male  retired dentist with a history of  coronary artery disease status post circumflex intervention following myocardial infarction in 2000 and found to have chronic occlusion of posterior descending artery in 2008.  He has a history of bursts of nonsustained ventricular tachycardia since 2015, status post pacemaker placement for sick sinus syndrome in 2009.  Echocardiogram 2020 showed an ejection fraction of 43% with inferior hypokinesis.  He developed atrial fibrillation with rapid ventricular response 11/2023, this was associated with a decrease in his ejection fraction in the 35% range.  He was hospitalized 3/2024 with weakness, no heart failure was suspected at that time, no tachycardia was noted.  BNP was 13,000.  Device check in April suggested 95% of the time his heart rate is less than 120. MPI 6/2024 showed  an EF of 38% with fixed and reversible perfusion defects in the inferolateral wall.  In December 2024 he was initiated on low-dose losartan, though it is unclear whether this was ever restarted.  12/21 he was found on the ground at home without recall, suspected syncope, return back to home that day.  This month he has had vertigo, was diagnosed with right vestibular artery occlusion.  Echocardiogram showed further decline in left ventricular ejection fraction to the 25 to 30% range on a technically difficult study.  Mild decrease in right ventricular systolic function was also noted.  Pacemaker demonstrated normal function, persistent atrial fibrillation.  Telemetry revealed runs of nonsustained ventricular tachycardia.  He presents today in follow-up, accompanied by his daughter.    He continues to feel dizzy, uses his walker consistently when walking, and has had no further falls.  Dizziness is intermittent.  He remains most concerned about ongoing care of his wife who is status at the care center is requiring more support.  Alen reports being sleepy much of the time, dozing off several times in the morning and afternoons.  He does not feel that his sleep is interrupted at night but it is poorly restorative.  He has had no peripheral edema.  He has had no chest pain or palpitations.    ______________________________________________________________________    Problem List:  Patient Active Problem List   Diagnosis     Hyperlipidemia     Hypertension     CAD -- S/P Stents 2001, 2012, 2014     Nonsustained ventricular tachycardia (H)     SSS -- S/P dual chamber Pacemaker     Primary osteoarthritis of left knee     Gout     Recurrent kidney stones     Status post total left knee replacement     Primary osteoarthritis of right knee     Thrombocytopenia     Spinal stenosis, lumbar region, with neurogenic claudication     Pacemaker     MI (myocardial infarction) (H)     Hypertension     Coronary artery disease      Chronic kidney disease     Paroxysmal atrial fib -- on Warfarin     CKD (chronic kidney disease) stage 3     Elevated troponin     Subtherapeutic international normalized ratio (INR)     Fall-- found down, > 12 hrs     Non-traumatic rhabdomyolysis     Atrial fibrillation, unspecified type (H)     History of stroke     Chronic systolic CHF -- EF 30-35% on 12/21/23     Aortic regurgitation -- 2+ on Echo 12/21/23     Moderate Pulm HTN -- Echo 12/21/23 PAP 36 + RAP     Chronic Left rotator cuff tear     Generalized weakness     Elevated brain natriuretic peptide (BNP) level     Hyperkalemia     Intermittent vertigo     Occlusion of right vertebral artery     Medical History:  Past Medical History:   Diagnosis Date     A-fib (H)      Anemia      Arrhythmia      Arthritis      Chronic kidney disease      Claudication      Coronary artery disease      Gout      Hearing loss     high frequency     History of transfusion      Hyperlipidemia      Hypertension      Impotence of organic origin      Kidney stone     recurrent     MI (myocardial infarction) (H)      Primary osteoarthritis of both knees      Sick sinus syndrome -- S/P Medtronic PPM      Skin cancer      Spinal stenosis, lumbar region, with neurogenic claudication      Syncope      Thrombocytopenia      VT (ventricular tachycardia) (H)      Surgical History:  Past Surgical History:   Procedure Laterality Date     CARDIAC CATHETERIZATION       CORONARY STENT PLACEMENT  2001    LCx     HEMORRHOID SURGERY  1989     INSERT / REPLACE / REMOVE PACEMAKER  01/2009    Medtronic     OTHER SURGICAL HISTORY  11/2018    l3-l4 lumbar stenosis     TONSILLECTOMY      child     TOTAL HIP ARTHROPLASTY Left      [facility] TOTAL KNEE ARTHROPLASTY Left 7/8/2019    Procedure: LEFT TOTAL KNEE ARTHROPLASTY;  Surgeon: Bwoen Padgett MD;  Location: [facility];  Service: Orthopedics     [facility] TOTAL KNEE ARTHROPLASTY Right 1/13/2020    Procedure: RIGHT TOTAL KNEE ARTHROPLASTY;  Surgeon:  Bowen Padgett MD;  Location: [facility] OR;  Service: Orthopedics     [facility] CORONARY STENT PERCUT, INITIAL VESSEL      Description: Cath Stent Placement;  Recorded: 10/01/2012;  Comments: Mid LCx     [facility] CORONARY STENT PERCUT, INITIAL VESSEL      Description: Cath Stent Placement;  Recorded: 10/21/2014;  Comments: Mid LCx     Social History:  Social History     Socioeconomic History     Marital status:      Spouse name: Not on file     Number of children: Not on file     Years of education: Not on file     Highest education level: Not on file   Occupational History     Not on file   Tobacco Use     Smoking status: Never     Smokeless tobacco: Never   Substance and Sexual Activity     Alcohol use: No     Drug use: Not Currently     Sexual activity: Not on file   Other Topics Concern     Not on file   Social History Narrative     Not on file     Social Drivers of Health     Financial Resource Strain: Low Risk  (8/18/2023)    [facility], [facility]    Financial Resource Strain      Difficulty of Paying Living Expenses: 3      Difficulty of Paying Living Expenses: Not on file   Food Insecurity: Food Insecurity Present (8/18/2023)    Received from [facility], [facility]    Food Insecurity      Worried About Running Out of Food in the Last Year: 2   Transportation Needs: No Transportation Needs (8/18/2023)    Received from [facility], [employer] [facility]    Transportation Needs      Lack of Transportation (Medical): 1   Physical Activity: Not on file   Stress: Not on file   Social Connections: Unknown (8/18/2024)    Received from [facility]    Social Connections      Frequency of Communication with Friends and Family: Not on file   Interpersonal Safety: Not on file    Housing Stability: High Risk (8/18/2023)    [facility], [facility]    Housing Stability      Unable to Pay for Housing in the Last Year: 3     Sleep History:  Poorly restorative with frequent dozing off throughout the day  Exercise History:  Minimal walking with his walker.  No recurrent falls since last month    Review of Systems:      Positive for dizziness, daytime sleepiness.  12 point review of systems otherwise negative     Please refer above for cardiac ROS details.         Family History:  Family History   Problem Relation Age of Onset     Heart Failure Mother      Heart Failure Father          Allergies:  Allergies   Allergen Reactions     Lisinopril Cough     Indomethacin Rash     Naproxen Rash     Medications:  Current Outpatient Medications   Medication Sig Dispense Refill     acetaminophen (TYLENOL) 500 MG tablet Take 500 mg by mouth every 6 hours as needed for mild pain.       atorvastatin (LIPITOR) 40 MG tablet Take 20 mg by mouth every evening       chlorthalidone (HYGROTEN) 25 MG tablet Take 25 mg by mouth every evening       hydrALAZINE (APRESOLINE) 10 MG tablet Take 1 tablet (10 mg) by mouth 3 times daily. 270 tablet 3     metoprolol succinate ER (TOPROL XL) 100 MG 24 hr tablet Take 1.5 tablets (150 mg) by mouth every evening 135 tablet 3     MULTIVITAMIN (MULTIPLE VITAMIN ORAL) Take 1 tablet by mouth every evening       nitroGLYcerin (NITRODUR) 0.2 MG/HR 24 hr patch Place 1 patch over 12 hours onto the skin daily. Remove at bedtime 90 patch 3     omega 3-dha-epa-fish oil (FISH OIL) 1,000 mg (120 mg-180 mg) cap Take 1 capsule by mouth every evening       spironolactone (ALDACTONE) 25 MG tablet Take 1 tablet by mouth daily at 2 pm.       warfarin ANTICOAGULANT (COUMADIN) 5 MG tablet Take 2.5 mg by mouth every evening       aspirin (ASA) 81 MG chewable tablet Take 1 tablet (81 mg) by mouth daily. 30 tablet 1  "      Objective:   Wt Readings from Last 3 Encounters:   01/28/25 74.8 kg (165 lb)   01/02/25 73.5 kg (162 lb)   12/21/24 75 kg (165 lb 5.5 oz)     Vital signs:  /64 (BP Location: Right arm, Patient Position: Sitting, Cuff Size: Adult Regular)   Pulse 72   Resp 16   Ht 1.702 m (5' 7\")   Wt 74.8 kg (165 lb)   BMI 25.84 kg/m        Physical Exam:    General Appearance : Awake, Alert, No acute distress  HEENT: No Scleral icterus; the mucous membranes were pink and moist.  Conjunctivae not injected.  Hard of hearing  Neck:  No cervical bruits, jugular venous distention, or thyromegaly   Chest: The spine was straight. Chest wall pacemaker site without erythema warmth or tenderness  Lungs: Respirations unlabored; the lungs are clear to auscultation.  No wheezing   Cardiovascular:   Nonpalpable point of maximal impulse.  Auscultation reveals slightly irregular first and second heart sounds with no murmurs, rubs, or gallops.  Carotid, radial, and dorsalis pedal pulses are intact and symmetric.    Abdomen: No organomegaly, masses, bruits, or tenderness. Bowels sounds are present  Extremities:  No clubbing, cyanosis.  No edema  Skin: No rash, bruising  Musculoskeletal: No tenderness.  Neurologic: Alert and oriented ×3. Speech is fluent.     Lab Results    Chemistry/lipid CBC Cardiac Enzymes/BNP/TSH/INR   Recent Labs   Lab Test 01/02/25 1914   CHOL 89   HDL 28*   LDL 31   TRIG 152*     Recent Labs   Lab Test 01/02/25 1914   LDL 31     Recent Labs   Lab Test 01/04/25  0703      POTASSIUM 4.6   CHLORIDE 107   CO2 22   GLC 93   BUN 41.2*   CR 1.38*   GFRESTIMATED 49*   JUAN 8.5*     Recent Labs   Lab Test 01/04/25  0703 01/02/25 1914 12/21/24  1200   CR 1.38* 1.68* 1.63*     Recent Labs   Lab Test 01/02/25 1914   A1C 6.3*          Recent Labs   Lab Test 01/04/25  0703   WBC 9.5   HGB 12.2*   HCT 35.5*   MCV 92   *     Recent Labs   Lab Test 01/04/25  0703 01/02/25 1914 12/21/24  1200   HGB 12.2* 13.3 " "13.3    No results for input(s): \"TROPONINI\" in the last 24200 hours.  Recent Labs   Lab Test 03/13/24  1903   NTBNPI 13,725*     No results for input(s): \"TSH\" in the last 55021 hours.  Recent Labs   Lab Test 01/04/25  0703 01/03/25  0720 01/02/25  2257   INR 3.01* 3.12* 3.32*        Echocardiogram 1/3/2025:  1. Technically difficult study.  2. The left ventricle is normal in size. Left ventricular systolic performance  is moderate to severely reduced. The ejection fraction is estimated to be 25-  30%.  3. There is moderate to severe global reduction in left ventricular systolic  performance.  4. There is mild aortic insufficiency.  5. Mild right ventricular enlargement with mildly reduced right ventricular  systolic performance.  6. There is moderate left atrial enlargement.  When compared to the prior real-time echocardiogram dated 25 April 2024, the  ejection fraction appears slightly more reduced on the current study.    Pharmacologic nuclear stress test 6/2024:     The regadenoson nuclear stress test is abnormal. There is a medium-sized area of nontransmural infarction in the inferior and inferolateral segment(s) of the left ventricle associated with a medium-sized area of mild travis-infarct ischemia. This appears to be in the left circumflex and/or right coronary artery distribution.     The left ventricular ejection fraction at stress is reduced at 38% with inferior and inferolateral wall hypokinesis.     The patient is at an intermediate risk of future cardiac ischemic events.     A prior study was conducted on 6/11/2021. Compared to the prior study, similar perfusion abnormalities are seen but there has been a decline in left ventricular systolic function.     Echocardiogram 4/2024:  Left ventricular function is decreased. The ejection fraction is 35-40%  (moderately reduced).  There is borderline concentric left ventricular hypertrophy.  There is moderate global hypokinesia of the left ventricle.  Normal " right ventricle size and systolic function.            ANNA VELASQUEZ MD [facility]  [phone]    This note created using Dragon voice recognition software.  Sound alike errors may have escaped editing.             Thank you for allowing me to participate in the care of your patient.      Sincerely,     Anna Velasquez MD     [facility]  cc:   Anna Velasquez MD  [address] [phone]

## 2025-01-28 NOTE — PROGRESS NOTES
Cardiology Clinic Office Note    Assessment / Plan:    1.  Ischemic cardiomyopathy with gradually declining ejection fraction on most recent assessment.  Decreased RV systolic function, daytime sleepiness may be suggestive of sleep disordered breathing.  Would check overnight oximetry study.  Will add low-dose hydralazine/nitroglycerin patch combination given progressive renal insufficiency.  2.  Persistent, likely permanent atrial fibrillation.  Reasonable rate control on most recent pacemaker assessment  3.  Coronary artery disease with largely fixed defect noted less than a year ago at site of chronic total occlusion of PDA.  No anginal symptoms.    Follow-up 3 months    ______________________________________________________________________    Subjective:    I had the opportunity to see Skip Newman at the [facility]. Skip Newman is a 89 year old male  retired dentist with a history of  coronary artery disease status post circumflex intervention following myocardial infarction in 2000 and found to have chronic occlusion of posterior descending artery in 2008.  He has a history of bursts of nonsustained ventricular tachycardia since 2015, status post pacemaker placement for sick sinus syndrome in 2009.  Echocardiogram 2020 showed an ejection fraction of 43% with inferior hypokinesis.  He developed atrial fibrillation with rapid ventricular response 11/2023, this was associated with a decrease in his ejection fraction in the 35% range.  He was hospitalized 3/2024 with weakness, no heart failure was suspected at that time, no tachycardia was noted.  BNP was 13,000.  Device check in April suggested 95% of the time his heart rate is less than 120. MPI 6/2024 showed an EF of 38% with fixed and reversible perfusion defects in the inferolateral wall.  In December 2024 he was initiated on low-dose losartan, though it is unclear whether this was ever restarted.  12/21 he was  found on the ground at home without recall, suspected syncope, return back to home that day.  This month he has had vertigo, was diagnosed with right vestibular artery occlusion.  Echocardiogram showed further decline in left ventricular ejection fraction to the 25 to 30% range on a technically difficult study.  Mild decrease in right ventricular systolic function was also noted.  Pacemaker demonstrated normal function, persistent atrial fibrillation.  Telemetry revealed runs of nonsustained ventricular tachycardia.  He presents today in follow-up, accompanied by his daughter.    He continues to feel dizzy, uses his walker consistently when walking, and has had no further falls.  Dizziness is intermittent.  He remains most concerned about ongoing care of his wife who is status at the care center is requiring more support.  Alen reports being sleepy much of the time, dozing off several times in the morning and afternoons.  He does not feel that his sleep is interrupted at night but it is poorly restorative.  He has had no peripheral edema.  He has had no chest pain or palpitations.    ______________________________________________________________________    Problem List:  Patient Active Problem List   Diagnosis    Hyperlipidemia    Hypertension    CAD -- S/P Stents 2001, 2012, 2014    Nonsustained ventricular tachycardia (H)    SSS -- S/P dual chamber Pacemaker    Primary osteoarthritis of left knee    Gout    Recurrent kidney stones    Status post total left knee replacement    Primary osteoarthritis of right knee    Thrombocytopenia    Spinal stenosis, lumbar region, with neurogenic claudication    Pacemaker    MI (myocardial infarction) (H)    Hypertension    Coronary artery disease    Chronic kidney disease    Paroxysmal atrial fib -- on Warfarin    CKD (chronic kidney disease) stage 3    Elevated troponin    Subtherapeutic international normalized ratio (INR)    Fall-- found down, > 12 hrs    Non-traumatic  rhabdomyolysis    Atrial fibrillation, unspecified type (H)    History of stroke    Chronic systolic CHF -- EF 30-35% on 12/21/23    Aortic regurgitation -- 2+ on Echo 12/21/23    Moderate Pulm HTN -- Echo 12/21/23 PAP 36 + RAP    Chronic Left rotator cuff tear    Generalized weakness    Elevated brain natriuretic peptide (BNP) level    Hyperkalemia    Intermittent vertigo    Occlusion of right vertebral artery     Medical History:  Past Medical History:   Diagnosis Date    A-fib (H)     Anemia     Arrhythmia     Arthritis     Chronic kidney disease     Claudication     Coronary artery disease     Gout     Hearing loss     high frequency    History of transfusion     Hyperlipidemia     Hypertension     Impotence of organic origin     Kidney stone     recurrent    MI (myocardial infarction) (H)     Primary osteoarthritis of both knees     Sick sinus syndrome -- S/P [employer] PPM     Skin cancer     Spinal stenosis, lumbar region, with neurogenic claudication     Syncope     Thrombocytopenia     VT (ventricular tachycardia) (H)      Surgical History:  Past Surgical History:   Procedure Laterality Date    CARDIAC CATHETERIZATION      CORONARY STENT PLACEMENT  2001    LCx    HEMORRHOID SURGERY  1989    INSERT / REPLACE / REMOVE PACEMAKER  01/2009    [employer]    OTHER SURGICAL HISTORY  11/2018    l3-l4 lumbar stenosis    TONSILLECTOMY      child    TOTAL HIP ARTHROPLASTY Left     [facility] TOTAL KNEE ARTHROPLASTY Left 7/8/2019    Procedure: LEFT TOTAL KNEE ARTHROPLASTY;  Surgeon: Bowen Padgett MD;  Location: [facility];  Service: Orthopedics    [facility] TOTAL KNEE ARTHROPLASTY Right 1/13/2020    Procedure: RIGHT TOTAL KNEE ARTHROPLASTY;  Surgeon: Bowen Padgett MD;  Location: [facility];  Service: Orthopedics    [facility] CORONARY STENT PERCUT, INITIAL VESSEL      Description: Cath Stent Placement;  Recorded: 10/01/2012;  Comments: Mid LCx    [facility] CORONARY STENT PERCUT, INITIAL VESSEL      Description: Cath Stent  Placement;  Recorded: 10/21/2014;  Comments: Mid LCx     Social History:  Social History     Socioeconomic History    Marital status:      Spouse name: Not on file    Number of children: Not on file    Years of education: Not on file    Highest education level: Not on file   Occupational History    Not on file   Tobacco Use    Smoking status: Never    Smokeless tobacco: Never   Substance and Sexual Activity    Alcohol use: No    Drug use: Not Currently    Sexual activity: Not on file   Other Topics Concern    Not on file   Social History Narrative    Not on file     Social Drivers of Health     Financial Resource Strain: Low Risk  (8/18/2023)    Received from [facility], [facility]    Financial Resource Strain     Difficulty of Paying Living Expenses: 3     Difficulty of Paying Living Expenses: Not on file   Food Insecurity: Food Insecurity Present (8/18/2023)    Received from [facility], [facility]    Food Insecurity     Worried About Running Out of Food in the Last Year: 2   Transportation Needs: No Transportation Needs (8/18/2023)    Received from [facility], [facility]    Transportation Needs     Lack of Transportation (Medical): 1   Physical Activity: Not on file   Stress: Not on file   Social Connections: Unknown (8/18/2024)    Received from [facility]    Social Connections     Frequency of Communication with Friends and Family: Not on file   Interpersonal Safety: Not on file   Housing Stability: High Risk (8/18/2023)    Received from [facility], [facility]    Housing Stability     Unable to Pay for Housing in the Last Year: 3     Sleep History:  Poorly restorative with frequent dozing off throughout  "the day  Exercise History:  Minimal walking with his walker.  No recurrent falls since last month    Review of Systems:      Positive for dizziness, daytime sleepiness.  12 point review of systems otherwise negative     Please refer above for cardiac ROS details.         Family History:  Family History   Problem Relation Age of Onset    Heart Failure Mother     Heart Failure Father          Allergies:  Allergies   Allergen Reactions    Lisinopril Cough    Indomethacin Rash    Naproxen Rash     Medications:  Current Outpatient Medications   Medication Sig Dispense Refill    acetaminophen (TYLENOL) 500 MG tablet Take 500 mg by mouth every 6 hours as needed for mild pain.      atorvastatin (LIPITOR) 40 MG tablet Take 20 mg by mouth every evening      chlorthalidone (HYGROTEN) 25 MG tablet Take 25 mg by mouth every evening      hydrALAZINE (APRESOLINE) 10 MG tablet Take 1 tablet (10 mg) by mouth 3 times daily. 270 tablet 3    metoprolol succinate ER (TOPROL XL) 100 MG 24 hr tablet Take 1.5 tablets (150 mg) by mouth every evening 135 tablet 3    MULTIVITAMIN (MULTIPLE VITAMIN ORAL) Take 1 tablet by mouth every evening      nitroGLYcerin (NITRODUR) 0.2 MG/HR 24 hr patch Place 1 patch over 12 hours onto the skin daily. Remove at bedtime 90 patch 3    omega 3-dha-epa-fish oil (FISH OIL) 1,000 mg (120 mg-180 mg) cap Take 1 capsule by mouth every evening      spironolactone (ALDACTONE) 25 MG tablet Take 1 tablet by mouth daily at 2 pm.      warfarin ANTICOAGULANT (COUMADIN) 5 MG tablet Take 2.5 mg by mouth every evening      aspirin (ASA) 81 MG chewable tablet Take 1 tablet (81 mg) by mouth daily. 30 tablet 1       Objective:   Wt Readings from Last 3 Encounters:   01/28/25 74.8 kg (165 lb)   01/02/25 73.5 kg (162 lb)   12/21/24 75 kg (165 lb 5.5 oz)     Vital signs:  /64 (BP Location: Right arm, Patient Position: Sitting, Cuff Size: Adult Regular)   Pulse 72   Resp 16   Ht 1.702 m (5' 7\")   Wt 74.8 kg (165 lb)   " "BMI 25.84 kg/m        Physical Exam:    General Appearance : Awake, Alert, No acute distress  HEENT: No Scleral icterus; the mucous membranes were pink and moist.  Conjunctivae not injected.  Hard of hearing  Neck:  No cervical bruits, jugular venous distention, or thyromegaly   Chest: The spine was straight. Chest wall pacemaker site without erythema warmth or tenderness  Lungs: Respirations unlabored; the lungs are clear to auscultation.  No wheezing   Cardiovascular:   Nonpalpable point of maximal impulse.  Auscultation reveals slightly irregular first and second heart sounds with no murmurs, rubs, or gallops.  Carotid, radial, and dorsalis pedal pulses are intact and symmetric.    Abdomen: No organomegaly, masses, bruits, or tenderness. Bowels sounds are present  Extremities:  No clubbing, cyanosis.  No edema  Skin: No rash, bruising  Musculoskeletal: No tenderness.  Neurologic: Alert and oriented ×3. Speech is fluent.     Lab Results    Chemistry/lipid CBC Cardiac Enzymes/BNP/TSH/INR   Recent Labs   Lab Test 01/02/25 1914   CHOL 89   HDL 28*   LDL 31   TRIG 152*     Recent Labs   Lab Test 01/02/25 1914   LDL 31     Recent Labs   Lab Test 01/04/25  0703      POTASSIUM 4.6   CHLORIDE 107   CO2 22   GLC 93   BUN 41.2*   CR 1.38*   GFRESTIMATED 49*   JUAN 8.5*     Recent Labs   Lab Test 01/04/25  0703 01/02/25 1914 12/21/24  1200   CR 1.38* 1.68* 1.63*     Recent Labs   Lab Test 01/02/25 1914   A1C 6.3*          Recent Labs   Lab Test 01/04/25  0703   WBC 9.5   HGB 12.2*   HCT 35.5*   MCV 92   *     Recent Labs   Lab Test 01/04/25  0703 01/02/25 1914 12/21/24  1200   HGB 12.2* 13.3 13.3    No results for input(s): \"TROPONINI\" in the last 81311 hours.  Recent Labs   Lab Test 03/13/24  1903   NTBNPI 13,725*     No results for input(s): \"TSH\" in the last 74876 hours.  Recent Labs   Lab Test 01/04/25  0703 01/03/25  0720 01/02/25  2257   INR 3.01* 3.12* 3.32*        Echocardiogram 1/3/2025:  1. " Technically difficult study.  2. The left ventricle is normal in size. Left ventricular systolic performance  is moderate to severely reduced. The ejection fraction is estimated to be 25-  30%.  3. There is moderate to severe global reduction in left ventricular systolic  performance.  4. There is mild aortic insufficiency.  5. Mild right ventricular enlargement with mildly reduced right ventricular  systolic performance.  6. There is moderate left atrial enlargement.  When compared to the prior real-time echocardiogram dated 25 April 2024, the  ejection fraction appears slightly more reduced on the current study.    Pharmacologic nuclear stress test 6/2024:    The regadenoson nuclear stress test is abnormal. There is a medium-sized area of nontransmural infarction in the inferior and inferolateral segment(s) of the left ventricle associated with a medium-sized area of mild travis-infarct ischemia. This appears to be in the left circumflex and/or right coronary artery distribution.    The left ventricular ejection fraction at stress is reduced at 38% with inferior and inferolateral wall hypokinesis.    The patient is at an intermediate risk of future cardiac ischemic events.    A prior study was conducted on 6/11/2021. Compared to the prior study, similar perfusion abnormalities are seen but there has been a decline in left ventricular systolic function.     Echocardiogram 4/2024:  Left ventricular function is decreased. The ejection fraction is 35-40%  (moderately reduced).  There is borderline concentric left ventricular hypertrophy.  There is moderate global hypokinesia of the left ventricle.  Normal right ventricle size and systolic function.            ANNA VELASQUEZ MD [facility]  [phone]    This note created using Dragon voice recognition software.  Sound alike errors may have escaped editing.

## 2025-02-03 ENCOUNTER — TELEPHONE (OUTPATIENT)
Dept: CARDIOLOGY | Facility: CLINIC | Age: OVER 89
End: 2025-02-03
Payer: MEDICARE

## 2025-02-03 DIAGNOSIS — R40.0 DAYTIME SLEEPINESS: Primary | ICD-10-CM

## 2025-02-03 NOTE — TELEPHONE ENCOUNTER
Sleep referral placed for management.       [facility] will call you to coordinate your care as prescribed by your provider. If you don't hear from a representative within 2 business days, please call [phone].

## 2025-03-06 ENCOUNTER — ANCILLARY PROCEDURE (OUTPATIENT)
Dept: CARDIOLOGY | Facility: CLINIC | Age: OVER 89
End: 2025-03-06
Attending: INTERNAL MEDICINE
Payer: MEDICARE

## 2025-03-06 DIAGNOSIS — Z95.0 PACEMAKER: ICD-10-CM

## 2025-03-06 DIAGNOSIS — I49.5 SICK SINUS SYNDROME (H): ICD-10-CM

## 2025-03-06 DIAGNOSIS — I48.0 PAROXYSMAL ATRIAL FIBRILLATION (H): ICD-10-CM

## 2025-03-06 LAB
MDC_IDC_LEAD_CONNECTION_STATUS: NORMAL
MDC_IDC_LEAD_CONNECTION_STATUS: NORMAL
MDC_IDC_LEAD_IMPLANT_DT: NORMAL
MDC_IDC_LEAD_IMPLANT_DT: NORMAL
MDC_IDC_LEAD_LOCATION: NORMAL
MDC_IDC_LEAD_LOCATION: NORMAL
MDC_IDC_LEAD_LOCATION_DETAIL_1: NORMAL
MDC_IDC_LEAD_LOCATION_DETAIL_1: NORMAL
MDC_IDC_LEAD_MFG: NORMAL
MDC_IDC_LEAD_MFG: NORMAL
MDC_IDC_LEAD_MODEL: NORMAL
MDC_IDC_LEAD_MODEL: NORMAL
MDC_IDC_LEAD_POLARITY_TYPE: NORMAL
MDC_IDC_LEAD_POLARITY_TYPE: NORMAL
MDC_IDC_LEAD_SERIAL: NORMAL
MDC_IDC_LEAD_SERIAL: NORMAL
MDC_IDC_MSMT_BATTERY_DTM: NORMAL
MDC_IDC_MSMT_BATTERY_IMPEDANCE: 1590 OHM
MDC_IDC_MSMT_BATTERY_REMAINING_LONGEVITY: 61 MO
MDC_IDC_MSMT_BATTERY_STATUS: NORMAL
MDC_IDC_MSMT_BATTERY_VOLTAGE: 2.76 V
MDC_IDC_MSMT_LEADCHNL_RA_IMPEDANCE_VALUE: 67 OHM
MDC_IDC_MSMT_LEADCHNL_RV_IMPEDANCE_VALUE: 471 OHM
MDC_IDC_MSMT_LEADCHNL_RV_PACING_THRESHOLD_AMPLITUDE: 1 V
MDC_IDC_MSMT_LEADCHNL_RV_PACING_THRESHOLD_PULSEWIDTH: 0.4 MS
MDC_IDC_PG_IMPLANT_DTM: NORMAL
MDC_IDC_PG_MFG: NORMAL
MDC_IDC_PG_MODEL: NORMAL
MDC_IDC_PG_SERIAL: NORMAL
MDC_IDC_PG_TYPE: NORMAL
MDC_IDC_SESS_CLINIC_NAME: NORMAL
MDC_IDC_SESS_DTM: NORMAL
MDC_IDC_SESS_TYPE: NORMAL
MDC_IDC_SET_BRADY_LOWRATE: 60 {BEATS}/MIN
MDC_IDC_SET_BRADY_MAX_SENSOR_RATE: 120 {BEATS}/MIN
MDC_IDC_SET_BRADY_MAX_TRACKING_RATE: 105 {BEATS}/MIN
MDC_IDC_SET_BRADY_MODE: NORMAL
MDC_IDC_SET_LEADCHNL_RV_PACING_AMPLITUDE: 1.75 V
MDC_IDC_SET_LEADCHNL_RV_PACING_CAPTURE_MODE: NORMAL
MDC_IDC_SET_LEADCHNL_RV_PACING_POLARITY: NORMAL
MDC_IDC_SET_LEADCHNL_RV_PACING_PULSEWIDTH: 0.4 MS
MDC_IDC_SET_LEADCHNL_RV_SENSING_POLARITY: NORMAL
MDC_IDC_SET_LEADCHNL_RV_SENSING_SENSITIVITY: 5.6 MV
MDC_IDC_SET_ZONE_DETECTION_INTERVAL: 375 MS
MDC_IDC_SET_ZONE_STATUS: NORMAL
MDC_IDC_SET_ZONE_STATUS: NORMAL
MDC_IDC_SET_ZONE_TYPE: NORMAL
MDC_IDC_SET_ZONE_TYPE: NORMAL
MDC_IDC_SET_ZONE_VENDOR_TYPE: NORMAL
MDC_IDC_SET_ZONE_VENDOR_TYPE: NORMAL
MDC_IDC_STAT_AT_BURDEN_PERCENT: 0 %
MDC_IDC_STAT_AT_DTM_END: NORMAL
MDC_IDC_STAT_AT_DTM_START: NORMAL
MDC_IDC_STAT_BRADY_DTM_END: NORMAL
MDC_IDC_STAT_BRADY_DTM_START: NORMAL
MDC_IDC_STAT_BRADY_RV_PERCENT_PACED: 7 %
MDC_IDC_STAT_EPISODE_RECENT_COUNT: 0
MDC_IDC_STAT_EPISODE_RECENT_COUNT: 46
MDC_IDC_STAT_EPISODE_RECENT_COUNT_DTM_END: NORMAL
MDC_IDC_STAT_EPISODE_RECENT_COUNT_DTM_END: NORMAL
MDC_IDC_STAT_EPISODE_RECENT_COUNT_DTM_START: NORMAL
MDC_IDC_STAT_EPISODE_RECENT_COUNT_DTM_START: NORMAL
MDC_IDC_STAT_EPISODE_TYPE: NORMAL
MDC_IDC_STAT_EPISODE_TYPE: NORMAL

## 2025-03-12 PROCEDURE — 93294 REM INTERROG EVL PM/LDLS PM: CPT | Performed by: INTERNAL MEDICINE

## 2025-03-12 PROCEDURE — 93296 REM INTERROG EVL PM/IDS: CPT | Performed by: INTERNAL MEDICINE

## 2025-04-09 NOTE — PROGRESS NOTES
HEART CARE FOLLOW UP NOTE      [facility]  [phone]      Assessment/Recommendations   Assessment: 89 year old male with cardiomyopathy, CAD, atrial fibrillation     Plan:  Cardiomyopathy  Likely mixed ischemic/nonischemic.  EF 50-55% until 2024 (35-40% with negative stress at that time), most recent 25-30%.  Etiology for decline not clear, but likely tachycardia from persistent atrial fibrillation based on most recent pacemaker check vs ADILENE vs other.  Well compensated with no acute CHF.  In ICD range.        -As below for atrial fibrillation increase Toprol to 100mg BID      -Continue Spironolactone 25mg, Hydralazine 10mg TID, NTG patch      -No ACE/ARB due to renal insufficiency        -Not on diuretic other than hydrochlorothiazide, weight 166      -Plan to recheck echocardiogram after Rx of atrial fibrillation, if EF remains low can discuss ICD but not clear appropriate given age     Atrial fibrillation, persistent  Appears to be suboptimally rate controlled on pacemaker check despite high dose beta blockers, no symptoms but EF has fallen further.      -Increase Toprol to 100mg BID       -Continue warfarin       -Message sent to EP RE AVN RFA but was felt to be high risk due to age of pacemaker and risk for subclavian stenosis       -Return to clinic 1 month to recheck HR     CAD  Remote PCI and then medical Rx for CTOs and poor targets in 2008.  Nuc 2024 with infarct but no ischemia, no symptoms, but EF has dropped further       -No ASA due to anticoagulation       -Continue Toprol 150mg, Lipitor 40mg    Sick sinus  Doing well s/p pacemaker 2009 and subsequent gen change 2015, pacing 7% so unlikely to be cause of decline in EF       -Routine pacemaker checks    HTN  Well controlled      -Increase Toprol to 100mg BID for atrial fibrillation      -Continue Spironolactone 25mg, Hydralazine 10mg TID, NTG patch      -No ACE/ARB due to renal insufficiency      Hyperlipidemia   LDL = 31       -Continue Lipitor 40mg     The longitudinal plan of care for the diagnosis(es)/condition(s) as documented were addressed during this visit. Due to the added complexity in care, I will continue to support Bill in the subsequent management and with ongoing continuity of care.          History of Present Illness/Subjective    Indication for visit:  Skip Newman returns for follow up of worsening cardiomyopathy, atrial fibrillation, CAD and was last seen on 1/28/2025 by Dr Will.      HPI: Skip Newman is a 89 year old male with a history of medically managed CAD, mild cardiomyopathy, persistent atrial fibrillation, HTN, hyperlipidemia, VT who saw Dr Will 1/2025 for further decline in EF.  A sleep evaluation was advised, nitrates and Hydralazine added (renal insufficiency).    He returns for follow up and reports he feels well, no chest pain, dyspnea, othopnea, or palpitations.  Last pacemaker check 46 episodes of rapid atrial fibrillation.    I reviewed notes from PCP prior to this visit.         Physical Examination  Past Cardiac History   Vitals: /84 (BP Location: Right arm, Patient Position: Sitting, Cuff Size: Adult Regular)   Pulse 102   Resp 14   Wt 75.3 kg (166 lb)   BMI 26.00 kg/m    BMI= Body mass index is 26 kg/m .  Wt Readings from Last 3 Encounters:   04/30/25 75.3 kg (166 lb)   02/21/25 74.8 kg (165 lb)   01/28/25 74.8 kg (165 lb)       General Appearance:   no distress, normal body habitus   ENT/Mouth: membranes moist, no oral lesions or bleeding gums.      EYES:  no scleral icterus, normal conjunctivae   Neck: no carotid bruits or thyromegaly   Chest/Lungs:   lungs are clear to auscultation, no rales or wheezing,  sternal scar, equal chest wall expansion    Cardiovascular:   Irregular. Normal first and second heart sounds with no murmurs, rubs, or gallops; the carotid, radial and posterior tibial pulses are intact, Jugular venous pressure normal , no edema bilaterally    Abdomen:  no  organomegaly, masses, bruits, or tenderness; bowel sounds are present   Extremities: no cyanosis or clubbing   Skin: no xanthelasma, warm.    Neurologic: normal  bilateral, no tremors           CAD: s/p PCI to Cx 2000, medically managed due to poor targets for PCI or CABG (2008)  Atrial fibrillation, persistent   Cardiomyopathy, mixed: dry weight 166   Sick sinus s/p pacemaker 2009  Mild AR   NSVT    Most Recent Echocardiogram: 1/3/2025  1. Technically difficult study.  2. The left ventricle is normal in size. Left ventricular systolic performance  is moderate to severely reduced. The ejection fraction is estimated to be 25-  30%.  3. There is moderate to severe global reduction in left ventricular systolic  performance.  4. There is mild aortic insufficiency.  5. Mild right ventricular enlargement with mildly reduced right ventricular  systolic performance.  6. There is moderate left atrial enlargement.     When compared to the prior real-time echocardiogram dated 25 April 2024, the  ejection fraction appears slightly more reduced on the current study.    Most Recent Stress Test: 6/13/2024    The regadenoson nuclear stress test is abnormal. There is a medium-sized area of nontransmural infarction in the inferior and inferolateral segment(s) of the left ventricle associated with a medium-sized area of mild travis-infarct ischemia. This appears to be in the left circumflex and/or right coronary artery distribution.    The left ventricular ejection fraction at stress is reduced at 38% with inferior and inferolateral wall hypokinesis.    The patient is at an intermediate risk of future cardiac ischemic events.    A prior study was conducted on 6/11/2021. Compared to the prior study, similar perfusion abnormalities are seen but there has been a decline in left ventricular systolic function.    Most Recent Angiogram: 8/27/2008  LEFT VENTRICULAR FUNCTION     Left ventricular function is normal with EF of 50% BY VISUAL  ESTIMATE.   DIAGNOSTIC - CORONARY     The RCA is severely diseased.     The circumflex artery has moderate disease.     Chronic total occlusion (with bridging collaterals) of the proximal 1st Obtuse Marginal     Baseline FFR measured in circumflex was 1     Post adenosine FFR measured in circumflex was 1   RECOMMENDATIONS & PLAN     Medical Rx     Medical Rx - Lesion not suitable for intervention     Optimize risk factors and medications:  HDL > 45 ;  LDL < 70 ;  Triglycerides < 100     Comment:   INTERVENTION     Unsuccessful angioplasty due to inability to cross lesion with balloon.     ECG (reviewed by myself): 12/21/2024 atrial fibrillation 99, PVCs, old inferior MI           Medical History  Family History Social History   Past Medical History:   Diagnosis Date    A-fib (H)     Anemia     Arrhythmia     Arthritis     Chronic kidney disease     Claudication     Coronary artery disease     Gout     Hearing loss     high frequency    History of transfusion     Hyperlipidemia     Hypertension     Impotence of organic origin     Kidney stone     recurrent    MI (myocardial infarction) (H)     Primary osteoarthritis of both knees     Sick sinus syndrome -- S/P Medtronic PPM     Skin cancer     Spinal stenosis, lumbar region, with neurogenic claudication     Syncope     Thrombocytopenia     VT (ventricular tachycardia) (H)      Family History   Problem Relation Age of Onset    Heart Failure Mother     Heart Failure Father         Social History     Socioeconomic History    Marital status:      Spouse name: Not on file    Number of children: Not on file    Years of education: Not on file    Highest education level: Not on file   Occupational History    Not on file   Tobacco Use    Smoking status: Never    Smokeless tobacco: Never   Substance and Sexual Activity    Alcohol use: No    Drug use: Not Currently    Sexual activity: Not on file   Other Topics Concern    Not on file   Social History Narrative    Not on  file     Social Drivers of Health     Financial Resource Strain: Low Risk  (8/18/2023)    [facility], [facility]    Financial Resource Strain     Difficulty of Paying Living Expenses: 3     Difficulty of Paying Living Expenses: Not on file   Food Insecurity: Food Insecurity Present (8/18/2023)    Received from [facility], [facility]    Food Insecurity     Worried About Running Out of Food in the Last Year: 2   Transportation Needs: No Transportation Needs (8/18/2023)    Received from [facility], [facility]    Transportation Needs     Lack of Transportation (Medical): 1   Physical Activity: Not on file   Stress: Not on file   Social Connections: Unknown (8/18/2024)    Received from [facility]    Social Connections     Frequency of Communication with Friends and Family: Not on file   Interpersonal Safety: Not on file   Housing Stability: High Risk (8/18/2023)    Received from [facility], [facility]    Housing Stability     Unable to Pay for Housing in the Last Year: 3           Medications  Allergies   Current Outpatient Medications   Medication Sig Dispense Refill    acetaminophen (TYLENOL) 500 MG tablet Take 500 mg by mouth every 6 hours as needed for mild pain.      atorvastatin (LIPITOR) 40 MG tablet Take 20 mg by mouth every evening      chlorthalidone (HYGROTEN) 25 MG tablet Take 25 mg by mouth every evening      hydrALAZINE (APRESOLINE) 10 MG tablet Take 1 tablet (10 mg) by mouth 3 times daily. 270 tablet 3    metoprolol succinate ER (TOPROL XL) 100 MG 24 hr tablet Take 1.5 tablets (150 mg) by mouth every evening 135 tablet 3    MULTIVITAMIN (MULTIPLE VITAMIN ORAL) Take 1 tablet by mouth every  "evening      nitroGLYcerin (NITRODUR) 0.2 MG/HR 24 hr patch Place 1 patch over 12 hours onto the skin daily. Remove at bedtime 90 patch 3    omega 3-dha-epa-fish oil (FISH OIL) 1,000 mg (120 mg-180 mg) cap Take 1 capsule by mouth every evening      spironolactone (ALDACTONE) 25 MG tablet Take 1 tablet by mouth daily at 2 pm.      warfarin ANTICOAGULANT (COUMADIN) 5 MG tablet Take 2.5 mg by mouth every evening         Allergies   Allergen Reactions    Lisinopril Cough    Indomethacin Rash    Naproxen Rash          Lab Results    Chemistry/lipid CBC Cardiac Enzymes/BNP/TSH/INR   Recent Labs   Lab Test 01/02/25 1914   CHOL 89   HDL 28*   LDL 31   TRIG 152*     Recent Labs   Lab Test 01/02/25 1914   LDL 31     Recent Labs   Lab Test 01/04/25  0703      POTASSIUM 4.6   CHLORIDE 107   CO2 22   GLC 93   BUN 41.2*   CR 1.38*   GFRESTIMATED 49*   JUAN 8.5*     Recent Labs   Lab Test 01/04/25  0703 01/02/25 1914 12/21/24  1200   CR 1.38* 1.68* 1.63*     Recent Labs   Lab Test 01/02/25 1914   A1C 6.3*          Recent Labs   Lab Test 01/04/25  0703   WBC 9.5   HGB 12.2*   HCT 35.5*   MCV 92   *     Recent Labs   Lab Test 01/04/25  0703 01/02/25 1914 12/21/24  1200   HGB 12.2* 13.3 13.3    No results for input(s): \"TROPONINI\" in the last 31647 hours.  Recent Labs   Lab Test 03/13/24  1903   NTBNPI 13,725*     No results for input(s): \"TSH\" in the last 57321 hours.  Recent Labs   Lab Test 01/04/25  0703 01/03/25  0720 01/02/25  2257   INR 3.01* 3.12* 3.32*        Zoraida Joshua MD  Noninvasive Cardiologist   [facility]                                    "

## 2025-04-30 ENCOUNTER — OFFICE VISIT (OUTPATIENT)
Dept: CARDIOLOGY | Facility: CLINIC | Age: OVER 89
End: 2025-04-30
Payer: MEDICARE

## 2025-04-30 VITALS
DIASTOLIC BLOOD PRESSURE: 84 MMHG | WEIGHT: 166 LBS | BODY MASS INDEX: 26 KG/M2 | SYSTOLIC BLOOD PRESSURE: 126 MMHG | RESPIRATION RATE: 14 BRPM | HEART RATE: 102 BPM

## 2025-04-30 DIAGNOSIS — Z95.0 CARDIAC PACEMAKER IN SITU: ICD-10-CM

## 2025-04-30 DIAGNOSIS — E78.5 HYPERLIPIDEMIA LDL GOAL <70: ICD-10-CM

## 2025-04-30 DIAGNOSIS — I48.11 LONGSTANDING PERSISTENT ATRIAL FIBRILLATION (H): Primary | ICD-10-CM

## 2025-04-30 DIAGNOSIS — I25.10 CORONARY ARTERY DISEASE INVOLVING NATIVE CORONARY ARTERY OF NATIVE HEART WITHOUT ANGINA PECTORIS: ICD-10-CM

## 2025-04-30 DIAGNOSIS — I10 PRIMARY HYPERTENSION: ICD-10-CM

## 2025-04-30 DIAGNOSIS — I42.0 DILATED CARDIOMYOPATHY (H): ICD-10-CM

## 2025-04-30 PROCEDURE — 3074F SYST BP LT 130 MM HG: CPT | Performed by: INTERNAL MEDICINE

## 2025-04-30 PROCEDURE — G2211 COMPLEX E/M VISIT ADD ON: HCPCS | Performed by: INTERNAL MEDICINE

## 2025-04-30 PROCEDURE — 99214 OFFICE O/P EST MOD 30 MIN: CPT | Performed by: INTERNAL MEDICINE

## 2025-04-30 PROCEDURE — 3079F DIAST BP 80-89 MM HG: CPT | Performed by: INTERNAL MEDICINE

## 2025-04-30 NOTE — PATIENT INSTRUCTIONS
Your heart function was decreased more when you saw Dr Will in Jan.  I think this might be from fast heart rates due to the atrial fibrillation.    Increase Metoprolol from 1.5 tablets daily to 1 whole tablet twice daily  Follow-up with dr Joshua in 1 month.  Once we get the heart rate controlled can recheck an echocardiogram and if the heart function is not improving we can check some additional tests

## 2025-04-30 NOTE — LETTER
4/30/2025    Jacquelin Francis MD  [address]  [facility] [address]    RE: Skip Newman       Dear Colleague,     I had the [facility] of seeing Skip Newman in the [facility].    HEART CARE FOLLOW UP NOTE      [facility]  [phone]      Assessment/Recommendations   Assessment: 89 year old male with cardiomyopathy, CAD, atrial fibrillation     Plan:  Cardiomyopathy  Likely mixed ischemic/nonischemic.  EF 50-55% until 2024 (35-40% with negative stress at that time), most recent 25-30%.  Etiology for decline not clear, but likely tachycardia from persistent atrial fibrillation based on most recent pacemaker check vs ADILENE vs other.  Well compensated with no acute CHF.  In ICD range.        -As below for atrial fibrillation increase Toprol to 100mg BID      -Continue Spironolactone 25mg, Hydralazine 10mg TID, NTG patch      -No ACE/ARB due to renal insufficiency        -Not on diuretic other than hydrochlorothiazide, weight 166      -Plan to recheck echocardiogram after Rx of atrial fibrillation, if EF remains low can discuss ICD but not clear appropriate given age     Atrial fibrillation, persistent  Appears to be suboptimally rate controlled on pacemaker check despite high dose beta blockers, no symptoms but EF has fallen further.      -Increase Toprol to 100mg BID       -Continue warfarin       -Message sent to EP RE AVN RFA but was felt to be high risk due to age of pacemaker and risk for subclavian stenosis       -Return to clinic 1 month to recheck HR     CAD  Remote PCI and then medical Rx for CTOs and poor targets in 2008.  Nuc 2024 with infarct but no ischemia, no symptoms, but EF has dropped further       -No ASA due to anticoagulation       -Continue Toprol 150mg, Lipitor 40mg    Sick sinus  Doing well s/p pacemaker 2009 and subsequent gen change 2015, pacing 7% so unlikely to be cause of decline in EF       -Routine pacemaker checks    HTN  Well  controlled      -Increase Toprol to 100mg BID for atrial fibrillation      -Continue Spironolactone 25mg, Hydralazine 10mg TID, NTG patch      -No ACE/ARB due to renal insufficiency      Hyperlipidemia   LDL = 31      -Continue Lipitor 40mg     The longitudinal plan of care for the diagnosis(es)/condition(s) as documented were addressed during this visit. Due to the added complexity in care, I will continue to support Bill in the subsequent management and with ongoing continuity of care.          History of Present Illness/Subjective    Indication for visit:  Skip Newman returns for follow up of worsening cardiomyopathy, atrial fibrillation, CAD and was last seen on 1/28/2025 by Dr Will.      HPI: Skip Newman is a 89 year old male with a history of medically managed CAD, mild cardiomyopathy, persistent atrial fibrillation, HTN, hyperlipidemia, VT who saw Dr Will 1/2025 for further decline in EF.  A sleep evaluation was advised, nitrates and Hydralazine added (renal insufficiency).    He returns for follow up and reports he feels well, no chest pain, dyspnea, othopnea, or palpitations.  Last pacemaker check 46 episodes of rapid atrial fibrillation.    I reviewed notes from PCP prior to this visit.         Physical Examination  Past Cardiac History   Vitals: /84 (BP Location: Right arm, Patient Position: Sitting, Cuff Size: Adult Regular)   Pulse 102   Resp 14   Wt 75.3 kg (166 lb)   BMI 26.00 kg/m    BMI= Body mass index is 26 kg/m .  Wt Readings from Last 3 Encounters:   04/30/25 75.3 kg (166 lb)   02/21/25 74.8 kg (165 lb)   01/28/25 74.8 kg (165 lb)       General Appearance:   no distress, normal body habitus   ENT/Mouth: membranes moist, no oral lesions or bleeding gums.      EYES:  no scleral icterus, normal conjunctivae   Neck: no carotid bruits or thyromegaly   Chest/Lungs:   lungs are clear to auscultation, no rales or wheezing,  sternal scar, equal chest wall expansion     Cardiovascular:   Irregular. Normal first and second heart sounds with no murmurs, rubs, or gallops; the carotid, radial and posterior tibial pulses are intact, Jugular venous pressure normal , no edema bilaterally    Abdomen:  no organomegaly, masses, bruits, or tenderness; bowel sounds are present   Extremities: no cyanosis or clubbing   Skin: no xanthelasma, warm.    Neurologic: normal  bilateral, no tremors           CAD: s/p PCI to Cx 2000, medically managed due to poor targets for PCI or CABG (2008)  Atrial fibrillation, persistent   Cardiomyopathy, mixed: dry weight 166   Sick sinus s/p pacemaker 2009  Mild AR   NSVT    Most Recent Echocardiogram: 1/3/2025  1. Technically difficult study.  2. The left ventricle is normal in size. Left ventricular systolic performance  is moderate to severely reduced. The ejection fraction is estimated to be 25-  30%.  3. There is moderate to severe global reduction in left ventricular systolic  performance.  4. There is mild aortic insufficiency.  5. Mild right ventricular enlargement with mildly reduced right ventricular  systolic performance.  6. There is moderate left atrial enlargement.     When compared to the prior real-time echocardiogram dated 25 April 2024, the  ejection fraction appears slightly more reduced on the current study.    Most Recent Stress Test: 6/13/2024     The regadenoson nuclear stress test is abnormal. There is a medium-sized area of nontransmural infarction in the inferior and inferolateral segment(s) of the left ventricle associated with a medium-sized area of mild travis-infarct ischemia. This appears to be in the left circumflex and/or right coronary artery distribution.     The left ventricular ejection fraction at stress is reduced at 38% with inferior and inferolateral wall hypokinesis.     The patient is at an intermediate risk of future cardiac ischemic events.     A prior study was conducted on 6/11/2021. Compared to the prior study,  similar perfusion abnormalities are seen but there has been a decline in left ventricular systolic function.    Most Recent Angiogram: 8/27/2008  LEFT VENTRICULAR FUNCTION     Left ventricular function is normal with EF of 50% BY VISUAL ESTIMATE.   DIAGNOSTIC - CORONARY     The RCA is severely diseased.     The circumflex artery has moderate disease.     Chronic total occlusion (with bridging collaterals) of the proximal 1st Obtuse Marginal     Baseline FFR measured in circumflex was 1     Post adenosine FFR measured in circumflex was 1   RECOMMENDATIONS & PLAN     Medical Rx     Medical Rx - Lesion not suitable for intervention     Optimize risk factors and medications:  HDL > 45 ;  LDL < 70 ;  Triglycerides < 100     Comment:   INTERVENTION     Unsuccessful angioplasty due to inability to cross lesion with balloon.     ECG (reviewed by myself): 12/21/2024 atrial fibrillation 99, PVCs, old inferior MI           Medical History  Family History Social History   Past Medical History:   Diagnosis Date     A-fib (H)      Anemia      Arrhythmia      Arthritis      Chronic kidney disease      Claudication      Coronary artery disease      Gout      Hearing loss     high frequency     History of transfusion      Hyperlipidemia      Hypertension      Impotence of organic origin      Kidney stone     recurrent     MI (myocardial infarction) (H)      Primary osteoarthritis of both knees      Sick sinus syndrome -- S/P Medtronic PPM      Skin cancer      Spinal stenosis, lumbar region, with neurogenic claudication      Syncope      Thrombocytopenia      VT (ventricular tachycardia) (H)      Family History   Problem Relation Age of Onset     Heart Failure Mother      Heart Failure Father         Social History     Socioeconomic History     Marital status:      Spouse name: Not on file     Number of children: Not on file     Years of education: Not on file     Highest education level: Not on file   Occupational History      Not on file   Tobacco Use     Smoking status: Never     Smokeless tobacco: Never   Substance and Sexual Activity     Alcohol use: No     Drug use: Not Currently     Sexual activity: Not on file   Other Topics Concern     Not on file   Social History Narrative     Not on file     Social Drivers of Health     Financial Resource Strain: Low Risk  (8/18/2023)    Received from [facility] [employer] [facility], [facility]    Financial Resource Strain      Difficulty of Paying Living Expenses: 3      Difficulty of Paying Living Expenses: Not on file   Food Insecurity: Food Insecurity Present (8/18/2023)    Received from [facility] [employer] [facility], [facility]    Food Insecurity      Worried About Running Out of Food in the Last Year: 2   Transportation Needs: No Transportation Needs (8/18/2023)    Received from [facility] [employer] [facility], [facility]    Transportation Needs      Lack of Transportation (Medical): 1   Physical Activity: Not on file   Stress: Not on file   Social Connections: Unknown (8/18/2024)    Received from [facility] [employer] [facility]    Social Connections      Frequency of Communication with Friends and Family: Not on file   Interpersonal Safety: Not on file   Housing Stability: High Risk (8/18/2023)    Received from [facility] [employer] [facility], [facility] [employer] [facility]    Housing Stability      Unable to Pay for Housing in the Last Year: 3           Medications  Allergies   Current Outpatient Medications   Medication Sig Dispense Refill     acetaminophen (TYLENOL) 500 MG tablet Take 500 mg by mouth every 6 hours as needed for mild pain.       atorvastatin (LIPITOR) 40 MG tablet Take 20 mg by mouth every evening       chlorthalidone (HYGROTEN) 25 MG tablet Take 25 mg by mouth every evening        "hydrALAZINE (APRESOLINE) 10 MG tablet Take 1 tablet (10 mg) by mouth 3 times daily. 270 tablet 3     metoprolol succinate ER (TOPROL XL) 100 MG 24 hr tablet Take 1.5 tablets (150 mg) by mouth every evening 135 tablet 3     MULTIVITAMIN (MULTIPLE VITAMIN ORAL) Take 1 tablet by mouth every evening       nitroGLYcerin (NITRODUR) 0.2 MG/HR 24 hr patch Place 1 patch over 12 hours onto the skin daily. Remove at bedtime 90 patch 3     omega 3-dha-epa-fish oil (FISH OIL) 1,000 mg (120 mg-180 mg) cap Take 1 capsule by mouth every evening       spironolactone (ALDACTONE) 25 MG tablet Take 1 tablet by mouth daily at 2 pm.       warfarin ANTICOAGULANT (COUMADIN) 5 MG tablet Take 2.5 mg by mouth every evening         Allergies   Allergen Reactions     Lisinopril Cough     Indomethacin Rash     Naproxen Rash          Lab Results    Chemistry/lipid CBC Cardiac Enzymes/BNP/TSH/INR   Recent Labs   Lab Test 01/02/25 1914   CHOL 89   HDL 28*   LDL 31   TRIG 152*     Recent Labs   Lab Test 01/02/25 1914   LDL 31     Recent Labs   Lab Test 01/04/25  0703      POTASSIUM 4.6   CHLORIDE 107   CO2 22   GLC 93   BUN 41.2*   CR 1.38*   GFRESTIMATED 49*   JUAN 8.5*     Recent Labs   Lab Test 01/04/25  0703 01/02/25 1914 12/21/24  1200   CR 1.38* 1.68* 1.63*     Recent Labs   Lab Test 01/02/25 1914   A1C 6.3*          Recent Labs   Lab Test 01/04/25  0703   WBC 9.5   HGB 12.2*   HCT 35.5*   MCV 92   *     Recent Labs   Lab Test 01/04/25  0703 01/02/25 1914 12/21/24  1200   HGB 12.2* 13.3 13.3    No results for input(s): \"TROPONINI\" in the last 86678 hours.  Recent Labs   Lab Test 03/13/24  1903   NTBNPI 13,725*     No results for input(s): \"TSH\" in the last 73459 hours.  Recent Labs   Lab Test 01/04/25  0703 01/03/25  0720 01/02/25  2257   INR 3.01* 3.12* 3.32*        Zoraida Joshua MD  Noninvasive Cardiologist   [facility]                                        Thank you for allowing me to participate in the " care of your patient.      Sincerely,     Zoraida Joshua MD     [facility]  cc:   Roc Will MD  [address] [address]

## 2025-05-27 NOTE — PROGRESS NOTES
HEART CARE FOLLOW UP NOTE      [facility]  [phone]      Assessment/Recommendations   Assessment: 89 year old male with cardiomyopathy, CAD, atrial fibrillation      Plan:  Cardiomyopathy  Likely mixed ischemic/nonischemic.  EF 50-55% until 2024 (35-40% with negative stress at that time), most recent 25-30%.  Etiology for decline not clear, but likely tachycardia from persistent atrial fibrillation based on most recent pacemaker check vs ADILENE vs other.  Well compensated with no acute CHF.  In ICD range.        -As below for atrial fibrillation increased Toprol to 100mg BID      -Continue Spironolactone 25mg, Hydralazine 10mg TID, NTG patch      -No ACE/ARB due to renal insufficiency        -Not on diuretic other than hydrochlorothiazide, weight 166, was 166      -Plan to recheck echocardiogram after Rx of atrial fibrillation, if EF remains low can discuss ICD, he says he would consider it       Atrial fibrillation, persistent  Was suboptimally rate controlled on pacemaker checks, improved now with escalating beta blocker dose.         -Continue Toprol 100mg BID and warfarin       -Message sent to EP RE AVN RFA but was felt to be high risk due to age of pacemaker and risk for subclavian stenosis       -pacemaker check to evaluate HR control      CAD  Remote PCI and then medical Rx for CTOs and poor targets in 2008.  Nuc 2024 with infarct but no ischemia, no symptoms, but EF has dropped further       -No ASA due to anticoagulation       -Continue Toprol 100mg BID, Lipitor 40mg     Sick sinus  Doing well s/p pacemaker 2009 and subsequent gen change 2015, pacing 7% so unlikely to be cause of decline in EF       -Routine pacemaker checks     HTN  Well controlled      -Continue Toprol 100mg BID, Spironolactone 25mg, Hydralazine 10mg TID, NTG patch      -No ACE/ARB due to renal insufficiency       Hyperlipidemia   LDL = 31      -Continue Lipitor 40mg     The longitudinal plan of care for the  "diagnosis(es)/condition(s) as documented were addressed during this visit. Due to the added complexity in care, I will continue to support Bill in the subsequent management and with ongoing continuity of care.          History of Present Illness/Subjective    Indication for visit:  Skip Newman returns for follow up of atrial fibrillation, cardiomyopathy and was last seen on 4/30/2025 by myself.      HPI: Skip Newman is a 89 year old male with a history of medically managed CAD, mild cardiomyopathy, persistent atrial fibrillation, HTN, hyperlipidemia, VT who saw Dr Will 1/2025 for further decline in EF.  A sleep evaluation was advised, nitrates and Hydralazine added (renal insufficiency).  I saw him in follow up he denied symptoms, but pacemaker check showed significant burden of atrial fibrillation with poorly controlled HR.  I advised increase Toprol and follow up in 1 month.    He returns for that planned follow up and reports he feels the same, no palpitations, chest pain, dyspnea, orthopnea, or PND.    I reviewed notes from PCP prior to this visit.         Physical Examination  Past Cardiac History   Vitals: /60 (BP Location: Left arm, Patient Position: Sitting, Cuff Size: Adult Regular)   Pulse 71   Resp 12   Ht 1.702 m (5' 7\")   Wt 75.3 kg (166 lb)   BMI 26.00 kg/m    BMI= Body mass index is 26 kg/m .  Wt Readings from Last 3 Encounters:   06/03/25 75.3 kg (166 lb)   04/30/25 75.3 kg (166 lb)   02/21/25 74.8 kg (165 lb)       General Appearance:   no distress, normal body habitus   ENT/Mouth: membranes moist, no oral lesions or bleeding gums.      EYES:  no scleral icterus, normal conjunctivae   Neck: no carotid bruits or thyromegaly   Chest/Lungs:   lungs are clear to auscultation, no rales or wheezing,  sternal scar, equal chest wall expansion    Cardiovascular:   Irregular. Normal first and second heart sounds with no murmurs, rubs, or gallops; the carotid, radial and posterior tibial " pulses are intact, Jugular venous pressure normal, no edema bilaterally    Abdomen:  no organomegaly, masses, bruits, or tenderness; bowel sounds are present   Extremities: no cyanosis or clubbing   Skin: no xanthelasma, warm.    Neurologic: normal  bilateral, no tremors           CAD: s/p PCI to Cx 2000, medically managed due to poor targets for PCI or CABG (2008)  Atrial fibrillation, persistent   Cardiomyopathy, mixed: dry weight 166   Sick sinus s/p pacemaker 2009  Mild AR   NSVT     Most Recent Echocardiogram: 1/3/2025  1. Technically difficult study.  2. The left ventricle is normal in size. Left ventricular systolic performance  is moderate to severely reduced. The ejection fraction is estimated to be 25-30%.  3. There is moderate to severe global reduction in left ventricular systolic  performance.  4. There is mild aortic insufficiency.  5. Mild right ventricular enlargement with mildly reduced right ventricular  systolic performance.  6. There is moderate left atrial enlargement.     When compared to the prior real-time echocardiogram dated 25 April 2024, the  ejection fraction appears slightly more reduced on the current study.     Most Recent Stress Test: 6/13/2024    The regadenoson nuclear stress test is abnormal. There is a medium-sized area of nontransmural infarction in the inferior and inferolateral segment(s) of the left ventricle associated with a medium-sized area of mild travis-infarct ischemia. This appears to be in the left circumflex and/or right coronary artery distribution.    The left ventricular ejection fraction at stress is reduced at 38% with inferior and inferolateral wall hypokinesis.    The patient is at an intermediate risk of future cardiac ischemic events.    A prior study was conducted on 6/11/2021. Compared to the prior study, similar perfusion abnormalities are seen but there has been a decline in left ventricular systolic function.     Most Recent Angiogram:  8/27/2008  LEFT VENTRICULAR FUNCTION     Left ventricular function is normal with EF of 50% BY VISUAL ESTIMATE.   DIAGNOSTIC - CORONARY     The RCA is severely diseased.     The circumflex artery has moderate disease.     Chronic total occlusion (with bridging collaterals) of the proximal 1st Obtuse Marginal     Baseline FFR measured in circumflex was 1     Post adenosine FFR measured in circumflex was 1   RECOMMENDATIONS & PLAN     Medical Rx     Medical Rx - Lesion not suitable for intervention     Optimize risk factors and medications:  HDL > 45 ;  LDL < 70 ;  Triglycerides < 100     Comment:   INTERVENTION     Unsuccessful angioplasty due to inability to cross lesion with balloon.      ECG (reviewed by myself): 12/21/2024 atrial fibrillation 99, PVCs, old inferior MI                 Medical History  Family History Social History   Past Medical History:   Diagnosis Date    A-fib (H)     Anemia     Arrhythmia     Arthritis     Chronic kidney disease     Claudication     Coronary artery disease     Gout     Hearing loss     high frequency    History of transfusion     Hyperlipidemia     Hypertension     Impotence of organic origin     Kidney stone     recurrent    MI (myocardial infarction) (H)     Primary osteoarthritis of both knees     Sick sinus syndrome -- S/P Medtronic PPM     Skin cancer     Spinal stenosis, lumbar region, with neurogenic claudication     Syncope     Thrombocytopenia     VT (ventricular tachycardia) (H)      Family History   Problem Relation Age of Onset    Heart Failure Mother     Heart Failure Father         Social History     Socioeconomic History    Marital status:      Spouse name: Not on file    Number of children: Not on file    Years of education: Not on file    Highest education level: Not on file   Occupational History    Not on file   Tobacco Use    Smoking status: Never    Smokeless tobacco: Never   Substance and Sexual Activity    Alcohol use: No    Drug use: Not  Currently    Sexual activity: Not on file   Other Topics Concern    Not on file   Social History Narrative    Not on file     Social Drivers of Health     Financial Resource Strain: Low Risk  (8/18/2023)    Received from [employer] [facility]    Financial Resource Strain     Difficulty of Paying Living Expenses: 3     Difficulty of Paying Living Expenses: Not on file   Food Insecurity: Food Insecurity Present (8/18/2023)    Received from [employer] [facility]    Food Insecurity     Worried About Running Out of Food in the Last Year: 2   Transportation Needs: No Transportation Needs (8/18/2023)    Received from [employer] [facility]    Transportation Needs     Lack of Transportation (Medical): 1   Physical Activity: Not on file   Stress: Not on file   Social Connections: Unknown (8/18/2024)    Received from [employer] [facility]    Social Connections     Frequency of Communication with Friends and Family: Not on file   Interpersonal Safety: Not on file   Housing Stability: High Risk (8/18/2023)    Received from [employer] [facility]    Housing Stability     Unable to Pay for Housing in the Last Year: 3           Medications  Allergies   Current Outpatient Medications   Medication Sig Dispense Refill    acetaminophen (TYLENOL) 500 MG tablet Take 500 mg by mouth every 6 hours as needed for mild pain.      atorvastatin (LIPITOR) 40 MG tablet Take 20 mg by mouth every evening      chlorthalidone (HYGROTEN) 25 MG tablet Take 25 mg by mouth every evening      hydrALAZINE (APRESOLINE) 10 MG tablet Take 1 tablet (10 mg) by mouth 3 times daily. 270 tablet 3    metoprolol succinate ER (TOPROL XL) 100 MG 24 hr tablet Take 1.5 tablets (150 mg) by mouth every evening (Patient taking differently: Take 200 mg by mouth daily.) 135 tablet 3    MULTIVITAMIN (MULTIPLE VITAMIN ORAL) Take 1 tablet by mouth every evening       "nitroGLYcerin (NITRODUR) 0.2 MG/HR 24 hr patch Place 1 patch over 12 hours onto the skin daily. Remove at bedtime 90 patch 3    omega 3-dha-epa-fish oil (FISH OIL) 1,000 mg (120 mg-180 mg) cap Take 1 capsule by mouth every evening      spironolactone (ALDACTONE) 25 MG tablet Take 1 tablet by mouth daily at 2 pm.      warfarin ANTICOAGULANT (COUMADIN) 5 MG tablet Take 2.5 mg by mouth every evening (Patient taking differently: Take 2.5 mg by mouth every evening. Except Friday 5 mg)         Allergies   Allergen Reactions    Lisinopril Cough    Indomethacin Rash    Naproxen Rash          Lab Results    Chemistry/lipid CBC Cardiac Enzymes/BNP/TSH/INR   Recent Labs   Lab Test 01/02/25 1914   CHOL 89   HDL 28*   LDL 31   TRIG 152*     Recent Labs   Lab Test 01/02/25 1914   LDL 31     Recent Labs   Lab Test 01/04/25  0703      POTASSIUM 4.6   CHLORIDE 107   CO2 22   GLC 93   BUN 41.2*   CR 1.38*   GFRESTIMATED 49*   JUAN 8.5*     Recent Labs   Lab Test 01/04/25  0703 01/02/25 1914 12/21/24  1200   CR 1.38* 1.68* 1.63*     Recent Labs   Lab Test 01/02/25 1914   A1C 6.3*          Recent Labs   Lab Test 01/04/25  0703   WBC 9.5   HGB 12.2*   HCT 35.5*   MCV 92   *     Recent Labs   Lab Test 01/04/25  0703 01/02/25 1914 12/21/24  1200   HGB 12.2* 13.3 13.3    No results for input(s): \"TROPONINI\" in the last 09788 hours.  Recent Labs   Lab Test 03/13/24  1903   NTBNPI 13,725*     No results for input(s): \"TSH\" in the last 79989 hours.  Recent Labs   Lab Test 01/04/25  0703 01/03/25  0720 01/02/25  2257   INR 3.01* 3.12* 3.32*        Zoraida Joshua MD  Noninvasive Cardiologist   [facility]                                    "

## 2025-06-03 ENCOUNTER — OFFICE VISIT (OUTPATIENT)
Dept: CARDIOLOGY | Facility: CLINIC | Age: OVER 89
End: 2025-06-03
Attending: INTERNAL MEDICINE
Payer: MEDICARE

## 2025-06-03 VITALS
HEART RATE: 71 BPM | SYSTOLIC BLOOD PRESSURE: 130 MMHG | HEIGHT: 67 IN | BODY MASS INDEX: 26.06 KG/M2 | WEIGHT: 166 LBS | DIASTOLIC BLOOD PRESSURE: 60 MMHG | RESPIRATION RATE: 12 BRPM

## 2025-06-03 DIAGNOSIS — I10 PRIMARY HYPERTENSION: ICD-10-CM

## 2025-06-03 DIAGNOSIS — I48.11 LONGSTANDING PERSISTENT ATRIAL FIBRILLATION (H): ICD-10-CM

## 2025-06-03 DIAGNOSIS — E78.5 HYPERLIPIDEMIA LDL GOAL <70: ICD-10-CM

## 2025-06-03 DIAGNOSIS — I25.10 CORONARY ARTERY DISEASE INVOLVING NATIVE CORONARY ARTERY OF NATIVE HEART WITHOUT ANGINA PECTORIS: ICD-10-CM

## 2025-06-03 DIAGNOSIS — Z95.0 CARDIAC PACEMAKER IN SITU: ICD-10-CM

## 2025-06-03 DIAGNOSIS — I42.0 DILATED CARDIOMYOPATHY (H): Primary | ICD-10-CM

## 2025-06-03 PROCEDURE — 3075F SYST BP GE 130 - 139MM HG: CPT | Performed by: INTERNAL MEDICINE

## 2025-06-03 PROCEDURE — 99214 OFFICE O/P EST MOD 30 MIN: CPT | Performed by: INTERNAL MEDICINE

## 2025-06-03 PROCEDURE — 3078F DIAST BP <80 MM HG: CPT | Performed by: INTERNAL MEDICINE

## 2025-06-03 PROCEDURE — G2211 COMPLEX E/M VISIT ADD ON: HCPCS | Performed by: INTERNAL MEDICINE

## 2025-06-03 NOTE — PATIENT INSTRUCTIONS
Your heart function has been weak, but at the check in 1/2025 it was much weaker than before.  I think this was due to the atrial fibrillation causing your heart rate to run too fast.  Now that we have adjusted the medications for the atrial fibrillation and the heart rate is better, let's recheck an echocardiogram and see if the heart function has improved back to where it was before.

## 2025-06-03 NOTE — LETTER
6/3/2025    Jacquelin Francis MD  [address]  [facility] [address]    RE: Skip Newman       Dear Colleague,     I had the pleasure of seeing Skip Newman in the [facility].    HEART CARE FOLLOW UP NOTE      [facility]  [phone]      Assessment/Recommendations   Assessment: 89 year old male with cardiomyopathy, CAD, atrial fibrillation      Plan:  Cardiomyopathy  Likely mixed ischemic/nonischemic.  EF 50-55% until 2024 (35-40% with negative stress at that time), most recent 25-30%.  Etiology for decline not clear, but likely tachycardia from persistent atrial fibrillation based on most recent pacemaker check vs ADILENE vs other.  Well compensated with no acute CHF.  In ICD range.        -As below for atrial fibrillation increased Toprol to 100mg BID      -Continue Spironolactone 25mg, Hydralazine 10mg TID, NTG patch      -No ACE/ARB due to renal insufficiency        -Not on diuretic other than hydrochlorothiazide, weight 166, was 166      -Plan to recheck echocardiogram after Rx of atrial fibrillation, if EF remains low can discuss ICD, he says he would consider it       Atrial fibrillation, persistent  Was suboptimally rate controlled on pacemaker checks, improved now with escalating beta blocker dose.         -Continue Toprol 100mg BID and warfarin       -Message sent to EP RE AVN RFA but was felt to be high risk due to age of pacemaker and risk for subclavian stenosis       -pacemaker check to evaluate HR control      CAD  Remote PCI and then medical Rx for CTOs and poor targets in 2008.  Nuc 2024 with infarct but no ischemia, no symptoms, but EF has dropped further       -No ASA due to anticoagulation       -Continue Toprol 100mg BID, Lipitor 40mg     Sick sinus  Doing well s/p pacemaker 2009 and subsequent gen change 2015, pacing 7% so unlikely to be cause of decline in EF       -Routine pacemaker checks     HTN  Well controlled      -Continue Toprol 100mg  "BID, Spironolactone 25mg, Hydralazine 10mg TID, NTG patch      -No ACE/ARB due to renal insufficiency       Hyperlipidemia   LDL = 31      -Continue Lipitor 40mg     The longitudinal plan of care for the diagnosis(es)/condition(s) as documented were addressed during this visit. Due to the added complexity in care, I will continue to support Bill in the subsequent management and with ongoing continuity of care.          History of Present Illness/Subjective    Indication for visit:  Skip Newman returns for follow up of atrial fibrillation, cardiomyopathy and was last seen on 4/30/2025 by myself.      HPI: Skip Newman is a 89 year old male with a history of medically managed CAD, mild cardiomyopathy, persistent atrial fibrillation, HTN, hyperlipidemia, VT who saw Dr Will 1/2025 for further decline in EF.  A sleep evaluation was advised, nitrates and Hydralazine added (renal insufficiency).  I saw him in follow up he denied symptoms, but pacemaker check showed significant burden of atrial fibrillation with poorly controlled HR.  I advised increase Toprol and follow up in 1 month.    He returns for that planned follow up and reports he feels the same, no palpitations, chest pain, dyspnea, orthopnea, or PND.    I reviewed notes from PCP prior to this visit.         Physical Examination  Past Cardiac History   Vitals: /60 (BP Location: Left arm, Patient Position: Sitting, Cuff Size: Adult Regular)   Pulse 71   Resp 12   Ht 1.702 m (5' 7\")   Wt 75.3 kg (166 lb)   BMI 26.00 kg/m    BMI= Body mass index is 26 kg/m .  Wt Readings from Last 3 Encounters:   06/03/25 75.3 kg (166 lb)   04/30/25 75.3 kg (166 lb)   02/21/25 74.8 kg (165 lb)       General Appearance:   no distress, normal body habitus   ENT/Mouth: membranes moist, no oral lesions or bleeding gums.      EYES:  no scleral icterus, normal conjunctivae   Neck: no carotid bruits or thyromegaly   Chest/Lungs:   lungs are clear to auscultation, no " rales or wheezing,  sternal scar, equal chest wall expansion    Cardiovascular:   Irregular. Normal first and second heart sounds with no murmurs, rubs, or gallops; the carotid, radial and posterior tibial pulses are intact, Jugular venous pressure normal, no edema bilaterally    Abdomen:  no organomegaly, masses, bruits, or tenderness; bowel sounds are present   Extremities: no cyanosis or clubbing   Skin: no xanthelasma, warm.    Neurologic: normal  bilateral, no tremors           CAD: s/p PCI to Cx 2000, medically managed due to poor targets for PCI or CABG (2008)  Atrial fibrillation, persistent   Cardiomyopathy, mixed: dry weight 166   Sick sinus s/p pacemaker 2009  Mild AR   NSVT     Most Recent Echocardiogram: 1/3/2025  1. Technically difficult study.  2. The left ventricle is normal in size. Left ventricular systolic performance  is moderate to severely reduced. The ejection fraction is estimated to be 25-30%.  3. There is moderate to severe global reduction in left ventricular systolic  performance.  4. There is mild aortic insufficiency.  5. Mild right ventricular enlargement with mildly reduced right ventricular  systolic performance.  6. There is moderate left atrial enlargement.     When compared to the prior real-time echocardiogram dated 25 April 2024, the  ejection fraction appears slightly more reduced on the current study.     Most Recent Stress Test: 6/13/2024     The regadenoson nuclear stress test is abnormal. There is a medium-sized area of nontransmural infarction in the inferior and inferolateral segment(s) of the left ventricle associated with a medium-sized area of mild travis-infarct ischemia. This appears to be in the left circumflex and/or right coronary artery distribution.     The left ventricular ejection fraction at stress is reduced at 38% with inferior and inferolateral wall hypokinesis.     The patient is at an intermediate risk of future cardiac ischemic events.     A prior  study was conducted on 6/11/2021. Compared to the prior study, similar perfusion abnormalities are seen but there has been a decline in left ventricular systolic function.     Most Recent Angiogram: 8/27/2008  LEFT VENTRICULAR FUNCTION     Left ventricular function is normal with EF of 50% BY VISUAL ESTIMATE.   DIAGNOSTIC - CORONARY     The RCA is severely diseased.     The circumflex artery has moderate disease.     Chronic total occlusion (with bridging collaterals) of the proximal 1st Obtuse Marginal     Baseline FFR measured in circumflex was 1     Post adenosine FFR measured in circumflex was 1   RECOMMENDATIONS & PLAN     Medical Rx     Medical Rx - Lesion not suitable for intervention     Optimize risk factors and medications:  HDL > 45 ;  LDL < 70 ;  Triglycerides < 100     Comment:   INTERVENTION     Unsuccessful angioplasty due to inability to cross lesion with balloon.      ECG (reviewed by myself): 12/21/2024 atrial fibrillation 99, PVCs, old inferior MI                 Medical History  Family History Social History   Past Medical History:   Diagnosis Date     A-fib (H)      Anemia      Arrhythmia      Arthritis      Chronic kidney disease      Claudication      Coronary artery disease      Gout      Hearing loss     high frequency     History of transfusion      Hyperlipidemia      Hypertension      Impotence of organic origin      Kidney stone     recurrent     MI (myocardial infarction) (H)      Primary osteoarthritis of both knees      Sick sinus syndrome -- S/P Medtronic PPM      Skin cancer      Spinal stenosis, lumbar region, with neurogenic claudication      Syncope      Thrombocytopenia      VT (ventricular tachycardia) (H)      Family History   Problem Relation Age of Onset     Heart Failure Mother      Heart Failure Father         Social History     Socioeconomic History     Marital status:      Spouse name: Not on file     Number of children: Not on file     Years of education: Not on  file     Highest education level: Not on file   Occupational History     Not on file   Tobacco Use     Smoking status: Never     Smokeless tobacco: Never   Substance and Sexual Activity     Alcohol use: No     Drug use: Not Currently     Sexual activity: Not on file   Other Topics Concern     Not on file   Social History Narrative     Not on file     Social Drivers of Health     Financial Resource Strain: Low Risk  (8/18/2023)    Received from [facility]    Financial Resource Strain      Difficulty of Paying Living Expenses: 3      Difficulty of Paying Living Expenses: Not on file   Food Insecurity: Food Insecurity Present (8/18/2023)    Received from [facility]    Food Insecurity      Worried About Running Out of Food in the Last Year: 2   Transportation Needs: No Transportation Needs (8/18/2023)    Received from [facility]    Transportation Needs      Lack of Transportation (Medical): 1   Physical Activity: Not on file   Stress: Not on file   Social Connections: Unknown (8/18/2024)    Received from [facility]    Social Connections      Frequency of Communication with Friends and Family: Not on file   Interpersonal Safety: Not on file   Housing Stability: High Risk (8/18/2023)    Received from [facility]    Housing Stability      Unable to Pay for Housing in the Last Year: 3           Medications  Allergies   Current Outpatient Medications   Medication Sig Dispense Refill     acetaminophen (TYLENOL) 500 MG tablet Take 500 mg by mouth every 6 hours as needed for mild pain.       atorvastatin (LIPITOR) 40 MG tablet Take 20 mg by mouth every evening       chlorthalidone (HYGROTEN) 25 MG tablet Take 25 mg by mouth every evening       hydrALAZINE (APRESOLINE) 10 MG tablet Take 1 tablet (10 mg) by mouth 3 times daily. 270 tablet 3     metoprolol  "succinate ER (TOPROL XL) 100 MG 24 hr tablet Take 1.5 tablets (150 mg) by mouth every evening (Patient taking differently: Take 200 mg by mouth daily.) 135 tablet 3     MULTIVITAMIN (MULTIPLE VITAMIN ORAL) Take 1 tablet by mouth every evening       nitroGLYcerin (NITRODUR) 0.2 MG/HR 24 hr patch Place 1 patch over 12 hours onto the skin daily. Remove at bedtime 90 patch 3     omega 3-dha-epa-fish oil (FISH OIL) 1,000 mg (120 mg-180 mg) cap Take 1 capsule by mouth every evening       spironolactone (ALDACTONE) 25 MG tablet Take 1 tablet by mouth daily at 2 pm.       warfarin ANTICOAGULANT (COUMADIN) 5 MG tablet Take 2.5 mg by mouth every evening (Patient taking differently: Take 2.5 mg by mouth every evening. Except Friday 5 mg)         Allergies   Allergen Reactions     Lisinopril Cough     Indomethacin Rash     Naproxen Rash          Lab Results    Chemistry/lipid CBC Cardiac Enzymes/BNP/TSH/INR   Recent Labs   Lab Test 01/02/25 1914   CHOL 89   HDL 28*   LDL 31   TRIG 152*     Recent Labs   Lab Test 01/02/25 1914   LDL 31     Recent Labs   Lab Test 01/04/25  0703      POTASSIUM 4.6   CHLORIDE 107   CO2 22   GLC 93   BUN 41.2*   CR 1.38*   GFRESTIMATED 49*   JUAN 8.5*     Recent Labs   Lab Test 01/04/25  0703 01/02/25 1914 12/21/24  1200   CR 1.38* 1.68* 1.63*     Recent Labs   Lab Test 01/02/25 1914   A1C 6.3*          Recent Labs   Lab Test 01/04/25  0703   WBC 9.5   HGB 12.2*   HCT 35.5*   MCV 92   *     Recent Labs   Lab Test 01/04/25  0703 01/02/25 1914 12/21/24  1200   HGB 12.2* 13.3 13.3    No results for input(s): \"TROPONINI\" in the last 87477 hours.  Recent Labs   Lab Test 03/13/24  1903   NTBNPI 13,725*     No results for input(s): \"TSH\" in the last 19848 hours.  Recent Labs   Lab Test 01/04/25  0703 01/03/25  0720 01/02/25  2257   INR 3.01* 3.12* 3.32*        Zoraida Joshua MD  Noninvasive Cardiologist   [facility]                                      Thank you for " allowing me to participate in the care of your patient.      Sincerely,     Zoraida Joshua MD     [facility]  cc:   Zoraida Joshua MD  [address] [place] [address]

## 2025-06-04 PROBLEM — N18.30 CKD (CHRONIC KIDNEY DISEASE) STAGE 3, GFR 30-59 ML/MIN (H): Chronic | Status: ACTIVE | Noted: 2024-02-15

## 2025-06-04 PROBLEM — M62.82 NON-TRAUMATIC RHABDOMYOLYSIS: Status: RESOLVED | Noted: 2024-02-15 | Resolved: 2025-06-04

## 2025-06-04 PROBLEM — Z79.01 ANTICOAGULATION MONITORING, INR RANGE 2-3: Status: ACTIVE | Noted: 2020-06-03

## 2025-06-04 PROBLEM — I35.1 AORTIC REGURGITATION: Chronic | Status: ACTIVE | Noted: 2024-02-16

## 2025-06-04 PROBLEM — N20.0 RECURRENT KIDNEY STONES: Status: ACTIVE | Noted: 2019-07-08

## 2025-06-04 PROBLEM — N20.0 RECURRENT KIDNEY STONES: Chronic | Status: ACTIVE | Noted: 2019-07-08

## 2025-06-04 PROBLEM — I50.22 CHRONIC SYSTOLIC CONGESTIVE HEART FAILURE (H): Chronic | Status: ACTIVE | Noted: 2024-02-15
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
## 2025-06-04 PROBLEM — I63.9 CVA (CEREBRAL VASCULAR ACCIDENT) (H): Status: RESOLVED | Noted: 2020-07-17 | Resolved: 2025-06-04

## 2025-06-04 PROBLEM — R79.89 ELEVATED TROPONIN: Status: RESOLVED | Noted: 2024-02-15 | Resolved: 2025-06-04

## 2025-06-04 PROBLEM — I63.9 CVA (CEREBRAL VASCULAR ACCIDENT) (H): Status: ACTIVE | Noted: 2020-07-17

## 2025-06-04 PROBLEM — I65.01 OCCLUSION OF RIGHT VERTEBRAL ARTERY: Chronic | Status: ACTIVE | Noted: 2025-01-02

## 2025-06-04 PROBLEM — I48.91 ATRIAL FIBRILLATION, UNSPECIFIED TYPE (H): Chronic | Status: ACTIVE | Noted: 2024-02-15

## 2025-06-04 PROBLEM — Z79.01 ANTICOAGULATION MONITORING, INR RANGE 2-3: Chronic | Status: ACTIVE | Noted: 2020-06-03

## 2025-06-04 PROBLEM — R79.1 SUBTHERAPEUTIC INTERNATIONAL NORMALIZED RATIO (INR): Status: RESOLVED | Noted: 2024-02-15 | Resolved: 2025-06-04

## 2025-06-04 PROBLEM — I27.20 PULMONARY HYPERTENSION (H): Chronic | Status: ACTIVE | Noted: 2024-02-16

## 2025-06-04 PROBLEM — R79.89 ELEVATED BRAIN NATRIURETIC PEPTIDE (BNP) LEVEL: Status: RESOLVED | Noted: 2024-03-13 | Resolved: 2025-06-04

## 2025-06-04 PROBLEM — Z86.73 HISTORY OF STROKE: Chronic | Status: ACTIVE | Noted: 2024-02-15

## 2025-06-05 ENCOUNTER — OFFICE VISIT (OUTPATIENT)
Dept: SLEEP MEDICINE | Facility: CLINIC | Age: OVER 89
End: 2025-06-05
Attending: INTERNAL MEDICINE
Payer: MEDICARE

## 2025-06-05 VITALS
HEART RATE: 62 BPM | WEIGHT: 166 LBS | DIASTOLIC BLOOD PRESSURE: 68 MMHG | HEIGHT: 67 IN | SYSTOLIC BLOOD PRESSURE: 120 MMHG | OXYGEN SATURATION: 97 % | BODY MASS INDEX: 26.06 KG/M2

## 2025-06-05 DIAGNOSIS — I48.91 ATRIAL FIBRILLATION, UNSPECIFIED TYPE (H): Chronic | ICD-10-CM

## 2025-06-05 DIAGNOSIS — I10 ESSENTIAL HYPERTENSION: Chronic | ICD-10-CM

## 2025-06-05 DIAGNOSIS — I25.10 ATHEROSCLEROSIS OF NATIVE CORONARY ARTERY OF NATIVE HEART WITHOUT ANGINA PECTORIS: Chronic | ICD-10-CM

## 2025-06-05 DIAGNOSIS — I50.22 CHRONIC SYSTOLIC CONGESTIVE HEART FAILURE (H): Chronic | ICD-10-CM

## 2025-06-05 DIAGNOSIS — Z86.73 HISTORY OF STROKE: Chronic | ICD-10-CM

## 2025-06-05 DIAGNOSIS — R40.0 DAYTIME SLEEPINESS: Primary | ICD-10-CM

## 2025-06-05 ASSESSMENT — SLEEP AND FATIGUE QUESTIONNAIRES
HOW LIKELY ARE YOU TO NOD OFF OR FALL ASLEEP WHILE SITTING AND TALKING TO SOMEONE: WOULD NEVER DOZE
HOW LIKELY ARE YOU TO NOD OFF OR FALL ASLEEP WHILE SITTING INACTIVE IN A PUBLIC PLACE: WOULD NEVER DOZE
HOW LIKELY ARE YOU TO NOD OFF OR FALL ASLEEP WHILE WATCHING TV: SLIGHT CHANCE OF DOZING
HOW LIKELY ARE YOU TO NOD OFF OR FALL ASLEEP IN A CAR, WHILE STOPPED FOR A FEW MINUTES IN TRAFFIC: WOULD NEVER DOZE
HOW LIKELY ARE YOU TO NOD OFF OR FALL ASLEEP WHILE SITTING QUIETLY AFTER LUNCH WITHOUT ALCOHOL: SLIGHT CHANCE OF DOZING
HOW LIKELY ARE YOU TO NOD OFF OR FALL ASLEEP WHILE SITTING AND READING: HIGH CHANCE OF DOZING
HOW LIKELY ARE YOU TO NOD OFF OR FALL ASLEEP WHEN YOU ARE A PASSENGER IN A CAR FOR AN HOUR WITHOUT A BREAK: WOULD NEVER DOZE
HOW LIKELY ARE YOU TO NOD OFF OR FALL ASLEEP WHILE LYING DOWN TO REST IN THE AFTERNOON WHEN CIRCUMSTANCES PERMIT: HIGH CHANCE OF DOZING

## 2025-06-05 NOTE — PROGRESS NOTES
Outpatient Sleep Medicine Consultation:      Name: Skip Newman MRN# [id number]   Age: 89 year old YOB: 1934     Date of Consultation: June 5, 2025  Consultation is requested by: Roc Will MD  [address]  [place] [address] Roc Will  Primary care provider: Jacquelin Francis       Reason for Sleep Consult:     Skip Newman is sent by Roc Will for a sleep consultation regarding 'excessive daytime sleepiness'.    Patient s Reason for visit  Skip Newman main reason for visit: (Proxy-Rptd) Cardiologist was wondering if getting enough oxygen while sleeping  Patient states problem(s) started: (Proxy-Rptd) Last year or two  Skip Newman's goals for this visit: (Proxy-Rptd) Conversation and education, discuss ideas/options/ thoughts           Assessment and Plan:     Daytime dozing, napping, possible long sleep times (ESS 8)  Patient is ambivalent about daytime sleepiness and does not feel he has a sleep problem.   Comorbid coronary artery disease, congestive heart failure, atrial fibrillation, history of cerebrovascular accident   Options discussed including no further evaluation. Initially elect no further testing at this time then changed mind  - Split-night polysomnogram (using 4% desaturation/Medicare/ AASM 1B scoring rules) for possible obstructive sleep apnea, possible Cheyne-Narayan respirations. If primary problem is Cheyne-Narayan respirations with hypoxemia would try treatment with O2 first.     Summary Counseling:    Sleep Testing Reviewed      I spent 30 minutes with patient including counseling, and 30 + 5 minutes with chart review, and documentation     CC: Roc Will          History of Present Illness:     Daughter Rashmi helps with the history   Veracity of history is uncertain    SLEEP-WAKE SCHEDULE:     Work/School Days: Patient goes to school/work: (Proxy-Rptd) No   Usually gets into bed at (Proxy-Rptd) 5-6pm  Takes patient about  (Proxy-Rptd) 5-10 min to fall asleep  Has trouble falling asleep (Proxy-Rptd) 0 nights per week  Wakes up in the middle of the night (Proxy-Rptd) 2 times.  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     Weekends/Non-work Days/All Other Days:  Usually gets into bed at (Proxy-Rptd) 730   Takes patient about (Proxy-Rptd) 5 min to fall asleep  Patient is usually up at (Proxy-Rptd) 7:30  Uses alarm: (Proxy-Rptd) No    Sleep Need  Patient gets  (Proxy-Rptd) 8 sleep on average   Patient thinks he needs about (Proxy-Rptd) 8 sleep     Skip Newman prefers to sleep in this position(s): (Proxy-Rptd) Back   Patient states they do the following activities in bed:   none    Naps  Patient takes a purposeful nap (Proxy-Rptd) 2 times a week and naps are usually (Proxy-Rptd) 2 hours in duration  He feels better after a nap: (Proxy-Rptd) Yes  He dozes off unintentionally (Proxy-Rptd) often   Patient has had a driving accident or near-miss due to sleepiness/drowsiness: (Proxy-Rptd) No      SLEEP DISRUPTIONS:    Breathing/Snoring  Patient snores:(Proxy-Rptd) No   Other people complain about his snoring: (Proxy-Rptd) No  Patient has been told he stops breathing in his sleep:(Proxy-Rptd) No  He has issues with the following: (Proxy-Rptd) Morning mouth dryness    Movement:  Patient gets pain, discomfort, with an urge to move:  (Proxy-Rptd) No  It happens when he is resting:  (Proxy-Rptd) No  It happens more at night:  (Proxy-Rptd) No  Patient has been told he kicks his legs at night:  (Proxy-Rptd) Yes     Behaviors in Sleep:  Skip Newman has experienced the following behaviors while sleeping: (Proxy-Rptd) Sleep-talking  He has experienced sudden muscle weakness during the day: (Proxy-Rptd) No      Is there anything else  you would like your sleep provider to know: (Proxy-Rptd) nothing else      CAFFEINE AND OTHER SUBSTANCES:    Patient consumes caffeinated beverages per day:  (Proxy-Rptd) once a day  Last caffeine use is usually: (Proxy-Rptd) supper time   List of any prescribed or over the counter stimulants that patient takes: (Proxy-Rptd) none  List of any prescribed or over the counter sleep medication patient takes: (Proxy-Rptd) none  List of previous sleep medications that patient has tried: (Proxy-Rptd) none  Patient drinks alcohol to help them sleep: (Proxy-Rptd) No  Patient drinks alcohol near bedtime: (Proxy-Rptd) No    Family History:  Patient has a family member been diagnosed with a sleep disorder: (Proxy-Rptd) No            SCALES:    EPWORTH SLEEPINESS SCALE         6/5/2025     2:28 PM    [place] Sleepiness Scale ( MAURA [place]  [id number]<br>ESS - [place]/English - Final version - 21 Nov 07 - [facility] [place].)   Sitting and reading High chance of dozing   Watching TV Slight chance of dozing   Sitting, inactive in a public place (e.g. a theatre or a meeting) Would never doze   As a passenger in a car for an hour without a break Would never doze   Lying down to rest in the afternoon when circumstances permit High chance of dozing   Sitting and talking to someone Would never doze   Sitting quietly after a lunch without alcohol Slight chance of dozing   In a car, while stopped for a few minutes in traffic Would never doze   [place] Score (MC) 8   [place] Score (Sleep) 8        Patient-reported         INSOMNIA SEVERITY INDEX (JUSTINE)          6/4/2025    10:32 PM   Insomnia Severity Index (JUSTINE)   Difficulty falling asleep 0   Difficulty staying asleep 1   Problems waking up too early 2   How SATISFIED/DISSATISFIED are you with your CURRENT sleep pattern? 2   How NOTICEABLE to others do you think your sleep problem is in terms of impairing the quality of your life? 2   How WORRIED/DISTRESSED are you about your current  sleep problem? 1   To what extent do you consider your sleep problem to INTERFERE with your daily functioning (e.g. daytime fatigue, mood, ability to function at work/daily chores, concentration, memory, mood, etc.) CURRENTLY? 2   JUSTINE Total Score 10        Patient-reported       Guidelines for Scoring/Interpretation:  Total score categories:  0-7 = No clinically significant insomnia   8-14 = Subthreshold insomnia   15-21 = Clinical insomnia (moderate severity)  22-28 = Clinical insomnia (severe)  Used via courtesy of www.[employer].va.gov with permission from Roc Tamayo PhD., [facility]        Allergies:    Allergies   Allergen Reactions    Lisinopril Cough    Indomethacin Rash    Naproxen Rash       Medications:    Current Outpatient Medications   Medication Sig Dispense Refill    acetaminophen (TYLENOL) 500 MG tablet Take 500 mg by mouth every 6 hours as needed for mild pain.      atorvastatin (LIPITOR) 40 MG tablet Take 20 mg by mouth every evening      chlorthalidone (HYGROTEN) 25 MG tablet Take 25 mg by mouth every evening      hydrALAZINE (APRESOLINE) 10 MG tablet Take 1 tablet (10 mg) by mouth 3 times daily. 270 tablet 3    metoprolol succinate ER (TOPROL XL) 100 MG 24 hr tablet Take 1.5 tablets (150 mg) by mouth every evening (Patient taking differently: Take 200 mg by mouth daily.) 135 tablet 3    MULTIVITAMIN (MULTIPLE VITAMIN ORAL) Take 1 tablet by mouth every evening      nitroGLYcerin (NITRODUR) 0.2 MG/HR 24 hr patch Place 1 patch over 12 hours onto the skin daily. Remove at bedtime 90 patch 3    omega 3-dha-epa-fish oil (FISH OIL) 1,000 mg (120 mg-180 mg) cap Take 1 capsule by mouth every evening      spironolactone (ALDACTONE) 25 MG tablet Take 1 tablet by mouth daily at 2 pm.      warfarin ANTICOAGULANT (COUMADIN) 5 MG tablet Take 2.5 mg by mouth every evening (Patient taking differently: Take 2.5 mg by mouth every evening. Except Friday 5 mg)         Problem List:  Patient Active Problem  List    Diagnosis Date Noted    History of stroke 02/15/2024     Priority: High     Admitted 7/17/2020 dizziness/vertigo and clumsiness and weakness of right hand. Pt unable to have MRI due to pacemaker so Head CTA showed no acute intracranial abnormality of brain, presumed chronic small vessel ischemic changes, occlusion of dominant right vertebral artery intracranially. He had started on coumadin 1m prior to admission       CAD -- S/P Stents 2001, 2012, 2014      Priority: High     Angiogram 8/08 showed severe disease RCA, Moderate disease Cx, Chronic total occlusion with collaterals OM1 - Unable to do PCI in proximal rt. PDA       Hyperkalemia 01/02/2025     Priority: Medium    Occlusion of right vertebral artery 01/02/2025     Priority: Medium     CTA 1/2025 - Complete occlusion of the right vertebral artery. The distal V2 and V3 segments of the right vertebral artery were patent on the prior study. Stable calcified atherosclerotic plaque left vertebral artery origin with at least moderate stenosis. Irregular calcified atherosclerotic plaque left subclavian artery with approximately 50% stenosis at the origin.  Carotid ultrasound 1/2025 - On the right there is a moderate amount of atheromatous plaque.  Peak systolic velocities in the ICA are 86 cm/s which correspond to the less than 50% stenosis range based on NASCET criteria. On the left there is a moderate amount of atheromatous plaque.  Peak systolic velocities in the ICA are 115 cm/s which correspond to the less than 50% stenosis range based on NASCET criteria. Antegrade flow within the left vertebral artery. Right vertebral artery is not visualized and cannot be evaluated.         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 EF 50-55% until 2024 (35-40% with negative stress at that time)       Anticoagulation monitoring, INR range 2-3 06/03/2020     Priority: Medium    Thrombocytopenia      Priority: Medium     Since at least 2018      Hypertension      Priority: Medium    Hypertension      Priority: Medium    SSS -- S/P dual chamber Pacemaker 03/07/2017     Priority: Medium      s/p pacemaker 2009 and subsequent gen change 2015         Hyperlipidemia      Priority: Medium    Nonsustained ventricular tachycardia (H) 01/19/2016     Priority: Medium     Noted on pacer checks, some of which may have been supraventricular in origin but misrepresented.       Hearing loss      Priority: Low     high frequency      Claudication      Priority: Low    Intermittent vertigo 01/02/2025     Priority: Low    Aortic regurgitation -- 2+ on Echo 12/21/23 02/16/2024     Priority: Low     Echo 1/2025 - mild aortic insufficiency.          Pulmonary hypertension (H) 02/16/2024     Priority: Low     Echo 12/21/23 PAP 36 + RAP. Moderate Pulm HTN   No pulmonary hypertension noted on echo 4/2024, 1/2025       Chronic Left rotator cuff tear 02/16/2024     Priority: Low    Primary osteoarthritis of right knee 01/13/2020     Priority: Low    Spinal stenosis, lumbar region, with neurogenic claudication      Priority: Low    Primary osteoarthritis of left knee 07/08/2019     Priority: Low    Recurrent kidney stones 07/08/2019     Priority: Low     last stone 1993        Status post total left knee replacement 07/08/2019     Priority: Low     7/08/19 @ [facility] ; Dr Bowen Padgett        History of skin cancer 05/05/2016     Priority: Low     4/19/16 Atypical squamous proliferation (HAK vs KA/SCC) s/p ED&C 5/5/16 09/21/17, right neck, invasive SCC, excised 10/12/17            Gout 03/26/2008     Priority: Low     BILATERAL ANKLES 3/08- RX PREDNISONE  On chronic allopurinol (2019)            Past Medical/Surgical History:  Past Medical History:    Diagnosis Date    Arthritis     Atrial fibrillation     Cardiomyopathy (H)     Chronic kidney disease     Coronary artery disease     CVA (cerebral vascular accident) (H) 07/17/2020    Elevated brain natriuretic peptide (BNP) level 03/13/2024    Elevated troponin 02/15/2024    Fall-- found down, > 12 hrs 02/15/2024    Gout     History of transfusion     Hyperlipidemia     Hypertension     Impotence of organic origin     Kidney stones     Non-traumatic rhabdomyolysis 02/15/2024    Sick sinus syndrome -- S/P Medtronic PPM     Spinal stenosis, lumbar region, with neurogenic claudication     Syncope 08/25/2008    Thrombocytopenia      Past Surgical History:   Procedure Laterality Date    CORONARY STENT PLACEMENT  01/01/2001    LCx    HEMORRHOID SURGERY  01/01/1989    INSERT / REPLACE / REMOVE PACEMAKER  01/01/2009    Medtronic    OTHER SURGICAL HISTORY  11/01/2018    l3-l4 lumbar stenosis    TONSILLECTOMY  1940    child    TOTAL HIP ARTHROPLASTY Left     TOTAL KNEE ARTHROPLASTY Left 07/08/2019    Procedure: LEFT TOTAL KNEE ARTHROPLASTY;  Surgeon: Bowen Padgett MD;  [facility]: [facility];  Service: Orthopedics    TOTAL KNEE ARTHROPLASTY Right 01/13/2020    Procedure: RIGHT TOTAL KNEE ARTHROPLASTY;  Surgeon: Bowen Padgett MD;  Location: [facility] OR;  Service: Orthopedics       Social History:  Social History     Socioeconomic History    Marital status:      Spouse name: Not on file    Number of children: Not on file    Years of education: Not on file    Highest education level: Not on file   Occupational History    Not on file   Tobacco Use    Smoking status: Never    Smokeless tobacco: Never   Substance and Sexual Activity    Alcohol use: No    Drug use: Not Currently    Sexual activity: Not on file   Other Topics Concern    Not on file   Social History Narrative    Not on file     Social Drivers of Health     Financial Resource Strain: Low Risk  (8/18/2023)    Received from [facility]    Financial Resource Strain     Difficulty of Paying Living Expenses: 3     Difficulty of Paying Living Expenses: Not on file   Food Insecurity: Food Insecurity Present (8/18/2023)    Received from [facility]    Food Insecurity     Worried About Running Out of Food in the Last Year: 2   Transportation Needs: No Transportation Needs (8/18/2023)    Received from [facility]    Transportation Needs     Lack of Transportation (Medical): 1   Physical Activity: Not on file   Stress: Not on file   Social Connections: Unknown (8/18/2024)    Received from [facility]    Social Connections     Frequency of Communication with Friends and Family: Not on file   Interpersonal Safety: Not on file   Housing Stability: High Risk (8/18/2023)    Received from [facility]    Housing Stability     Unable to Pay for Housing in the Last Year: 3       Family History:  Family History   Problem Relation Age of Onset    Heart Failure Mother     Heart Failure Father          Physical Examination:  Vitals: There were no vitals taken for this visit.  BMI= There is no height or weight on file to calculate BMI.         SpO2 Readings from Last 4 Encounters:   01/04/25 96%   12/21/24 96%   03/14/24 97%   02/16/24 92%       GENERAL APPEARANCE: alert and no distress, forgetiul  EYES: Eyes grossly normal to inspection  HENT: mouth without ulcers or lesions  NECK: not overly generous size  LUNGS: no shortness of breath , cough  NEURO: mentation intact, speech normal and cranial nerves 2-12 appear intact  PSYCH: affect normal/bright           Data: All pertinent previous laboratory data reviewed     Recent Labs   Lab Test 01/04/25  0703 01/03/25  1841 01/03/25  0300 01/02/25  1914     --   --  134*   POTASSIUM 4.6  --   --  5.6*   CHLORIDE 107  --   --  102   CO2 22  --   --  20*    ANIONGAP 10  --   --  12   GLC 93 125*   < > 109*   BUN 41.2*  --   --  67.5*   CR 1.38*  --   --  1.68*   JUAN 8.5*  --   --  9.2    < > = values in this interval not displayed.       Recent Labs   Lab Test 01/04/25  0703   WBC 9.5   RBC 3.84*   HGB 12.2*   HCT 35.5*   MCV 92   MCH 31.8   MCHC 34.4   RDW 14.6   *       Recent Labs   Lab Test 03/13/24  1903   PROTTOTAL 7.3   ALBUMIN 4.1   BILITOTAL 0.6   ALKPHOS 87   AST 36   ALT 28         Chest CT:   CT Chest Abdomen Pelvis w/o Contrast 12/21/2024    Narrative  EXAM: CT CHEST ABDOMEN PELVIS W/O CONTRAST  LOCATION: [facility]  DATE: 12/21/2024    INDICATION: fall dementia on coumadin  COMPARISON: 2/20/2021  TECHNIQUE: CT scan of the chest, abdomen, and pelvis was performed without IV contrast. Multiplanar reformats were obtained. Dose reduction techniques were used.  CONTRAST: None.    FINDINGS:  LUNGS AND PLEURA: Evaluation limited by respiratory motion. No large nodules or airspace opacities. No pleural effusion or pneumothorax.    MEDIASTINUM/AXILLAE: Cardiomegaly and left chest pacemaker.    CORONARY ARTERY CALCIFICATION: Severe.    HEPATOBILIARY: Normal.    PANCREAS: Normal.    SPLEEN: Normal.    ADRENAL GLANDS: Normal.    KIDNEYS/BLADDER: No significant mass, stones, or hydronephrosis. There are simple or benign cysts. No follow up is needed. Subcentimeter nonobstructing intrarenal calculi bilaterally.    BOWEL: Diverticulosis of the colon. No acute inflammatory change. No obstruction. Periampullary duodenal diverticulum.    LYMPH NODES: Normal.    VASCULATURE: No abdominal aortic aneurysm. Moderate atherosclerotic calcification of the aorta, iliofemoral arteries, and visceral branches.    PELVIC ORGANS: Normal.    MUSCULOSKELETAL: Osteopenia with degenerative changes in the spine and shoulders. Left hip arthroplasty. No acute fracture.    Impression  IMPRESSION:  No acute traumatic finding in the chest, abdomen or  pelvis.        Serge Crandall MD 6/5/2025

## 2025-06-05 NOTE — NURSING NOTE
"Chief Complaint   Patient presents with    Sleep Problem     Patient is present today by referral from Cardiologist. Cardiologist is worried about low oxygen levels at night.        Initial /68   Pulse 62   Ht 1.702 m (5' 7.01\")   Wt 75.3 kg (166 lb)   SpO2 97%   BMI 25.99 kg/m   Estimated body mass index is 25.99 kg/m  as calculated from the following:    Height as of this encounter: 1.702 m (5' 7.01\").    Weight as of this encounter: 75.3 kg (166 lb).    Medication Reconciliation: complete    Neck circumference: 16.14 inches / 41 centimeters.    DME: n/a    Jeannine Garcia CMA on 6/5/2025 at 2:36 PM      "

## 2025-06-06 ENCOUNTER — TELEPHONE (OUTPATIENT)
Dept: CARDIOLOGY | Facility: CLINIC | Age: OVER 89
End: 2025-06-06

## 2025-06-06 NOTE — TELEPHONE ENCOUNTER
----- Message from Yaquelin Emerson sent at 6/6/2025 10:30 AM CDT -----  Regarding: Device RN Review  Encounter Type: Routine remote pacemaker transmission   Device: Medtronic Adapta   Pacing % /Programmed:  6% @ VVIR 60 bpm   Lead(s): Stable measurements and trends.   Battery longevity: 4 years, 11 months estimated   Presenting: , VS 61 bpm  Atrial high rates: N/A Hx AFib  Anticoagulant: Coumadin   Ventricular High rates: Since 3/6/2025 7 VHR episodes, EGMs mostly marker channel, appear to be mostly RVR and possible NSVT 16-20 beats, duration ~ 5 seconds, avg rate ~219 bpm (episodes on 5/8/25)  Comments: Normal device function.   Plan: Routed to device RN for review. Patient is scheduled for annual device check on 7/8/25 at 11:15AM at our [place] location. Letter sent. Yelitza, Device Specialist    Transmission reviewed. Agree with findings. Out of the 6 EGMs reviewed, 2 were suggestive of of 16-20 bts of NSVT. All others were suggestive of AF with RVR. Patient does have a history of NSVT. EF: 25-30% on 1/3/25 by echo.   Left message for patient to call back to discuss.     This remote was completed at the request of Dr. Joshua for further rate evaluation after medication adjustment. The histogram however is skewed as these older pacemakers require an in-clinic check to clear counters. Would suggest in-clinic visit to clear counters prior to a remote check. Routed to Dr. Joshua.     Payton Serra, RN  Device Nurse

## 2025-06-09 DIAGNOSIS — I42.0 DILATED CARDIOMYOPATHY (H): ICD-10-CM

## 2025-06-09 DIAGNOSIS — Z95.0 PACEMAKER: ICD-10-CM

## 2025-06-09 DIAGNOSIS — I48.11 LONGSTANDING PERSISTENT ATRIAL FIBRILLATION (H): Primary | ICD-10-CM

## 2025-06-09 DIAGNOSIS — I48.0 PAROXYSMAL ATRIAL FIBRILLATION (H): ICD-10-CM

## 2025-06-27 ENCOUNTER — HOSPITAL ENCOUNTER (OUTPATIENT)
Dept: CARDIOLOGY | Facility: CLINIC | Age: OVER 89
Discharge: HOME | End: 2025-06-27
Attending: INTERNAL MEDICINE | Admitting: INTERNAL MEDICINE
Payer: MEDICARE

## 2025-06-27 DIAGNOSIS — I42.0 DILATED CARDIOMYOPATHY (H): ICD-10-CM

## 2025-06-27 PROCEDURE — 93306 TTE W/DOPPLER COMPLETE: CPT

## 2025-06-27 PROCEDURE — 93306 TTE W/DOPPLER COMPLETE: CPT | Mod: 26 | Performed by: INTERNAL MEDICINE

## 2025-07-08 DIAGNOSIS — I48.0 PAROXYSMAL ATRIAL FIBRILLATION (H): Primary | ICD-10-CM

## 2025-07-08 RX ORDER — METOPROLOL SUCCINATE 100 MG/1
100 TABLET, EXTENDED RELEASE ORAL 2 TIMES DAILY
Qty: 180 TABLET | Refills: 3 | Status: ON HOLD | OUTPATIENT
Start: 2025-07-08

## 2025-07-09 ENCOUNTER — ANCILLARY PROCEDURE (OUTPATIENT)
Dept: CARDIOLOGY | Facility: CLINIC | Age: OVER 89
End: 2025-07-09
Attending: INTERNAL MEDICINE
Payer: MEDICARE

## 2025-07-09 ENCOUNTER — HOSPITAL ENCOUNTER (INPATIENT)
Facility: HOSPITAL | Age: OVER 89
End: 2025-07-09
Attending: EMERGENCY MEDICINE | Admitting: EMERGENCY MEDICINE
Payer: MEDICARE

## 2025-07-09 DIAGNOSIS — R56.9 POSTICTAL STATE (H): ICD-10-CM

## 2025-07-09 DIAGNOSIS — Z95.0 PACEMAKER: ICD-10-CM

## 2025-07-09 DIAGNOSIS — I48.0 PAROXYSMAL ATRIAL FIBRILLATION (H): ICD-10-CM

## 2025-07-09 DIAGNOSIS — I49.5 SICK SINUS SYNDROME (H): ICD-10-CM

## 2025-07-09 DIAGNOSIS — R56.9 SEIZURE-LIKE ACTIVITY (H): ICD-10-CM

## 2025-07-09 PROBLEM — E87.29 INCREASED ANION GAP METABOLIC ACIDOSIS: Status: ACTIVE | Noted: 2025-07-09

## 2025-07-09 PROBLEM — E87.1 HYPONATREMIA: Status: ACTIVE | Noted: 2025-01-01

## 2025-07-09 PROBLEM — E87.5 HYPERKALEMIA: Status: RESOLVED | Noted: 2025-01-01 | Resolved: 2025-01-01

## 2025-07-09 PROBLEM — E87.20 LACTIC ACIDOSIS: Status: ACTIVE | Noted: 2025-07-09

## 2025-07-09 PROBLEM — I48.20 CHRONIC ATRIAL FIBRILLATION (H): Chronic | Status: ACTIVE | Noted: 2024-02-15

## 2025-07-09 PROBLEM — Z79.01 ANTICOAGULATED ON WARFARIN: Chronic | Status: ACTIVE | Noted: 2025-07-09

## 2025-07-09 PROBLEM — R42 INTERMITTENT VERTIGO: Status: RESOLVED | Noted: 2025-01-02 | Resolved: 2025-07-09

## 2025-07-09 PROBLEM — N18.31 STAGE 3A CHRONIC KIDNEY DISEASE (H): Chronic | Status: ACTIVE | Noted: 2024-02-15

## 2025-07-09 LAB
ANION GAP SERPL CALCULATED.3IONS-SCNC: 16 MMOL/L (ref 7–15)
BUN SERPL-MCNC: 53.4 MG/DL (ref 8–23)
CALCIUM SERPL-MCNC: 9.6 MG/DL (ref 8.8–10.4)
CHLORIDE SERPL-SCNC: 100 MMOL/L (ref 98–107)
CREAT SERPL-MCNC: 1.73 MG/DL (ref 0.67–1.17)
EGFRCR SERPLBLD CKD-EPI 2021: 37 ML/MIN/1.73M2
ERYTHROCYTE [DISTWIDTH] IN BLOOD BY AUTOMATED COUNT: 14.9 % (ref 10–15)
GLUCOSE SERPL-MCNC: 135 MG/DL (ref 70–99)
HCO3 SERPL-SCNC: 17 MMOL/L (ref 22–29)
HCT VFR BLD AUTO: 38.8 % (ref 40–53)
HGB BLD-MCNC: 13.1 G/DL (ref 13.3–17.7)
INR PPP: 2.52 (ref 0.85–1.15)
LACTATE SERPL-SCNC: 1.1 MMOL/L (ref 0.7–2)
LACTATE SERPL-SCNC: 5.7 MMOL/L (ref 0.7–2)
MCH RBC QN AUTO: 31.9 PG (ref 26.5–33)
MCHC RBC AUTO-ENTMCNC: 33.8 G/DL (ref 31.5–36.5)
MCV RBC AUTO: 94 FL (ref 78–100)
PLATELET # BLD AUTO: 134 10E3/UL (ref 150–450)
POTASSIUM SERPL-SCNC: 5.2 MMOL/L (ref 3.4–5.3)
PROTHROMBIN TIME: 27 SECONDS (ref 11.8–14.8)
RBC # BLD AUTO: 4.11 10E6/UL (ref 4.4–5.9)
SODIUM SERPL-SCNC: 133 MMOL/L (ref 135–145)
TROPONIN T SERPL HS-MCNC: 59 NG/L
TROPONIN T SERPL HS-MCNC: 62 NG/L
WBC # BLD AUTO: 9.5 10E3/UL (ref 4–11)

## 2025-07-09 PROCEDURE — 250N000011 HC RX IP 250 OP 636: Performed by: INTERNAL MEDICINE

## 2025-07-09 PROCEDURE — 96374 THER/PROPH/DIAG INJ IV PUSH: CPT

## 2025-07-09 PROCEDURE — 99222 1ST HOSP IP/OBS MODERATE 55: CPT | Performed by: INTERNAL MEDICINE

## 2025-07-09 PROCEDURE — 36415 COLL VENOUS BLD VENIPUNCTURE: CPT | Performed by: EMERGENCY MEDICINE

## 2025-07-09 PROCEDURE — 99285 EMERGENCY DEPT VISIT HI MDM: CPT | Mod: 25 | Performed by: EMERGENCY MEDICINE

## 2025-07-09 PROCEDURE — 83605 ASSAY OF LACTIC ACID: CPT | Performed by: EMERGENCY MEDICINE

## 2025-07-09 PROCEDURE — 84484 ASSAY OF TROPONIN QUANT: CPT | Performed by: EMERGENCY MEDICINE

## 2025-07-09 PROCEDURE — 85027 COMPLETE CBC AUTOMATED: CPT | Performed by: EMERGENCY MEDICINE

## 2025-07-09 PROCEDURE — 93005 ELECTROCARDIOGRAM TRACING: CPT | Performed by: EMERGENCY MEDICINE

## 2025-07-09 PROCEDURE — 85610 PROTHROMBIN TIME: CPT | Performed by: EMERGENCY MEDICINE

## 2025-07-09 PROCEDURE — G0378 HOSPITAL OBSERVATION PER HR: HCPCS

## 2025-07-09 PROCEDURE — 80048 BASIC METABOLIC PNL TOTAL CA: CPT | Performed by: EMERGENCY MEDICINE

## 2025-07-09 RX ORDER — HYDRALAZINE HYDROCHLORIDE 20 MG/ML
10-20 INJECTION INTRAMUSCULAR; INTRAVENOUS EVERY 30 MIN PRN
Status: DISCONTINUED | OUTPATIENT
Start: 2025-07-09 | End: 2025-07-30

## 2025-07-09 RX ORDER — POLYETHYLENE GLYCOL 3350 17 G/17G
17 POWDER, FOR SOLUTION ORAL 2 TIMES DAILY PRN
Status: DISCONTINUED | OUTPATIENT
Start: 2025-07-09 | End: 2025-07-21

## 2025-07-09 RX ORDER — SPIRONOLACTONE 25 MG/1
25 TABLET ORAL DAILY
Status: DISCONTINUED | OUTPATIENT
Start: 2025-07-10 | End: 2025-07-28

## 2025-07-09 RX ORDER — AMOXICILLIN 250 MG
1 CAPSULE ORAL 2 TIMES DAILY PRN
Status: DISCONTINUED | OUTPATIENT
Start: 2025-07-09 | End: 2025-07-21

## 2025-07-09 RX ORDER — MAGNESIUM HYDROXIDE/ALUMINUM HYDROXICE/SIMETHICONE 120; 1200; 1200 MG/30ML; MG/30ML; MG/30ML
30 SUSPENSION ORAL EVERY 4 HOURS PRN
Status: DISCONTINUED | OUTPATIENT
Start: 2025-07-09 | End: 2025-07-31 | Stop reason: HOSPADM

## 2025-07-09 RX ORDER — NITROGLYCERIN 40 MG/1
1 PATCH TRANSDERMAL DAILY
Status: DISCONTINUED | OUTPATIENT
Start: 2025-07-10 | End: 2025-07-28

## 2025-07-09 RX ORDER — ONDANSETRON 4 MG/1
4 TABLET, ORALLY DISINTEGRATING ORAL EVERY 6 HOURS PRN
Status: DISCONTINUED | OUTPATIENT
Start: 2025-07-09 | End: 2025-07-31 | Stop reason: HOSPADM

## 2025-07-09 RX ORDER — ONDANSETRON 2 MG/ML
4 INJECTION INTRAMUSCULAR; INTRAVENOUS EVERY 6 HOURS PRN
Status: DISCONTINUED | OUTPATIENT
Start: 2025-07-09 | End: 2025-07-31 | Stop reason: HOSPADM

## 2025-07-09 RX ORDER — NALOXONE HYDROCHLORIDE 0.4 MG/ML
0.2 INJECTION, SOLUTION INTRAMUSCULAR; INTRAVENOUS; SUBCUTANEOUS
Status: DISCONTINUED | OUTPATIENT
Start: 2025-07-09 | End: 2025-07-30

## 2025-07-09 RX ORDER — HYDRALAZINE HYDROCHLORIDE 10 MG/1
10 TABLET, FILM COATED ORAL 3 TIMES DAILY
Status: DISCONTINUED | OUTPATIENT
Start: 2025-07-10 | End: 2025-07-28

## 2025-07-09 RX ORDER — AMOXICILLIN 250 MG
2 CAPSULE ORAL 2 TIMES DAILY PRN
Status: DISCONTINUED | OUTPATIENT
Start: 2025-07-09 | End: 2025-07-21

## 2025-07-09 RX ORDER — HYDROMORPHONE HCL IN WATER/PF 6 MG/30 ML
0.2 PATIENT CONTROLLED ANALGESIA SYRINGE INTRAVENOUS
Refills: 0 | Status: DISCONTINUED | OUTPATIENT
Start: 2025-07-09 | End: 2025-07-18

## 2025-07-09 RX ORDER — OXYCODONE HYDROCHLORIDE 5 MG/1
5 TABLET ORAL EVERY 4 HOURS PRN
Refills: 0 | Status: DISCONTINUED | OUTPATIENT
Start: 2025-07-09 | End: 2025-07-18

## 2025-07-09 RX ORDER — ACETAMINOPHEN 325 MG/1
650 TABLET ORAL EVERY 4 HOURS PRN
Status: DISCONTINUED | OUTPATIENT
Start: 2025-07-09 | End: 2025-07-31 | Stop reason: HOSPADM

## 2025-07-09 RX ORDER — HYDROMORPHONE HCL IN WATER/PF 6 MG/30 ML
0.4 PATIENT CONTROLLED ANALGESIA SYRINGE INTRAVENOUS
Refills: 0 | Status: DISCONTINUED | OUTPATIENT
Start: 2025-07-09 | End: 2025-07-18

## 2025-07-09 RX ORDER — LABETALOL HYDROCHLORIDE 5 MG/ML
10-20 INJECTION, SOLUTION INTRAVENOUS EVERY 10 MIN PRN
Status: DISCONTINUED | OUTPATIENT
Start: 2025-07-09 | End: 2025-07-30

## 2025-07-09 RX ORDER — WARFARIN SODIUM 2.5 MG/1
2.5 TABLET ORAL ONCE
Status: DISCONTINUED | OUTPATIENT
Start: 2025-07-09 | End: 2025-07-09

## 2025-07-09 RX ORDER — PROCHLORPERAZINE MALEATE 5 MG/1
5 TABLET ORAL EVERY 6 HOURS PRN
Status: DISCONTINUED | OUTPATIENT
Start: 2025-07-09 | End: 2025-07-31 | Stop reason: HOSPADM

## 2025-07-09 RX ORDER — CHLORTHALIDONE 25 MG/1
25 TABLET ORAL EVERY EVENING
Status: DISCONTINUED | OUTPATIENT
Start: 2025-07-10 | End: 2025-07-28

## 2025-07-09 RX ORDER — NALOXONE HYDROCHLORIDE 0.4 MG/ML
0.4 INJECTION, SOLUTION INTRAMUSCULAR; INTRAVENOUS; SUBCUTANEOUS
Status: DISCONTINUED | OUTPATIENT
Start: 2025-07-09 | End: 2025-07-30

## 2025-07-09 RX ORDER — ATORVASTATIN CALCIUM 10 MG/1
20 TABLET, FILM COATED ORAL EVERY EVENING
Status: DISCONTINUED | OUTPATIENT
Start: 2025-07-10 | End: 2025-07-28

## 2025-07-09 RX ORDER — ACETAMINOPHEN 650 MG/1
650 SUPPOSITORY RECTAL EVERY 4 HOURS PRN
Status: DISCONTINUED | OUTPATIENT
Start: 2025-07-09 | End: 2025-07-31 | Stop reason: HOSPADM

## 2025-07-09 RX ORDER — WARFARIN SODIUM 2.5 MG/1
2.5-5 TABLET ORAL SEE ADMIN INSTRUCTIONS
Status: DISCONTINUED | OUTPATIENT
Start: 2025-07-10 | End: 2025-07-09

## 2025-07-09 RX ORDER — METOPROLOL SUCCINATE 100 MG/1
100 TABLET, EXTENDED RELEASE ORAL 2 TIMES DAILY
Status: DISCONTINUED | OUTPATIENT
Start: 2025-07-10 | End: 2025-07-11 | Stop reason: ALTCHOICE

## 2025-07-09 RX ADMIN — HYDROMORPHONE HYDROCHLORIDE 0.2 MG: 0.2 INJECTION, SOLUTION INTRAMUSCULAR; INTRAVENOUS; SUBCUTANEOUS at 23:04

## 2025-07-09 ASSESSMENT — ACTIVITIES OF DAILY LIVING (ADL)
ADLS_ACUITY_SCORE: 55

## 2025-07-09 ASSESSMENT — COLUMBIA-SUICIDE SEVERITY RATING SCALE - C-SSRS: IS THE PATIENT NOT ABLE TO COMPLETE C-SSRS: UNABLE TO VERBALIZE

## 2025-07-09 NOTE — ED TRIAGE NOTES
Pt arrives via [facility], pt was given upsetting news that his wife would no longer be living at the assisted living with him.  Per staff he then tensed up and started shaking then went unresponsive.  Upon arrival of EMS pt was still not responding or moving, pt needed to be sternal rubbed.  Pt's BG for .  Pt does not have hx of seizures, pt does have hx of previous strokes and a fall last week. Upon arrival to the ED pt is alert and confused, pt yelling out that everything hurts.  Pt not following commands, but is moving all extremities.  Pt assessed by provider immediately, no stroke code.

## 2025-07-09 NOTE — ED PROVIDER NOTES
EMERGENCY DEPARTMENT ENCOUNTER      NAME: Skip Newman  AGE: 89 year old male  YOB: 1934  MRN: [id number]  EVALUATION DATE & TIME: 7/9/2025  5:51 PM    PCP: Jacquelin Francis    ED PROVIDER: Elliot Mike M.D.      Chief Complaint   Patient presents with    Altered Mental Status         FINAL IMPRESSION:  1. Seizure-like activity (H)    2. Postictal state (H)          ED COURSE & MEDICAL DECISION MAKING:    Pertinent Labs & Imaging studies reviewed below.  All EKGs below represent my independent interpretation.   ED Course as of 07/09/25 2335   Wed Jul 09, 2025   1757 I was able to review his cardiology note from 6/3/2025.  He has history of dilated cardiomyopathy, A-fib,   1800 Patient is a 89-year-old gentleman with history of cardiomyopathy, A-fib with pacemaker in place, brought in by medics due to sudden onset of collapse, seizure-like activity.  When medics arrived he was not responding to a sternal rub, but has slowly become slightly more responsive.  Using all 4 extremities when I evaluated the patient on arrival by the charge desk here in the emergency department.   1800 Eyes closed, dried blood   1801  in the lips, answering some questions, not following commands moving all 4 extremities intentionally.  Overall picture consistent with postictal state, general encephalopathy, not focal or about to be due to a stroke at this time.  No stroke code called.  He was brought back to the room.  Even over the course the last 5 minutes he has become more alert although is still confused and cannot fully answer questions.   1801 Differential includes syncope, seizure, subdural hematoma (on Coumadin for A-fib).  Screening labs ordered, pacemaker interrogation, CT head and neck ordered.   1802 He is not febrile, tachycardic, less likely to be infectious encephalopathy.   1802 EKG shows atrial fibrillation with a rate of 80.  No acute ischemic ST elevation or depression.  Normal QTc, QRS.  When  compared to prior EKG on December 21, 2024, there is no significant change.  Impression: Atrial fibrillation with a rate of 80   1902 I reassessed the patient.  He is far more awake and alert now.  He has no recollection of the events.  Family is present and they were able to see him on facetime: he was in a seated position during this spell, he was tensed up, looking off to the right and up.  There was no fall.  On reexamination he has tenderness and guarding of the left shoulder.  I ordered x-ray.   1929 Head CT w/o contrast  1.  No acute findings.   1930 XR Shoulder Left 2 Views   Demineralization without displaced fracture. 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 Severe glenohumeral joint osteoarthritis. Superior screw fixation of the humerus likely indicating underlying   rotator cuff pathology. Left chest pacer. Redemonstrated lucent finding the proximal humerus which may represent sequela of prior biceps tenodesis.    1933 CT Cervical Spine w/o Contrast  1.  No acute cervical spine fracture.   2002 Pacemaker interrogation summary shows most recent ventricular high rate episode was in May 2019 with 2025, nothing today.   2006 CT Lumbar Spine w/o Contrast  1.  Multilevel degenerative changes. No acute fracture.   2024 Lactic Acid: 1.1   2142 I admitted the patient to the hospitalist.  I also spoke to the patient's daughter Rashmi, updated her on plan.     Summary: Patient is a 89-year-old gentleman who comes in with encephalopathy, generalized weakness, slowly regained consciousness to the point where he was conversant comfortable.  He has left shoulder pain with a negative x-ray, likely strain of his chronic rotator cuff issues.  Otherwise trauma scan of the head, C-spine negative.  L-spine CT also ordered given low back pain, this was normal.  Patient was admitted to observation status for first-time seizure in combination with his age, known cardiomyopathy, and  still feeling somewhat weak.    Additional ED Course timestamps entered by medical scribe:  6:00 PM Introduced myself to the patient, obtained history of present illness, and performed initial physical exam at this time.   6:52 PM I rechecked and re updated the patient at this time.    Initial EKG.  P A-fib, a second EKG was performed due to artifact and appears as though he is in sinus rhythm with first-degree AV block.  Otherwise no ischemic morphology    Medical Decision Making  I obtained history from Family Member/Significant Other  Admit.    MIPS (CTPE, Dental pain, [place], Sinusitis, Asthma/COPD, Head Trauma): Not Applicable    SEPSIS: Lactic acid elevated due to seizure. At this time there are no signs of sepsis or septic shock    MEDICATIONS GIVEN IN THE EMERGENCY:  Medications   Warfarin Dose Required Daily - Pharmacist Managed (has no administration in time range)   chlorthalidone (HYGROTON) tablet 25 mg ( Oral Automatically Held 7/13/25 2000)   atorvastatin (LIPITOR) tablet 20 mg (has no administration in time range)   hydrALAZINE (APRESOLINE) tablet 10 mg (has no administration in time range)   metoprolol succinate ER (TOPROL XL) 24 hr tablet 100 mg (has no administration in time range)   spironolactone (ALDACTONE) tablet 25 mg ( Oral Automatically Held 7/13/25 1400)   nitroGLYcerin (NITRODUR) 0.2 MG/HR 24 hr patch 1 patch (has no administration in time range)   senna-docusate (SENOKOT-S/PERICOLACE) 8.6-50 MG per tablet 1 tablet (has no administration in time range)     Or   senna-docusate (SENOKOT-S/PERICOLACE) 8.6-50 MG per tablet 2 tablet (has no administration in time range)   ondansetron (ZOFRAN ODT) ODT tab 4 mg (has no administration in time range)     Or   ondansetron (ZOFRAN) injection 4 mg (has no administration in time range)   prochlorperazine (COMPAZINE) injection 5 mg (has no administration in time range)     Or   prochlorperazine (COMPAZINE) tablet 5 mg (has no administration in time  range)   Patient is already receiving anticoagulation with heparin, enoxaparin (LOVENOX), warfarin (COUMADIN)  or other anticoagulant medication (has no administration in time range)   labetalol (NORMODYNE/TRANDATE) injection 10-20 mg (has no administration in time range)   hydrALAZINE (APRESOLINE) injection 10-20 mg (has no administration in time range)   acetaminophen (TYLENOL) tablet 650 mg (has no administration in time range)     Or   acetaminophen (TYLENOL) Suppository 650 mg (has no administration in time range)   oxyCODONE IR (ROXICODONE) half-tab 2.5 mg (has no administration in time range)   oxyCODONE (ROXICODONE) tablet 5 mg (has no administration in time range)   HYDROmorphone (DILAUDID) injection 0.2 mg (0.2 mg Intravenous $Given 7/9/25 7903)   HYDROmorphone (DILAUDID) injection 0.4 mg (has no administration in time range)   melatonin tablet 1 mg (has no administration in time range)   polyethylene glycol (MIRALAX) Packet 17 g (has no administration in time range)   alum & mag hydroxide-simethicone (MAALOX) suspension 30 mL (has no administration in time range)   naloxone (NARCAN) injection 0.2 mg (has no administration in time range)     Or   naloxone (NARCAN) injection 0.4 mg (has no administration in time range)     Or   naloxone (NARCAN) injection 0.2 mg (has no administration in time range)     Or   naloxone (NARCAN) injection 0.4 mg (has no administration in time range)         NEW PRESCRIPTIONS STARTED AT TODAY'S ER VISIT  Current Discharge Medication List             =================================================================    HPI    Skip Newman is a 89 year old male who presents to this ED for evaluation of altered mental status. The patient has a history of stroke, hyperlipidemia, HTN, tachycardia, CKD stage 3, afib, congestive heart failure, skin cancer, vertigo.     Per EMS, the patient was at his assisted living facility in [place] today when he learned that his wife would be  "leaving the facility, and he tensed up and started shaking. The patient was not moving his right side, and was not moving at all until they reached the ED. Per EMS the patient did have a fall ~1 week ago, but did not sustain any injuries and did not hit his head. His last known well was 16:15.     The patient states that \"everything hurts\".     After ~1 hour the patient states that he is \"feeling ok\". He does not remember anything about his seizure. Per the patient's family, there was a caregiver with the patient when this occurred, and saw that the patient was unable to respond, and leaning back and to the side and shaking in his chair. The family reports the patient fell 6 days ago. The patient endorses pain in his left shoulder, but denies pain in his teeth, neck, or hips.    Per chart review:   - 12/21/2024 (3 hours) [facility] Emergency Department: The patient presented in the ED for evaluation of a fall. Viral PCR was negative and normal BMP and CBC, INR 3.9 was slightly supratherapeutic. CT head and c-spine, CAP were normal and XR elbow without traumatic pathology.Patient was discharged home.    - 1/2/2025 - 1/4/2025 (2 days) [facility]: The patient was admitted after presenting at the [facility] ED for vertebral artery occlusion causing dizziness.  Labs were significant for WBC 8.6, creatinine 1.68 slightly above baseline, troponins elevated but flat in the 90s-100s.  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  Otherwise CT head and C-spine were negative for acute fracture or other abnormality. Neurology was consulted.  Patient had a repeat CT of the head on 1/3/2025 which was stable without any new signs of infarction.  Patient also had an ultrasound of the carotid arteries with less than 50% stenosis bilaterally.  Echo TTE showed known heart failure with EF 25-30% and no LV thrombus.Patient continued on " "atorvastatin 40 mg daily, and metoprolol  mg daily. Prescribed daily aspirin 81 mg daily for vertebral artery disease and stenosis seen on CTA head and neck.     - 6/3/2025 [facility] [place]: The patient was seen by cardiology for follow up. Instructed to continue toprol 100mg, Lipitor 40mg, Spironolactone 25mg, Hydralazine 10mg TID, NTG patch. Pacemaker was checked. Evaluated for cardiomyopathy, afib, CAD, sick sinus, HTN, hyperlipidemia.       VITALS:  /75 (BP Location: Left arm)   Pulse 91   Temp 98.4  F (36.9  C) (Temporal)   Resp 18   Ht 1.702 m (5' 7\")   Wt 73 kg (160 lb 15 oz)   SpO2 95%   BMI 25.21 kg/m      PHYSICAL EXAM    Constitutional: Eyes closed, complaining of body pain.  HENT: Mouth, no dental malocclusion, no tongue laceration.  Neck- Supple, gross ROM intact.   Eyes: Pupils mid-range, conjunctiva without injection, no discharge.   Respiratory: Clear to auscultation bilaterally, no respiratory distress, no wheezing, speaks full sentences easily. No cough.  Cardiovascular: Normal heart rate, regular rhythm, no murmurs.   GI: Soft, no tenderness to deep palpation in all quadrants, no masses.  Musculoskeletal: Moving all 4 extremities intentionally.  Pain to left shoulder with range of motion.  No obvious deformity.  Skin: Warm, dry, no rash.  Neurologic: Intentionally moving all 4 extremities but not following commands.   Psychiatric: Affect normal, cooperative.      I, Jennifer Andre am serving as a scribe to document services personally performed by Dr. Elliot Mike based on my observation and the provider's statements to me. I, Elliot Mike MD attest that Jennifer Andre is acting in a scribe capacity, has observed my performance of the services and has documented them in accordance with my direction.    Elliot Mike M.D.  Emergency Medicine  [facility] EMERGENCY DEPARTMENT  [address] " [facility] [address]  [phone]  Dept: [phone]       Elliot Mike MD  07/09/25 2340       Elliot Mike MD  07/10/25 0123

## 2025-07-09 NOTE — ED NOTES
Bed: [place] [address]  Expected date: 7/9/25  Expected time:   Means of arrival:   Comments:  [facility]; 89M, weakness/all extremities, slurred speech, LWN 1630.

## 2025-07-10 VITALS
TEMPERATURE: 98 F | HEART RATE: 77 BPM | OXYGEN SATURATION: 96 % | WEIGHT: 160.94 LBS | DIASTOLIC BLOOD PRESSURE: 67 MMHG | HEIGHT: 67 IN | RESPIRATION RATE: 20 BRPM | BODY MASS INDEX: 25.26 KG/M2 | SYSTOLIC BLOOD PRESSURE: 158 MMHG

## 2025-07-10 PROBLEM — R56.9: Status: ACTIVE | Noted: 2025-07-10

## 2025-07-10 LAB
ALBUMIN UR-MCNC: 10 MG/DL
ANION GAP SERPL CALCULATED.3IONS-SCNC: 13 MMOL/L (ref 7–15)
APPEARANCE UR: CLEAR
BILIRUB UR QL STRIP: NEGATIVE
BUN SERPL-MCNC: 49.6 MG/DL (ref 8–23)
CALCIUM SERPL-MCNC: 9.3 MG/DL (ref 8.8–10.4)
CHLORIDE SERPL-SCNC: 102 MMOL/L (ref 98–107)
COLOR UR AUTO: ABNORMAL
CREAT SERPL-MCNC: 1.66 MG/DL (ref 0.67–1.17)
EGFRCR SERPLBLD CKD-EPI 2021: 39 ML/MIN/1.73M2
GLUCOSE SERPL-MCNC: 97 MG/DL (ref 70–99)
GLUCOSE UR STRIP-MCNC: NEGATIVE MG/DL
HCO3 SERPL-SCNC: 21 MMOL/L (ref 22–29)
HGB UR QL STRIP: NEGATIVE
HYALINE CASTS: 4 /LPF
INR PPP: 2.62 (ref 0.85–1.15)
KETONES UR STRIP-MCNC: NEGATIVE MG/DL
LEUKOCYTE ESTERASE UR QL STRIP: NEGATIVE
MDC_IDC_LEAD_CONNECTION_STATUS: NORMAL
MDC_IDC_LEAD_CONNECTION_STATUS: NORMAL
MDC_IDC_LEAD_IMPLANT_DT: NORMAL
MDC_IDC_LEAD_IMPLANT_DT: NORMAL
MDC_IDC_LEAD_LOCATION: NORMAL
MDC_IDC_LEAD_LOCATION: NORMAL
MDC_IDC_LEAD_LOCATION_DETAIL_1: NORMAL
MDC_IDC_LEAD_LOCATION_DETAIL_1: NORMAL
MDC_IDC_LEAD_MFG: NORMAL
MDC_IDC_LEAD_MFG: NORMAL
MDC_IDC_LEAD_MODEL: NORMAL
MDC_IDC_LEAD_MODEL: NORMAL
MDC_IDC_LEAD_POLARITY_TYPE: NORMAL
MDC_IDC_LEAD_POLARITY_TYPE: NORMAL
MDC_IDC_LEAD_SERIAL: NORMAL
MDC_IDC_LEAD_SERIAL: NORMAL
MDC_IDC_MSMT_BATTERY_DTM: NORMAL
MDC_IDC_MSMT_BATTERY_IMPEDANCE: 1618 OHM
MDC_IDC_MSMT_BATTERY_REMAINING_LONGEVITY: 59 MO
MDC_IDC_MSMT_BATTERY_STATUS: NORMAL
MDC_IDC_MSMT_BATTERY_VOLTAGE: 2.76 V
MDC_IDC_MSMT_LEADCHNL_RA_IMPEDANCE_VALUE: 67 OHM
MDC_IDC_MSMT_LEADCHNL_RV_IMPEDANCE_VALUE: 459 OHM
MDC_IDC_MSMT_LEADCHNL_RV_PACING_THRESHOLD_AMPLITUDE: 1.25 V
MDC_IDC_MSMT_LEADCHNL_RV_PACING_THRESHOLD_PULSEWIDTH: 0.4 MS
MDC_IDC_PG_IMPLANT_DTM: NORMAL
MDC_IDC_PG_MFG: NORMAL
MDC_IDC_PG_MODEL: NORMAL
MDC_IDC_PG_SERIAL: NORMAL
MDC_IDC_PG_TYPE: NORMAL
MDC_IDC_SESS_CLINIC_NAME: NORMAL
MDC_IDC_SESS_DTM: NORMAL
MDC_IDC_SESS_TYPE: NORMAL
MDC_IDC_SET_BRADY_LOWRATE: 60 {BEATS}/MIN
MDC_IDC_SET_BRADY_MAX_SENSOR_RATE: 120 {BEATS}/MIN
MDC_IDC_SET_BRADY_MAX_TRACKING_RATE: 105 {BEATS}/MIN
MDC_IDC_SET_BRADY_MODE: NORMAL
MDC_IDC_SET_LEADCHNL_RV_PACING_AMPLITUDE: 1.88
MDC_IDC_SET_LEADCHNL_RV_PACING_CAPTURE_MODE: NORMAL
MDC_IDC_SET_LEADCHNL_RV_PACING_POLARITY: NORMAL
MDC_IDC_SET_LEADCHNL_RV_PACING_PULSEWIDTH: 0.4 MS
MDC_IDC_SET_LEADCHNL_RV_SENSING_POLARITY: NORMAL
MDC_IDC_SET_LEADCHNL_RV_SENSING_SENSITIVITY: 5.6 MV
MDC_IDC_SET_ZONE_DETECTION_INTERVAL: 375 MS
MDC_IDC_SET_ZONE_STATUS: NORMAL
MDC_IDC_SET_ZONE_STATUS: NORMAL
MDC_IDC_SET_ZONE_TYPE: NORMAL
MDC_IDC_SET_ZONE_TYPE: NORMAL
MDC_IDC_SET_ZONE_VENDOR_TYPE: NORMAL
MDC_IDC_SET_ZONE_VENDOR_TYPE: NORMAL
MDC_IDC_STAT_AT_BURDEN_PERCENT: 0 %
MDC_IDC_STAT_AT_DTM_END: NORMAL
MDC_IDC_STAT_AT_DTM_START: NORMAL
MDC_IDC_STAT_BRADY_DTM_END: NORMAL
MDC_IDC_STAT_BRADY_DTM_START: NORMAL
MDC_IDC_STAT_BRADY_RV_PERCENT_PACED: 12 %
MDC_IDC_STAT_EPISODE_RECENT_COUNT: 0
MDC_IDC_STAT_EPISODE_RECENT_COUNT: 0
MDC_IDC_STAT_EPISODE_RECENT_COUNT_DTM_END: NORMAL
MDC_IDC_STAT_EPISODE_RECENT_COUNT_DTM_END: NORMAL
MDC_IDC_STAT_EPISODE_RECENT_COUNT_DTM_START: NORMAL
MDC_IDC_STAT_EPISODE_RECENT_COUNT_DTM_START: NORMAL
MDC_IDC_STAT_EPISODE_TYPE: NORMAL
MDC_IDC_STAT_EPISODE_TYPE: NORMAL
NITRATE UR QL: NEGATIVE
PH UR STRIP: 5 [PH] (ref 5–7)
POTASSIUM SERPL-SCNC: 4.4 MMOL/L (ref 3.4–5.3)
PROTHROMBIN TIME: 27.8 SECONDS (ref 11.8–14.8)
RBC URINE: <1 /HPF
SODIUM SERPL-SCNC: 136 MMOL/L (ref 135–145)
SP GR UR STRIP: 1.02 (ref 1–1.03)
UROBILINOGEN UR STRIP-MCNC: NORMAL MG/DL
WBC URINE: <1 /HPF

## 2025-07-10 PROCEDURE — 85610 PROTHROMBIN TIME: CPT | Performed by: INTERNAL MEDICINE

## 2025-07-10 PROCEDURE — 250N000013 HC RX MED GY IP 250 OP 250 PS 637: Performed by: INTERNAL MEDICINE

## 2025-07-10 PROCEDURE — G0378 HOSPITAL OBSERVATION PER HR: HCPCS

## 2025-07-10 PROCEDURE — 99222 1ST HOSP IP/OBS MODERATE 55: CPT | Performed by: PSYCHIATRY & NEUROLOGY

## 2025-07-10 PROCEDURE — 81001 URINALYSIS AUTO W/SCOPE: CPT | Performed by: INTERNAL MEDICINE

## 2025-07-10 PROCEDURE — 96376 TX/PRO/DX INJ SAME DRUG ADON: CPT

## 2025-07-10 PROCEDURE — 36415 COLL VENOUS BLD VENIPUNCTURE: CPT | Performed by: INTERNAL MEDICINE

## 2025-07-10 PROCEDURE — 80048 BASIC METABOLIC PNL TOTAL CA: CPT | Performed by: INTERNAL MEDICINE

## 2025-07-10 PROCEDURE — 99233 SBSQ HOSP IP/OBS HIGH 50: CPT | Performed by: EMERGENCY MEDICINE

## 2025-07-10 PROCEDURE — 250N000013 HC RX MED GY IP 250 OP 250 PS 637: Performed by: EMERGENCY MEDICINE

## 2025-07-10 PROCEDURE — 250N000011 HC RX IP 250 OP 636: Performed by: INTERNAL MEDICINE

## 2025-07-10 PROCEDURE — 250N000013 HC RX MED GY IP 250 OP 250 PS 637: Performed by: PSYCHIATRY & NEUROLOGY

## 2025-07-10 PROCEDURE — 120N000001 HC R&B MED SURG/OB

## 2025-07-10 RX ORDER — WARFARIN SODIUM 2.5 MG/1
2.5 TABLET ORAL
Status: COMPLETED | OUTPATIENT
Start: 2025-07-10 | End: 2025-07-10

## 2025-07-10 RX ORDER — LEVETIRACETAM 250 MG/1
250 TABLET ORAL 2 TIMES DAILY
Status: DISCONTINUED | OUTPATIENT
Start: 2025-07-10 | End: 2025-07-11

## 2025-07-10 RX ADMIN — NITROGLYCERIN 1 PATCH: 0.2 PATCH TRANSDERMAL at 09:26

## 2025-07-10 RX ADMIN — HYDROMORPHONE HYDROCHLORIDE 0.2 MG: 0.2 INJECTION, SOLUTION INTRAMUSCULAR; INTRAVENOUS; SUBCUTANEOUS at 04:01

## 2025-07-10 RX ADMIN — HYDRALAZINE HYDROCHLORIDE 10 MG: 10 TABLET ORAL at 15:09

## 2025-07-10 RX ADMIN — HYDRALAZINE HYDROCHLORIDE 10 MG: 10 TABLET ORAL at 09:24

## 2025-07-10 RX ADMIN — METOPROLOL SUCCINATE 100 MG: 50 TABLET, EXTENDED RELEASE ORAL at 09:25

## 2025-07-10 RX ADMIN — SPIRONOLACTONE 25 MG: 25 TABLET, FILM COATED ORAL at 15:09

## 2025-07-10 RX ADMIN — WARFARIN SODIUM 2.5 MG: 2.5 TABLET ORAL at 17:23

## 2025-07-10 RX ADMIN — LEVETIRACETAM 250 MG: 250 TABLET, FILM COATED ORAL at 11:15

## 2025-07-10 ASSESSMENT — ACTIVITIES OF DAILY LIVING (ADL)
WALKING_OR_CLIMBING_STAIRS: STAIR CLIMBING DIFFICULTY, ASSISTANCE 1 PERSON
ADLS_ACUITY_SCORE: 55
ADLS_ACUITY_SCORE: 54
ADLS_ACUITY_SCORE: 57
ADLS_ACUITY_SCORE: 57
ADLS_ACUITY_SCORE: 55
ADLS_ACUITY_SCORE: 57
ADLS_ACUITY_SCORE: 57
WEAR_GLASSES_OR_BLIND: OTHER (SEE COMMENTS)
ADLS_ACUITY_SCORE: 59
ADLS_ACUITY_SCORE: 55
DOING_ERRANDS_INDEPENDENTLY_DIFFICULTY: YES
FALL_HISTORY_WITHIN_LAST_SIX_MONTHS: YES
ADLS_ACUITY_SCORE: 52
ADLS_ACUITY_SCORE: 57
TOILETING_ISSUES: YES
ADLS_ACUITY_SCORE: 54
ADLS_ACUITY_SCORE: 55
TOILETING: 0-->NOT TOILET TRAINED OR ASSISTANCE NEEDED (DEVELOPMENTALLY APPROPRIATE)
TOILETING: 1-->ASSISTANCE (EQUIPMENT/PERSON) NEEDED
ADLS_ACUITY_SCORE: 57
CONCENTRATING,_REMEMBERING_OR_MAKING_DECISIONS_DIFFICULTY: YES
EQUIPMENT_CURRENTLY_USED_AT_HOME: CANE, STRAIGHT
ADLS_ACUITY_SCORE: 52
ADLS_ACUITY_SCORE: 52
WALKING_OR_CLIMBING_STAIRS_DIFFICULTY: YES
ADLS_ACUITY_SCORE: 57
CHANGE_IN_FUNCTIONAL_STATUS_SINCE_ONSET_OF_CURRENT_ILLNESS/INJURY: YES
DIFFICULTY_EATING/SWALLOWING: NO
ADLS_ACUITY_SCORE: 55
DRESSING/BATHING_DIFFICULTY: YES
TOILETING_ASSISTANCE: TOILETING DIFFICULTY, ASSISTANCE 1 PERSON
ADLS_ACUITY_SCORE: 59
DRESSING/BATHING: BATHING DIFFICULTY, ASSISTANCE 1 PERSON
ADLS_ACUITY_SCORE: 57
ADLS_ACUITY_SCORE: 57

## 2025-07-10 NOTE — MEDICATION SCRIBE - ADMISSION MEDICATION HISTORY
Medication Scribe Admission Medication History    Admission medication history is complete. The information provided in this note is only as accurate as the sources available at the time of the update.    Information Source(s): Family member via in-person    Pertinent Information: Patients daughter Rashmi manages medications. Daughter able to verify med list, allergies and last dosing. Patient has taken all meds for the day. PATCH IS OFF    Changes made to PTA medication list:  Added: None  Deleted: None  Changed: None    Allergies reviewed with patient and updates made in EHR: yes    Medication History Completed By: Dami Mason 7/9/2025 7:27 PM    PTA Med List   Medication Sig Last Dose/Taking    acetaminophen (TYLENOL) 500 MG tablet Take 500 mg by mouth every 6 hours as needed for mild pain. Taking As Needed    atorvastatin (LIPITOR) 40 MG tablet Take 20 mg by mouth every evening 7/9/2025 Evening    chlorthalidone (HYGROTEN) 25 MG tablet Take 25 mg by mouth every evening 7/9/2025 Evening    hydrALAZINE (APRESOLINE) 10 MG tablet Take 1 tablet (10 mg) by mouth 3 times daily. 7/9/2025 Evening    metoprolol succinate ER (TOPROL XL) 100 MG 24 hr tablet Take 1 tablet (100 mg) by mouth 2 times daily. 7/9/2025 Evening    MULTIVITAMIN (MULTIPLE VITAMIN ORAL) Take 1 tablet by mouth every evening 7/9/2025 Evening    nitroGLYcerin (NITRODUR) 0.2 MG/HR 24 hr patch Place 1 patch over 12 hours onto the skin daily. Remove at bedtime 7/9/2025 Morning    omega 3-dha-epa-fish oil (FISH OIL) 1,000 mg (120 mg-180 mg) cap Take 1 capsule by mouth every evening 7/9/2025 Evening    spironolactone (ALDACTONE) 25 MG tablet Take 1 tablet by mouth daily at 2 pm. 7/9/2025 Evening    warfarin ANTICOAGULANT (COUMADIN) 5 MG tablet Take 2.5-5 mg by mouth See Admin Instructions. 2.5mg all days except Fridays on which take 5mg 7/9/2025 Evening

## 2025-07-10 NOTE — H&P
[facility]    History and Physical - Hospitalist Service       Date of Admission:  7/9/2025    Assessment & Plan      Skip Newman is a 89 year old male admitted on 7/9/2025. Presents with seizure activity, improved now, but will observe overnight and have Neurology see in AM.     Problem-based Assessment & Plan:  Seizure-like activity  Unclear etiology. CT Head imaging negative.   As noted during prior admissions, pacemaker is incompatible with MRI  Will consult with Neurology for recommendations on additional evaluation.   Will observe overnight. Defer anti-convulsant therapy at this time pending neurology recommendations  Seizure precautions  Monitor telemetry  Lactic Acidosis/increased AGMA, certainly secondary to seizure activity. Lactic acidosis already resolved, will monitor repeat BMP/electrolytes in AM. Anticipate resolution with PO hydration and absence of seizure activity.   Heart Failure with reduced ejection fraction, chronic, stable without signs of decompensation.  Continue PTA GDMT/diuretics regimen without change.   However, will hold PTA chlorthalidone and spironolactone until repeat BMP in AM (see CKD below). Of note, patient did take his normal doses today already.  Monitor daily weights + I/Os  CAD, without any angina, s/p multiple remote stents, currently medically managed, continue prior to admission ASA/statin/beta blocker regimen.  Chronic Kidney Disease, stage 3a, eGFR/Creatinine is stable slightly above chronic baseline, possibly due to seizure activity/acidosis vs dehydration. See treatment plan above regarding root etiology. Monitor renal function daily. Dose medication as appropriate per CrCl/GFR.   H/o Afib s/p PPM continue PTA beta blocker for rate control, PTA Warfarin AC  Pharmacy consult for warfarin dosing  HTN, chronic, stable. Continue PTA anti-HTN medication regimen without changes. However, will hold PTA chlorthalidone and spironolactone until  "repeat BMP in AM (see above)  Hypercholesterolemia, chronic, stable, continue PTA statin regimen without change          Observation Goals: -diagnostic tests and consults completed and resulted, -vital signs normal or at patient baseline, -safe disposition plan has been identified, Nurse to notify provider when observation goals have been met and patient is ready for discharge.  Diet: Combination Diet Low Saturated Fat Na <2400mg Diet  DVT Prophylaxis: Warfarin  Chandler Catheter: Not present  Lines: None     Cardiac Monitoring: ACTIVE order. Indication: seizure activity, monitoring  Code Status: Full Code    Clinically Significant Risk Factors Present on Admission         # Hyponatremia: Lowest Na = 133 mmol/L in last 2 days, will monitor as appropriate          # Drug Induced Coagulation Defect: home medication list includes an anticoagulant medication    # Hypertension: Noted on problem list  # Chronic heart failure with reduced ejection fraction: last echo with EF <40%          # Overweight: Estimated body mass index is 25.21 kg/m  as calculated from the following:    Height as of this encounter: 1.702 m (5' 7\").    Weight as of this encounter: 73 kg (160 lb 15 oz).       # Financial/Environmental Concerns:     # Pacemaker present       Disposition Plan     Medically Ready for Discharge: Anticipated Tomorrow           Los Moreno MD  Hospitalist Service  [facility]  Securely message with [employer] (more info)  Text page via [employer] Paging/Directory     ______________________________________________________________________    Chief Complaint   Seizure activity    History is obtained from the patient, electronic health record, and emergency department physician    History of Present Illness   Skip Newman is a 89 year old male who presents with seizure activity. Patient went unresponsive while sitting in chair, arms up, looking back, unresponsive. By time EMS arrived, patient was no " "longer extended and more limb yet still unresponsive. Son was reportedly starting a FaceTime call with patient and witnessed the event partially. ED Provider reports patient was fairly lethargic upon arrival, then soon post-ictal, and now at time of sign out more back to his normal baseline but still feels fatigued/malaise. Patient still a bit confused about the event and unable to recall any details, however knows he's in the hospital for that issue not. Noting \"pain\" but is very inconsistent with the location and severity, at times saying his \"tummy\", other times saying \"everything\", or \"half my body\", and other times saying he's \"fine\". Would not elaborate or specify a location of pain otherwise nor any other descriptors, and appeared to be okay if resting still.     ED Provider interrogated pacer, no recent runs of NSVT or VT.     Personally review the following vitals signs during this current ED encounter:  Vitals  HR 82  /65  RR 20  SpO2 96% RA  T 98.9    Personally review the following laboratory studies during this current encounter ordered by the ED provider:  BMP  Na 133  K 5.2 (slightly hemolysis noted)  Cl 100  CO2 17  AG 16  Cr 1.73 (baseline ~1.3's, although at times appears to fluctuate slightly)  Glucose 135  CBC  WBC 9.5  Hgb 13.1  Plt 134 (baseline)  Trop T: 59 (chronic elevation on all prior checks) --> 62  NTpBNP: (not obtained)  Lactic Acid: 5.7 --> 1.1 (no intervention)  INR 2.52 (Goal 2-3)  Mag (not obtained)  UA: (not obtained)    Personally reviewed the following imaging studies as ordered by the ED provider this ED encounter:  CTH without contrast is negative for any acute intracranial processes.  CT Cervical spine is negative for any acute fractures   CT Lumbar spine negative for any acute fractures   XR of the left shoulder does not show any acute fractures, but does show some moderate to severe osteoarthritis in multiple locations as well as evidence of prior surgical " interventions on the rotator cuff.   ECG: sinus rhythm, AK interval of ~300ms it would seem,     Reviewed the following ED provider orders/treatments:  none    Prior inpatient/outpatient encounters personally reviewed within [facility]:  Cardiology OV 6/2/25, personally reviewed provider documentation.   Discussed patient's prior cardiac history/conditions and current management   Neurology OV 2/21/25, personally reviewed provider documentation.   Discharge Summary from hospital admission 1/2/2025 - 1/4/2025 at [facility], personally reviewed provider documentation.   Of note, patient's pacemaker noted to be MRI incompatible       Other records personally reviewed within Epic:  Last TTE results from 6/27/25, LVEF 25-30% (see Epic encounter/report for full results report)        Past Medical History    Past Medical History:   Diagnosis Date    Arthritis     Atrial fibrillation     Cardiomyopathy (H)     Chronic kidney disease     Coronary artery disease     CVA (cerebral vascular accident) (H) 07/17/2020    Elevated brain natriuretic peptide (BNP) level 03/13/2024    Elevated troponin 02/15/2024    Fall-- found down, > 12 hrs 02/15/2024    Gout     History of transfusion     Hyperlipidemia     Hypertension     Impotence of organic origin     Kidney stones     Non-traumatic rhabdomyolysis 02/15/2024    Sick sinus syndrome -- S/P [employer] PPM     Spinal stenosis, lumbar region, with neurogenic claudication     Syncope 08/25/2008    Thrombocytopenia        Past Surgical History   Past Surgical History:   Procedure Laterality Date    CORONARY STENT PLACEMENT  01/01/2001    LCx    HEMORRHOID SURGERY  01/01/1989    INSERT / REPLACE / REMOVE PACEMAKER  01/01/2009    [employer]    OTHER SURGICAL HISTORY  11/01/2018    l3-l4 lumbar stenosis    TONSILLECTOMY  1940    child    TOTAL HIP ARTHROPLASTY Left 2015    ?2015    TOTAL KNEE ARTHROPLASTY Left 07/08/2019    Procedure: LEFT TOTAL KNEE ARTHROPLASTY;   Surgeon: Bowen aPdgett MD;  Location: [facility];  Service: Orthopedics    TOTAL KNEE ARTHROPLASTY Right 01/13/2020    Procedure: RIGHT TOTAL KNEE ARTHROPLASTY;  Surgeon: Bowen Padgett MD;  Location: [facility];  Service: Orthopedics       Prior to Admission Medications   Prior to Admission Medications   Prescriptions Last Dose Informant Patient Reported? Taking?   MULTIVITAMIN (MULTIPLE VITAMIN ORAL) 7/9/2025 Evening Daughter Yes Yes   Sig: Take 1 tablet by mouth every evening   acetaminophen (TYLENOL) 500 MG tablet  Daughter Yes Yes   Sig: Take 500 mg by mouth every 6 hours as needed for mild pain.   atorvastatin (LIPITOR) 40 MG tablet 7/9/2025 Evening Daughter Yes Yes   Sig: Take 20 mg by mouth every evening   chlorthalidone (HYGROTEN) 25 MG tablet 7/9/2025 Evening Daughter Yes Yes   Sig: Take 25 mg by mouth every evening   hydrALAZINE (APRESOLINE) 10 MG tablet 7/9/2025 Evening  No Yes   Sig: Take 1 tablet (10 mg) by mouth 3 times daily.   metoprolol succinate ER (TOPROL XL) 100 MG 24 hr tablet 7/9/2025 Evening  No Yes   Sig: Take 1 tablet (100 mg) by mouth 2 times daily.   nitroGLYcerin (NITRODUR) 0.2 MG/HR 24 hr patch 7/9/2025 Morning  No Yes   Sig: Place 1 patch over 12 hours onto the skin daily. Remove at bedtime   omega 3-dha-epa-fish oil (FISH OIL) 1,000 mg (120 mg-180 mg) cap 7/9/2025 Evening Daughter Yes Yes   Sig: Take 1 capsule by mouth every evening   spironolactone (ALDACTONE) 25 MG tablet 7/9/2025 Evening Daughter Yes Yes   Sig: Take 1 tablet by mouth daily at 2 pm.   warfarin ANTICOAGULANT (COUMADIN) 5 MG tablet 7/9/2025 Evening Daughter Yes Yes   Sig: Take 2.5-5 mg by mouth See Admin Instructions. 2.5mg all days except Fridays on which take 5mg      Facility-Administered Medications: None           Physical Exam   Vital Signs: Temp: 98.4  F (36.9  C) Temp src: Temporal BP: 125/75 Pulse: 91   Resp: 18 SpO2: 95 % O2 Device: None (Room air)    Weight: 160 lbs 14.97 oz    Physical  "Exam  Vitals and nursing note reviewed.   Constitutional:       General: He is in acute distress (non-localized \"pain\" (see HPI)).      Appearance: Normal appearance. He is not ill-appearing, toxic-appearing or diaphoretic.   HENT:      Head: Normocephalic and atraumatic.   Eyes:      General: No scleral icterus.     Conjunctiva/sclera: Conjunctivae normal.   Cardiovascular:      Rate and Rhythm: Normal rate and regular rhythm.      Pulses: Normal pulses.   Pulmonary:      Effort: Pulmonary effort is normal. No respiratory distress.      Breath sounds: No wheezing or rales.   Abdominal:      General: Abdomen is flat. Bowel sounds are normal. There is no distension.      Palpations: Abdomen is soft.   Musculoskeletal:      Right lower leg: No edema.      Left lower leg: No edema.   Neurological:      General: No focal deficit present.      Mental Status: He is alert. Mental status is at baseline.   Psychiatric:         Mood and Affect: Mood normal.         Behavior: Behavior normal.           Medical Decision Making       60 MINUTES SPENT BY ME on the date of service doing chart review, history, exam, documentation & further activities per the note.      Data   ------------------------- PAST 24 HR DATA REVIEWED -----------------------------------------------    I have personally reviewed the following data over the past 24 hrs:    9.5  \   13.1 (L)   / 134 (L)     133 (L) 100 53.4 (H) /  135 (H)   5.2 17 (L) 1.73 (H) \     Trop: 62 (H) BNP: N/A     Procal: N/A CRP: N/A Lactic Acid: 1.1       INR:  2.52 (H) PTT:  N/A   D-dimer:  N/A Fibrinogen:  N/A       Imaging results reviewed over the past 24 hrs:   Recent Results (from the past 24 hours)   Head CT w/o contrast    Narrative    EXAM: CT HEAD W/O CONTRAST  LOCATION: [facility]  DATE: 7/9/2025    INDICATION: seizure, anticoagulated (recent fall?)  COMPARISON: 1/3/2025  TECHNIQUE: Routine CT Head without IV contrast. Multiplanar reformats. " Dose reduction techniques were used.    FINDINGS:  INTRACRANIAL CONTENTS: No intracranial hemorrhage, extraaxial collection, or mass effect.  No CT evidence of acute infarct. Moderate presumed chronic small vessel ischemic changes. Moderate generalized volume loss. No hydrocephalus. Intracranial   atheromatous disease.     VISUALIZED ORBITS/SINUSES/MASTOIDS: No intraorbital abnormality. No paranasal sinus mucosal disease. No middle ear or mastoid effusion.    BONES/SOFT TISSUES: No acute abnormality.      Impression    IMPRESSION:  1.  No acute findings.   CT Cervical Spine w/o Contrast    Narrative    EXAM: CT CERVICAL SPINE W/O CONTRAST  LOCATION: [facility]  DATE: 7/9/2025    INDICATION: fall, confused, unclear if head or neck trauma  COMPARISON: None.  TECHNIQUE: Routine CT Cervical Spine without IV contrast. Multiplanar reformats. Dose reduction techniques were used.    FINDINGS:  Vertebral body heights are maintained. Multilevel loss of disc height. No jumped or perched facets. Spinous processes are intact. Degenerative pannus at the craniocervical junction. Stable appearance to subchondral cystic changes and degenerative pannus   formation at the craniocervical junction at the level of the midportion of C2. There is a stable appearance to the craniocervical junction and C2 level as compared to 12/21/2024. Multilevel facet disease left greater than right. No prevertebral edema.      Impression    IMPRESSION:  1.  No acute cervical spine fracture.   XR Shoulder Left 2 Views    Narrative    EXAM: XR SHOULDER LEFT 2 VIEWS  LOCATION: [facility]  DATE: 7/9/2025    INDICATION: fall, pain  COMPARISON: 02/15/2024       Impression    IMPRESSION: Demineralization without displaced fracture. 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 Severe glenohumeral joint osteoarthritis. Superior screw fixation of the humerus likely  indicating underlying   rotator cuff pathology. Left chest pacer. Redemonstrated lucent finding the proximal humerus which may represent sequela of prior biceps tenodesis.    CT Lumbar Spine w/o Contrast    Narrative    EXAM: CT LUMBAR SPINE W/O CONTRAST  LOCATION: [facility]  DATE: 7/9/2025    INDICATION: fall, low back and left leg pain. pt declining MRI  COMPARISON: None.  TECHNIQUE: Routine CT Lumbar Spine without IV contrast. Multiplanar reformats. Dose reduction techniques were used.     FINDINGS:  Vertebral body heights are maintained. Extensive multilevel loss of disc height. Multilevel Schmorl's nodes. Multilevel anterior osteophytic changes. Mild straightening of the normal lumbar curvature. Moderate canal stenotic findings at L2-3, and   postoperative changes of left hemilaminectomy at L3-4. Moderate canal stenosis at this level. Moderate canal stenosis at L4-5 with postoperative changes of left hemilaminectomy. No paravertebral edematous changes.    Moderate bilateral foraminal narrowing at L4-5 and L3-4. Moderate bilateral foraminal narrowing at L2-3. Mild bilateral foraminal narrowing at L1-2.      Impression    IMPRESSION:  1.  Multilevel degenerative changes. No acute fracture.

## 2025-07-10 NOTE — PLAN OF CARE
Problem: Pain Acute  Goal: Optimal Pain Control and Function  Outcome: Progressing   Goal Outcome Evaluation:         Neuro: Disoriented to time, place, person and situation  Cardiac: Afebrile, VSS.   Respiratory: RA  GI/: Voiding spontaneously. No BM this shift.  Diet/appetite: Tolerating low saturated fat diet. Denies nausea.  Activity: not OOB  Pain: complains of pain but explain where pain is  Skin: No new deficits noted.  Lines: piv SL  Drains: no  Replacements: no  Cardiac device check has been done.

## 2025-07-10 NOTE — CONSULTS
Care Management Initial Consult    General Information  Assessment completed with: Patient, Family,    Type of CM/SW Visit: Initial Assessment    Primary Care Provider verified and updated as needed:     Readmission within the last 30 days: no previous admission in last 30 days         Advance Care Planning: Advance Care Planning Reviewed: present on chart          Communication Assessment  Patient's communication style: spoken language (English or Bilingual)    Hearing Difficulty or Deaf: no (Patient unable to answer)   Wear Glasses or Blind: other (see comments)    Cognitive  Cognitive/Neuro/Behavioral: .WDL except, orientation  Level of Consciousness: confused  Arousal Level: arouses to voice  Orientation: disoriented x 4  Mood/Behavior: cooperative     Speech: illogical    Living Environment:   People in home: facility resident     Current living Arrangements: assisted living      Able to return to prior arrangements: yes       Family/Social Support:  Care provided by: other (see comments) (staff)  Provides care for: no one, unable/limited ability to care for self  Marital Status:   Support system: Wife          Description of Support System: Supportive    Support Assessment: Adequate family and caregiver support    Current Resources:   Patient receiving home care services: No        Community Resources:    Equipment currently used at home: cane, straight  Supplies currently used at home:      Employment/Financial:  Employment Status: retired        Financial Concerns:     Referral to Financial Worker: No       Does the patient's insurance plan have a 3 day qualifying hospital stay waiver?  No    Lifestyle & Psychosocial Needs:  Social Drivers of Health     Food Insecurity: Unknown (7/10/2025)    Food Insecurity     Within the past 12 months, did you worry that your food would run out before you got money to buy more?: Patient unable to answer     Within the past 12 months, did the food you bought just  not last and you didn t have money to get more?: Patient unable to answer   Depression: Not at risk (2/21/2025)    PHQ-2     PHQ-2 Score: 0   Housing Stability: Unknown (7/10/2025)    Housing Stability     Do you have housing? : Patient unable to answer     Are you worried about losing your housing?: Patient unable to answer   Tobacco Use: Low Risk  (6/5/2025)    Patient History     Smoking Tobacco Use: Never     Smokeless Tobacco Use: Never     Passive Exposure: Not on file   Financial Resource Strain: Unknown (7/10/2025)    Financial Resource Strain     Within the past 12 months, have you or your family members you live with been unable to get utilities (heat, electricity) when it was really needed?: Patient unable to answer   Alcohol Use: Not on file   Transportation Needs: Unknown (7/10/2025)    Transportation Needs     Within the past 12 months, has lack of transportation kept you from medical appointments, getting your medicines, non-medical meetings or appointments, work, or from getting things that you need?: Patient unable to answer   Physical Activity: Not on file   Interpersonal Safety: Not on file   Stress: Not on file   Social Connections: Unknown (8/18/2024)    Received from [employer] [facility]    Social Connections     Frequency of Communication with Friends and Family: Not on file   Health Literacy: Not on file       Functional Status:  Prior to admission patient needed assistance:   Dependent ADLs:: Ambulation-cane, Independent  Dependent IADLs:: Transportation, Meal Preparation, Cleaning, Cooking, Shopping  Assesssment of Functional Status: Not at baseline with ADL Functioning    Mental Health Status:  Mental Health Status: No Current Concerns       Chemical Dependency Status:  Chemical Dependency Status: No Current Concerns             Values/Beliefs:  Spiritual, Cultural Beliefs, Restorationist Practices, Values that affect care:                 Discussed  Partnership in Safe  Discharge Planning  document with patient/family: No    Additional Information:  SW spoke with pt's daughter to introduce role of CM, complete  initial assessment, and to discuss needs at time of d/c. Pt comes from home; lives at [facility], and noted to be somewhat independent at baseline with assistive devices. Wife lives with pt at this time but will be moving into memory care which has been causing significant stress on pt. Therapy is consulted; TCU is anticipated to be recommended by care team.     CM to continue to follow through hospitalization and for recommendations.  12:40 PM    Brenna Kjellberg, BSW LSW  7/10/2025

## 2025-07-10 NOTE — PHARMACY-ANTICOAGULATION SERVICE
Clinical Pharmacy - Warfarin Dosing Consult     Pharmacy has been consulted to manage this patient s warfarin therapy.  Indication: Atrial Fibrillation  Therapy Goal: INR 2-3  Provider/Team: [facility]  OP Anticoag Clinic: [facility]  Warfarin Prior to Admission: Yes  Warfarin PTA Regimen: Friday 5 mg, 2.5 mg all other days  Significant drug interactions: none new  Recent documented change in oral intake/nutrition: Unknown  Dose Comments: no dose (patient took usual 2.5 mg dose at home prior to admission)     INR   Date Value Ref Range Status   07/09/2025 2.52 (H) 0.85 - 1.15 Final   01/04/2025 3.01 (H) 0.85 - 1.15 Final       Recommend no dose today (patient took usual 2.5 mg dose at home prior to admission).  Pharmacy will monitor Skip Newman daily and order warfarin doses to achieve specified goal.      Please contact pharmacy as soon as possible if the warfarin needs to be held for a procedure or if the warfarin goals change.      Nicollette McMann, PharmD, BCPS

## 2025-07-10 NOTE — PROGRESS NOTES
"Daily Progress Note    Assessment/Plan:  89-year-old male who resides in assisted living admitted with seizure like symptoms.    Seizure activity: Seen by neurology, EEG ordered and started on Keppra.  Acute metabolic encephalopathy: Could have some underlying dementia contributing to this and seizure.  His daughter has noticed some confusion recently.  Chronic diastolic heart failure: Resume home meds  Coronary artery disease: Resume home meds  CKD stage IIIa: Baseline creatinine over the last year has been 1.4-1.7.  1.73 on admission and 1.66 today.  Will continue to trend.  History of atrial fibrillation status post PPM.  Resume home meds  Clinically Significant Risk Factors Present on Admission         # Hyponatremia: Lowest Na = 133 mmol/L in last 2 days, will monitor as appropriate        # Drug Induced Coagulation Defect: home medication list includes an anticoagulant medication    # Hypertension: Noted on problem list  # Chronic heart failure with reduced ejection fraction: last echo with EF <40%          # Overweight: Estimated body mass index is 25.21 kg/m  as calculated from the following:    Height as of this encounter: 1.702 m (5' 7\").    Weight as of this encounter: 73 kg (160 lb 15 oz).       # Financial/Environmental Concerns:     # Pacemaker present          Code status:Full Code        Barriers to Discharge: Seizure evaluation, assessing medication changes, PT and OT ordered for disposition recommendations    Disposition: Anticipate discharge in 1 to 2 days    Subjective:  Alen seemed confused this morning.  I called and spoke with his daughter Rashmi who states that this is not typical for him but she has noticed some cognitive changes in the last couple weeks.  He did fall 1 week ago today.  CT showed no evidence of bleed which should be apparent 1 week later.  No falls since then, unable to do MRI due to device.        Current Medications Reviewed via EHR List    Objective:  Vital signs in last 24 " hours:  [unfilled]  .prog  Weight:   @THISENCWEIGHTS(1)@  Weight change:   Body mass index is 25.21 kg/m .    Intake/Output last 3 shifts:  No intake/output data recorded.  Intake/Output this shift:  No intake/output data recorded.    Physical Exam:  General: This morning was cooperative but confused   CV: Regular rate and rhythm  Lungs: Clear to auscultation  Abdomen: Soft, nontender        Imaging:  Personally Reviewed.  Cardiac Device Check - Remote (Standing ORD 5 count)  Result Date: 7/10/2025  Type: [employer] remote pacemaker transmission done at [facility].  Courtesy check. Rep notes: n/a. Presenting rhythm: ventricular sensing 80-85 bpm. Battery longevity: 4yrs, 11mo estimated. Lead status: stable. Atrial high rates: n/a. Anticoagulant: Coumadin. Ventricular high rates: since 6/6/25; none detected. Comments: Normal magnet and pacemaker function. Plan: Patient scheduled for annual device check on 8/5/25 at 2:00PM at our [place] location. MAYLIN Emerson, Device Specialist Device follow up for the entirety of having the device, based on best practices determined by [employer] and in Compliance with Medicare guidelines. Continue remote device monitoring per patient plan. I have reviewed and interpreted the device interrogation, settings, programming, and encounter summary. The device is functioning within normal device parameters. I agree with the current findings, assessment and plan.    CT Lumbar Spine w/o Contrast  Result Date: 7/9/2025  EXAM: CT LUMBAR SPINE W/O CONTRAST LOCATION: [facility] DATE: 7/9/2025 INDICATION: fall, low back and left leg pain. pt declining MRI COMPARISON: None. TECHNIQUE: Routine CT Lumbar Spine without IV contrast. Multiplanar reformats. Dose reduction techniques were used. FINDINGS: Vertebral body heights are maintained. Extensive multilevel loss of disc height. Multilevel Schmorl's nodes. Multilevel anterior osteophytic changes. Mild  straightening of the normal lumbar curvature. Moderate canal stenotic findings at L2-3, and postoperative changes of left hemilaminectomy at L3-4. Moderate canal stenosis at this level. Moderate canal stenosis at L4-5 with postoperative changes of left hemilaminectomy. No paravertebral edematous changes. Moderate bilateral foraminal narrowing at L4-5 and L3-4. Moderate bilateral foraminal narrowing at L2-3. Mild bilateral foraminal narrowing at L1-2.     IMPRESSION: 1.  Multilevel degenerative changes. No acute fracture.    CT Cervical Spine w/o Contrast  Result Date: 7/9/2025  EXAM: CT CERVICAL SPINE W/O CONTRAST LOCATION: [facility] DATE: 7/9/2025 INDICATION: fall, confused, unclear if head or neck trauma COMPARISON: None. TECHNIQUE: Routine CT Cervical Spine without IV contrast. Multiplanar reformats. Dose reduction techniques were used. FINDINGS: Vertebral body heights are maintained. Multilevel loss of disc height. No jumped or perched facets. Spinous processes are intact. Degenerative pannus at the craniocervical junction. Stable appearance to subchondral cystic changes and degenerative pannus formation at the craniocervical junction at the level of the midportion of C2. There is a stable appearance to the craniocervical junction and C2 level as compared to 12/21/2024. Multilevel facet disease left greater than right. No prevertebral edema.     IMPRESSION: 1.  No acute cervical spine fracture.    XR Shoulder Left 2 Views  Result Date: 7/9/2025  EXAM: XR SHOULDER LEFT 2 VIEWS LOCATION: [facility] DATE: 7/9/2025 INDICATION: fall, pain COMPARISON: 02/15/2024     IMPRESSION: Demineralization without displaced fracture. 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 Severe glenohumeral joint osteoarthritis. Superior screw fixation of the humerus likely indicating underlying  rotator cuff pathology. Left chest pacer.  Redemonstrated lucent finding the proximal humerus which may represent sequela of prior biceps tenodesis.     Head CT w/o contrast  Result Date: 2025  EXAM: CT HEAD W/O CONTRAST LOCATION: [facility] DATE: 2025 INDICATION: seizure, anticoagulated (recent fall?) COMPARISON: 1/3/2025 TECHNIQUE: Routine CT Head without IV contrast. Multiplanar reformats. Dose reduction techniques were used. FINDINGS: INTRACRANIAL CONTENTS: No intracranial hemorrhage, extraaxial collection, or mass effect.  No CT evidence of acute infarct. Moderate presumed chronic small vessel ischemic changes. Moderate generalized volume loss. No hydrocephalus. Intracranial atheromatous disease. VISUALIZED ORBITS/SINUSES/MASTOIDS: No intraorbital abnormality. No paranasal sinus mucosal disease. No middle ear or mastoid effusion. BONES/SOFT TISSUES: No acute abnormality.     IMPRESSION: 1.  No acute findings.    Echocardiogram Complete  Result Date: 2025  [id number]^LUIS ALBERTO^NICOLETTE^[place] [address]  Name: EDUARDO GENAO MRN: [id number] : 1934 Study Date: 2025 12:52 PM Age: 89 yrs Gender: Male Patient Location: [facility] Reason For Study: Dilated cardiomyopathy (H) Ordering Physician: Nicolette Joshua MD Referring Physician: Nicolette Joshua MD Performed By: KELLY^^^^  BSA: 1.9 m2 Height: 67 in Weight: 166 lb HR: 65 BP: 153/81 mmHg ______________________________________________________________________________ Procedure Echocardiogram with two-dimensional, color and spectral Doppler. ______________________________________________________________________________ Interpretation Summary  1. The left ventricle is normal in size. Left ventricular systolic performance is moderate to severely reduced. The estimated ejection fraction is 25-30%. 2. There is moderate global reduction in left ventricular systolic performance with basal-mid posterior and  inferior wall akinesis. 3. There is mild aortic insufficiency. 4. There is mild to moderate mitral insufficiency. 5. Borderline right ventricular enlargement with low normal right ventricular systolic performance. 6. There is moderate left atrial enlargement.  When compared to the prior real-time echocardiogram dated 3 January 2025, the acoustic quality and endocardial resolution on the current examination is somewhat better than the previous. There is perhaps a subtle increase in the overall ejection fraction though overall appears fairly similar. The reduction in left ventricular systolic performance now appears more regional in nature with basal-mid posterior and inferior wall akinesis (this not necessarily felt to be a significant change, but more of a reflection of the improved endocardial resolution on the current study allowing for better assessment of segmental function). ______________________________________________________________________________ Left ventricle: The left ventricle is normal in size. Left ventricular systolic performance is moderate to severely reduced. The estimated ejection fraction is 25-30%. There is moderate global reduction in left ventricular systolic performance with basal-mid posterior and inferior wall akinesis. Left ventricular wall thickness is normal.  Right ventricle: Borderline right ventricular enlargement with low normal right ventricular systolic performance. There is a pacing electrode noted in the right-sided chambers.  Left atrium: There is moderate left atrial enlargement.  Right atrium: There is borderline right atrial enlargement.  IVC: The IVC is borderline dilated.  Aortic valve: The aortic valve is comprised of three cusps. There is mild aortic valve sclerosis without significant stenosis. There is mild aortic insufficiency.  Mitral valve: There is mild mitral annular calcification. There is mild nonspecific mitral valve leaflet thickening. There is mild to  moderate mitral insufficiency.  Tricuspid valve: The tricuspid valve is grossly morphologically normal. There is mild tricuspid insufficiency.  Pulmonic valve: The pulmonic valve is grossly morphologically normal. There is trace pulmonic insufficiency.  Thoracic aorta: The aortic root and proximal ascending aorta are of normal dimension.  Pericardium: There is no significant pericardial effusion. ______________________________________________________________________________ ______________________________________________________________________________ MMode/2D Measurements & Calculations IVSd: 0.68 cm LVIDd: 5.1 cm LVIDs: 4.4 cm LVPWd: 0.68 cm FS: 13.7 % LV mass(C)d: 115.7 grams LV mass(C)dI: 62.0 grams/m2 Ao root diam: 3.4 cm LA dimension: 4.1 cm asc Aorta Diam: 3.3 cm LA/Ao: 1.2 LVOT diam: 2.2 cm LVOT area: 3.6 cm2 Ao root diam index Ht(cm/m): 2.0 Ao root diam index BSA (cm/m2): 1.8 Asc Ao diam index BSA (cm/m2): 1.8 Asc Ao diam index Ht(cm/m): 1.9 EF Biplane: 32.5 % LA Volume (BP): 75.1 ml  LA Volume Index (BP): 40.2 ml/m2 LA Volume Indexed (AL/bp): 43.1 ml/m2 RWT: 0.27 TAPSE: 0.92 cm  Time Measurements MM HR: 65.0 BPM  Doppler Measurements & Calculations MV E max dale: 105.0 cm/sec MV A max dale: 34.6 cm/sec MV E/A: 3.0 MV dec time: 0.15 sec Ao V2 max: 154.0 cm/sec Ao max P.0 mmHg Ao V2 mean: 113.0 cm/sec Ao mean P.0 mmHg Ao V2 VTI: 34.4 cm DRE(I,D): 1.5 cm2 DRE(V,D): 1.7 cm2 AI P1/2t: 413.6 msec LV V1 max P.0 mmHg LV V1 max: 70.7 cm/sec LV V1 VTI: 14.0 cm SV(LVOT): 50.8 ml SI(LVOT): 27.2 ml/m2 PA acc time: 0.08 sec TR max dale: 300.0 cm/sec TR max P.0 mmHg AV Dale Ratio (DI): 0.46 DRE Index (cm2/m2): 0.79 E/E': 6.1 E/E' avg: 10.2  Lateral E/e': 6.1 Medial E/e': 14.2 Peak E' Dale: 17.1 cm/sec RV S Dale: 8.6 cm/sec  ______________________________________________________________________________ Report approved by: Orlando Villasenor MD on 2025 08:14 AM         Lab Results:  Personally Reviewed.  "  Fingerstick Blood Glucose: @[id number](POCGLUFGR:10)@    Last Hbg A1C: No results found for: \"HGBA1C\"   Lab Results   Component Value Date    INR 2.62 (H) 07/10/2025    INR 2.52 (H) 07/09/2025    INR 3.01 (H) 01/04/2025    PROTIME 27.8 (H) 07/10/2025    PROTIME 27.0 (H) 07/09/2025     Recent Results (from the past 24 hours)   CBC (+ platelets, no diff)    Collection Time: 07/09/25  5:57 PM   Result Value Ref Range    WBC Count 9.5 4.0 - 11.0 10e3/uL    RBC Count 4.11 (L) 4.40 - 5.90 10e6/uL    Hemoglobin 13.1 (L) 13.3 - 17.7 g/dL    Hematocrit 38.8 (L) 40.0 - 53.0 %    MCV 94 78 - 100 fL    MCH 31.9 26.5 - 33.0 pg    MCHC 33.8 31.5 - 36.5 g/dL    RDW 14.9 10.0 - 15.0 %    Platelet Count 134 (L) 150 - 450 10e3/uL   Basic metabolic panel    Collection Time: 07/09/25  5:57 PM   Result Value Ref Range    Sodium 133 (L) 135 - 145 mmol/L    Potassium 5.2 3.4 - 5.3 mmol/L    Chloride 100 98 - 107 mmol/L    Carbon Dioxide (CO2) 17 (L) 22 - 29 mmol/L    Anion Gap 16 (H) 7 - 15 mmol/L    Urea Nitrogen 53.4 (H) 8.0 - 23.0 mg/dL    Creatinine 1.73 (H) 0.67 - 1.17 mg/dL    GFR Estimate 37 (L) >60 mL/min/1.73m2    Calcium 9.6 8.8 - 10.4 mg/dL    Glucose 135 (H) 70 - 99 mg/dL   Lactic Acid Whole Blood with 1X Repeat in 2 HR when >2    Collection Time: 07/09/25  5:57 PM   Result Value Ref Range    Lactic Acid, Initial 5.7 (HH) 0.7 - 2.0 mmol/L   Troponin T, High Sensitivity    Collection Time: 07/09/25  5:57 PM   Result Value Ref Range    Troponin T, High Sensitivity 59 (H) <=22 ng/L   INR    Collection Time: 07/09/25  5:57 PM   Result Value Ref Range    INR 2.52 (H) 0.85 - 1.15    PT 27.0 (H) 11.8 - 14.8 Seconds   Lactic acid whole blood    Collection Time: 07/09/25  8:10 PM   Result Value Ref Range    Lactic Acid 1.1 0.7 - 2.0 mmol/L   Troponin T, High Sensitivity    Collection Time: 07/09/25  8:10 PM   Result Value Ref Range    Troponin T, High Sensitivity 62 (H) <=22 ng/L   INR    Collection Time: 07/10/25  5:23 AM   Result " Value Ref Range    INR 2.62 (H) 0.85 - 1.15    PT 27.8 (H) 11.8 - 14.8 Seconds   Basic metabolic panel    Collection Time: 07/10/25  5:23 AM   Result Value Ref Range    Sodium 136 135 - 145 mmol/L    Potassium 4.4 3.4 - 5.3 mmol/L    Chloride 102 98 - 107 mmol/L    Carbon Dioxide (CO2) 21 (L) 22 - 29 mmol/L    Anion Gap 13 7 - 15 mmol/L    Urea Nitrogen 49.6 (H) 8.0 - 23.0 mg/dL    Creatinine 1.66 (H) 0.67 - 1.17 mg/dL    GFR Estimate 39 (L) >60 mL/min/1.73m2    Calcium 9.3 8.8 - 10.4 mg/dL    Glucose 97 70 - 99 mg/dL   UA with Microscopic reflex to Culture    Collection Time: 07/10/25  8:58 AM    Specimen: Urine, Midstream   Result Value Ref Range    Color Urine Light Yellow Colorless, Straw, Light Yellow, Yellow    Appearance Urine Clear Clear    Glucose Urine Negative Negative mg/dL    Bilirubin Urine Negative Negative    Ketones Urine Negative Negative mg/dL    Specific Gravity Urine 1.016 1.001 - 1.030    Blood Urine Negative Negative    pH Urine 5.0 5.0 - 7.0    Protein Albumin Urine 10 (A) Negative mg/dL    Urobilinogen Urine Normal Normal mg/dL    Nitrite Urine Negative Negative    Leukocyte Esterase Urine Negative Negative    RBC Urine <1 <=2 /HPF    WBC Urine <1 <=5 /HPF    Hyaline Casts Urine 4 (H) <=2 /LPF   Cardiac Device Check - Remote (Standing ORD 5 count)    Collection Time: 07/10/25 10:37 AM   Result Value Ref Range    Date Time Interrogation Session [id number]     Implantable Pulse Generator  Medtronic     Implantable Pulse Generator Model [id number] Adapta     Implantable Pulse Generator Serial Number [id number]     Type Interrogation Session Remote     Clinic Name [facility]     Implantable Pulse Generator Type Pacemaker     Implantable Pulse Generator Implant Date 20151027     Implantable Lead  Medtronic     Implantable Lead Model 5076 CapSureFix Novus     Implantable Lead Serial Number [id number]     Implantable Lead Implant Date 20090127     Implantable  Lead Polarity Type Bipolar Lead     Implantable Lead Location Detail 1 SEPTUM     Implantable Lead Location Right Ventricle     Implantable Lead Connection Status Connected     Implantable Lead  Medtronic     Implantable Lead Model 5076 CapSureFix Novus     Implantable Lead Serial Number [id number]     Implantable Lead Implant Date 20090127     Implantable Lead Polarity Type Bipolar Lead     Implantable Lead Location Detail 1 APPENDAGE     Implantable Lead Location Right Atrium     Implantable Lead Connection Status Connected     Robby Setting Mode (NBG Code) VVIR     Robby Setting Lower Rate Limit 60 [beats]/min    Robby Setting Maximum Tracking Rate 105 [beats]/min    Robby Setting Maximum Sensor Rate 120 [beats]/min    Lead Channel Setting Sensing Polarity Bipolar     Lead Channel Setting Sensing Sensitivity 5.60 mV    Lead Channel Setting Pacing Polarity Bipolar     Lead Channel Setting Pacing Pulse Width 0.40 ms    Lead Channel Setting Pacing Amplitude 1.875     Lead Channel Setting Pacing Capture Mode Adaptive     Zone Setting Type Category VF     Zone Setting Vendor Type Category V High Rate     Zone Setting Status Monitor     Zone Setting Detection Interval 375 ms    Zone Setting Type Category ATRIAL_FIBRILLATION     Zone Setting Vendor Type Category FastATAF     Zone Setting Status Monitor     Lead Channel Impedance Value 67 ohm    Lead Channel Impedance Value 459 ohm    Lead Channel Pacing Threshold Amplitude 1.250 V    Lead Channel Pacing Threshold Pulse Width 0.40 ms    Battery Date Time of Measurements 20250709223300     Battery Status OK     Battery Remaining Longevity 59 mo    Battery Voltage 2.76 V    Battery Impedance 1,618 ohm    Robby Statistic Date Time Start [id number]     Robby Statistic Date Time End 20250709223300     Robby Statistic RV Percent Paced 12 %    Atrial Tachy Statistic Date Time Start [id number]     Atrial Tachy Statistic Date Time End [id number]     Atrial  Tachy Statistic AT/AF [place] Percent 0 %    Episode Statistic Recent Count 0     Episode Statistic Type Category AT/AF     Episode Statistic Recent Count 0     Episode Statistic Type Category VF     Episode Statistic Recent Date Time Start [id number]     Episode Statistic Recent Date Time End [id number]     Episode Statistic Recent Date Time Start 08616417294557     Episode Statistic Recent Date Time End [id number]        Medically Ready for Discharge: Anticipated in 2-4 Days       Advanced Care Planning    Medical Decision Making       50 MINUTES SPENT BY ME on the date of service doing chart review, history, exam, documentation & further activities per the note.      Drew Romero MD  Date: 7/10/2025  Time: 12:17 PM  [facility]  Family Medicine

## 2025-07-10 NOTE — SUMMARY OF CARE
Patient admitted to room 09 at approximately 2225 via cart from emergency room.    Patient ambulated/transferred:  with two assist. air ingrid.    Detailed List of Belongings (be very specific listing out each item):  Sweater  Pants  Shirt

## 2025-07-10 NOTE — PROGRESS NOTES
Patient admitted to room 09 at approximately 2230 via cart from emergency room.  Reason for Admission:   Report received from:   Patient was accompanied by Self.  Discharge transportation provided by:  Patient ambulated/transferred:  with 4 assist. nabila.  Patient is alert but disoriented x4.  Outpatient Observation education provided to: (patient, family, friend)  MDRO Education done if applicable (MRSA, VRE, etc)  Safety risks were identified during admission:  fall.   Yellow risk/fall band applied:  Yes  Home meds sent home: No  Home meds sent to pharmacy:No IF YES add 1/2 sheet laminated page reminder to chart/clipboard

## 2025-07-10 NOTE — PROGRESS NOTES
Bedside EEG was performed. Wake, drowsy, and sleep were obtained.   Activations completed were: (eyes open/eyes closed, mental activations, photic)   Activations omitted & reasoning: Hyperventilations due to patient state  Patient's mental status was: (alert/disoriented X2 confused,uncooperative)  Was the neurologist consulted regarding significant waveforms or interventions needed? N/a  Skin intact? yes     EEG #   EEG system used: [id number]    Neurologist dictation to follow.

## 2025-07-10 NOTE — ED NOTES
[facility] ED Handoff Report    ED Chief Complaint: AMS    ED Diagnosis:  (R56.9) Seizure-like activity (H)    (R56.9) Postictal state (H)         PMH:    Past Medical History:   Diagnosis Date    Arthritis     Atrial fibrillation     Cardiomyopathy (H)     Chronic kidney disease     Coronary artery disease     CVA (cerebral vascular accident) (H) 07/17/2020    Elevated brain natriuretic peptide (BNP) level 03/13/2024    Elevated troponin 02/15/2024    Fall-- found down, > 12 hrs 02/15/2024    Gout     History of transfusion     Hyperlipidemia     Hypertension     Impotence of organic origin     Kidney stones     Non-traumatic rhabdomyolysis 02/15/2024    Sick sinus syndrome -- S/P Medtronic PPM     Spinal stenosis, lumbar region, with neurogenic claudication     Syncope 08/25/2008    Thrombocytopenia         Code Status:  Prior     Falls Risk: Yes Band: Applied    Current Living Situation/Residence: lives in an assisted living facility     Elimination Status: Continent: wears briefs     Activity Level: 2 assist    Patients Preferred Language:  English     Needed: No    Vital Signs:  BP (!) 149/71   Pulse 83   Temp 98.9  F (37.2  C) (Temporal)   Resp 15   SpO2 97%      Cardiac Rhythm: SR first degree AVB.    Pain Score: Patient is unable to quantify.    Is the Patient Confused:  Yes Where is the patient located?    Last Food or Drink: 07/09/2025 at morning    Focused Assessment:  Pt is alert; confused; not answering questions appropriately. Intermittently yelling out but not trying to get out of bed; reassured by voice. Pt unable to quantify pain; intermittently sleeping and then waking up. Repositioned for comfort when pt yelling out; this seems to help pt for comfort.    Tests Performed: Done: Labs and Imaging    Treatments Provided:  See pt's chart.    Family Dynamics/Concerns: No    Family Updated On Visitor Policy: Yes    Plan of Care Communicated to Family: Yes    Who Was Updated about  Plan of Care: Pt's family was here earlier today.    Belongings Checklist Done and Signed by Patient: Yes    Belongings Sent with Patient: Remains with pt.    Medications sent with patient: NA.    Covid: asymptomatic , not tested.    Additional Information: Pt is confused; yells out intermittently when awake; calm when assured by voice.     RN: Imtiaz Thomas RN 7/9/2025 10:01 PM

## 2025-07-10 NOTE — CONSULTS
[facility]  [place]    Skip Newman MRN# [id number]   Age: 89 year old YOB: 1934               Assessment and Plan:      Seizure      By description this sounds like a generalized tonic-clonic seizure, however there could be some lateralization to it.     We will get an EEG to see if there is any clues about a seizure focus.  New onset seizure in this age group with suggestion of some cognitive impairment likely indicates seizures will continue if not treated.  For now I think the wisest course is to start him on Keppra.  Will start with 250 mg twice a day given some renal insufficiency (Keppra is not nephrotoxic but is renally cleared).      CT of the head is unremarkable, notes indicate his pacemaker is not MRI compatible and I think the CT is good enough for our purposes here.  There was history of fall a week ago, CT would show if there was any hemorrhage or contusion related to that.    Okay to start mobilizing him with physical therapy  Okay to continue anticoagulation for his A-fib to prevent strokes, however if falling seems to be a significant risk may have to reconsider anticoagulation.    He has been seen in our clinic and should follow-up with neurology in 2 to 3 months regarding new diagnosis of seizure.  Okay to discharge at any point from my perspective.            Chief Complaint/HPI:     7/10/25:  This patient is a 89-year-old gentleman seen for neurologic evaluation today regarding seizure-like episode yesterday.  Yesterday afternoon he was sitting in his chair at assisted living when he tensed up and started shaking.  Notes indicate that he was not moving the right side.  1 report describes the patient leaning back and to the right side while in his chair.  There is report of a fall 6 days prior, the CT [facility] at [facility] shows no evidence of intracerebral hemorrhage (he is on warfarin for history of A-fib and cardiomyopathy).  The patient himself does not  remember anything of these events.  In the ER initially he showed decreased responsiveness but that improved over about an hour.  Initial lactate was quite elevated at 5.7 and was normal about 2 hours later.            Past Medical History:    has a past medical history of Arthritis, Atrial fibrillation, Cardiomyopathy (H), Chronic kidney disease, Coronary artery disease, CVA (cerebral vascular accident) (H) (07/17/2020), Elevated brain natriuretic peptide (BNP) level (03/13/2024), Elevated troponin (02/15/2024), Fall-- found down, > 12 hrs (02/15/2024), Gout, History of transfusion, Hyperlipidemia, Hypertension, Impotence of organic origin, Kidney stones, Non-traumatic rhabdomyolysis (02/15/2024), Sick sinus syndrome -- S/P Medtronic PPM, Spinal stenosis, lumbar region, with neurogenic claudication, Syncope (08/25/2008), and Thrombocytopenia.          Past Surgical History:    has a past surgical history that includes Insert / Replace / Remove Pacemaker (01/01/2009); Coronary Stent Placement (01/01/2001); Total Hip Arthroplasty (Left, 2015); Tonsillectomy (1940); other surgical history (11/01/2018); Hemorrhoid surgery (01/01/1989); Total Knee Arthroplasty (Left, 07/08/2019); and Total Knee Arthroplasty (Right, 01/13/2020).          Social History:     Social History     Tobacco Use    Smoking status: Never    Smokeless tobacco: Never   Substance Use Topics    Alcohol use: No             Family History:     Family History   Problem Relation Age of Onset    Heart Failure Mother     Heart Failure Father                 Allergies:     Allergies   Allergen Reactions    Lisinopril Cough    Indomethacin Rash    Naproxen Rash             Medications:     Current Facility-Administered Medications:     acetaminophen (TYLENOL) tablet 650 mg, 650 mg, Oral, Q4H PRN **OR** acetaminophen (TYLENOL) Suppository 650 mg, 650 mg, Rectal, Q4H PRN, Los Moreno MD    alum & mag hydroxide-simethicone (MAALOX) suspension 30 mL, 30  mL, Oral, Q4H PRN, Los Moreno MD    atorvastatin (LIPITOR) tablet 20 mg, 20 mg, Oral, QPM, Los Moreno MD    [Held by provider] chlorthalidone (HYGROTON) tablet 25 mg, 25 mg, Oral, QPM, Los Moreno MD    hydrALAZINE (APRESOLINE) injection 10-20 mg, 10-20 mg, Intravenous, Q30 Min PRN, Los Moreno MD    hydrALAZINE (APRESOLINE) tablet 10 mg, 10 mg, Oral, TID, Los Moreno MD, 10 mg at 07/10/25 0924    HYDROmorphone (DILAUDID) injection 0.2 mg, 0.2 mg, Intravenous, Q2H PRN, Los Moreno MD, 0.2 mg at 07/10/25 0401    HYDROmorphone (DILAUDID) injection 0.4 mg, 0.4 mg, Intravenous, Q2H PRN, Los Moreno MD    labetalol (NORMODYNE/TRANDATE) injection 10-20 mg, 10-20 mg, Intravenous, Q10 Min PRN, Los Moreno MD    melatonin tablet 1 mg, 1 mg, Oral, At Bedtime PRN, Los Moreno MD    metoprolol succinate ER (TOPROL XL) 24 hr tablet 100 mg, 100 mg, Oral, BID, Los Moreno MD, 100 mg at 07/10/25 0925    naloxone (NARCAN) injection 0.2 mg, 0.2 mg, Intravenous, Q2 Min PRN **OR** naloxone (NARCAN) injection 0.4 mg, 0.4 mg, Intravenous, Q2 Min PRN **OR** naloxone (NARCAN) injection 0.2 mg, 0.2 mg, Intramuscular, Q2 Min PRN **OR** naloxone (NARCAN) injection 0.4 mg, 0.4 mg, Intramuscular, Q2 Min PRN, Los Moreno MD    nitroGLYcerin (NITRODUR) 0.2 MG/HR 24 hr patch 1 patch, 1 patch, Transdermal, Daily, Los Moreno MD, 1 patch at 07/10/25 0926    ondansetron (ZOFRAN ODT) ODT tab 4 mg, 4 mg, Oral, Q6H PRN **OR** ondansetron (ZOFRAN) injection 4 mg, 4 mg, Intravenous, Q6H PRN, Los Moreno MD    oxyCODONE (ROXICODONE) tablet 5 mg, 5 mg, Oral, Q4H PRN, Los Moreno MD    oxyCODONE IR (ROXICODONE) half-tab 2.5 mg, 2.5 mg, Oral, Q4H PRN, Los Moreno MD    Patient is already receiving anticoagulation with heparin, enoxaparin (LOVENOX), warfarin (COUMADIN)  or other anticoagulant medication, , Does not apply, Continuous PRN, Josh,  "MD Los    polyethylene glycol (MIRALAX) Packet 17 g, 17 g, Oral, BID PRN, Los Moreno MD    prochlorperazine (COMPAZINE) injection 5 mg, 5 mg, Intravenous, Q6H PRN **OR** prochlorperazine (COMPAZINE) tablet 5 mg, 5 mg, Oral, Q6H PRN, Los Moreno MD    senna-docusate (SENOKOT-S/PERICOLACE) 8.6-50 MG per tablet 1 tablet, 1 tablet, Oral, BID PRN **OR** senna-docusate (SENOKOT-S/PERICOLACE) 8.6-50 MG per tablet 2 tablet, 2 tablet, Oral, BID PRN, Los Moreno MD    [Held by provider] spironolactone (ALDACTONE) tablet 25 mg, 25 mg, Oral, Daily, Los Moreno MD    warfarin ANTICOAGULANT (COUMADIN/JANTOVEN) tablet 2.5 mg, 2.5 mg, Oral, ONCE at 18:00, Chago Romero MD    Warfarin Dose Required Daily - Pharmacist Managed, 1 each, Oral, See Admin Instructions, Los Moreno MD              Physical Exam:      Vitals: BP (!) 177/81   Pulse 77   Temp 98  F (36.7  C) (Axillary)   Resp 20   Ht 1.702 m (5' 7\")   Wt 73 kg (160 lb 15 oz)   SpO2 96%   BMI 25.21 kg/m    BMI= Body mass index is 25.21 kg/m .     Patient is alert and in no acute distress. Neck was supple, no carotid bruits, thyromegaly, lymphadenopathy or JVD noted.   Neurological Exam:   Mental status: Patient is awake and alert, he knows [facility] but cannot tell me the month. Speech is clear and fluent    CN II: PERRLA.   CN III, IV, VI: EOMI.    CN VII: Face is symmetric   CN VII: Hearing is normal to conversation   CN IX, X: Phonation is normal.    CN XI: Head turning and shoulder shrug are intact   CN XII: Tongue is midline with normal movements and no atrophy or fasciculations.   Motor: Muscle bulk and tone are normal. No pronator drift. Strength is 5/5 bilaterally. No fasciculations noted.   Reflexes: Reflexes are absent throughout   Sensory: Light touch, pinprick are fine on both sides.    Coordination: Rapid alternating movements and fine finger movements are intact. There is no dysmetria on finger-to-nose " testing.    Gait: gait testing is deferred for safety         Triston Palafox MD       More than 60 minutes spent reviewing records and results, evaluating patient, discussing with pt and family and on documentation

## 2025-07-10 NOTE — ED NOTES
Pt's family reports that they will be going home and would like for the doctor to call for update when all the results are completed (Scans and blood work).    Pt's daughter's, Rashmi - [phone].

## 2025-07-10 NOTE — PLAN OF CARE
"PRIMARY DIAGNOSIS: \"GENERIC\" NURSING  OUTPATIENT/OBSERVATION GOALS TO BE MET BEFORE DISCHARGE:  ADLs back to baseline: No    Activity and level of assistance: NOT OOB    Pain status: Improved but still requiring IV narcotics.    Return to near baseline physical activity: No     Discharge Planner Nurse   Safe discharge environment identified: Yes  Barriers to discharge: Yes       Entered by: Colleen Martell RN 07/10/2025 6:23 AM     Please review provider order for any additional goals.   Nurse to notify provider when observation goals have been met and patient is ready for discharge.Goal Outcome Evaluation:                            "

## 2025-07-11 LAB
ATRIAL RATE - MUSE: 85 BPM
DIASTOLIC BLOOD PRESSURE - MUSE: 65 MMHG
GLUCOSE BLDC GLUCOMTR-MCNC: 101 MG/DL (ref 70–99)
HOLD SPECIMEN: NORMAL
HOLD SPECIMEN: NORMAL
INR PPP: 2.4 (ref 0.85–1.15)
INTERPRETATION ECG - MUSE: NORMAL
P AXIS - MUSE: 78 DEGREES
PR INTERVAL - MUSE: 322 MS
PROTHROMBIN TIME: 26 SECONDS (ref 11.8–14.8)
QRS DURATION - MUSE: 86 MS
QT - MUSE: 372 MS
QTC - MUSE: 442 MS
R AXIS - MUSE: -18 DEGREES
SYSTOLIC BLOOD PRESSURE - MUSE: 134 MMHG
T AXIS - MUSE: 109 DEGREES
VENTRICULAR RATE- MUSE: 85 BPM

## 2025-07-11 PROCEDURE — 99233 SBSQ HOSP IP/OBS HIGH 50: CPT | Performed by: EMERGENCY MEDICINE

## 2025-07-11 PROCEDURE — 250N000011 HC RX IP 250 OP 636: Mod: JW | Performed by: INTERNAL MEDICINE

## 2025-07-11 PROCEDURE — 99232 SBSQ HOSP IP/OBS MODERATE 35: CPT | Performed by: PSYCHIATRY & NEUROLOGY

## 2025-07-11 PROCEDURE — 250N000013 HC RX MED GY IP 250 OP 250 PS 637: Performed by: INTERNAL MEDICINE

## 2025-07-11 PROCEDURE — 250N000011 HC RX IP 250 OP 636: Performed by: PSYCHIATRY & NEUROLOGY

## 2025-07-11 PROCEDURE — 36415 COLL VENOUS BLD VENIPUNCTURE: CPT | Performed by: EMERGENCY MEDICINE

## 2025-07-11 PROCEDURE — 250N000013 HC RX MED GY IP 250 OP 250 PS 637: Performed by: EMERGENCY MEDICINE

## 2025-07-11 PROCEDURE — 120N000001 HC R&B MED SURG/OB

## 2025-07-11 PROCEDURE — 85610 PROTHROMBIN TIME: CPT | Performed by: EMERGENCY MEDICINE

## 2025-07-11 PROCEDURE — 250N000011 HC RX IP 250 OP 636: Performed by: EMERGENCY MEDICINE

## 2025-07-11 PROCEDURE — 250N000013 HC RX MED GY IP 250 OP 250 PS 637: Performed by: PSYCHIATRY & NEUROLOGY

## 2025-07-11 RX ORDER — DIVALPROEX SODIUM 125 MG/1
500 CAPSULE, COATED PELLETS ORAL EVERY 12 HOURS SCHEDULED
Status: DISCONTINUED | OUTPATIENT
Start: 2025-07-11 | End: 2025-07-14

## 2025-07-11 RX ORDER — METOPROLOL TARTRATE 25 MG/1
100 TABLET, FILM COATED ORAL 2 TIMES DAILY
Status: DISCONTINUED | OUTPATIENT
Start: 2025-07-11 | End: 2025-07-28

## 2025-07-11 RX ORDER — DIVALPROEX SODIUM 500 MG/1
500 TABLET, DELAYED RELEASE ORAL 2 TIMES DAILY
Status: DISCONTINUED | OUTPATIENT
Start: 2025-07-11 | End: 2025-07-11 | Stop reason: ALTCHOICE

## 2025-07-11 RX ORDER — OLANZAPINE 10 MG/2ML
2.5 INJECTION, POWDER, FOR SOLUTION INTRAMUSCULAR EVERY 6 HOURS PRN
Status: DISCONTINUED | OUTPATIENT
Start: 2025-07-11 | End: 2025-07-31 | Stop reason: HOSPADM

## 2025-07-11 RX ORDER — WARFARIN SODIUM 5 MG/1
5 TABLET ORAL
Status: COMPLETED | OUTPATIENT
Start: 2025-07-11 | End: 2025-07-11

## 2025-07-11 RX ORDER — DIAZEPAM 10 MG/2ML
2.5 INJECTION, SOLUTION INTRAMUSCULAR; INTRAVENOUS ONCE
Status: COMPLETED | OUTPATIENT
Start: 2025-07-11 | End: 2025-07-11

## 2025-07-11 RX ORDER — OLANZAPINE 10 MG/2ML
5 INJECTION, POWDER, FOR SOLUTION INTRAMUSCULAR ONCE
Status: COMPLETED | OUTPATIENT
Start: 2025-07-11 | End: 2025-07-11

## 2025-07-11 RX ADMIN — ATORVASTATIN CALCIUM 20 MG: 10 TABLET, FILM COATED ORAL at 22:17

## 2025-07-11 RX ADMIN — HYDRALAZINE HYDROCHLORIDE 10 MG: 10 TABLET ORAL at 22:17

## 2025-07-11 RX ADMIN — DIAZEPAM 2.5 MG: 5 INJECTION, SOLUTION INTRAMUSCULAR; INTRAVENOUS at 01:29

## 2025-07-11 RX ADMIN — WARFARIN SODIUM 5 MG: 5 TABLET ORAL at 18:08

## 2025-07-11 RX ADMIN — DIVALPROEX SODIUM 500 MG: 125 CAPSULE, COATED PELLETS ORAL at 18:09

## 2025-07-11 RX ADMIN — SPIRONOLACTONE 25 MG: 25 TABLET, FILM COATED ORAL at 15:53

## 2025-07-11 RX ADMIN — OLANZAPINE 2.5 MG: 10 INJECTION, POWDER, FOR SOLUTION INTRAMUSCULAR at 14:40

## 2025-07-11 RX ADMIN — TRAZODONE HYDROCHLORIDE 25 MG: 50 TABLET ORAL at 22:17

## 2025-07-11 RX ADMIN — CHLORTHALIDONE 25 MG: 25 TABLET ORAL at 22:17

## 2025-07-11 RX ADMIN — HYDRALAZINE HYDROCHLORIDE 10 MG: 10 TABLET ORAL at 15:53

## 2025-07-11 RX ADMIN — METOPROLOL TARTRATE 100 MG: 25 TABLET, FILM COATED ORAL at 22:16

## 2025-07-11 RX ADMIN — OLANZAPINE 2.5 MG: 10 INJECTION, POWDER, FOR SOLUTION INTRAMUSCULAR at 08:33

## 2025-07-11 RX ADMIN — OLANZAPINE 5 MG: 10 INJECTION, POWDER, FOR SOLUTION INTRAMUSCULAR at 22:17

## 2025-07-11 ASSESSMENT — ACTIVITIES OF DAILY LIVING (ADL)
ADLS_ACUITY_SCORE: 71
ADLS_ACUITY_SCORE: 67
ADLS_ACUITY_SCORE: 67
ADLS_ACUITY_SCORE: 59
ADLS_ACUITY_SCORE: 59
ADLS_ACUITY_SCORE: 67
ADLS_ACUITY_SCORE: 71
ADLS_ACUITY_SCORE: 67
ADLS_ACUITY_SCORE: 71
ADLS_ACUITY_SCORE: 67
ADLS_ACUITY_SCORE: 71
ADLS_ACUITY_SCORE: 67
ADLS_ACUITY_SCORE: 67
ADLS_ACUITY_SCORE: 59
ADLS_ACUITY_SCORE: 67
ADLS_ACUITY_SCORE: 73
ADLS_ACUITY_SCORE: 71
ADLS_ACUITY_SCORE: 71
ADLS_ACUITY_SCORE: 59
ADLS_ACUITY_SCORE: 59
ADLS_ACUITY_SCORE: 71
ADLS_ACUITY_SCORE: 59
ADLS_ACUITY_SCORE: 73

## 2025-07-11 NOTE — PROGRESS NOTES
[facility]  [place]    Skip Newman MRN# [id number]   Age: 89 year old YOB: 1934               Assessment and Plan:      Seizure   Delirium     By description this sounds like he had a generalized tonic-clonic seizure, however there could be some lateralization to it.     EEG showed nonspecific slowing, no clear epileptiform abnormalities.    New onset seizure in this age group with suggestion of some cognitive impairment likely indicates seizures will continue if not treated.  For now I think the wisest course is to start him on anticonvulsant medication.  Keppra started yesterday, Keppra can be associated with irritability, he only got 1 dose yesterday.  Nonetheless, given his current agitated delirium I will switch him to Depakote, though he has been refusing medications recently.  If another seizure occurs we can give him an IV dose.    Contributors to his delirium include likely underlying cognitive impairment, sleep deprivation here in the hospital.  Would avoid benzos if able, okay to use quetiapine or olanzapine as needed.  Anything we can do to foster sleep at night may be helpful.    CT of the head is unremarkable, notes indicate his pacemaker is not MRI compatible and I think the CT is good enough for our purposes here.  There was history of fall a week ago, CT would show if there was any hemorrhage or contusion related to that.    Okay to continue anticoagulation for his A-fib to prevent strokes, however if falling seems to be a significant risk may have to reconsider anticoagulation.    He has been seen in our clinic and should follow-up with neurology in 2 to 3 months regarding new diagnosis of seizure.              Chief Complaint/HPI:     7/11/25: Patient more confused and agitated today.  No further seizures noted.    7/10/25:  This patient is a 89-year-old gentleman seen for neurologic evaluation today regarding seizure-like episode yesterday.  Yesterday  afternoon he was sitting in his chair at assisted living when he tensed up and started shaking.  Notes indicate that he was not moving the right side.  1 report describes the patient leaning back and to the right side while in his chair.  There is report of a fall 6 days prior, the CT here at [facility] shows no evidence of intracerebral hemorrhage (he is on warfarin for history of A-fib and cardiomyopathy).  The patient himself does not remember anything of these events.  In the ER initially he showed decreased responsiveness but that improved over about an hour.  Initial lactate was quite elevated at 5.7 and was normal about 2 hours later.            Past Medical History:    has a past medical history of Arthritis, Atrial fibrillation, Cardiomyopathy (H), Chronic kidney disease, Coronary artery disease, CVA (cerebral vascular accident) (H) (07/17/2020), Elevated brain natriuretic peptide (BNP) level (03/13/2024), Elevated troponin (02/15/2024), Fall-- found down, > 12 hrs (02/15/2024), Gout, History of transfusion, Hyperlipidemia, Hypertension, Impotence of organic origin, Kidney stones, Non-traumatic rhabdomyolysis (02/15/2024), Sick sinus syndrome -- S/P Medtronic PPM, Spinal stenosis, lumbar region, with neurogenic claudication, Syncope (08/25/2008), and Thrombocytopenia.          Past Surgical History:    has a past surgical history that includes Insert / Replace / Remove Pacemaker (01/01/2009); Coronary Stent Placement (01/01/2001); Total Hip Arthroplasty (Left, 2015); Tonsillectomy (1940); other surgical history (11/01/2018); Hemorrhoid surgery (01/01/1989); Total Knee Arthroplasty (Left, 07/08/2019); and Total Knee Arthroplasty (Right, 01/13/2020).          Social History:     Social History     Tobacco Use    Smoking status: Never    Smokeless tobacco: Never   Substance Use Topics    Alcohol use: No             Family History:     Family History   Problem Relation Age of Onset    Heart Failure Mother      Heart Failure Father                 Allergies:     Allergies   Allergen Reactions    Lisinopril Cough    Indomethacin Rash    Naproxen Rash             Medications:     Current Facility-Administered Medications:     acetaminophen (TYLENOL) tablet 650 mg, 650 mg, Oral, Q4H PRN **OR** acetaminophen (TYLENOL) Suppository 650 mg, 650 mg, Rectal, Q4H PRN, Los Moreno MD    alum & mag hydroxide-simethicone (MAALOX) suspension 30 mL, 30 mL, Oral, Q4H PRN, Los Moreno MD    atorvastatin (LIPITOR) tablet 20 mg, 20 mg, Oral, QPM, Los Moreno MD    chlorthalidone (HYGROTON) tablet 25 mg, 25 mg, Oral, QPM, Chago Romero MD    hydrALAZINE (APRESOLINE) injection 10-20 mg, 10-20 mg, Intravenous, Q30 Min PRN, Los Moreno MD    hydrALAZINE (APRESOLINE) tablet 10 mg, 10 mg, Oral, TID, Los Moreno MD, 10 mg at 07/10/25 1509    HYDROmorphone (DILAUDID) injection 0.2 mg, 0.2 mg, Intravenous, Q2H PRN, Los Moreno MD, 0.2 mg at 07/10/25 0401    HYDROmorphone (DILAUDID) injection 0.4 mg, 0.4 mg, Intravenous, Q2H PRN, Los Moreno MD    labetalol (NORMODYNE/TRANDATE) injection 10-20 mg, 10-20 mg, Intravenous, Q10 Min PRN, Los Moreno MD    levETIRAcetam (KEPPRA) tablet 250 mg, 250 mg, Oral, BID, Triston Palafox MD, 250 mg at 07/10/25 1115    melatonin tablet 1 mg, 1 mg, Oral, At Bedtime PRN, Los Moreno MD    metoprolol succinate ER (TOPROL XL) 24 hr tablet 100 mg, 100 mg, Oral, BID, Los Moreno MD, 100 mg at 07/10/25 0925    naloxone (NARCAN) injection 0.2 mg, 0.2 mg, Intravenous, Q2 Min PRN **OR** naloxone (NARCAN) injection 0.4 mg, 0.4 mg, Intravenous, Q2 Min PRN **OR** naloxone (NARCAN) injection 0.2 mg, 0.2 mg, Intramuscular, Q2 Min PRN **OR** naloxone (NARCAN) injection 0.4 mg, 0.4 mg, Intramuscular, Q2 Min PRN, Los Moreno MD    nitroGLYcerin (NITRODUR) 0.2 MG/HR 24 hr patch 1 patch, 1 patch, Transdermal, Daily, Los Moreno MD, 1 patch at  "07/10/25 0926    OLANZapine (zyPREXA) injection 2.5 mg, 2.5 mg, Intramuscular, Q6H PRN, Chago Romero MD, 2.5 mg at 07/11/25 0833    OLANZapine zydis (zyPREXA) ODT half-tab 2.5 mg, 2.5 mg, Oral, Q6H PRN, Chago Romero MD    ondansetron (ZOFRAN ODT) ODT tab 4 mg, 4 mg, Oral, Q6H PRN **OR** ondansetron (ZOFRAN) injection 4 mg, 4 mg, Intravenous, Q6H PRN, Los Moreno MD    oxyCODONE (ROXICODONE) tablet 5 mg, 5 mg, Oral, Q4H PRN, Los Moreno MD    oxyCODONE IR (ROXICODONE) half-tab 2.5 mg, 2.5 mg, Oral, Q4H PRN, Los Moreno MD    Patient is already receiving anticoagulation with heparin, enoxaparin (LOVENOX), warfarin (COUMADIN)  or other anticoagulant medication, , Does not apply, Continuous PRN, Los Moreno MD    polyethylene glycol (MIRALAX) Packet 17 g, 17 g, Oral, BID PRN, Los Moreno MD    prochlorperazine (COMPAZINE) injection 5 mg, 5 mg, Intravenous, Q6H PRN **OR** prochlorperazine (COMPAZINE) tablet 5 mg, 5 mg, Oral, Q6H PRN, Los Moreno MD    senna-docusate (SENOKOT-S/PERICOLACE) 8.6-50 MG per tablet 1 tablet, 1 tablet, Oral, BID PRN **OR** senna-docusate (SENOKOT-S/PERICOLACE) 8.6-50 MG per tablet 2 tablet, 2 tablet, Oral, BID PRN, Los Moreno MD    spironolactone (ALDACTONE) tablet 25 mg, 25 mg, Oral, Daily, Chago Romero MD, 25 mg at 07/10/25 1509    traZODone (DESYREL) half-tab 25 mg, 25 mg, Oral, At Bedtime, Chago Romero MD    warfarin ANTICOAGULANT (COUMADIN/JANTOVEN) tablet 5 mg, 5 mg, Oral, ONCE at 18:00, Chago Romero MD    Warfarin Dose Required Daily - Pharmacist Managed, 1 each, Oral, See Admin Instructions, Los Moreno MD              Physical Exam:      Vitals: BP (!) 158/67   Pulse 77   Temp 98  F (36.7  C) (Axillary)   Resp 20   Ht 1.702 m (5' 7\")   Wt 73.2 kg (161 lb 6 oz)   SpO2 96%   BMI 25.28 kg/m    BMI= Body mass index is 25.28 kg/m .     Patient is awake, supine in bed.  When I " get his attention he quickly becomes quite agitated, he is a bit incoherent today.    Neurological Exam:   Mental status: Awake but not able to answer any questions for me today, as noted he quickly becomes agitated and incoherent.   CN II: PERRLA.   CN III, IV, VI: EOMI.    CN VII: Face is symmetric   CN VII: Hearing is normal to conversation   CN IX, X: Phonation is normal.    CN XI: Head turning and shoulder shrug are intact   CN XII: Tongue is midline with normal movements and no atrophy or fasciculations.   Motor: Unable to test formally today, but no obvious lateralized weakness           Triston Palafox MD       More than 35 minutes spent reviewing records and results, evaluating patient, discussing with pt and family and on documentation

## 2025-07-11 NOTE — PROGRESS NOTES
"CLINICAL NUTRITION SERVICES - ASSESSMENT NOTE    RECOMMENDATIONS FOR MDs/PROVIDERS TO ORDER:      Registered Dietitian Interventions:  Ensure Plus HP or similar - vanilla at lunch    Future/Additional Recommendations:  Will monitor po intake, wt, labs, bowel pattern     REASON FOR ASSESSMENT  Positive admission nutrition risk screen - unsure of weight loss.  Not eating poorly.    Per chart, 89-year-old male who resides in assisted living admitted with seizure like symptoms.  Seen by neurology and started on Keppra with mildly abnormal EEG.  Main issue now is worsening encephalopathy. Recent cognitive changes. Fall last week.    Pt's wife is moving to memory care.    INFORMATION OBTAINED  Patient not appropriate to interview due to disorientation, agitation, aggression.  Pt busy when RD visited.    CURRENT NUTRITION ORDERS  Diet: Low Saturated Fat Na <2400 mg    CURRENT INTAKE/TOLERANCE  No oral intake today per Nursing due to AMS  100% of dinner last night    LABS  Nutrition-relevant labs: FSB  7/10/25 BUN 49.6 H  Creat 1.66 H    MEDICATIONS  Nutrition-relevant medications:   Lipitor  Aldactone  Warfarin    ANTHROPOMETRICS  Height: 170.2 cm (5' 7\")  Admission Weight: 73 kg (160 lb 15 oz) (25 2229)   Most Recent Weight: 73.2 kg (161 lb 6 oz) (25 1008) 3% in one month  IBW: 67.2 kg  BMI: Body mass index is 25.28 kg/m .   Weight History:   Wt Readings from Last 10 Encounters:   25 73.2 kg (161 lb 6 oz)   25 75.3 kg (166 lb)   25 75.3 kg (166 lb)   25 75.3 kg (166 lb)   25 74.8 kg (165 lb)   25 74.8 kg (165 lb)   25 73.5 kg (162 lb)   24 75 kg (165 lb 5.5 oz)   24 74.8 kg (165 lb)   24 75.3 kg (166 lb)      Dosing Weight: 73.2 kg, based on actual wt    ASSESSED NUTRITION NEEDS  Estimated Energy Needs: 1830 -2200 kcals/day (25 - 30 kcals/kg)  Justification: Increased needs  Estimated Protein Needs: 59-73 grams protein/day (0.8 - 1 grams of " pro/kg)  Justification: CKD and Increased needs  Estimated Fluid Needs: 1830 -2200 mL/day (25 - 30 mL/kg), or per Provider  Justification: Increased needs, HF    SYSTEM AND PHYSICAL FINDINGS    Per chart,  GI symptoms: WDL  Skin/wounds: Bruised    MALNUTRITION  % Intake: Decreased intake does not meet criteria  % Weight Loss: Weight loss does not meet criteria   Subcutaneous Fat Loss: Unable to assess  Muscle Loss: Unable to assess  Fluid Accumulation/Edema: Unable to assess  Malnutrition Diagnosis: Unable to determine due to need nfpe  Malnutrition Present on Admission: Unable to assess    NUTRITION DIAGNOSIS  Unintended weight loss related to acute illness as evidenced by 5 lb weight loss in one month    INTERVENTIONS  Medical food supplement therapy     GOALS  Patient to consume % of nutritionally adequate meal trays TID, or the equivalent with supplements/snacks.     MONITORING/EVALUATION  Progress toward goals will be monitored and evaluated per policy.

## 2025-07-11 NOTE — PLAN OF CARE
Problem: Adult Inpatient Plan of Care  Goal: Plan of Care Review  Description: The Plan of Care Review/Shift note should be completed every shift.  The Outcome Evaluation is a brief statement about your assessment that the patient is improving, declining, or no change.  This information will be displayed automatically on your shift  note.  Outcome: Progressing   Goal Outcome Evaluation:    [id number]... Pt arrived on the unit from the Observation unit, confused, restless and somewhat agitated. 4 staff along with 2 security helped to settle him in bed. Pt refused to be touched and vitals to be taken., no assessment at this time. Writer attempted but he started hitting, and kicking . At 1435, pt became very agitated, hitting, kicking spitting, writer called for help and he was medicated with PRN Zyprexa  2.5 mg IM at 1440, it was less effective. Pt also attempted to hit and kicked his son while he was visiting. Afternoon meds were given crushed in pudding. 1:1 sitter remains at the bedside. Please continue to monitor.

## 2025-07-11 NOTE — PROGRESS NOTES
Pt is irritated, agitated, and physically aggressive towards staff. He has been kicking, and punching and attempting to bite staff trying to assess him or provide care for him. It took four staff to help change his wet bedding and briefs.There was not a nitroglycerin patch on either of his shoulders. Once nobody is in the room, patient is usually calm.

## 2025-07-11 NOTE — PLAN OF CARE
"  Problem: Adult Inpatient Plan of Care  Goal: Plan of Care Review  Description: The Plan of Care Review/Shift note should be completed every shift.  The Outcome Evaluation is a brief statement about your assessment that the patient is improving, declining, or no change.  This information will be displayed automatically on your shift  note.  Outcome: Progressing  Flowsheets (Taken 7/11/2025 7346)  Plan of Care Reviewed With: patient  Overall Patient Progress: no change  Goal: Patient-Specific Goal (Individualized)  Description: You can add care plan individualizations to a care plan. Examples of Individualization might be:  \"Parent requests to be called daily at 9am for status\", \"I have a hard time hearing out of my right ear\", or \"Do not touch me to wake me up as it startles  me\".  Outcome: Progressing  Goal: Absence of Hospital-Acquired Illness or Injury  Outcome: Progressing  Goal: Optimal Comfort and Wellbeing  Outcome: Progressing  Goal: Readiness for Transition of Care  Outcome: Progressing     Goal Outcome Evaluation:      Plan of Care Reviewed With: patient    Overall Patient Progress: no change    Pt. Disoriented to person, place, situation and time. Pt. Refused meds, vitals, and tele. Pt. Can be aggressive when staff tries to provide care. IM Zyprexa given. Pt. Attempted to kick RN after medication was given. Security and staff was present during medication administration.     0942 - Report given to P2 RN. All belongings sent w/ pt.   "

## 2025-07-11 NOTE — PLAN OF CARE
Goal Outcome Evaluation:      Plan of Care Reviewed With: patient    Overall Patient Progress: no change    Patient is disoriented x 4. Patient is on RA. Patient is on Tele- V paced. Patient has a pacemaker. Patient is on a low saturated diet. Patient is incontinent- patient took off his brief and refuses a new one. Patient has a R PIV that is SL. Patient has seizure pads on. Patient was seen by neurology and had an EEG at bedside. Patient is A x 2 for mobility. Patient has a nitro patch on his left shoulder. Patient has been very agitated and aggressive. Patient was violent towards author twice today- compasses written for both.

## 2025-07-11 NOTE — PROGRESS NOTES
"Daily Progress Note    Assessment/Plan:  89-year-old male who resides in assisted living admitted with seizure like symptoms.  Seen by neurology and started on Keppra with mildly abnormal EEG.  Main issue now is worsening encephalopathy.  Encephalopathy order set placed and psychiatry consulted.     Seizure activity: Seen by neurology, EEG read as mildly abnormal due to some mild bilateral slowing and some slowing of the background.  These findings are nonspecific and suggest a mild encephalopathy.  Neurology started on Keppra.  Acute metabolic encephalopathy: Could have some underlying dementia contributing to hospital delirium.  His daughter has noticed some confusion in the past few weeks.  He has also had a lot of stress as his wife is moving out of their facility into memory care.  Was quite agitated last night, this is 2 nights in a row, will start nighttime trazodone to try and get him sleeping better at night.  Discussed with his daughter Rashmi by phone.  Chronic diastolic heart failure: Resume home meds  Coronary artery disease: Resume home meds  CKD stage IIIa: Baseline creatinine over the last year has been 1.4-1.7.  1.73 on admission and 1.66 yesterday.  Labs pending today.  Will continue to trend.  History of atrial fibrillation status post PPM.  Resume home meds  Clinically Significant Risk Factors Present on Admission         # Hyponatremia: Lowest Na = 133 mmol/L in last 2 days, will monitor as appropriate        # Drug Induced Coagulation Defect: home medication list includes an anticoagulant medication    # Hypertension: Noted on problem list  # Chronic heart failure with reduced ejection fraction: last echo with EF <40%          # Overweight: Estimated body mass index is 25.21 kg/m  as calculated from the following:    Height as of this encounter: 1.702 m (5' 7\").    Weight as of this encounter: 73 kg (160 lb 15 oz).       # Financial/Environmental Concerns:     # Pacemaker present          Code " status:Full Code        Barriers to Discharge: Agitation    Disposition: Anticipate discharge in 1 to 2 days, currently lives in assisted living, awaiting PT/OT recommendations regarding disposition however has been too agitated for evaluation    Subjective:  Alen is confused and agitated this morning.  It started overnight and he received diazepam by the overnight physician.  This morning he has been confused as agitated and striking out at people.  Unable to obtain history from him as he is confused.        Current Medications Reviewed via EHR List    Objective:  Vital signs in last 24 hours:  [unfilled]  .prog  Weight:   @THISENCWEIGHTS(1)@  Weight change:   Body mass index is 25.21 kg/m .    Intake/Output last 3 shifts:  I/O last 3 completed shifts:  In: 240 [P.O.:240]  Out: -   Intake/Output this shift:  No intake/output data recorded.    Physical Exam:  General: Agitated but intermittently cooperative  CV: Regular rate rhythm  Lungs: No abnormal sounds  Abdomen:        Imaging:  Personally Reviewed.  EEG Awake or Drowsy Routine  Result Date: 7/10/2025  [facility]  Name: Skip Newman;  : 3/6/1934;  MRN: [id number] PCP: Jacquelin Francis Date of study: Jul 10, 2025 History: Skip Newman is a 89 year old male admitted to [facility] after generalized seizure.  This EEG is performed to evaluate for underlying seizure disorder. Findings: This 22 channel digital EEG is recorded using the 10-20 [employer] system.  The recording shows a posterior dominant background rhythm in the 7 to 8 Hz range which is not terribly well-formed and seen only briefly, later in the recording.  Photic stimulation produces no change in the record.  Much of the recording is characterized by mild theta slowing bilaterally and occasional triphasic waves.  There are no areas of focal or lateralized slowing.  No epileptiform findings are present. Impression: This is a mildly abnormal EEG due  to some mild bilateral slowing and some slowing of the background.  These findings are nonspecific and suggest a mild encephalopathy.  No seizures or interictal discharges are seen. Triston Palafox MD     Cardiac Device Check - Remote (Standing ORD 5 count)  Result Date: 7/10/2025  Type: [employer] remote pacemaker transmission done at [facility].  Courtesy check. Rep notes: n/a. Presenting rhythm: ventricular sensing 80-85 bpm. Battery longevity: 4yrs, 11mo estimated. Lead status: stable. Atrial high rates: n/a. Anticoagulant: Coumadin. Ventricular high rates: since 6/6/25; none detected. Comments: Normal magnet and pacemaker function. Plan: Patient scheduled for annual device check on 8/5/25 at 2:00PM at our [place] location. MAYLIN Emerson, Device Specialist Device follow up for the entirety of having the device, based on best practices determined by [employer] and in Compliance with Medicare guidelines. Continue remote device monitoring per patient plan. I have reviewed and interpreted the device interrogation, settings, programming, and encounter summary. The device is functioning within normal device parameters. I agree with the current findings, assessment and plan.    CT Lumbar Spine w/o Contrast  Result Date: 7/9/2025  EXAM: CT LUMBAR SPINE W/O CONTRAST LOCATION: [facility] DATE: 7/9/2025 INDICATION: fall, low back and left leg pain. pt declining MRI COMPARISON: None. TECHNIQUE: Routine CT Lumbar Spine without IV contrast. Multiplanar reformats. Dose reduction techniques were used. FINDINGS: Vertebral body heights are maintained. Extensive multilevel loss of disc height. Multilevel Schmorl's nodes. Multilevel anterior osteophytic changes. Mild straightening of the normal lumbar curvature. Moderate canal stenotic findings at L2-3, and postoperative changes of left hemilaminectomy at L3-4. Moderate canal stenosis at this level. Moderate canal stenosis at L4-5  with postoperative changes of left hemilaminectomy. No paravertebral edematous changes. Moderate bilateral foraminal narrowing at L4-5 and L3-4. Moderate bilateral foraminal narrowing at L2-3. Mild bilateral foraminal narrowing at L1-2.     IMPRESSION: 1.  Multilevel degenerative changes. No acute fracture.    CT Cervical Spine w/o Contrast  Result Date: 7/9/2025  EXAM: CT CERVICAL SPINE W/O CONTRAST LOCATION: [facility] DATE: 7/9/2025 INDICATION: fall, confused, unclear if head or neck trauma COMPARISON: None. TECHNIQUE: Routine CT Cervical Spine without IV contrast. Multiplanar reformats. Dose reduction techniques were used. FINDINGS: Vertebral body heights are maintained. Multilevel loss of disc height. No jumped or perched facets. Spinous processes are intact. Degenerative pannus at the craniocervical junction. Stable appearance to subchondral cystic changes and degenerative pannus formation at the craniocervical junction at the level of the midportion of C2. There is a stable appearance to the craniocervical junction and C2 level as compared to 12/21/2024. Multilevel facet disease left greater than right. No prevertebral edema.     IMPRESSION: 1.  No acute cervical spine fracture.    XR Shoulder Left 2 Views  Result Date: 7/9/2025  EXAM: XR SHOULDER LEFT 2 VIEWS LOCATION: [facility] DATE: 7/9/2025 INDICATION: fall, pain COMPARISON: 02/15/2024     IMPRESSION: Demineralization without displaced fracture. 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 Severe glenohumeral joint osteoarthritis. Superior screw fixation of the humerus likely indicating underlying  rotator cuff pathology. Left chest pacer. Redemonstrated lucent finding the proximal humerus which may represent sequela of prior biceps tenodesis.     Head CT w/o contrast  Result Date: 7/9/2025  EXAM: CT HEAD W/O CONTRAST LOCATION: [facility]  DATE: 2025 INDICATION: seizure, anticoagulated (recent fall?) COMPARISON: 1/3/2025 TECHNIQUE: Routine CT Head without IV contrast. Multiplanar reformats. Dose reduction techniques were used. FINDINGS: INTRACRANIAL CONTENTS: No intracranial hemorrhage, extraaxial collection, or mass effect.  No CT evidence of acute infarct. Moderate presumed chronic small vessel ischemic changes. Moderate generalized volume loss. No hydrocephalus. Intracranial atheromatous disease. VISUALIZED ORBITS/SINUSES/MASTOIDS: No intraorbital abnormality. No paranasal sinus mucosal disease. No middle ear or mastoid effusion. BONES/SOFT TISSUES: No acute abnormality.     IMPRESSION: 1.  No acute findings.    Echocardiogram Complete  Result Date: 2025  [id number]^LUIS ALBERTO^NICOLETTE^[place] [address]  Name: EDUARDO GENAO MRN: [id number] : 1934 Study Date: 2025 12:52 PM Age: 89 yrs Gender: Male Patient Location: [facility] Reason For Study: Dilated cardiomyopathy (H) Ordering Physician: Nicolette Joshua MD Referring Physician: Nicolette Joshua MD Performed By: KELLY^^^^  BSA: 1.9 m2 Height: 67 in Weight: 166 lb HR: 65 BP: 153/81 mmHg ______________________________________________________________________________ Procedure Echocardiogram with two-dimensional, color and spectral Doppler. ______________________________________________________________________________ Interpretation Summary  1. The left ventricle is normal in size. Left ventricular systolic performance is moderate to severely reduced. The estimated ejection fraction is 25-30%. 2. There is moderate global reduction in left ventricular systolic performance with basal-mid posterior and inferior wall akinesis. 3. There is mild aortic insufficiency. 4. There is mild to moderate mitral insufficiency. 5. Borderline right ventricular enlargement with low normal right ventricular systolic performance. 6. There is  moderate left atrial enlargement.  When compared to the prior real-time echocardiogram dated 3 January 2025, the acoustic quality and endocardial resolution on the current examination is somewhat better than the previous. There is perhaps a subtle increase in the overall ejection fraction though overall appears fairly similar. The reduction in left ventricular systolic performance now appears more regional in nature with basal-mid posterior and inferior wall akinesis (this not necessarily felt to be a significant change, but more of a reflection of the improved endocardial resolution on the current study allowing for better assessment of segmental function). ______________________________________________________________________________ Left ventricle: The left ventricle is normal in size. Left ventricular systolic performance is moderate to severely reduced. The estimated ejection fraction is 25-30%. There is moderate global reduction in left ventricular systolic performance with basal-mid posterior and inferior wall akinesis. Left ventricular wall thickness is normal.  Right ventricle: Borderline right ventricular enlargement with low normal right ventricular systolic performance. There is a pacing electrode noted in the right-sided chambers.  Left atrium: There is moderate left atrial enlargement.  Right atrium: There is borderline right atrial enlargement.  IVC: The IVC is borderline dilated.  Aortic valve: The aortic valve is comprised of three cusps. There is mild aortic valve sclerosis without significant stenosis. There is mild aortic insufficiency.  Mitral valve: There is mild mitral annular calcification. There is mild nonspecific mitral valve leaflet thickening. There is mild to moderate mitral insufficiency.  Tricuspid valve: The tricuspid valve is grossly morphologically normal. There is mild tricuspid insufficiency.  Pulmonic valve: The pulmonic valve is grossly morphologically normal. There is trace  "pulmonic insufficiency.  Thoracic aorta: The aortic root and proximal ascending aorta are of normal dimension.  Pericardium: There is no significant pericardial effusion. ______________________________________________________________________________ ______________________________________________________________________________ MMode/2D Measurements & Calculations IVSd: 0.68 cm LVIDd: 5.1 cm LVIDs: 4.4 cm LVPWd: 0.68 cm FS: 13.7 % LV mass(C)d: 115.7 grams LV mass(C)dI: 62.0 grams/m2 Ao root diam: 3.4 cm LA dimension: 4.1 cm asc Aorta Diam: 3.3 cm LA/Ao: 1.2 LVOT diam: 2.2 cm LVOT area: 3.6 cm2 Ao root diam index Ht(cm/m): 2.0 Ao root diam index BSA (cm/m2): 1.8 Asc Ao diam index BSA (cm/m2): 1.8 Asc Ao diam index Ht(cm/m): 1.9 EF Biplane: 32.5 % LA Volume (BP): 75.1 ml  LA Volume Index (BP): 40.2 ml/m2 LA Volume Indexed (AL/bp): 43.1 ml/m2 RWT: 0.27 TAPSE: 0.92 cm  Time Measurements MM HR: 65.0 BPM  Doppler Measurements & Calculations MV E max dale: 105.0 cm/sec MV A max dale: 34.6 cm/sec MV E/A: 3.0 MV dec time: 0.15 sec Ao V2 max: 154.0 cm/sec Ao max P.0 mmHg Ao V2 mean: 113.0 cm/sec Ao mean P.0 mmHg Ao V2 VTI: 34.4 cm DRE(I,D): 1.5 cm2 DRE(V,D): 1.7 cm2 AI P1/2t: 413.6 msec LV V1 max P.0 mmHg LV V1 max: 70.7 cm/sec LV V1 VTI: 14.0 cm SV(LVOT): 50.8 ml SI(LVOT): 27.2 ml/m2 PA acc time: 0.08 sec TR max dale: 300.0 cm/sec TR max P.0 mmHg AV Dale Ratio (DI): 0.46 DRE Index (cm2/m2): 0.79 E/E': 6.1 E/E' avg: 10.2  Lateral E/e': 6.1 Medial E/e': 14.2 Peak E' Dale: 17.1 cm/sec RV S Dale: 8.6 cm/sec  ______________________________________________________________________________ Report approved by: Orlando Villasenor MD on 2025 08:14 AM         Lab Results:  Personally Reviewed.   Fingerstick Blood Glucose: @[id number](POCGLUFGR:10)@    Last Hbg A1C: No results found for: \"HGBA1C\"   Lab Results   Component Value Date    INR 2.62 (H) 07/10/2025    INR 2.52 (H) 2025    INR 3.01 (H) 2025    " PROTIME 27.8 (H) 07/10/2025    PROTIME 27.0 (H) 07/09/2025     Recent Results (from the past 24 hours)   UA with Microscopic reflex to Culture    Collection Time: 07/10/25  8:58 AM    Specimen: Urine, Midstream   Result Value Ref Range    Color Urine Light Yellow Colorless, Straw, Light Yellow, Yellow    Appearance Urine Clear Clear    Glucose Urine Negative Negative mg/dL    Bilirubin Urine Negative Negative    Ketones Urine Negative Negative mg/dL    Specific Gravity Urine 1.016 1.001 - 1.030    Blood Urine Negative Negative    pH Urine 5.0 5.0 - 7.0    Protein Albumin Urine 10 (A) Negative mg/dL    Urobilinogen Urine Normal Normal mg/dL    Nitrite Urine Negative Negative    Leukocyte Esterase Urine Negative Negative    RBC Urine <1 <=2 /HPF    WBC Urine <1 <=5 /HPF    Hyaline Casts Urine 4 (H) <=2 /LPF   Cardiac Device Check - Remote (Standing ORD 5 count)    Collection Time: 07/10/25 10:37 AM   Result Value Ref Range    Date Time Interrogation Session [id number]     Implantable Pulse Generator  Medtronic     Implantable Pulse Generator Model [id number] Adapta     Implantable Pulse Generator Serial Number [id number]     Type Interrogation Session Remote     [facility]inic Name [facility]     Implantable Pulse Generator Type Pacemaker     Implantable Pulse Generator Implant Date 20151027     Implantable Lead  Medtronic     Implantable Lead Model 5076 CapSureFix Novus     Implantable Lead Serial Number [id number]     Implantable Lead Implant Date 20090127     Implantable Lead Polarity Type Bipolar Lead     Implantable Lead Location Detail 1 SEPTUM     Implantable Lead Location Right Ventricle     Implantable Lead Connection Status Connected     Implantable Lead  Medtronic     Implantable Lead Model 5076 CapSureFix Novus     Implantable Lead Serial Number [id number]     Implantable Lead Implant Date 20090127     Implantable Lead Polarity Type Bipolar Lead     Implantable  Lead Location Detail 1 APPENDAGE     Implantable Lead Location Right Atrium     Implantable Lead Connection Status Connected     Robby Setting Mode (NBG Code) VVIR     Robby Setting Lower Rate Limit 60 [beats]/min    Robby Setting Maximum Tracking Rate 105 [beats]/min    Robby Setting Maximum Sensor Rate 120 [beats]/min    Lead Channel Setting Sensing Polarity Bipolar     Lead Channel Setting Sensing Sensitivity 5.60 mV    Lead Channel Setting Pacing Polarity Bipolar     Lead Channel Setting Pacing Pulse Width 0.40 ms    Lead Channel Setting Pacing Amplitude 1.875     Lead Channel Setting Pacing Capture Mode Adaptive     Zone Setting Type Category VF     Zone Setting Vendor Type Category V High Rate     Zone Setting Status Monitor     Zone Setting Detection Interval 375 ms    Zone Setting Type Category ATRIAL_FIBRILLATION     Zone Setting Vendor Type Category FastATAF     Zone Setting Status Monitor     Lead Channel Impedance Value 67 ohm    Lead Channel Impedance Value 459 ohm    Lead Channel Pacing Threshold Amplitude 1.250 V    Lead Channel Pacing Threshold Pulse Width 0.40 ms    Battery Date Time of Measurements 20250709223300     Battery Status OK     Battery Remaining Longevity 59 mo    Battery Voltage 2.76 V    Battery Impedance 1,618 ohm    Robby Statistic Date Time Start [id number]     Robby Statistic Date Time End [id number]     Robby Statistic RV Percent Paced 12 %    Atrial Tachy Statistic Date Time Start [id number]     Atrial Tachy Statistic Date Time End [id number]     Atrial Tachy Statistic AT/AF [place] Percent 0 %    Episode Statistic Recent Count 0     Episode Statistic Type Category AT/AF     Episode Statistic Recent Count 0     Episode Statistic Type Category VF     Episode Statistic Recent Date Time Start [id number]     Episode Statistic Recent Date Time End [id number]     Episode Statistic Recent Date Time Start [id number]     Episode Statistic Recent Date Time End  [id number]    Glucose by meter    Collection Time: 07/11/25  1:35 AM   Result Value Ref Range    GLUCOSE BY METER POCT 101 (H) 70 - 99 mg/dL       Medically Ready for Discharge: Anticipated in 2-4 Days       Advanced Care Planning    Medical Decision Making       50 MINUTES SPENT BY ME on the date of service doing chart review, history, exam, documentation & further activities per the note.      Drew Romero MD  Date: 7/11/2025  Time: 7:55 AM  [facility]  Family Medicine

## 2025-07-11 NOTE — PLAN OF CARE
Goal Outcome Evaluation:       Pt has been very agitated in this shift. This is gets worse when staff go to his room. He kicks and even tries to bite making it hard to do cares on him. We had to seek help from security to put him back on tele, check his BS and bladder scan him as per the dr's orders. Needs at least 4 staff members for any care.

## 2025-07-11 NOTE — CONSULTS
Initial Psychiatric Consult   Consult date: July 11, 2025         Reason for Consult, requesting source:    Reason: Delirium-medication management     Requesting source: Chago Romero    Labs and imaging reviewed.       HPI:   Chart Review 7/11/25  Skip Newman is a 89 year old male who presents with seizure activity. Patient went unresponsive while sitting in chair, arms up, looking back, unresponsive. By time EMS arrived, patient was no longer extended and more limb yet still unresponsive. Son was reportedly starting a FaceTime call with patient and witnessed the event partially. ED Provider reports patient was fairly lethargic upon arrival, then soon post-ictal, and now at time of sign out more back to his normal baseline but still feels fatigued/malaise.   Main issue now is worsening encephalopathy.     Psychiatry met with Bill today. The patient was moved from the shot stay unit to the medical unit this morning. When team met with the patient, he was in bed moving around restlessly. The patient was not able to carry conversion with the team, just few phrases that were not related to the questions being asked. Per the [facility] staff, the patient is restless, agitated, and strikes at staff during cares. Per chart the patient was also agitated for the past two nights.         Past Psychiatric History:   No known past psychiatric history.        Substance Use and History:   NO known past substance history        Past Medical History:   PAST MEDICAL HISTORY:   Past Medical History:   Diagnosis Date    Arthritis     Atrial fibrillation     Cardiomyopathy (H)     Chronic kidney disease     Coronary artery disease     CVA (cerebral vascular accident) (H) 07/17/2020    Elevated brain natriuretic peptide (BNP) level 03/13/2024    Elevated troponin 02/15/2024    Fall-- found down, > 12 hrs 02/15/2024    Gout     History of transfusion     Hyperlipidemia     Hypertension     Impotence of organic origin      Kidney stones     Non-traumatic rhabdomyolysis 02/15/2024    Sick sinus syndrome -- S/P Medtronic PPM     Spinal stenosis, lumbar region, with neurogenic claudication     Syncope 08/25/2008    Thrombocytopenia        PAST SURGICAL HISTORY:   Past Surgical History:   Procedure Laterality Date    CORONARY STENT PLACEMENT  01/01/2001    LCx    HEMORRHOID SURGERY  01/01/1989    INSERT / REPLACE / REMOVE PACEMAKER  01/01/2009    Medtronic    OTHER SURGICAL HISTORY  11/01/2018    l3-l4 lumbar stenosis    TONSILLECTOMY  1940    child    TOTAL HIP ARTHROPLASTY Left 2015    ?2015    TOTAL KNEE ARTHROPLASTY Left 07/08/2019    Procedure: LEFT TOTAL KNEE ARTHROPLASTY;  Surgeon: Bowen Padgett MD;  Location: [facility];  Service: Orthopedics    TOTAL KNEE ARTHROPLASTY Right 01/13/2020    Procedure: RIGHT TOTAL KNEE ARTHROPLASTY;  Surgeon: Bowen Padgett MD;  Location: [facility];  Service: Orthopedics             Family History:   FAMILY HISTORY:   Family History   Problem Relation Age of Onset    Heart Failure Mother     Heart Failure Father            Social History:   SOCIAL HISTORY:   Social History     Tobacco Use    Smoking status: Never    Smokeless tobacco: Never   Substance Use Topics    Alcohol use: No            Physical ROS:   The 10 point Review of Systems is negative other than noted in the HPI or here.           Medications:     Current Facility-Administered Medications   Medication Dose Route Frequency Provider Last Rate Last Admin    atorvastatin (LIPITOR) tablet 20 mg  20 mg Oral QPM Los Moreno MD        chlorthalidone (HYGROTON) tablet 25 mg  25 mg Oral QPM Chago Romero MD        hydrALAZINE (APRESOLINE) tablet 10 mg  10 mg Oral TID Los Moreno MD   10 mg at 07/10/25 1509    levETIRAcetam (KEPPRA) tablet 250 mg  250 mg Oral BID Triston Palafox MD   250 mg at 07/10/25 1115    metoprolol succinate ER (TOPROL XL) 24 hr tablet 100 mg  100 mg Oral BID Los Moreno  MD   100 mg at 07/10/25 0925    nitroGLYcerin (NITRODUR) 0.2 MG/HR 24 hr patch 1 patch  1 patch Transdermal Daily Los Moreno MD   1 patch at 07/10/25 0926    spironolactone (ALDACTONE) tablet 25 mg  25 mg Oral Daily Chago Romero MD   25 mg at 07/10/25 1509    traZODone (DESYREL) half-tab 25 mg  25 mg Oral At Bedtime Chago Romero MD        Warfarin Dose Required Daily - Pharmacist Managed  1 each Oral See Admin Instructions Los Moreno MD                  Allergies:     Allergies   Allergen Reactions    Lisinopril Cough    Indomethacin Rash    Naproxen Rash          Labs:     Recent Results (from the past 48 hours)   CBC (+ platelets, no diff)    Collection Time: 07/09/25  5:57 PM   Result Value Ref Range    WBC Count 9.5 4.0 - 11.0 10e3/uL    RBC Count 4.11 (L) 4.40 - 5.90 10e6/uL    Hemoglobin 13.1 (L) 13.3 - 17.7 g/dL    Hematocrit 38.8 (L) 40.0 - 53.0 %    MCV 94 78 - 100 fL    MCH 31.9 26.5 - 33.0 pg    MCHC 33.8 31.5 - 36.5 g/dL    RDW 14.9 10.0 - 15.0 %    Platelet Count 134 (L) 150 - 450 10e3/uL   Basic metabolic panel    Collection Time: 07/09/25  5:57 PM   Result Value Ref Range    Sodium 133 (L) 135 - 145 mmol/L    Potassium 5.2 3.4 - 5.3 mmol/L    Chloride 100 98 - 107 mmol/L    Carbon Dioxide (CO2) 17 (L) 22 - 29 mmol/L    Anion Gap 16 (H) 7 - 15 mmol/L    Urea Nitrogen 53.4 (H) 8.0 - 23.0 mg/dL    Creatinine 1.73 (H) 0.67 - 1.17 mg/dL    GFR Estimate 37 (L) >60 mL/min/1.73m2    Calcium 9.6 8.8 - 10.4 mg/dL    Glucose 135 (H) 70 - 99 mg/dL   Lactic Acid Whole Blood with 1X Repeat in 2 HR when >2    Collection Time: 07/09/25  5:57 PM   Result Value Ref Range    Lactic Acid, Initial 5.7 (HH) 0.7 - 2.0 mmol/L   Troponin T, High Sensitivity    Collection Time: 07/09/25  5:57 PM   Result Value Ref Range    Troponin T, High Sensitivity 59 (H) <=22 ng/L   INR    Collection Time: 07/09/25  5:57 PM   Result Value Ref Range    INR 2.52 (H) 0.85 - 1.15    PT 27.0 (H) 11.8 - 14.8  Seconds   ECG 12-LEAD WITH MUSE (LHE)    Collection Time: 07/09/25  6:04 PM   Result Value Ref Range    Systolic Blood Pressure 134 mmHg    Diastolic Blood Pressure 65 mmHg    Ventricular Rate 85 BPM    Atrial Rate 85 BPM    CO Interval 322 ms    QRS Duration 86 ms     ms    QTc 442 ms    P Axis 78 degrees    R AXIS -18 degrees    T Axis 109 degrees    Interpretation ECG       Sinus rhythm with 1st degree A-V block  Inferior infarct (cited on or before 21-Dec-2024)  Anterolateral infarct (cited on or before 09-Jul-2025)  Abnormal ECG  When compared with ECG of 09-Jul-2025 18:01,  No significant change was found  Confirmed by SEE ED PROVIDER NOTE FOR, ECG INTERPRETATION ([id number]),  RUBINA CABA ([id number]) on 7/11/2025 5:56:17 AM     Lactic acid whole blood    Collection Time: 07/09/25  8:10 PM   Result Value Ref Range    Lactic Acid 1.1 0.7 - 2.0 mmol/L   Troponin T, High Sensitivity    Collection Time: 07/09/25  8:10 PM   Result Value Ref Range    Troponin T, High Sensitivity 62 (H) <=22 ng/L   INR    Collection Time: 07/10/25  5:23 AM   Result Value Ref Range    INR 2.62 (H) 0.85 - 1.15    PT 27.8 (H) 11.8 - 14.8 Seconds   Basic metabolic panel    Collection Time: 07/10/25  5:23 AM   Result Value Ref Range    Sodium 136 135 - 145 mmol/L    Potassium 4.4 3.4 - 5.3 mmol/L    Chloride 102 98 - 107 mmol/L    Carbon Dioxide (CO2) 21 (L) 22 - 29 mmol/L    Anion Gap 13 7 - 15 mmol/L    Urea Nitrogen 49.6 (H) 8.0 - 23.0 mg/dL    Creatinine 1.66 (H) 0.67 - 1.17 mg/dL    GFR Estimate 39 (L) >60 mL/min/1.73m2    Calcium 9.3 8.8 - 10.4 mg/dL    Glucose 97 70 - 99 mg/dL   UA with Microscopic reflex to Culture    Collection Time: 07/10/25  8:58 AM    Specimen: Urine, Midstream   Result Value Ref Range    Color Urine Light Yellow Colorless, Straw, Light Yellow, Yellow    Appearance Urine Clear Clear    Glucose Urine Negative Negative mg/dL    Bilirubin Urine Negative Negative    Ketones Urine Negative Negative mg/dL     Specific Gravity Urine 1.016 1.001 - 1.030    Blood Urine Negative Negative    pH Urine 5.0 5.0 - 7.0    Protein Albumin Urine 10 (A) Negative mg/dL    Urobilinogen Urine Normal Normal mg/dL    Nitrite Urine Negative Negative    Leukocyte Esterase Urine Negative Negative    RBC Urine <1 <=2 /HPF    WBC Urine <1 <=5 /HPF    Hyaline Casts Urine 4 (H) <=2 /LPF   Cardiac Device Check - Remote (Standing ORD 5 count)    Collection Time: 07/10/25 10:37 AM   Result Value Ref Range    Date Time Interrogation Session [id number]     Implantable Pulse Generator  Medtronic     Implantable Pulse Generator Model [id number] Adapta     Implantable Pulse Generator Serial Number [id number]     Type Interrogation Session Remote     Clinic Name [facility]     Implantable Pulse Generator Type Pacemaker     Implantable Pulse Generator Implant Date 20151027     Implantable Lead  Medtronic     Implantable Lead Model 5076 CapSureFix Novus     Implantable Lead Serial Number [id number]     Implantable Lead Implant Date 20090127     Implantable Lead Polarity Type Bipolar Lead     Implantable Lead Location Detail 1 SEPTUM     Implantable Lead Location Right Ventricle     Implantable Lead Connection Status Connected     Implantable Lead  Medtronic     Implantable Lead Model 5076 CapSureFix Novus     Implantable Lead Serial Number [id number]     Implantable Lead Implant Date 20090127     Implantable Lead Polarity Type Bipolar Lead     Implantable Lead Location Detail 1 APPENDAGE     Implantable Lead Location Right Atrium     Implantable Lead Connection Status Connected     Robby Setting Mode (NBG Code) VVIR     Robby Setting Lower Rate Limit 60 [beats]/min    Robby Setting Maximum Tracking Rate 105 [beats]/min    Robby Setting Maximum Sensor Rate 120 [beats]/min    Lead Channel Setting Sensing Polarity Bipolar     Lead Channel Setting Sensing Sensitivity 5.60 mV    Lead Channel Setting Pacing  "Polarity Bipolar     Lead Channel Setting Pacing Pulse Width 0.40 ms    Lead Channel Setting Pacing Amplitude 1.875     Lead Channel Setting Pacing Capture Mode Adaptive     Zone Setting Type Category VF     Zone Setting Vendor Type Category V High Rate     Zone Setting Status Monitor     Zone Setting Detection Interval 375 ms    Zone Setting Type Category ATRIAL_FIBRILLATION     Zone Setting Vendor Type Category FastATAF     Zone Setting Status Monitor     Lead Channel Impedance Value 67 ohm    Lead Channel Impedance Value 459 ohm    Lead Channel Pacing Threshold Amplitude 1.250 V    Lead Channel Pacing Threshold Pulse Width 0.40 ms    Battery Date Time of Measurements 20250709223300     Battery Status OK     Battery Remaining Longevity 59 mo    Battery Voltage 2.76 V    Battery Impedance 1,618 ohm    Robby Statistic Date Time Start [id number]     Robby Statistic Date Time End 20250709223300     Robby Statistic RV Percent Paced 12 %    Atrial Tachy Statistic Date Time Start [id number]     Atrial Tachy Statistic Date Time End [id number]     Atrial Tachy Statistic AT/AF [place] Percent 0 %    Episode Statistic Recent Count 0     Episode Statistic Type Category AT/AF     Episode Statistic Recent Count 0     Episode Statistic Type Category VF     Episode Statistic Recent Date Time Start [id number]     Episode Statistic Recent Date Time End [id number]     Episode Statistic Recent Date Time Start 91003514942059     Episode Statistic Recent Date Time End [id number]    Glucose by meter    Collection Time: 07/11/25  1:35 AM   Result Value Ref Range    GLUCOSE BY METER POCT 101 (H) 70 - 99 mg/dL          Physical and Psychiatric Examination:     BP (!) 158/67   Pulse 77   Temp 98  F (36.7  C) (Axillary)   Resp 20   Ht 1.702 m (5' 7\")   Wt 73 kg (160 lb 15 oz)   SpO2 96%   BMI 25.21 kg/m    Weight is 160 lbs 14.97 oz  Body mass index is 25.21 kg/m .    Physical Exam:  I have reviewed the physical " exam as documented by by the medical team and agree with findings and assessment and have no additional findings to add at this time.         MSE:   Appearance: appeared as age stated and disheveled   Attitude:  less cooperative  Eye Contact:  fair  Mood:  anxious  Affect:  slightly restricted, restless  Speech:  mumbling  Psychomotor Behavior:  no evidence of tardive dyskinesia, dystonia, or tics  Muscle strength and tone: intact  Thought Process:  circumstantial  Associations:  no loose associations  Thought Content:  no evidence of suicidal ideation or homicidal ideation and no evidence of psychotic thought  Insight:  limited  Judgement:  limited  Oriented to:  person  Attention Span and Concentration:  limited  Recent and Remote Memory:  limited             DSM-5 Diagnosis:   Neurocognitive disorder, rule out dementia vs delirium           Assessment:   Skip Newman is a 89 year old male who presents with seizure activity. Patient went unresponsive while sitting in chair, arms up, looking back, unresponsive. By time EMS arrived, patient was no longer extended and more limb yet still unresponsive. Son was reportedly starting a FaceTime call with patient and witnessed the event partially. ED Provider reports patient was fairly lethargic upon arrival, then soon post-ictal, and now at time of sign out more back to his normal baseline but still feels fatigued/malaise.   Main issue now is worsening encephalopathy.     Today, the patient appears confused and restless, and is unable to converse with the psychiatry team. It seems the patient has not slept well and has been agitated for the past couple of nights. While it is difficult to determine when the current cognitive decline began, per the chart, the patient's daughter noted she had observed some confusion in the previous weeks. The patient may have been experiencing a slow cognitive decline that has now worsened due to situational stressors, including current  physical health issues and the relocation of the patient's wife to memory care.     In terms of the treatment plan, psychiaty agrees with the recommendation to utilize Trazodone. Its sedating effects can help improve sleep and reduce restlessness and agitation. Additionally, Trazodone is safer for the elderly compared to benzodiazepines, which can increase confusion. It is also advisable to consider low doses of Clonidine, Propranolol, or Gabapentin for agitation related to dementia instead of antipsychotics, as these medications have anticholinergic effects, which can exacerbate confusion.         Summary of Recommendations:   Plan:  Continue Trazodone at bedtime for sleep management.   Consider using low doses of Clonidine, Propranolol, or Gabapentin for agitation and restlessness, keeping the patient s cardiology and nephrology issues in mind.    Delirium precautions:  Up during the day with lights on  Lights off at night, avoid interruptions during the night as much as possible  Family visits  Encourage wearing glasses  Reorientation  Avoid opioids, benzodiazepines, anticholinergics.    Continue to ensure proper nutrition, fluid and electrolyte balance. Monitor for infections, hypoxia, metabolic derangements, or other causes of delirium.       Non-pharmacological approaches are preferable to reduce responsive behaviours, improve/maintain functional capacity and reduce emotional disorders (Shanel Pierce 2018).     Please see below.     When first meeting patient or with each new interaction:  -Knock - Announce self   -Greet & Smile   -Always use the positive physical approach:    Call the person by name   Move slowly  - respect personal space & be supportive, not confrontational   Give your name & greet patient  Get to the person s level to speak with them  Now deliver message    Types of Help   Visual   -Written Information - Schedules and notes   -Key Word Signs - locators & identifiers   -Objects in View -  "familiar items to stimulate task performance   -Gestures - pointing and movements   -Demonstration     Auditory  -Talking and Telling - give information, ask questions, provide choices   -Breaking it Down - Step-by-Step task Instructions   -Using Simple Words and Phrases  -Positive Feedback - praise, encouragement   -Acknowledge patient's response/reaction to your statement  -Limit your words - keep it simple    Try the following prompts:  -  It s about time for     -  Let s go this way     -  Here are your socks      Don't ask questions you don't want to hear the answer to  ( Do you want to  ,  Are you ready to  , \"I need you to  )     Only ask  Are you ready to   If you are willing to come back later    Do   Go with the flow    Use supportive communication techniques   - Use objects and the environment   - Give examples   - Use gestures and pointing   - Acknowledge & accept emotions   - Use empathy & validation   - Use familiar phrases or known interests   - Respect patient's values and beliefs      Approaching When The Person is distressed:  After greeting, approach patient with the following:  - It sounds like you are (give an emotion or feeling that seems to be true)?\"     -Utilize empathy to assess what the person really needs or wants (\"It sounds like \" \"It looks like \" \"It seems like \" \"You're feeling \")     -Once the person is listening and responding to you THEN -   -Redirect their attention and actions to a desired action or activity  -Redirect from negative behaviors, patient may not understand their behavior to be negative.       Pay attention to how you are communicatin. How you speak   - Tone of voice   - Pitch of voice   - Rate of speech    2. What you are saying  -Acknowledge emotions - \"sounds like , seems like , I can see you are \"   -Use familiar words or phrases (what the person uses)   -Know who the person is as a person. What does the patient value?  -Use familiar objects, pictures, " actions to help & direct   -Be prepared to repeat information  -Be prepared for possible emotional lability  -Don't argue, but don't let the person get into a dangerous situation    3.  How you respond to the person  -Use positive, friendly approval or praise  -Offer thanks and appreciation for their efforts     How is the patient responding to our interaction and communication?  -Watch movements & actions of patient  -Watch facial expressions and eye movements   -Listen for changes in volume, frequency, and intensity of sounds or words   -Give a short, direct message about what is happening   -Give simple choices about what the person can do   -Ask the person to help you do something   -Break down the task - give it one step at a time   -Assess possible patient needs:   Physical needs:  -Tired   -In pain or uncomfortable   -Thirsty or Hungry   -Need to urinate or have a BM or already did & need help   -Too hot or too cold     Emotional needs:  -Afraid   -Lonely   -Bored   -Angry   -Excited     If what you are doing is not working - stop, back down and give patient space, decide what to do differently and try again.        Additional non-pharmacological interventions include, but are not limited to:   Lavender   Warm Blankets and/or weighted blankets   Activities of interest currently or in the past   Calming music   5,4,3,2,1    Grounding exercise: 5 things you see, 4 things you feel, 3 things you hear, 2 things you smell, 1 thing you taste       Page me or re-consult psychiatry as needed.       DAMASO Lawler, Student  Consult/Liaison Psychiatry  [facility]   Contact information available via [facility] Paging/Directory.  If I am not available, please call [facility] intake ([phone])       Addendum for student attestation, 07/15/25:  I was present with the student who participated in the service and in the documentation of the services provided. I have verified the  history and personally performed the physical exam and medical decision making, as documented by the student and edited by me.     Nasra Fall, DENNYSP, APRN  Consult/Liaison Psychiatry  [facility]

## 2025-07-12 LAB
HOLD SPECIMEN: NORMAL
INR PPP: 3.04 (ref 0.85–1.15)
PROTHROMBIN TIME: 31.1 SECONDS (ref 11.8–14.8)

## 2025-07-12 PROCEDURE — 99232 SBSQ HOSP IP/OBS MODERATE 35: CPT | Performed by: INTERNAL MEDICINE

## 2025-07-12 PROCEDURE — 36415 COLL VENOUS BLD VENIPUNCTURE: CPT | Performed by: EMERGENCY MEDICINE

## 2025-07-12 PROCEDURE — 120N000001 HC R&B MED SURG/OB

## 2025-07-12 PROCEDURE — 250N000013 HC RX MED GY IP 250 OP 250 PS 637: Performed by: INTERNAL MEDICINE

## 2025-07-12 PROCEDURE — 250N000013 HC RX MED GY IP 250 OP 250 PS 637: Performed by: EMERGENCY MEDICINE

## 2025-07-12 PROCEDURE — 999N000111 HC STATISTIC OT IP EVAL DEFER

## 2025-07-12 PROCEDURE — 250N000013 HC RX MED GY IP 250 OP 250 PS 637: Performed by: PSYCHIATRY & NEUROLOGY

## 2025-07-12 PROCEDURE — 250N000011 HC RX IP 250 OP 636: Performed by: EMERGENCY MEDICINE

## 2025-07-12 PROCEDURE — 250N000011 HC RX IP 250 OP 636: Performed by: INTERNAL MEDICINE

## 2025-07-12 PROCEDURE — 99232 SBSQ HOSP IP/OBS MODERATE 35: CPT | Performed by: PSYCHIATRY & NEUROLOGY

## 2025-07-12 PROCEDURE — 999N000104 HC STATISTIC NO CHARGE

## 2025-07-12 PROCEDURE — 85610 PROTHROMBIN TIME: CPT | Performed by: EMERGENCY MEDICINE

## 2025-07-12 RX ORDER — GABAPENTIN 250 MG/5ML
200 SOLUTION ORAL 2 TIMES DAILY
Status: DISCONTINUED | OUTPATIENT
Start: 2025-07-12 | End: 2025-07-28

## 2025-07-12 RX ADMIN — HYDRALAZINE HYDROCHLORIDE 10 MG: 10 TABLET ORAL at 14:22

## 2025-07-12 RX ADMIN — HYDROMORPHONE HYDROCHLORIDE 0.2 MG: 0.2 INJECTION, SOLUTION INTRAMUSCULAR; INTRAVENOUS; SUBCUTANEOUS at 11:18

## 2025-07-12 RX ADMIN — NITROGLYCERIN 1 PATCH: 0.2 PATCH TRANSDERMAL at 09:04

## 2025-07-12 RX ADMIN — SPIRONOLACTONE 25 MG: 25 TABLET, FILM COATED ORAL at 14:22

## 2025-07-12 RX ADMIN — DIVALPROEX SODIUM 500 MG: 125 CAPSULE, COATED PELLETS ORAL at 20:01

## 2025-07-12 RX ADMIN — HYDRALAZINE HYDROCHLORIDE 10 MG: 20 INJECTION INTRAMUSCULAR; INTRAVENOUS at 09:30

## 2025-07-12 RX ADMIN — METOPROLOL TARTRATE 100 MG: 25 TABLET, FILM COATED ORAL at 09:07

## 2025-07-12 RX ADMIN — OLANZAPINE 2.5 MG: 10 INJECTION, POWDER, FOR SOLUTION INTRAMUSCULAR at 09:25

## 2025-07-12 RX ADMIN — HYDRALAZINE HYDROCHLORIDE 10 MG: 10 TABLET ORAL at 09:05

## 2025-07-12 RX ADMIN — DIVALPROEX SODIUM 500 MG: 125 CAPSULE, COATED PELLETS ORAL at 09:05

## 2025-07-12 RX ADMIN — WARFARIN SODIUM 1.5 MG: 1 TABLET ORAL at 17:51

## 2025-07-12 ASSESSMENT — ACTIVITIES OF DAILY LIVING (ADL)
ADLS_ACUITY_SCORE: 71
ADLS_ACUITY_SCORE: 73
ADLS_ACUITY_SCORE: 71
ADLS_ACUITY_SCORE: 73
ADLS_ACUITY_SCORE: 71
ADLS_ACUITY_SCORE: 71
ADLS_ACUITY_SCORE: 73
ADLS_ACUITY_SCORE: 71
ADLS_ACUITY_SCORE: 73
ADLS_ACUITY_SCORE: 71
ADLS_ACUITY_SCORE: 73
ADLS_ACUITY_SCORE: 71
ADLS_ACUITY_SCORE: 73
ADLS_ACUITY_SCORE: 71

## 2025-07-12 NOTE — PLAN OF CARE
Problem: Adult Inpatient Plan of Care  Goal: Absence of Hospital-Acquired Illness or Injury  Intervention: Identify and Manage Fall Risk  Recent Flowsheet Documentation  Taken 7/12/2025 0810 by Tyrone Washington RN  Safety Promotion/Fall Prevention:   clutter free environment maintained   increased rounding and observation   nonskid shoes/slippers when out of bed   room door open   room near nurse's station   room organization consistent   safety round/check completed  Intervention: Prevent Skin Injury  Recent Flowsheet Documentation  Taken 7/12/2025 0810 by Tyrone Washington RN  Body Position: supine  Intervention: Prevent and Manage VTE (Venous Thromboembolism) Risk  Recent Flowsheet Documentation  Taken 7/12/2025 0810 by Tyrone Washington RN  VTE Prevention/Management: SCDs off (sequential compression devices)  Intervention: Prevent Infection  Recent Flowsheet Documentation  Taken 7/12/2025 0810 by Tyrone Washington RN  Infection Prevention:   single patient room provided   rest/sleep promoted   personal protective equipment utilized   hand hygiene promoted  Goal: Readiness for Transition of Care  Outcome: Not Progressing     Problem: Delirium  Goal: Optimal Coping  Outcome: Not Progressing  Goal: Improved Behavioral Control  Outcome: Not Progressing  Intervention: Prevent and Manage Agitation  Recent Flowsheet Documentation  Taken 7/12/2025 0810 by Tyrone Washington RN  Environment Familiarity/Consistency: daily routine followed  Intervention: Minimize Safety Risk  Recent Flowsheet Documentation  Taken 7/12/2025 0810 by Tyrone Washington RN  Enhanced Safety Measures: monitored by video  Goal: Improved Attention and Thought Clarity  Outcome: Not Progressing  Goal: Improved Sleep  Outcome: Not Progressing   Goal Outcome Evaluation:       Patient with increased confusion and agitation, striking out at staff, non compliant with cares, spitting on staff, and threatening to bite staff. Patient moans and yells when moved by  staff dilaudid given with relief. Family here to visit and was striking at them. Will continue to monitor.    Tyrone Washington RN

## 2025-07-12 NOTE — PROGRESS NOTES
"[facility]    Medicine Progress Note - Hospitalist Service    Date of Admission:  7/9/2025    Assessment & Plan   89-year-old male who resides in assisted living admitted with seizure like symptoms.  Seen by neurology and started on Keppra with mildly abnormal EEG, then changed to Depakote.  Main issue now is worsening encephalopathy with agitation.  Encephalopathy order set placed and psychiatry consulted.     Seizure activity: Seen by neurology, EEG read as mildly abnormal due to some mild bilateral slowing and some slowing of the background.  These findings are nonspecific and suggest a mild encephalopathy.  Neurology started on Keppra on 7/11, changed to Depakote due to agitated delirium. Neurologist stated: \"Likely underlying cognitive impairment, sleep deprivation here in the hospital.  Would avoid benzos if able, okay to use quetiapine or olanzapine as needed.  Agree with trazodone and will add gabapentin per Psych rec's.\".\"Okay to continue anticoagulation for his A-fib to prevent strokes, however if falling seems to be a significant risk may have to reconsider anticoagulation. \"  Acute metabolic encephalopathy: Agitated delirium. Could have some underlying dementia contributing to hospital delirium.  His daughter has noticed some confusion in the past few weeks.  He has also had a lot of stress as his wife is moving out of their facility into memory care.  Was quite agitated for 2-3 nights in a row, started nighttime trazodone to try and get him sleeping better at night.  Discussed with his daughter Rashmi by phone. Psych consulted:Consider using low doses of Clonidine, Propranolol, or Gabapentin for agitation and restlessness   Chronic diastolic heart failure: Resume home meds  Coronary artery disease: Resume home meds  CKD stage IIIa: Baseline creatinine over the last year has been 1.4-1.7.  1.73 on admission and 1.66 yesterday.  Labs pending today.  Will continue to trend.  History " "of atrial fibrillation status post PPM.  Resume home meds  Clinically Significant Risk Factors Present on Admission         # Hyponatremia: Lowest Na = 133 mmol/L in last 2 days, will monitor as appropriate        # Drug Induced Coagulation Defect: home medication list includes an anticoagulant medication    # Hypertension: Noted on problem list  # Chronic heart failure with reduced ejection fraction: last echo with EF <40%           # Overweight: Estimated body mass index is 25.21 kg/m  as calculated from the following:    Height as of this encounter: 1.702 m (5' 7\").    Weight as of this encounter: 73 kg (160 lb 15 oz).       # Financial/Environmental Concerns:     # Pacemaker present               Diet: Combination Diet Low Saturated Fat Na <2400mg Diet  Snacks/Supplements Adult: Ensure Plus High Protein; With Meals    DVT Prophylaxis: Warfarin  Chandler Catheter: Not present  Lines: None     Cardiac Monitoring: None  Code Status: No CPR- Do NOT Intubate      Clinically Significant Risk Factors                   # Hypertension: Noted on problem list  # Chronic heart failure with reduced ejection fraction: last echo with EF <40%           # Overweight: Estimated body mass index is 25 kg/m  as calculated from the following:    Height as of this encounter: 1.702 m (5' 7\").    Weight as of this encounter: 72.4 kg (159 lb 9.8 oz)., PRESENT ON ADMISSION     # Financial/Environmental Concerns:     # Pacemaker present       Social Drivers of Health    Food Insecurity: Unknown (7/10/2025)    Food Insecurity     Within the past 12 months, did you worry that your food would run out before you got money to buy more?: Patient unable to answer     Within the past 12 months, did the food you bought just not last and you didn t have money to get more?: Patient unable to answer   Housing Stability: Unknown (7/10/2025)    Housing Stability     Do you have housing? : Patient unable to answer     Are you worried about losing your " housing?: Patient unable to answer   Financial Resource Strain: Unknown (7/10/2025)    Financial Resource Strain     Within the past 12 months, have you or your family members you live with been unable to get utilities (heat, electricity) when it was really needed?: Patient unable to answer   Transportation Needs: Unknown (7/10/2025)    Transportation Needs     Within the past 12 months, has lack of transportation kept you from medical appointments, getting your medicines, non-medical meetings or appointments, work, or from getting things that you need?: Patient unable to answer   Interpersonal Safety: Unknown (7/10/2025)    Interpersonal Safety     Do you feel physically and emotionally safe where you currently live?: Patient unable to answer     Within the past 12 months, have you been hit, slapped, kicked or otherwise physically hurt by someone?: Patient unable to answer     Within the past 12 months, have you been humiliated or emotionally abused in other ways by your partner or ex-partner?: Patient unable to answer    Received from [facility]    Social Connections          Disposition Plan     Medically Ready for Discharge: Anticipated in 2-4 Days      Barriers to Discharge: Agitation     Disposition: Anticipate discharge in 1 to 2 days, currently lives in assisted living, awaiting PT/OT recommendations regarding disposition however has been too agitated for evaluation    7/12. Called daughter Rashmi for update, especially neurologist changed anti seizure medication. Rashmi would like to get update daily. If pt declined, might opt to comfort care.          Ania Jaquez MD  Hospitalist Service  [facility]  Securely message with [employer] (more info)  Text page via [employer] Paging/Directory   ______________________________________________________________________    Interval History   Sleepy, just got Olanzapine due to agitation earlier    Physical Exam   Vital  Signs: Temp: 98.7  F (37.1  C) Temp src: Axillary BP: (!) 156/80 Pulse: 62   Resp: 16 SpO2: 97 % O2 Device: None (Room air)    Weight: 159 lbs 9.81 oz    Sleeping, not in distress  Cvs: rrr  Lung no wheezing    Medical Decision Making       42 MINUTES SPENT BY ME on the date of service doing chart review, history, exam, documentation & further activities per the note.      Data     I have personally reviewed the following data over the past 24 hrs:    INR:  3.04 (H) PTT:  N/A   D-dimer:  N/A Fibrinogen:  N/A       Imaging results reviewed over the past 24 hrs:   No results found for this or any previous visit (from the past 24 hours).

## 2025-07-12 NOTE — PLAN OF CARE
Problem: Adult Inpatient Plan of Care  Goal: Plan of Care Review  Description: The Plan of Care Review/Shift note should be completed every shift.  The Outcome Evaluation is a brief statement about your assessment that the patient is improving, declining, or no change.  This information will be displayed automatically on your shift  note.  7/12/2025 0517 by Jason Humphrey RN  Outcome: Progressing  Flowsheets (Taken 7/12/2025 0517)  Plan of Care Reviewed With: patient  Overall Patient Progress: no change  7/12/2025 0516 by Jason Humphrey RN  Outcome: Progressing  Flowsheets (Taken 7/12/2025 0516)  Plan of Care Reviewed With: patient  Overall Patient Progress: no change     Problem: Delirium  Goal: Improved Behavioral Control  7/12/2025 0517 by Jason Humphrey RN  Outcome: Progressing  7/12/2025 0516 by Jason Humphrey RN  Outcome: Progressing  Intervention: Minimize Safety Risk  Recent Flowsheet Documentation  Taken 7/12/2025 0513 by Jason Humphrey RN  Enhanced Safety Measures: monitored by video  Taken 7/11/2025 2217 by Jason Humphrey RN  Enhanced Safety Measures: monitored by video     Problem: Delirium  Goal: Improved Sleep  7/12/2025 0517 by Jason Humphrey RN  Outcome: Progressing  7/12/2025 0516 by Jason Humphrey RN  Outcome: Progressing     Problem: Comorbidity Management  Goal: Blood Pressure in Desired Range  Outcome: Progressing  Intervention: Maintain Blood Pressure Management  Recent Flowsheet Documentation  Taken 7/12/2025 0513 by Jason Humphrey RN  Medication Review/Management: medications reviewed  Taken 7/11/2025 2217 by Jason Humphrey RN  Medication Review/Management: medications reviewed   Goal Outcome Evaluation:      Plan of Care Reviewed With: patient    Overall Patient Progress: no changeOverall Patient Progress: no change    Pt sleep through most of the night, show no sign or symptom of pain or discomfort. Pt being close monitor with video. Vss except elevated BP  156/80. Recent bladder scan was 295, no straigth cath perform overnight. No acute changes overnight.    Chelly Humphrey RN.

## 2025-07-12 NOTE — PROGRESS NOTES
Care Management Follow Up    Length of Stay (days): 2    Expected Discharge Date: 07/13/2025    Anticipated Discharge Plan:  Assisted Living    Transportation: TBD    PT Recommendations:    OT Recommendations:        Barriers to Discharge: medical stability, video monitoring, delirium    Prior Living Situation: assisted living with facility resident    Discussed  Partnership in Safe Discharge Planning  document with patient/family: No     Handoff Completed: Yes, [facility] PCP: Internal handoff referral completed    Patient/Spokesperson Updated: No    Additional Information:  RNCM chart review for anticipated discharge plan and needs. Currently patient's main issue is worsening encephalopathy with agitation, at this time patient is on video monitoring. Therapy have not been able to complete evaluations for recommendations due to agitation. Patient from [facility] where wife     Next Steps: RNCM to follow for medical progression, therapy recommendations, and final discharge plan.     Yaquelin Schumacher RN  Acute Care Management  [facility]  For further questions/concerns you can reach us at Voc[employer] Web Console

## 2025-07-12 NOTE — PLAN OF CARE
Occupational Therapy: Orders received. Chart reviewed and discussed with care team.? Occupational Therapy not indicated at this time, attempted to eval pt three times. Unsuccessful due to pt arousal levels and increased agitation upon attempts.? Defer discharge recommendations to interdisciplinary team.? Will complete orders.     Mary Ann Vinson OT, 7/12/2025

## 2025-07-12 NOTE — PROGRESS NOTES
[facility] Neurology  [place]    Skip Newman MRN# [id number]   Age: 89 year old YOB: 1934               Assessment and Plan:      Seizure   Delirium     By description this sounds like he had a generalized tonic-clonic seizure, however there could be some lateralization to it.     EEG showed nonspecific slowing, no clear epileptiform abnormalities.    New onset seizure in this age group with suggestion of some cognitive impairment likely indicates seizures will continue if not treated.  For now I think the wisest course is to start him on anticonvulsant medication.  Keppra started yesterday, Keppra can be associated with irritability, he only got 1 dose yesterday.  Nonetheless, given his current agitated delirium I will switch him to Depakote, looks like RNs have been able to get him to take po meds last 18 hours.     Now with agitated delirium.    Contributors to his delirium include likely underlying cognitive impairment, sleep deprivation here in the hospital.  Would avoid benzos if able, okay to use quetiapine or olanzapine as needed.  Agree with trazodone and will add gabapentin per Psych rec's    CT of the head is unremarkable, notes indicate his pacemaker is not MRI compatible and I think the CT is good enough for our purposes here.  There was history of fall a week ago, CT would show if there was any hemorrhage or contusion related to that.    Okay to continue anticoagulation for his A-fib to prevent strokes, however if falling seems to be a significant risk may have to reconsider anticoagulation.    He has been seen in our clinic and should follow-up with neurology in 2 to 3 months regarding new diagnosis of seizure.              Chief Complaint/HPI:     7/12/25: sleepy today, got 2.5 mg olanzapine about an hour ago, so likely was awake and agitated then.     7/11/25: Patient more confused and agitated today.  No further seizures noted.    7/10/25:  This patient is a 89-year-old  gentleman seen for neurologic evaluation today regarding seizure-like episode yesterday.  Yesterday afternoon he was sitting in his chair at assisted living when he tensed up and started shaking.  Notes indicate that he was not moving the right side.  1 report describes the patient leaning back and to the right side while in his chair.  There is report of a fall 6 days prior, the CT here at [facility] shows no evidence of intracerebral hemorrhage (he is on warfarin for history of A-fib and cardiomyopathy).  The patient himself does not remember anything of these events.  In the ER initially he showed decreased responsiveness but that improved over about an hour.  Initial lactate was quite elevated at 5.7 and was normal about 2 hours later.                 Medications:     Current Facility-Administered Medications:     acetaminophen (TYLENOL) tablet 650 mg, 650 mg, Oral, Q4H PRN **OR** acetaminophen (TYLENOL) Suppository 650 mg, 650 mg, Rectal, Q4H PRN, Los Moreno MD    alum & mag hydroxide-simethicone (MAALOX) suspension 30 mL, 30 mL, Oral, Q4H PRN, Los Moreno MD    atorvastatin (LIPITOR) tablet 20 mg, 20 mg, Oral, QPM, Los Moreno MD, 20 mg at 07/11/25 2217    chlorthalidone (HYGROTON) tablet 25 mg, 25 mg, Oral, QPM, Chago Romero MD, 25 mg at 07/11/25 2217    divalproex sodium delayed-release (DEPAKOTE SPRINKLE) DR capsule 500 mg, 500 mg, Oral, Q12H [facility] (08/20), Triston Palafox MD, 500 mg at 07/12/25 0905    hydrALAZINE (APRESOLINE) injection 10-20 mg, 10-20 mg, Intravenous, Q30 Min PRN, Los Moreno MD    hydrALAZINE (APRESOLINE) tablet 10 mg, 10 mg, Oral, TID, Los Moreno MD, 10 mg at 07/12/25 0905    HYDROmorphone (DILAUDID) injection 0.2 mg, 0.2 mg, Intravenous, Q2H PRN, Los Moreno MD, 0.2 mg at 07/10/25 0401    HYDROmorphone (DILAUDID) injection 0.4 mg, 0.4 mg, Intravenous, Q2H PRN, Los Moreno MD    labetalol (NORMODYNE/TRANDATE) injection 10-20  mg, 10-20 mg, Intravenous, Q10 Min PRN, Los Moreno MD    melatonin tablet 1 mg, 1 mg, Oral, At Bedtime PRN, Los Moreno MD    metoprolol tartrate (LOPRESSOR) tablet 100 mg, 100 mg, Oral, BID, Chago Romero MD, 100 mg at 07/12/25 0907    naloxone (NARCAN) injection 0.2 mg, 0.2 mg, Intravenous, Q2 Min PRN **OR** naloxone (NARCAN) injection 0.4 mg, 0.4 mg, Intravenous, Q2 Min PRN **OR** naloxone (NARCAN) injection 0.2 mg, 0.2 mg, Intramuscular, Q2 Min PRN **OR** naloxone (NARCAN) injection 0.4 mg, 0.4 mg, Intramuscular, Q2 Min PRN, Los Moreno MD    nitroGLYcerin (NITRODUR) 0.2 MG/HR 24 hr patch 1 patch, 1 patch, Transdermal, Daily, Los Moreno MD, 1 patch at 07/12/25 0904    OLANZapine (zyPREXA) injection 2.5 mg, 2.5 mg, Intramuscular, Q6H PRN, Chago Romero MD, 2.5 mg at 07/12/25 0925    OLANZapine zydis (zyPREXA) ODT half-tab 2.5 mg, 2.5 mg, Oral, Q6H PRN, Chago Romero MD    ondansetron (ZOFRAN ODT) ODT tab 4 mg, 4 mg, Oral, Q6H PRN **OR** ondansetron (ZOFRAN) injection 4 mg, 4 mg, Intravenous, Q6H PRN, Los Moreno MD    oxyCODONE (ROXICODONE) tablet 5 mg, 5 mg, Oral, Q4H PRN, Los Moreno MD    oxyCODONE IR (ROXICODONE) half-tab 2.5 mg, 2.5 mg, Oral, Q4H PRN, Los Moreno MD    Patient is already receiving anticoagulation with heparin, enoxaparin (LOVENOX), warfarin (COUMADIN)  or other anticoagulant medication, , Does not apply, Continuous PRN, Los Moreno MD    polyethylene glycol (MIRALAX) Packet 17 g, 17 g, Oral, BID PRN, Los Moreno MD    prochlorperazine (COMPAZINE) injection 5 mg, 5 mg, Intravenous, Q6H PRN **OR** prochlorperazine (COMPAZINE) tablet 5 mg, 5 mg, Oral, Q6H PRN, Los Moreno MD    senna-docusate (SENOKOT-S/PERICOLACE) 8.6-50 MG per tablet 1 tablet, 1 tablet, Oral, BID PRN **OR** senna-docusate (SENOKOT-S/PERICOLACE) 8.6-50 MG per tablet 2 tablet, 2 tablet, Oral, BID PRN, Los Moreno MD     "spironolactone (ALDACTONE) tablet 25 mg, 25 mg, Oral, Daily, Chago Romero MD, 25 mg at 07/11/25 1553    traZODone (DESYREL) half-tab 25 mg, 25 mg, Oral, At Bedtime, Chago Romero MD, 25 mg at 07/11/25 2217    Warfarin Dose Required Daily - Pharmacist Managed, 1 each, Oral, See Admin Instructions, Los Moreno MD              Physical Exam:      Vitals: BP (!) 165/79 (BP Location: Left arm)   Pulse 62   Temp 98.1  F (36.7  C) (Axillary)   Resp 17   Ht 1.702 m (5' 7\")   Wt 72.4 kg (159 lb 9.8 oz)   SpO2 98%   BMI 25.00 kg/m    BMI= Body mass index is 25 kg/m .     Patient is asleep, supine in bed.  Opens his eyes just slightly when I call his name, did not answer any questions for me (or ask any)  Starts to get agitated when I touch his foot, so I desisted  Neurological Exam:   Mental status: sleepy now (mid-morning)   CN II: PERRLA.   CN III, IV, VI: EOMI.    CN VII: Face is symmetric   CN VII: Hearing is normal to conversation   CN IX, X: Phonation is normal.    CN XI: Head turning and shoulder shrug are intact   CN XII: Tongue is midline with normal movements and no atrophy or fasciculations.   Motor: Unable to test formally today, but no obvious lateralized weakness           Triston Palafox MD       More than 35 minutes spent reviewing records and results, evaluating patient, discussing with pt and family and on documentation    "

## 2025-07-13 ENCOUNTER — APPOINTMENT (OUTPATIENT)
Dept: NEUROLOGY | Facility: HOSPITAL | Age: OVER 89
End: 2025-07-13
Attending: PSYCHIATRY & NEUROLOGY
Payer: MEDICARE

## 2025-07-13 LAB
ANION GAP SERPL CALCULATED.3IONS-SCNC: 20 MMOL/L (ref 7–15)
BUN SERPL-MCNC: 80.3 MG/DL (ref 8–23)
CALCIUM SERPL-MCNC: 9.4 MG/DL (ref 8.8–10.4)
CHLORIDE SERPL-SCNC: 104 MMOL/L (ref 98–107)
CREAT SERPL-MCNC: 2.6 MG/DL (ref 0.67–1.17)
EGFRCR SERPLBLD CKD-EPI 2021: 23 ML/MIN/1.73M2
ERYTHROCYTE [DISTWIDTH] IN BLOOD BY AUTOMATED COUNT: 15.2 % (ref 10–15)
GLUCOSE BLDC GLUCOMTR-MCNC: 208 MG/DL (ref 70–99)
GLUCOSE BLDC GLUCOMTR-MCNC: 211 MG/DL (ref 70–99)
GLUCOSE BLDC GLUCOMTR-MCNC: 214 MG/DL (ref 70–99)
GLUCOSE BLDC GLUCOMTR-MCNC: 264 MG/DL (ref 70–99)
GLUCOSE SERPL-MCNC: 144 MG/DL (ref 70–99)
HCO3 SERPL-SCNC: 17 MMOL/L (ref 22–29)
HCT VFR BLD AUTO: 43.3 % (ref 40–53)
HGB BLD-MCNC: 14.1 G/DL (ref 13.3–17.7)
INR PPP: 5 (ref 0.85–1.15)
MCH RBC QN AUTO: 31.3 PG (ref 26.5–33)
MCHC RBC AUTO-ENTMCNC: 32.6 G/DL (ref 31.5–36.5)
MCV RBC AUTO: 96 FL (ref 78–100)
PLATELET # BLD AUTO: 114 10E3/UL (ref 150–450)
POTASSIUM SERPL-SCNC: 5 MMOL/L (ref 3.4–5.3)
POTASSIUM SERPL-SCNC: 5.9 MMOL/L (ref 3.4–5.3)
POTASSIUM SERPL-SCNC: 6.2 MMOL/L (ref 3.4–5.3)
PROTHROMBIN TIME: 45.4 SECONDS (ref 11.8–14.8)
RBC # BLD AUTO: 4.5 10E6/UL (ref 4.4–5.9)
SODIUM SERPL-SCNC: 141 MMOL/L (ref 135–145)
WBC # BLD AUTO: 9.9 10E3/UL (ref 4–11)

## 2025-07-13 PROCEDURE — 95819 EEG AWAKE AND ASLEEP: CPT

## 2025-07-13 PROCEDURE — 258N000003 HC RX IP 258 OP 636: Performed by: INTERNAL MEDICINE

## 2025-07-13 PROCEDURE — 99233 SBSQ HOSP IP/OBS HIGH 50: CPT | Performed by: PSYCHIATRY & NEUROLOGY

## 2025-07-13 PROCEDURE — 85610 PROTHROMBIN TIME: CPT | Performed by: EMERGENCY MEDICINE

## 2025-07-13 PROCEDURE — 999N000157 HC STATISTIC RCP TIME EA 10 MIN

## 2025-07-13 PROCEDURE — 250N000013 HC RX MED GY IP 250 OP 250 PS 637: Performed by: EMERGENCY MEDICINE

## 2025-07-13 PROCEDURE — 120N000001 HC R&B MED SURG/OB

## 2025-07-13 PROCEDURE — 84132 ASSAY OF SERUM POTASSIUM: CPT | Performed by: INTERNAL MEDICINE

## 2025-07-13 PROCEDURE — 250N000009 HC RX 250: Performed by: INTERNAL MEDICINE

## 2025-07-13 PROCEDURE — 95819 EEG AWAKE AND ASLEEP: CPT | Mod: 26 | Performed by: PSYCHIATRY & NEUROLOGY

## 2025-07-13 PROCEDURE — 258N000001 HC RX 258: Performed by: INTERNAL MEDICINE

## 2025-07-13 PROCEDURE — 94640 AIRWAY INHALATION TREATMENT: CPT

## 2025-07-13 PROCEDURE — 99233 SBSQ HOSP IP/OBS HIGH 50: CPT | Performed by: INTERNAL MEDICINE

## 2025-07-13 PROCEDURE — 250N000011 HC RX IP 250 OP 636: Performed by: PSYCHIATRY & NEUROLOGY

## 2025-07-13 PROCEDURE — 80048 BASIC METABOLIC PNL TOTAL CA: CPT | Performed by: INTERNAL MEDICINE

## 2025-07-13 PROCEDURE — 250N000013 HC RX MED GY IP 250 OP 250 PS 637: Performed by: INTERNAL MEDICINE

## 2025-07-13 PROCEDURE — 250N000012 HC RX MED GY IP 250 OP 636 PS 637: Performed by: INTERNAL MEDICINE

## 2025-07-13 PROCEDURE — 36415 COLL VENOUS BLD VENIPUNCTURE: CPT | Performed by: INTERNAL MEDICINE

## 2025-07-13 PROCEDURE — 85027 COMPLETE CBC AUTOMATED: CPT | Performed by: INTERNAL MEDICINE

## 2025-07-13 PROCEDURE — 250N000013 HC RX MED GY IP 250 OP 250 PS 637: Performed by: PSYCHIATRY & NEUROLOGY

## 2025-07-13 PROCEDURE — 250N000011 HC RX IP 250 OP 636: Performed by: INTERNAL MEDICINE

## 2025-07-13 RX ORDER — PHYTONADIONE 1 MG/.5ML
2 INJECTION, EMULSION INTRAMUSCULAR; INTRAVENOUS; SUBCUTANEOUS ONCE
Status: COMPLETED | OUTPATIENT
Start: 2025-07-13 | End: 2025-07-13

## 2025-07-13 RX ORDER — NICOTINE POLACRILEX 4 MG
15-30 LOZENGE BUCCAL
Status: DISCONTINUED | OUTPATIENT
Start: 2025-07-13 | End: 2025-07-29

## 2025-07-13 RX ORDER — DEXTROSE MONOHYDRATE 25 G/50ML
25 INJECTION, SOLUTION INTRAVENOUS ONCE
Status: COMPLETED | OUTPATIENT
Start: 2025-07-13 | End: 2025-07-13

## 2025-07-13 RX ORDER — DEXTROSE MONOHYDRATE 25 G/50ML
25-50 INJECTION, SOLUTION INTRAVENOUS
Status: DISCONTINUED | OUTPATIENT
Start: 2025-07-13 | End: 2025-07-29

## 2025-07-13 RX ORDER — ALBUTEROL SULFATE 5 MG/ML
10 SOLUTION RESPIRATORY (INHALATION) ONCE
Status: COMPLETED | OUTPATIENT
Start: 2025-07-13 | End: 2025-07-13

## 2025-07-13 RX ADMIN — HYDRALAZINE HYDROCHLORIDE 10 MG: 10 TABLET ORAL at 21:28

## 2025-07-13 RX ADMIN — NITROGLYCERIN 1 PATCH: 0.2 PATCH TRANSDERMAL at 11:11

## 2025-07-13 RX ADMIN — DEXTROSE 300 ML: 10 SOLUTION INTRAVENOUS at 16:07

## 2025-07-13 RX ADMIN — HYDRALAZINE HYDROCHLORIDE 20 MG: 20 INJECTION INTRAMUSCULAR; INTRAVENOUS at 04:31

## 2025-07-13 RX ADMIN — DIVALPROEX SODIUM 500 MG: 125 CAPSULE, COATED PELLETS ORAL at 21:29

## 2025-07-13 RX ADMIN — TRAZODONE HYDROCHLORIDE 25 MG: 50 TABLET ORAL at 21:28

## 2025-07-13 RX ADMIN — DEXTROSE MONOHYDRATE 25 G: 25 INJECTION, SOLUTION INTRAVENOUS at 16:22

## 2025-07-13 RX ADMIN — ATORVASTATIN CALCIUM 20 MG: 10 TABLET, FILM COATED ORAL at 21:28

## 2025-07-13 RX ADMIN — HYDRALAZINE HYDROCHLORIDE 20 MG: 20 INJECTION INTRAMUSCULAR; INTRAVENOUS at 00:29

## 2025-07-13 RX ADMIN — METOPROLOL TARTRATE 100 MG: 25 TABLET, FILM COATED ORAL at 21:28

## 2025-07-13 RX ADMIN — GABAPENTIN 200 MG: 250 SUSPENSION ORAL at 12:55

## 2025-07-13 RX ADMIN — PHYTONADIONE 2 MG: 2 INJECTION, EMULSION INTRAMUSCULAR; INTRAVENOUS; SUBCUTANEOUS at 12:55

## 2025-07-13 RX ADMIN — SODIUM BICARBONATE: 84 INJECTION, SOLUTION INTRAVENOUS at 16:22

## 2025-07-13 RX ADMIN — HYDROMORPHONE HYDROCHLORIDE 0.2 MG: 0.2 INJECTION, SOLUTION INTRAMUSCULAR; INTRAVENOUS; SUBCUTANEOUS at 02:58

## 2025-07-13 RX ADMIN — SODIUM CHLORIDE 7.2 UNITS: 9 INJECTION, SOLUTION INTRAVENOUS at 16:07

## 2025-07-13 RX ADMIN — SODIUM ZIRCONIUM CYCLOSILICATE 10 G: 10 POWDER, FOR SUSPENSION ORAL at 16:53

## 2025-07-13 RX ADMIN — ALBUTEROL SULFATE 10 MG: 2.5 SOLUTION RESPIRATORY (INHALATION) at 19:08

## 2025-07-13 ASSESSMENT — ACTIVITIES OF DAILY LIVING (ADL)
ADLS_ACUITY_SCORE: 71
ADLS_ACUITY_SCORE: 77
ADLS_ACUITY_SCORE: 71
ADLS_ACUITY_SCORE: 77
ADLS_ACUITY_SCORE: 71
ADLS_ACUITY_SCORE: 71
ADLS_ACUITY_SCORE: 77
ADLS_ACUITY_SCORE: 71
ADLS_ACUITY_SCORE: 77
ADLS_ACUITY_SCORE: 77
ADLS_ACUITY_SCORE: 71

## 2025-07-13 NOTE — PROGRESS NOTES
[facility]    Medicine Progress Note - Hospitalist Service    Date of Admission:  7/9/2025    Assessment & Plan   Skip Newman is a 89-year-old male with history of sick sinus syndrome status post pacemaker placement, hypertension, CAD, stage III CKD, chronic atrial fibrillation, on warfarin, HFrEF, seizure disorder, NSVT, gout, nephrolithiasis, aortic regurgitation, pulmonary hypertension, and prior CVA admitted on 7/9/2025 from care home with seizure-like activity and suspected metabolic encephalopathy.      He was placed on Keppra on admission and subsequently transition to Depakote per neurology recommendation. Due to worsening mental status, neurologist plans to pursue lumbar puncture. Head CT was unremarkable.  Warfarin is on hold and he received IV vitamin K given supratherapeutic INR.    Patient developed PRICILA on stage III CKD and hyperkalemia during hospital stay.    Suspected metabolic encephalopathy  Seizure-like activity  Unclear whether altered mental status is secondary to worsening PRICILA, and metabolic acidosis versus postictal state from seizure. Head CT was unremarkable.     Continue to divalproex 500 mg every 12 hours per neurology recommendation  Hold gabapentin for now given worsening mental status and PRICILA on stage III CKD  Avoid deliriogenic medications  Neurology [id number] assistance      PRICILA on stage III CKD  High anion gap metabolic acidosis  Start sodium bicarbonate infusion  Monitor BMP  Hold PTA chlorthalidone 25 mg daily for now  Hold PTA spironolactone for now  Monitor BMP  Avoid nephrotoxin  Consider nephrology evaluation if renal function continues to worsen    Hyperkalemia  Potassium level is elevated  Likely secondary to PRICILA and spironolactone  Hold spironolactone for now  Dextrose and insulin infusion per protocol  Nebulize albuterol x 1  Lokelma x 1  Telemetry    Hypertension  Continue metoprolol tartrate and hydralazine  Hold chlorthalidone and  "spironolactone for now    Atrial fibrillation  Sick sinus syndrome status post pacemaker placement  Heart rate is controlled  Warfarin as per pharmacy  Continue metoprolol    Supratherapeutic INR  Received IV vitamin K on 7/13/2025      HFrEF  Echocardiogram performed 6/27/2025 showed severely reduced LV systolic function with LVEF of 25 to 30%.  Patient is requiring IV hydration for now due to suspected prerenal PRICILA in the setting of diuretic therapy and poor oral intake  IV hydration for now  Hold spironolactone for now due to PRICILA on stage III CKD    CAD  Continue atorvastatin 20 mg daily  On warfarin PTA  Sublingual nitroglycerin as needed    Clinically Significant Risk Factors Present on Admission         # Hyponatremia: Lowest Na = 133 mmol/L in last 2 days, will monitor as appropriate        # Drug Induced Coagulation Defect: home medication list includes an anticoagulant medication    # Hypertension: Noted on problem list  # Chronic heart failure with reduced ejection fraction: last echo with EF <40%           # Overweight: Estimated body mass index is 25.21 kg/m  as calculated from the following:    Height as of this encounter: 1.702 m (5' 7\").    Weight as of this encounter: 73 kg (160 lb 15 oz).       # Financial/Environmental Concerns:     # Pacemaker present           Diet: Combination Diet Low Saturated Fat Na <2400mg Diet  Snacks/Supplements Adult: Ensure Plus High Protein; With Meals    DVT Prophylaxis: Warfarin  Chandler Catheter: Not present  Lines: None     Cardiac Monitoring: None  Code Status: No CPR- Do NOT Intubate      Clinically Significant Risk Factors        # Hyperkalemia: Highest K = 5.9 mmol/L in last 2 days, will monitor as appropriate       # Anion Gap Metabolic Acidosis: Highest Anion Gap = 20 mmol/L in last 2 days, will monitor and treat as appropriate    # Thrombocytopenia: Lowest platelets = 114 in last 2 days, will monitor for bleeding  # Acute Kidney Injury, unspecified: based on a " >150% or 0.3 mg/dL increase in last creatinine compared to past 90 day average, will monitor renal function  # Hypertension: Noted on problem list  # Chronic heart failure with reduced ejection fraction: last echo with EF <40%                 # Financial/Environmental Concerns:     # Pacemaker present       Social Drivers of Health    Food Insecurity: Unknown (7/10/2025)    Food Insecurity     Within the past 12 months, did you worry that your food would run out before you got money to buy more?: Patient unable to answer     Within the past 12 months, did the food you bought just not last and you didn t have money to get more?: Patient unable to answer   Housing Stability: Unknown (7/10/2025)    Housing Stability     Do you have housing? : Patient unable to answer     Are you worried about losing your housing?: Patient unable to answer   Financial Resource Strain: Unknown (7/10/2025)    Financial Resource Strain     Within the past 12 months, have you or your family members you live with been unable to get utilities (heat, electricity) when it was really needed?: Patient unable to answer   Transportation Needs: Unknown (7/10/2025)    Transportation Needs     Within the past 12 months, has lack of transportation kept you from medical appointments, getting your medicines, non-medical meetings or appointments, work, or from getting things that you need?: Patient unable to answer   Interpersonal Safety: Unknown (7/10/2025)    Interpersonal Safety     Do you feel physically and emotionally safe where you currently live?: Patient unable to answer     Within the past 12 months, have you been hit, slapped, kicked or otherwise physically hurt by someone?: Patient unable to answer     Within the past 12 months, have you been humiliated or emotionally abused in other ways by your partner or ex-partner?: Patient unable to answer    Received from [facility]    [employer]           Disposition Plan     Medically Ready for Discharge: Anticipated in 2-4 Days      Barriers to Discharge: Agitation     Disposition: Anticipate discharge in 1 to 2 days, currently lives in assisted living, awaiting PT/OT recommendations regarding disposition however has been too agitated for evaluation    07/13/25: Patient was somnolent and sometimes grunts to sternal rub; unable to engage in any meaningful conversation or follow commands.      Darci Danielson MD  Hospitalist Service  [facility]  Securely message with [employer] (more info)  Text page via [facility] Paging/Directory   ______________________________________________________________________    Interval History   Patient was somnolent and sometimes grunts to sternal rub; unable to engage in any meaningful conversation or follow commands.  Updated daughter Rashmi about current status and plans.    Physical Exam   Vital Signs: Temp: 98.9  F (37.2  C) Temp src: Oral BP: 131/62 Pulse: 88   Resp: 12 SpO2: 96 % O2 Device: None (Room air)    Weight: 157 lbs 13.59 oz      General appearance: Somnolent, only groans to sternal rub.  Unable to follow simple commands.   HEENT: Normocephalic, atraumatic, conjunctiva clear without icterus and ears without discharge  Lungs: Diminished air entry bilaterally  Cardiovascular: Irregular rhythm, normal apical impulse, normal S1 and S2, no lower extremity edema bilaterally  Abdomen: Soft, non-tender and Non-distended, active bowel sounds  Skin: Skin color, texture normal and bruising or bleeding. No rashes or lesions over face, neck, arms and legs, turgor normal.  Musculoskeletal: No bony deformities or joint tenderness. Normal ROM upon flexion & extension.   Neurologic: Somnolent.  Unable to follow simple commands.  Groans to sternal rub.  Psychiatric: Unable to assess    Medical Decision Making       55 MINUTES SPENT BY ME on the date of service doing chart review, history, exam, documentation & further  activities per the note.      Data     I have personally reviewed the following data over the past 24 hrs:    9.9  \   14.1   / 114 (L)     141 104 80.3 (H) /  144 (H)   5.9 (H) 17 (L) 2.60 (H) \     INR:  5.00 (H) PTT:  N/A   D-dimer:  N/A Fibrinogen:  N/A       Imaging results reviewed over the past 24 hrs:   No results found for this or any previous visit (from the past 24 hours).

## 2025-07-13 NOTE — PROGRESS NOTES
Bedside EEG was performed that included photic stimulation; hyperventilation was deferred. The patient was mostly asleep throughout the recording. He was unable to follow commands.    [id number] used.

## 2025-07-13 NOTE — PLAN OF CARE
Problem: Adult Inpatient Plan of Care  Goal: Absence of Hospital-Acquired Illness or Injury  Intervention: Identify and Manage Fall Risk  Recent Flowsheet Documentation  Taken 7/12/2025 1631 by Pina Joyce RN  Safety Promotion/Fall Prevention: activity supervised     Problem: Comorbidity Management  Goal: Blood Pressure in Desired Range  Outcome: Progressing  Intervention: Maintain Blood Pressure Management  Recent Flowsheet Documentation  Taken 7/12/2025 1631 by Pina Joyce RN  Medication Review/Management: medications reviewed     Problem: Comorbidity Management  Goal: Blood Pressure in Desired Range  Outcome: Progressing  Intervention: Maintain Blood Pressure Management  Recent Flowsheet Documentation  Taken 7/12/2025 1631 by Pina Joyce RN  Medication Review/Management: medications reviewed     Problem: Pain Acute  Goal: Optimal Pain Control and Function  Outcome: Progressing  Intervention: Prevent or Manage Pain  Recent Flowsheet Documentation  Taken 7/12/2025 1631 by Pina Joyce RN  Sensory Stimulation Regulation: quiet environment promoted  Bowel Elimination Promotion: adequate fluid intake promoted  Medication Review/Management: medications reviewed  Pt has been drowsy and confuse. Refused to eat, spitting out food at staff. Bladder scan every 4 hours. Straight cath per order. Refused  to take meds. Pt was able to take in a portion of his Depakote but not other po meds. Son visited and updated. Kept on seizure precaution.

## 2025-07-13 NOTE — PLAN OF CARE
Problem: Adult Inpatient Plan of Care  Goal: Optimal Comfort and Wellbeing  Outcome: Not Progressing     Problem: Delirium  Goal: Optimal Coping  Outcome: Not Progressing   Goal Outcome Evaluation:       Pt is oriented to self only. Pt was unable to tolerate most of his PO medication this AM shift. Pt spit out crushed pills/tablets. No seizure like activity noted. Pt's PT is critically high. Pt's potassium critically high as well. Provider as well. Plan is to use Dextrose and insulin to shift the excessive potassium from the extracellular space into the cells.  Skip Mireles RN  7/13/2025  3:28 PM

## 2025-07-13 NOTE — PLAN OF CARE
Problem: Adult Inpatient Plan of Care  Goal: Plan of Care Review  Description: The Plan of Care Review/Shift note should be completed every shift.  The Outcome Evaluation is a brief statement about your assessment that the patient is improving, declining, or no change.  This information will be displayed automatically on your shift  note.  Outcome: Progressing  Flowsheets (Taken 7/13/2025 0446)  Overall Patient Progress: declining  Problem: Delirium  Goal: Improved Behavioral Control  Outcome: Progressing  Intervention: Minimize Safety Risk  Recent Flowsheet Documentation  Taken 7/13/2025 0020 by Jason Humphrey RN  Enhanced Safety Measures:   room near unit station   pain management     Problem: Comorbidity Management  Goal: Blood Pressure in Desired Range  Outcome: Progressing  Intervention: Maintain Blood Pressure Management  Recent Flowsheet Documentation  Taken 7/13/2025 0020 by Jason Humphrey RN  Medication Review/Management: medications reviewed      Goal Outcome Evaluation:      Plan of Care Reviewed With: patient    Overall Patient Progress: decliningOverall Patient Progress: declining    Pt has been lethargic overnight. Pt was moaning loudly, prn dilaudid given once during the shift with relief. Vss on RA, except elevated BP. PRN Hydralazine 2x with recent BP was 136/67. Pt was apneic for 20 sec with RR 8/min after given Dilaudid and hydralazine. Closely monitor with door open.       Chelly Hupmhrey RN.

## 2025-07-13 NOTE — PROGRESS NOTES
[facility] Neurology  [place]    Skip Newman MRN# [id number]   Age: 89 year old YOB: 1934               Assessment and Plan:      Seizure   Delirium--now less responsive     By description this sounds like he had a generalized tonic-clonic seizure, however there could be some lateralization to it.     EEG showed nonspecific slowing, no clear epileptiform abnormalities.    New onset seizure in this age group with suggestion of some cognitive impairment likely indicates seizures will continue if not treated.  For now I think the wisest course is to start him on anticonvulsant medication.  Given his agitated delirium I would like to have him on Depakote, continue this as able, he has not been taking in PO reliably.  If he has a clear seizure we can give him depakote IV    Now with delirium.    Less responsive last couple days, some neck stiffness on exam  We should pursue LP -- hold warfarin today, will give some vit K and recheck INR in AM.   Recheck EEG today     CT of the head is unremarkable, there was history of fall a week ago, CT would show if there was any hemorrhage or contusion related to that.    notes indicate his pacemaker is not MRI compatible               Chief Complaint/HPI:     7/13/25: less responsive today     7/12/25: sleepy today, got 2.5 mg olanzapine about an hour ago, so likely was awake and agitated then.     7/11/25: Patient more confused and agitated today.  No further seizures noted.    7/10/25:  This patient is a 89-year-old gentleman seen for neurologic evaluation today regarding seizure-like episode yesterday.  Yesterday afternoon he was sitting in his chair at assisted living when he tensed up and started shaking.  Notes indicate that he was not moving the right side.  1 report describes the patient leaning back and to the right side while in his chair.  There is report of a fall 6 days prior, the CT here at [facility] shows no evidence of intracerebral  hemorrhage (he is on warfarin for history of A-fib and cardiomyopathy).  The patient himself does not remember anything of these events.  In the ER initially he showed decreased responsiveness but that improved over about an hour.  Initial lactate was quite elevated at 5.7 and was normal about 2 hours later.                 Medications:     Current Facility-Administered Medications:     acetaminophen (TYLENOL) tablet 650 mg, 650 mg, Oral, Q4H PRN **OR** acetaminophen (TYLENOL) Suppository 650 mg, 650 mg, Rectal, Q4H PRN, Los Moreno MD    alum & mag hydroxide-simethicone (MAALOX) suspension 30 mL, 30 mL, Oral, Q4H PRN, Los Moreno MD    atorvastatin (LIPITOR) tablet 20 mg, 20 mg, Oral, QPM, Los Moreno MD, 20 mg at 07/11/25 2217    chlorthalidone (HYGROTON) tablet 25 mg, 25 mg, Oral, QPM, Chago Romero MD, 25 mg at 07/11/25 2217    divalproex sodium delayed-release (DEPAKOTE SPRINKLE) DR capsule 500 mg, 500 mg, Oral, Q12H MARY (08/20), Triston Palafox MD, 500 mg at 07/12/25 2001    gabapentin (NEURONTIN) solution 200 mg, 200 mg, Oral, BID, Triston Palafox MD    hydrALAZINE (APRESOLINE) injection 10-20 mg, 10-20 mg, Intravenous, Q30 Min PRN, Los Moreno MD, 20 mg at 07/13/25 0431    hydrALAZINE (APRESOLINE) tablet 10 mg, 10 mg, Oral, TID, Los Moreno MD, 10 mg at 07/12/25 1422    HYDROmorphone (DILAUDID) injection 0.2 mg, 0.2 mg, Intravenous, Q2H PRN, Los Moreno MD, 0.2 mg at 07/13/25 0258    HYDROmorphone (DILAUDID) injection 0.4 mg, 0.4 mg, Intravenous, Q2H PRN, Los Moreno MD    labetalol (NORMODYNE/TRANDATE) injection 10-20 mg, 10-20 mg, Intravenous, Q10 Min PRN, Los Moreno MD    melatonin tablet 1 mg, 1 mg, Oral, At Bedtime PRN, Los Moreno MD    metoprolol tartrate (LOPRESSOR) tablet 100 mg, 100 mg, Oral, BID, Chago Romero MD, 100 mg at 07/12/25 0907    naloxone (NARCAN) injection 0.2 mg, 0.2 mg, Intravenous, Q2 Min PRN **OR**  naloxone (NARCAN) injection 0.4 mg, 0.4 mg, Intravenous, Q2 Min PRN **OR** naloxone (NARCAN) injection 0.2 mg, 0.2 mg, Intramuscular, Q2 Min PRN **OR** naloxone (NARCAN) injection 0.4 mg, 0.4 mg, Intramuscular, Q2 Min PRN, Los Moreno MD    nitroGLYcerin (NITRODUR) 0.2 MG/HR 24 hr patch 1 patch, 1 patch, Transdermal, Daily, Los Moreno MD, 1 patch at 07/12/25 0904    OLANZapine (zyPREXA) injection 2.5 mg, 2.5 mg, Intramuscular, Q6H PRN, Chago Romero MD, 2.5 mg at 07/12/25 0925    OLANZapine zydis (zyPREXA) ODT half-tab 2.5 mg, 2.5 mg, Oral, Q6H PRN, Chago Romero MD    ondansetron (ZOFRAN ODT) ODT tab 4 mg, 4 mg, Oral, Q6H PRN **OR** ondansetron (ZOFRAN) injection 4 mg, 4 mg, Intravenous, Q6H PRN, Los Moreno MD    oxyCODONE (ROXICODONE) tablet 5 mg, 5 mg, Oral, Q4H PRN, Los Moreno MD    oxyCODONE IR (ROXICODONE) half-tab 2.5 mg, 2.5 mg, Oral, Q4H PRN, Los Moreno MD    Patient is already receiving anticoagulation with heparin, enoxaparin (LOVENOX), warfarin (COUMADIN)  or other anticoagulant medication, , Does not apply, Continuous PRN, Los Moreno MD    polyethylene glycol (MIRALAX) Packet 17 g, 17 g, Oral, BID PRN, Los Moreno MD    prochlorperazine (COMPAZINE) injection 5 mg, 5 mg, Intravenous, Q6H PRN **OR** prochlorperazine (COMPAZINE) tablet 5 mg, 5 mg, Oral, Q6H PRN, Los Moreno MD    senna-docusate (SENOKOT-S/PERICOLACE) 8.6-50 MG per tablet 1 tablet, 1 tablet, Oral, BID PRN **OR** senna-docusate (SENOKOT-S/PERICOLACE) 8.6-50 MG per tablet 2 tablet, 2 tablet, Oral, BID PRN, Los Moreno MD    spironolactone (ALDACTONE) tablet 25 mg, 25 mg, Oral, Daily, Chago Romero MD, 25 mg at 07/12/25 1422    traZODone (DESYREL) half-tab 25 mg, 25 mg, Oral, At Bedtime, Chago Romero MD, 25 mg at 07/11/25 3375    Warfarin Dose Required Daily - Pharmacist Managed, 1 each, Oral, See Admin Instructions, Los Moreno MD  "             Physical Exam:      Vitals: BP (!) 178/74 (BP Location: Right arm)   Pulse 74   Temp 98.7  F (37.1  C) (Axillary)   Resp (!) 8   Ht 1.702 m (5' 7\")   Wt 71.6 kg (157 lb 13.6 oz)   SpO2 96%   BMI 24.72 kg/m    BMI= Body mass index is 24.72 kg/m .     Patient is asleep, supine in bed.  Opens his eyes just briefly when I ask him to  Did squeeze on left to command, spontaneous grasp on right  Tone increased a bit throughout, but does seem to be some neck stiffness.  No verbal output  Neurological Exam:   Mental status:    CN II: PERRLA.   CN III, IV, VI: EOMI.    CN VII: Face is symmetric   CN VII: Hearing is normal to conversation   CN IX, X: Phonation is normal.    CN XI: Head turning and shoulder shrug are intact   CN XII: Tongue is midline with normal movements and no atrophy or fasciculations.   Motor: Unable to test formally today, but no obvious lateralized weakness        Triston Palafox MD       More than 50 minutes spent reviewing records and results, evaluating patient, discussing with pt and family and on documentation    "

## 2025-07-14 ENCOUNTER — APPOINTMENT (OUTPATIENT)
Dept: PHYSICAL THERAPY | Facility: HOSPITAL | Age: OVER 89
End: 2025-07-14
Attending: EMERGENCY MEDICINE
Payer: MEDICARE

## 2025-07-14 LAB
ALBUMIN SERPL BCG-MCNC: 3.8 G/DL (ref 3.5–5.2)
ALBUMIN SERPL BCG-MCNC: 3.8 G/DL (ref 3.5–5.2)
ALP SERPL-CCNC: 95 U/L (ref 40–150)
ALP SERPL-CCNC: 95 U/L (ref 40–150)
ALT SERPL W P-5'-P-CCNC: 21 U/L (ref 0–70)
ALT SERPL W P-5'-P-CCNC: 22 U/L (ref 0–70)
AMMONIA PLAS-SCNC: 15 UMOL/L (ref 16–60)
ANION GAP SERPL CALCULATED.3IONS-SCNC: 13 MMOL/L (ref 7–15)
ANION GAP SERPL CALCULATED.3IONS-SCNC: 15 MMOL/L (ref 7–15)
AST SERPL W P-5'-P-CCNC: 24 U/L (ref 0–45)
AST SERPL W P-5'-P-CCNC: 25 U/L (ref 0–45)
BILIRUB DIRECT SERPL-MCNC: 0.19 MG/DL (ref 0–0.3)
BILIRUB DIRECT SERPL-MCNC: 0.22 MG/DL (ref 0–0.3)
BILIRUB SERPL-MCNC: 0.7 MG/DL
BILIRUB SERPL-MCNC: 0.7 MG/DL
BUN SERPL-MCNC: 87 MG/DL (ref 8–23)
BUN SERPL-MCNC: 87.3 MG/DL (ref 8–23)
CALCIUM SERPL-MCNC: 9.2 MG/DL (ref 8.8–10.4)
CALCIUM SERPL-MCNC: 9.3 MG/DL (ref 8.8–10.4)
CHLORIDE SERPL-SCNC: 101 MMOL/L (ref 98–107)
CHLORIDE SERPL-SCNC: 102 MMOL/L (ref 98–107)
CREAT SERPL-MCNC: 2.52 MG/DL (ref 0.67–1.17)
CREAT SERPL-MCNC: 2.58 MG/DL (ref 0.67–1.17)
EGFRCR SERPLBLD CKD-EPI 2021: 23 ML/MIN/1.73M2
EGFRCR SERPLBLD CKD-EPI 2021: 23 ML/MIN/1.73M2
GLUCOSE BLDC GLUCOMTR-MCNC: 173 MG/DL (ref 70–99)
GLUCOSE SERPL-MCNC: 111 MG/DL (ref 70–99)
GLUCOSE SERPL-MCNC: 116 MG/DL (ref 70–99)
HCO3 SERPL-SCNC: 24 MMOL/L (ref 22–29)
HCO3 SERPL-SCNC: 26 MMOL/L (ref 22–29)
HOLD SPECIMEN: NORMAL
HOLD SPECIMEN: NORMAL
INR PPP: 5.41 (ref 0.85–1.15)
POTASSIUM SERPL-SCNC: 4.2 MMOL/L (ref 3.4–5.3)
POTASSIUM SERPL-SCNC: 4.3 MMOL/L (ref 3.4–5.3)
PROT SERPL-MCNC: 6.6 G/DL (ref 6.4–8.3)
PROT SERPL-MCNC: 6.6 G/DL (ref 6.4–8.3)
PROTHROMBIN TIME: 48.2 SECONDS (ref 11.8–14.8)
SODIUM SERPL-SCNC: 140 MMOL/L (ref 135–145)
SODIUM SERPL-SCNC: 141 MMOL/L (ref 135–145)

## 2025-07-14 PROCEDURE — 97162 PT EVAL MOD COMPLEX 30 MIN: CPT | Mod: GP

## 2025-07-14 PROCEDURE — 36415 COLL VENOUS BLD VENIPUNCTURE: CPT | Performed by: PSYCHIATRY & NEUROLOGY

## 2025-07-14 PROCEDURE — 120N000001 HC R&B MED SURG/OB

## 2025-07-14 PROCEDURE — 80053 COMPREHEN METABOLIC PANEL: CPT | Performed by: INTERNAL MEDICINE

## 2025-07-14 PROCEDURE — 97530 THERAPEUTIC ACTIVITIES: CPT | Mod: GP

## 2025-07-14 PROCEDURE — 97112 NEUROMUSCULAR REEDUCATION: CPT | Mod: GP

## 2025-07-14 PROCEDURE — 82435 ASSAY OF BLOOD CHLORIDE: CPT | Performed by: PSYCHIATRY & NEUROLOGY

## 2025-07-14 PROCEDURE — 82140 ASSAY OF AMMONIA: CPT | Performed by: PSYCHIATRY & NEUROLOGY

## 2025-07-14 PROCEDURE — 82248 BILIRUBIN DIRECT: CPT | Performed by: PSYCHIATRY & NEUROLOGY

## 2025-07-14 PROCEDURE — 36415 COLL VENOUS BLD VENIPUNCTURE: CPT | Performed by: EMERGENCY MEDICINE

## 2025-07-14 PROCEDURE — 250N000013 HC RX MED GY IP 250 OP 250 PS 637: Performed by: PSYCHIATRY & NEUROLOGY

## 2025-07-14 PROCEDURE — 99232 SBSQ HOSP IP/OBS MODERATE 35: CPT | Performed by: PSYCHIATRY & NEUROLOGY

## 2025-07-14 PROCEDURE — 250N000013 HC RX MED GY IP 250 OP 250 PS 637: Performed by: INTERNAL MEDICINE

## 2025-07-14 PROCEDURE — 82374 ASSAY BLOOD CARBON DIOXIDE: CPT | Performed by: INTERNAL MEDICINE

## 2025-07-14 PROCEDURE — 250N000013 HC RX MED GY IP 250 OP 250 PS 637: Performed by: EMERGENCY MEDICINE

## 2025-07-14 PROCEDURE — 99232 SBSQ HOSP IP/OBS MODERATE 35: CPT | Performed by: INTERNAL MEDICINE

## 2025-07-14 PROCEDURE — 85610 PROTHROMBIN TIME: CPT | Performed by: EMERGENCY MEDICINE

## 2025-07-14 RX ORDER — NYSTATIN 100000 [USP'U]/ML
500000 SUSPENSION ORAL 4 TIMES DAILY
Status: DISCONTINUED | OUTPATIENT
Start: 2025-07-14 | End: 2025-07-23

## 2025-07-14 RX ADMIN — SODIUM ZIRCONIUM CYCLOSILICATE 10 G: 10 POWDER, FOR SUSPENSION ORAL at 10:16

## 2025-07-14 RX ADMIN — ATORVASTATIN CALCIUM 20 MG: 10 TABLET, FILM COATED ORAL at 19:32

## 2025-07-14 RX ADMIN — METOPROLOL TARTRATE 100 MG: 25 TABLET, FILM COATED ORAL at 21:56

## 2025-07-14 RX ADMIN — HYDRALAZINE HYDROCHLORIDE 10 MG: 10 TABLET ORAL at 10:16

## 2025-07-14 RX ADMIN — NYSTATIN 500000 UNITS: 100000 SUSPENSION ORAL at 21:56

## 2025-07-14 RX ADMIN — METOPROLOL TARTRATE 100 MG: 25 TABLET, FILM COATED ORAL at 10:15

## 2025-07-14 RX ADMIN — NYSTATIN 500000 UNITS: 100000 SUSPENSION ORAL at 19:33

## 2025-07-14 RX ADMIN — NITROGLYCERIN 1 PATCH: 0.2 PATCH TRANSDERMAL at 08:06

## 2025-07-14 RX ADMIN — HYDRALAZINE HYDROCHLORIDE 10 MG: 10 TABLET ORAL at 13:10

## 2025-07-14 RX ADMIN — SENNOSIDES AND DOCUSATE SODIUM 2 TABLET: 50; 8.6 TABLET ORAL at 13:10

## 2025-07-14 RX ADMIN — HYDRALAZINE HYDROCHLORIDE 10 MG: 10 TABLET ORAL at 19:33

## 2025-07-14 RX ADMIN — NYSTATIN 500000 UNITS: 100000 SUSPENSION ORAL at 11:06

## 2025-07-14 RX ADMIN — DIVALPROEX SODIUM 500 MG: 125 CAPSULE, COATED PELLETS ORAL at 10:16

## 2025-07-14 RX ADMIN — NYSTATIN 500000 UNITS: 100000 SUSPENSION ORAL at 13:10

## 2025-07-14 RX ADMIN — TRAZODONE HYDROCHLORIDE 25 MG: 50 TABLET ORAL at 21:56

## 2025-07-14 ASSESSMENT — ACTIVITIES OF DAILY LIVING (ADL)
ADLS_ACUITY_SCORE: 80
ADLS_ACUITY_SCORE: 77
ADLS_ACUITY_SCORE: 69
ADLS_ACUITY_SCORE: 76
ADLS_ACUITY_SCORE: 77
ADLS_ACUITY_SCORE: 76
ADLS_ACUITY_SCORE: 81
ADLS_ACUITY_SCORE: 77
ADLS_ACUITY_SCORE: 76
ADLS_ACUITY_SCORE: 77
ADLS_ACUITY_SCORE: 76
ADLS_ACUITY_SCORE: 77
ADLS_ACUITY_SCORE: 69
ADLS_ACUITY_SCORE: 78
ADLS_ACUITY_SCORE: 81
ADLS_ACUITY_SCORE: 77
ADLS_ACUITY_SCORE: 76
ADLS_ACUITY_SCORE: 77
ADLS_ACUITY_SCORE: 76
ADLS_ACUITY_SCORE: 77
ADLS_ACUITY_SCORE: 76

## 2025-07-14 NOTE — PROGRESS NOTES
[facility]    Medicine Progress Note - Hospitalist Service    Date of Admission:  7/9/2025    Assessment & Plan   Skip Newman is a 89-year-old male with history of sick sinus syndrome status post pacemaker placement, hypertension, CAD, stage III CKD, chronic atrial fibrillation, on warfarin, HFrEF, seizure disorder, NSVT, gout, nephrolithiasis, aortic regurgitation, pulmonary hypertension, and prior CVA admitted on 7/9/2025 from senior living with seizure-like activity and suspected metabolic encephalopathy.      He was placed on Keppra on admission and subsequently transition to Depakote per neurology recommendation. Due to worsening mental status, neurologist plans to pursue lumbar puncture. Head CT was unremarkable. Warfarin is on hold and he received IV vitamin K given supratherapeutic INR.    Patient developed PRICILA on stage III CKD and hyperkalemia during hospital stay, likely secondary to poor oral intake.  She received IV fluid briefly.    Suspected metabolic encephalopathy  Seizure-like activity  Unclear whether altered mental status is secondary to worsening PRICILA, and metabolic acidosis versus postictal state from seizure. Head CT was unremarkable.     Continue to divalproex 500 mg every 12 hours per neurology recommendation  Hold gabapentin for now given worsening mental status and PRICILA on stage III CKD  Avoid deliriogenic medications  Neurology bbtdey-em-hzcesxathi assistance    PRICILA on stage III CKD  High anion gap metabolic acidosis  Start sodium bicarbonate infusion  Monitor BMP  Hold PTA chlorthalidone 25 mg daily for now  Hold PTA spironolactone for now  Monitor BMP  Avoid nephrotoxin  Consider nephrology evaluation if renal function continues to worsen    Hyperkalemia  Potassium level is elevated  Likely secondary to PRICILA and spironolactone  Hold spironolactone for now  Dextrose and insulin infusion per protocol  Nebulize albuterol x 1  Lokelma x 1  Telemetry    Hypertension  Continue  "metoprolol tartrate and hydralazine  Hold chlorthalidone and spironolactone for now    Atrial fibrillation  Sick sinus syndrome status post pacemaker placement  Heart rate is controlled  Warfarin as per pharmacy  Continue metoprolol    Supratherapeutic INR  Received IV vitamin K on 7/13/2025      HFrEF  Echocardiogram performed 6/27/2025 showed severely reduced LV systolic function with LVEF of 25 to 30%.  Patient required IV hydration f briefly due to suspected prerenal PRICILA in the setting of diuretic therapy and poor oral intake  Hold diuretic therapy for now  Hold spironolactone for now due to PRICILA on stage III CKD  Telemetry  Intake and output monitoring      CAD  Continue atorvastatin 20 mg daily  On warfarin PTA  Sublingual nitroglycerin as needed    Oral thrush  Nystatin swish and swallow    Clinically Significant Risk Factors Present on Admission        # Hyponatremia: Lowest Na = 133 mmol/L in last 2 days, will monitor as appropriate        # Drug Induced Coagulation Defect: home medication list includes an anticoagulant medication    # Hypertension: Noted on problem list  # Chronic heart failure with reduced ejection fraction: last echo with EF <40%           # Overweight: Estimated body mass index is 25.21 kg/m  as calculated from the following:    Height as of this encounter: 1.702 m (5' 7\").    Weight as of this encounter: 73 kg (160 lb 15 oz).       # Financial/Environmental Concerns:     # Pacemaker present           Diet: Combination Diet Low Saturated Fat Na <2400mg Diet  Snacks/Supplements Adult: Ensure Plus High Protein; With Meals    DVT Prophylaxis: Warfarin  Chandler Catheter: PRESENT, indication: Acute retention or obstruction  Lines: None     Cardiac Monitoring: ACTIVE order. Indication: Electrolyte Imbalance (24 hours)- Magnesium <1.3 mg/ml; Potassium < =2.8 or > 5.5 mg/ml  Code Status: No CPR- Do NOT Intubate      Clinically Significant Risk Factors        # Hyperkalemia: Highest K = 6.2 mmol/L " "in last 2 days, will monitor as appropriate       # Anion Gap Metabolic Acidosis: Highest Anion Gap = 20 mmol/L in last 2 days, will monitor and treat as appropriate    # Thrombocytopenia: Lowest platelets = 114 in last 2 days, will monitor for bleeding  # Acute Kidney Injury, unspecified: based on a >150% or 0.3 mg/dL increase in last creatinine compared to past 90 day average, will monitor renal function  # Hypertension: Noted on problem list  # Chronic heart failure with reduced ejection fraction: last echo with EF <40%           # Overweight: Estimated body mass index is 27.97 kg/m  as calculated from the following:    Height as of this encounter: 1.702 m (5' 7\").    Weight as of this encounter: 81 kg (178 lb 9.2 oz).        # Financial/Environmental Concerns:     # Pacemaker present       Social Drivers of Health    Food Insecurity: Unknown (7/10/2025)    Food Insecurity     Within the past 12 months, did you worry that your food would run out before you got money to buy more?: Patient unable to answer     Within the past 12 months, did the food you bought just not last and you didn t have money to get more?: Patient unable to answer   Housing Stability: Unknown (7/10/2025)    Housing Stability     Do you have housing? : Patient unable to answer     Are you worried about losing your housing?: Patient unable to answer   Financial Resource Strain: Unknown (7/10/2025)    Financial Resource Strain     Within the past 12 months, have you or your family members you live with been unable to get utilities (heat, electricity) when it was really needed?: Patient unable to answer   Transportation Needs: Unknown (7/10/2025)    Transportation Needs     Within the past 12 months, has lack of transportation kept you from medical appointments, getting your medicines, non-medical meetings or appointments, work, or from getting things that you need?: Patient unable to answer   Interpersonal Safety: Unknown (7/10/2025)    " Interpersonal Safety     Do you feel physically and emotionally safe where you currently live?: Patient unable to answer     Within the past 12 months, have you been hit, slapped, kicked or otherwise physically hurt by someone?: Patient unable to answer     Within the past 12 months, have you been humiliated or emotionally abused in other ways by your partner or ex-partner?: Patient unable to answer    Received from [facility]    Social Connections          Disposition Plan     Medically Ready for Discharge: Anticipated in 2-4 Days      Barriers to Discharge: Agitation     Disposition: Anticipate discharge in 1 to 2 days, currently lives in assisted living, awaiting PT/OT recommendations regarding disposition however has been too agitated for evaluation    07/13/25: Patient was somnolent and sometimes grunts to sternal rub; unable to engage in any meaningful conversation or follow commands.      Darci Danielson MD  Hospitalist Service  [facility]  Securely message with e[employer] (more info)  Text page via [employer] Paging/Directory   ______________________________________________________________________    Interval History   Mentation is slightly better today but patient remains drowsy and arousable.  Bedside RN reports shoulder pain and right forearm pain exacerbated by movement.  Ordered left forearm ultrasound and shoulder ultrasound.    Physical Exam   Vital Signs: Temp: 97.9  F (36.6  C) Temp src: Axillary BP: 120/60 Pulse: 61   Resp: 14 SpO2: 95 % O2 Device: None (Room air)    Weight: 178 lbs 9.16 oz      General appearance: Somnolent, only groans to sternal rub.  Unable to follow simple commands.   HEENT: Normocephalic, atraumatic, conjunctiva clear without icterus and ears without discharge  Lungs: Diminished air entry bilaterally  Cardiovascular: Irregular rhythm, normal apical impulse, normal S1 and S2, no lower extremity edema bilaterally  Abdomen:  Soft, non-tender and Non-distended, active bowel sounds  Skin: Skin color, texture normal and bruising or bleeding. No rashes or lesions over face, neck, arms and legs, turgor normal.  Musculoskeletal: No bony deformities or joint tenderness. Normal ROM upon flexion & extension.   Neurologic: Somnolent.  Unable to follow simple commands.  Groans to sternal rub.  Psychiatric: Unable to assess    Medical Decision Making       55 MINUTES SPENT BY ME on the date of service doing chart review, history, exam, documentation & further activities per the note.      Data     I have personally reviewed the following data over the past 24 hrs:    N/A  \   N/A   / N/A     141 102 87.3 (H) /  111 (H)   4.3 26 2.52 (H) \     ALT: 21 AST: 24 AP: 95 TBILI: 0.7   ALB: 3.8 TOT PROTEIN: 6.6 LIPASE: N/A     INR:  5.41 (HH) PTT:  N/A   D-dimer:  N/A Fibrinogen:  N/A       Imaging results reviewed over the past 24 hrs:   No results found for this or any previous visit (from the past 24 hours).

## 2025-07-14 NOTE — PLAN OF CARE
Problem: Pain Acute  Goal: Optimal Pain Control and Function  Intervention: Prevent or Manage Pain  Recent Flowsheet Documentation  Taken 7/14/2025 0948 by Delmi Christine RN  Medication Review/Management: medications reviewed     Problem: Delirium  Goal: Improved Behavioral Control  Outcome: Progressing  Intervention: Minimize Safety Risk  Recent Flowsheet Documentation  Taken 7/14/2025 0948 by Delmi Chrisitne RN  Enhanced Safety Measures: patient/family teach back on injury risk     Problem: Adult Inpatient Plan of Care  Goal: Plan of Care Review  Description: The Plan of Care Review/Shift note should be completed every shift.  The Outcome Evaluation is a brief statement about your assessment that the patient is improving, declining, or no change.  This information will be displayed automatically on your shift  note.  Outcome: Progressing  Flowsheets (Taken 7/14/2025 1444)  Plan of Care Reviewed With:   patient   grandchild(asif)  Overall Patient Progress: improving   Goal Outcome Evaluation:      Plan of Care Reviewed With: patient, grandchild(asif)    Overall Patient Progress: improvingOverall Patient Progress: improving     Alert to self and place. This AM said it was Jan 2025 but did correct himself and say July 2025. Disoriented to situation. Grandalec visited for short period of time asking multiple questions and texting other family members. Asked writer if he could take pictures of x-ray results on computer and told no. Turn and repositioned every 2hrs. Pillow support. Heel protectors. Heels elevated. Pt c/o sig pain in left arm/shoulder when changing gown this AM and when checking BP. MD notified. Appetite improving. Does need to be awakened for meals and needs assistance. Likes to drink indep. Prefers milk. Taking pills crushed in applesauce. Ramesh placed this AM per order. A paced and converts at time to NSR. Freg cough not associated with eating.

## 2025-07-14 NOTE — PROGRESS NOTES
CLINICAL NUTRITION SERVICES - REASSESSMENT NOTE     RECOMMENDATIONS FOR MDs/PROVIDERS TO ORDER:  Consider need to adjust insulin regimen with hyperglycemia     Registered Dietitian Interventions:  Continue ensure plusHP daily   Room service with assist   Requested daily weights obtained via standing scale if able     Future/Additional Recommendations:  Monitor po, weight, labs, I/Os.      INFORMATION OBTAINED  Assessed patient in room. Spoke with RN   Pt appears to be less confused, is able to provide some hx. Pt reports preference for x2-3 meals/day but provided x1 meal/day here prior to today. Pt is taking most of his meals today and some ensure. Pt is being fed at meals. Pt does enjoy milk and ice cream. Pt reports liking ensure nutrition supplement.     CURRENT NUTRITION ORDERS  Diet: Low Saturated Fat/2400 mg Sodium  Snacks/Supplements: Ensure Enlive daily      CURRENT INTAKE/TOLERANCE  7/10: 100% dinner  7/11-13: Pt spitting out food, only x1 meal/day ordered   7/14: 100% oatmeal and milk + sips ensure and water at breakfast, 75% lunch  Po <50% x4 days      NEW FINDINGS  GI symptoms:   Oral thrush noted   Last BM unknown   Nutrition-relevant labs: BUN 87.3(H), Creat 2.52(H), Ammonia 15(L), -164 last 24 hrs   Nutrition-relevant medications: Scheduled lipitor, desyrel. Held- aldactone, warfarin  Weight: Question accuracy of most recent weight- up 21 lb x1 day  Date/Time Weight Weight Method   07/14/25 0546 81 kg (178 lb 9.2 oz) Bed scale   07/13/25 0654 71.6 kg (157 lb 13.6 oz) Bed scale   07/13/25 0500 71.8 kg (158 lb 4.6 oz) Bed scale   07/12/25 0513 72.4 kg (159 lb 9.8 oz) Bed scale   07/11/25 1008 73.2 kg (161 lb 6 oz) Bed scale   07/09/25 2229 73 kg (160 lb 15 oz) Bed scale     06/05/25 75.3 kg (166 lb)   06/03/25 75.3 kg (166 lb)   04/30/25 75.3 kg (166 lb)   02/21/25 74.8 kg (165 lb)   01/28/25 74.8 kg (165 lb)   01/02/25 73.5 kg (162 lb)   12/21/24 75 kg (165 lb 5.5 oz)   12/02/24 74.8 kg (165 lb)    09/30/24 75.3 kg (166 lb)     ASSESSED NUTRITION NEEDS  Dosing Weight: 73.2 kg, based on actual wt   Estimated Energy Needs: 1830 -2200 kcals/day (25 - 30 kcals/kg)  Justification: Increased needs  Estimated Protein Needs: 59-73 grams protein/day (0.8 - 1 grams of pro/kg)  Justification: CKD and Increased needs  Estimated Fluid Needs: 1830 -2200 mL/day (25 - 30 mL/kg), or per Provider  Justification: Increased needs, HF    MALNUTRITION  % Intake: Unable to fully determine, need additional nutrition hx   % Weight Loss: Weight loss does not meet criteria   Subcutaneous Fat Loss: Triceps: Mild  Muscle Loss: Temples (temporalis muscle): Mild and Clavicles (pectoralis and deltoids): Mild  Fluid Accumulation/Edema: None noted  Malnutrition Diagnosis: Unable to determine due to need additional nutrition hx   Malnutrition Present on Admission: Unable to assess    EVALUATION OF THE PROGRESS TOWARD GOALS   Previous Nutrition Diagnosis  Unintended weight loss related to acute illness as evidenced by 5 lb weight loss in one month   Evaluation: Improving? Question accuracy of bed weights     NUTRITION DIAGNOSIS  Inadequate oral intake related to acute illness as evidenced by po <50% >/= 4 days     INTERVENTIONS  Medical food supplement therapy- Continue ensure plusHP daily     GOALS  Patient to consume % of nutritionally adequate meal trays TID, or the equivalent with supplements/snacks - Not Met but progressing today   Meet >75% nutrition needs - NEW  BG <180 - NEW      MONITORING/EVALUATION  Progress toward goals will be monitored and evaluated per policy.

## 2025-07-14 NOTE — PROGRESS NOTES
[facility] Neurology  [place]    Skip Newman MRN# [id number]   Age: 89 year old YOB: 1934               Assessment and Plan:      Seizure   Delirium--now less responsive     By description this sounds like he had a generalized tonic-clonic seizure, however there could be some lateralization to it.     EEG showed nonspecific slowing, no clear epileptiform abnormalities.    New onset seizure in this age group with suggestion of some cognitive impairment likely indicates seizures will continue if not treated.  I started Keppra on him initially but he was quite agitated and I switched to Depakote.  He is now less responsive and INR is markedly elevated despite holding warfarin and otherwise normal liver functions.  I wonder if the Depakote could be increasing the effect of warfarin.  Will stop Depakote now. Hold off on further AEDs for the moment    Now with delirium.    Less responsive last 3 days, some neck stiffness on exam  We should pursue LP if INR improves (holding Depakote)   Recheck EEG 7/13/25 shows sleepiness, no sign of seizures    CT of the head was unremarkable initially, repeat CT head now  notes indicate his pacemaker is not MRI compatible    Mouth looks dry, could consider gentle iv hydration, cautiously as he has low EF on echo last month.              Chief Complaint/HPI:     7/13/25: less responsive today     7/12/25: sleepy today, got 2.5 mg olanzapine about an hour ago, so likely was awake and agitated then.     7/11/25: Patient more confused and agitated today.  No further seizures noted.    7/10/25:  This patient is a 89-year-old gentleman seen for neurologic evaluation today regarding seizure-like episode yesterday.  Yesterday afternoon he was sitting in his chair at assisted living when he tensed up and started shaking.  Notes indicate that he was not moving the right side.  1 report describes the patient leaning back and to the right side while in his chair.  There  is report of a fall 6 days prior, the CT here at [facility] shows no evidence of intracerebral hemorrhage (he is on warfarin for history of A-fib and cardiomyopathy).  The patient himself does not remember anything of these events.  In the ER initially he showed decreased responsiveness but that improved over about an hour.  Initial lactate was quite elevated at 5.7 and was normal about 2 hours later.                 Medications:     Current Facility-Administered Medications:     acetaminophen (TYLENOL) tablet 650 mg, 650 mg, Oral, Q4H PRN **OR** acetaminophen (TYLENOL) Suppository 650 mg, 650 mg, Rectal, Q4H PRN, Los Moreno MD    alum & mag hydroxide-simethicone (MAALOX) suspension 30 mL, 30 mL, Oral, Q4H PRN, Los Moreno MD    atorvastatin (LIPITOR) tablet 20 mg, 20 mg, Oral, QPM, Los Moreno MD, 20 mg at 07/13/25 2128    [Held by provider] chlorthalidone (HYGROTON) tablet 25 mg, 25 mg, Oral, QPM, Chago Romero MD, 25 mg at 07/11/25 2217    glucose gel 15-30 g, 15-30 g, Oral, Q15 Min PRN **OR** dextrose 50 % injection 25-50 mL, 25-50 mL, Intravenous, Q15 Min PRN **OR** glucagon injection 1 mg, 1 mg, Subcutaneous, Q15 Min PRN, Darci Danielson MD    divalproex sodium delayed-release (DEPAKOTE SPRINKLE) DR capsule 500 mg, 500 mg, Oral, Q12H MARY (08/20), Triston Palafox MD, 500 mg at 07/14/25 1016    [Held by provider] gabapentin (NEURONTIN) solution 200 mg, 200 mg, Oral, BID, Triston Palafox MD, 200 mg at 07/13/25 1255    hydrALAZINE (APRESOLINE) injection 10-20 mg, 10-20 mg, Intravenous, Q30 Min PRN, Los Moreno MD, 20 mg at 07/13/25 0431    hydrALAZINE (APRESOLINE) tablet 10 mg, 10 mg, Oral, TID, Los Moreno MD, 10 mg at 07/14/25 1016    HYDROmorphone (DILAUDID) injection 0.2 mg, 0.2 mg, Intravenous, Q2H PRN, Los Moreno MD, 0.2 mg at 07/13/25 0258    HYDROmorphone (DILAUDID) injection 0.4 mg, 0.4 mg, Intravenous, Q2H PRN, Los Moreno MD     labetalol (NORMODYNE/TRANDATE) injection 10-20 mg, 10-20 mg, Intravenous, Q10 Min PRN, Los Moreno MD    melatonin tablet 1 mg, 1 mg, Oral, At Bedtime PRN, Los Moreno MD    metoprolol tartrate (LOPRESSOR) tablet 100 mg, 100 mg, Oral, BID, Chago Romero MD, 100 mg at 07/14/25 1015    naloxone (NARCAN) injection 0.2 mg, 0.2 mg, Intravenous, Q2 Min PRN **OR** naloxone (NARCAN) injection 0.4 mg, 0.4 mg, Intravenous, Q2 Min PRN **OR** naloxone (NARCAN) injection 0.2 mg, 0.2 mg, Intramuscular, Q2 Min PRN **OR** naloxone (NARCAN) injection 0.4 mg, 0.4 mg, Intramuscular, Q2 Min PRN, Los Moreno MD    nitroGLYcerin (NITRODUR) 0.2 MG/HR 24 hr patch 1 patch, 1 patch, Transdermal, Daily, Los Moreno MD, 1 patch at 07/14/25 0806    nystatin (MYCOSTATIN) suspension 500,000 Units, 500,000 Units, Oral, 4x Daily, Darci Danielson MD    OLANZapine (zyPREXA) injection 2.5 mg, 2.5 mg, Intramuscular, Q6H PRN, Chago Romero MD, 2.5 mg at 07/12/25 0925    OLANZapine zydis (zyPREXA) ODT half-tab 2.5 mg, 2.5 mg, Oral, Q6H PRN, Chago Romero MD    ondansetron (ZOFRAN ODT) ODT tab 4 mg, 4 mg, Oral, Q6H PRN **OR** ondansetron (ZOFRAN) injection 4 mg, 4 mg, Intravenous, Q6H PRN, Los Moreno MD    oxyCODONE (ROXICODONE) tablet 5 mg, 5 mg, Oral, Q4H PRN, Los Moreno MD    oxyCODONE IR (ROXICODONE) half-tab 2.5 mg, 2.5 mg, Oral, Q4H PRN, Los Moreno MD    Patient is already receiving anticoagulation with heparin, enoxaparin (LOVENOX), warfarin (COUMADIN)  or other anticoagulant medication, , Does not apply, Continuous PRN, Los Moreno MD    polyethylene glycol (MIRALAX) Packet 17 g, 17 g, Oral, BID PRN, Los Moreno MD    prochlorperazine (COMPAZINE) injection 5 mg, 5 mg, Intravenous, Q6H PRN **OR** prochlorperazine (COMPAZINE) tablet 5 mg, 5 mg, Oral, Q6H PRN, Los Moreno MD    senna-docusate (SENOKOT-S/PERICOLACE) 8.6-50 MG per tablet 1 tablet,  "1 tablet, Oral, BID PRN **OR** senna-docusate (SENOKOT-S/PERICOLACE) 8.6-50 MG per tablet 2 tablet, 2 tablet, Oral, BID PRN, Los Moreno MD    [Held by provider] spironolactone (ALDACTONE) tablet 25 mg, 25 mg, Oral, Daily, Chago Romero MD, 25 mg at 07/12/25 1422    traZODone (DESYREL) half-tab 25 mg, 25 mg, Oral, At Bedtime, Chago Romero MD, 25 mg at 07/13/25 2128    [Held by provider] Warfarin Dose Required Daily - Pharmacist Managed, 1 each, Oral, See Admin Instructions, Los Moreno MD              Physical Exam:      Vitals: /62 (BP Location: Right arm)   Pulse 62   Temp 98.2  F (36.8  C) (Axillary)   Resp 14   Ht 1.702 m (5' 7\")   Wt 81 kg (178 lb 9.2 oz)   SpO2 94%   BMI 27.97 kg/m    BMI= Body mass index is 27.97 kg/m .     Patient is asleep, supine in bed.  Opens his eyes just briefly when I ask him to  Did squeeze on left to command, spontaneous grasp on right  Tone increased a bit throughout, but does seem to have some neck stiffness.  No verbal output  Neurological Exam:   Mental status:    CN II: PERRLA.   CN III, IV, VI: EOMI.    CN VII: Face is symmetric   CN VII:    CN IX, X:    CN XII: Tongue is midline with normal movements and no atrophy or fasciculations.   Motor: Unable to test formally today, but no obvious lateralized weakness. He moves both arms a bit        Triston Palafox MD       More than 35 minutes spent reviewing records and results, evaluating patient and on documentation    "

## 2025-07-14 NOTE — PROGRESS NOTES
"Care Management Follow Up    Length of Stay (days): 4    Expected Discharge Date: 07/15/2025    Anticipated Discharge Plan:  Assisted Living    Transportation: TBD    PT Recommendations:  Consult placed   OT Recommendations:        Barriers to Discharge: medical stability/ Video monitoring. Neuro following bedside EEG was performed.       Prior Living Situation:   \" Pt comes from home; lives at [facility], and noted to be somewhat independent at baseline with assistive devices. Wife lives with pt at this time but will be moving into memory care which has been causing significant stress on pt. Therapy is consulted; TCU is anticipated to be recommended by care team. Pt's daughter stated this is not pt's baseline. \"      Discussed  Partnership in Safe Discharge Planning  document with patient/family: No     Handoff Completed: No, handoff not indicated or clinically appropriate    Patient/Spokesperson Updated: No    Additional Information:    PT is on the schedule to see pt this afternoon.       Next Steps: See therapy recs. CM will continue to monitor progression of care, review team recommendations and provide discharge planning assist as needed.       Ana Vinson RN  Acute Care Management  [facility]  For further questions/concerns you can reach us at Vocera Web Console    "

## 2025-07-14 NOTE — PLAN OF CARE
Problem: Adult Inpatient Plan of Care  Goal: Plan of Care Review  Description: The Plan of Care Review/Shift note should be completed every shift.  The Outcome Evaluation is a brief statement about your assessment that the patient is improving, declining, or no change.  This information will be displayed automatically on your shift  note.  Outcome: Progressing  Flowsheets (Taken 7/14/2025 0420)  Overall Patient Progress: no change     Problem: Delirium  Goal: Improved Behavioral Control  Outcome: Progressing  Intervention: Minimize Safety Risk  Recent Flowsheet Documentation  Taken 7/13/2025 2128 by Jason Humphrey RN  Enhanced Safety Measures:   room near unit station   pain management     Problem: Delirium  Goal: Improved Attention and Thought Clarity  Outcome: Progressing  Intervention: Maximize Cognitive Function  Recent Flowsheet Documentation  Taken 7/13/2025 2128 by Jason Humphrey RN  Sensory Stimulation Regulation: quiet environment promoted  Reorientation Measures: clock in view     Problem: Comorbidity Management  Goal: Blood Pressure in Desired Range  Outcome: Progressing  Intervention: Maintain Blood Pressure Management  Recent Flowsheet Documentation  Taken 7/13/2025 2128 by Jason Humphrey RN  Medication Review/Management: medications reviewed   Goal Outcome Evaluation:    Overall Patient Progress: no changeOverall Patient Progress: no change    Pt still is lethargic, arousal to touch. Pt sleep throughout the night, no sign or symptom of discomfort. Reposition q2-3h, heel off the bed with mepilex covered. Straight cath once during the shift with total 410cc out. Tele - NSR with 1st degree AV block. Vss on RA. No acute changes overnight.     Chelly Humphrey RN.

## 2025-07-15 LAB
ANION GAP SERPL CALCULATED.3IONS-SCNC: 15 MMOL/L (ref 7–15)
BUN SERPL-MCNC: 93.5 MG/DL (ref 8–23)
CALCIUM SERPL-MCNC: 9 MG/DL (ref 8.8–10.4)
CHLORIDE SERPL-SCNC: 97 MMOL/L (ref 98–107)
CREAT SERPL-MCNC: 2.39 MG/DL (ref 0.67–1.17)
EGFRCR SERPLBLD CKD-EPI 2021: 25 ML/MIN/1.73M2
GLUCOSE SERPL-MCNC: 115 MG/DL (ref 70–99)
HCO3 SERPL-SCNC: 25 MMOL/L (ref 22–29)
HOLD SPECIMEN: NORMAL
HOLD SPECIMEN: NORMAL
INR PPP: 3.33 (ref 0.85–1.15)
POTASSIUM SERPL-SCNC: 4.4 MMOL/L (ref 3.4–5.3)
PROTHROMBIN TIME: 33.4 SECONDS (ref 11.8–14.8)
SODIUM SERPL-SCNC: 137 MMOL/L (ref 135–145)

## 2025-07-15 PROCEDURE — 82610 CYSTATIN C: CPT

## 2025-07-15 PROCEDURE — 82550 ASSAY OF CK (CPK): CPT | Performed by: INTERNAL MEDICINE

## 2025-07-15 PROCEDURE — 250N000013 HC RX MED GY IP 250 OP 250 PS 637: Performed by: INTERNAL MEDICINE

## 2025-07-15 PROCEDURE — 99232 SBSQ HOSP IP/OBS MODERATE 35: CPT | Performed by: PSYCHIATRY & NEUROLOGY

## 2025-07-15 PROCEDURE — 36415 COLL VENOUS BLD VENIPUNCTURE: CPT | Performed by: EMERGENCY MEDICINE

## 2025-07-15 PROCEDURE — 85610 PROTHROMBIN TIME: CPT | Performed by: EMERGENCY MEDICINE

## 2025-07-15 PROCEDURE — 120N000001 HC R&B MED SURG/OB

## 2025-07-15 PROCEDURE — 250N000013 HC RX MED GY IP 250 OP 250 PS 637: Performed by: EMERGENCY MEDICINE

## 2025-07-15 PROCEDURE — 80048 BASIC METABOLIC PNL TOTAL CA: CPT | Performed by: INTERNAL MEDICINE

## 2025-07-15 PROCEDURE — 99232 SBSQ HOSP IP/OBS MODERATE 35: CPT | Performed by: INTERNAL MEDICINE

## 2025-07-15 RX ADMIN — HYDRALAZINE HYDROCHLORIDE 10 MG: 10 TABLET ORAL at 14:52

## 2025-07-15 RX ADMIN — NYSTATIN 500000 UNITS: 100000 SUSPENSION ORAL at 20:12

## 2025-07-15 RX ADMIN — HYDRALAZINE HYDROCHLORIDE 10 MG: 10 TABLET ORAL at 20:13

## 2025-07-15 RX ADMIN — METOPROLOL TARTRATE 100 MG: 25 TABLET, FILM COATED ORAL at 20:13

## 2025-07-15 RX ADMIN — NYSTATIN 500000 UNITS: 100000 SUSPENSION ORAL at 17:38

## 2025-07-15 RX ADMIN — TRAZODONE HYDROCHLORIDE 25 MG: 50 TABLET ORAL at 22:59

## 2025-07-15 RX ADMIN — ATORVASTATIN CALCIUM 20 MG: 10 TABLET, FILM COATED ORAL at 20:12

## 2025-07-15 RX ADMIN — NYSTATIN 500000 UNITS: 100000 SUSPENSION ORAL at 14:52

## 2025-07-15 RX ADMIN — NITROGLYCERIN 1 PATCH: 0.2 PATCH TRANSDERMAL at 08:51

## 2025-07-15 ASSESSMENT — ACTIVITIES OF DAILY LIVING (ADL)
ADLS_ACUITY_SCORE: 78
ADLS_ACUITY_SCORE: 80
ADLS_ACUITY_SCORE: 78
ADLS_ACUITY_SCORE: 80
ADLS_ACUITY_SCORE: 80
ADLS_ACUITY_SCORE: 78
ADLS_ACUITY_SCORE: 80
ADLS_ACUITY_SCORE: 78
ADLS_ACUITY_SCORE: 80
ADLS_ACUITY_SCORE: 78
ADLS_ACUITY_SCORE: 80
ADLS_ACUITY_SCORE: 80

## 2025-07-15 NOTE — PROGRESS NOTES
[facility] Neurology  [place]    Skip Newman MRN# [id number]   Age: 89 year old YOB: 1934               Assessment and Plan:      Seizure   Delirium--slight improvement today     By description this sounds like he had a generalized tonic-clonic seizure, however there could be some lateralization to it.     Initial EEG showed nonspecific slowing, no clear epileptiform abnormalities.    New onset seizure in this age group with suggestion of some cognitive impairment likely indicates seizures will continue if not treated.  I started Keppra on him initially but he was quite agitated and I switched to Depakote.  He is now less responsive and INR is markedly elevated despite holding warfarin and otherwise normal liver functions.  I wonder if the Depakote could be increasing the effect of warfarin.  Will stop Depakote now. Hold off on further AEDs for the moment    Now with delirium.    Less responsive for a few days, a little better today, some neck stiffness on exam  We should pursue LP if INR improves (holding Depakote)   INR 3.33 today (5.4 on 7/14)    Recheck EEG 7/13/25 shows sleepiness, no sign of seizures    Repeat CT head 7/14/25 again shows no acute issues  notes indicate his pacemaker is not MRI compatible    Mouth looks dry, could consider gentle iv hydration, cautiously as he has low EF on echo last month.              Chief Complaint/HPI:     7/15/25:  more awake today, combative again.     7/13/25: less responsive today     7/12/25: sleepy today, got 2.5 mg olanzapine about an hour ago, so likely was awake and agitated then.     7/11/25: Patient more confused and agitated today.  No further seizures noted.    7/10/25:  This patient is a 89-year-old gentleman seen for neurologic evaluation today regarding seizure-like episode yesterday.  Yesterday afternoon he was sitting in his chair at assisted living when he tensed up and started shaking.  Notes indicate that he was not moving  the right side.  1 report describes the patient leaning back and to the right side while in his chair.  There is report of a fall 6 days prior, the CT here at [facility] shows no evidence of intracerebral hemorrhage (he is on warfarin for history of A-fib and cardiomyopathy).  The patient himself does not remember anything of these events.  In the ER initially he showed decreased responsiveness but that improved over about an hour.  Initial lactate was quite elevated at 5.7 and was normal about 2 hours later.                 Medications:     Current Facility-Administered Medications:     acetaminophen (TYLENOL) tablet 650 mg, 650 mg, Oral, Q4H PRN **OR** acetaminophen (TYLENOL) Suppository 650 mg, 650 mg, Rectal, Q4H PRN, Los Moreno MD    alum & mag hydroxide-simethicone (MAALOX) suspension 30 mL, 30 mL, Oral, Q4H PRN, Los Moreno MD    atorvastatin (LIPITOR) tablet 20 mg, 20 mg, Oral, QPM, Los Moreno MD, 20 mg at 07/14/25 1932    [Held by provider] chlorthalidone (HYGROTON) tablet 25 mg, 25 mg, Oral, QPM, Chago Romero MD, 25 mg at 07/11/25 2217    glucose gel 15-30 g, 15-30 g, Oral, Q15 Min PRN **OR** dextrose 50 % injection 25-50 mL, 25-50 mL, Intravenous, Q15 Min PRN **OR** glucagon injection 1 mg, 1 mg, Subcutaneous, Q15 Min PRN, Darci Danielson MD    [Held by provider] gabapentin (NEURONTIN) solution 200 mg, 200 mg, Oral, BID, Triston Palafox MD, 200 mg at 07/13/25 1255    hydrALAZINE (APRESOLINE) injection 10-20 mg, 10-20 mg, Intravenous, Q30 Min PRN, Los Moreno MD, 20 mg at 07/13/25 0431    hydrALAZINE (APRESOLINE) tablet 10 mg, 10 mg, Oral, TID, Los Moreno MD, 10 mg at 07/14/25 1933    HYDROmorphone (DILAUDID) injection 0.2 mg, 0.2 mg, Intravenous, Q2H PRN, Los Moreno MD, 0.2 mg at 07/13/25 0258    HYDROmorphone (DILAUDID) injection 0.4 mg, 0.4 mg, Intravenous, Q2H PRN, Los Moreno MD    labetalol (NORMODYNE/TRANDATE) injection 10-20 mg,  10-20 mg, Intravenous, Q10 Min PRN, Los Moreno MD    melatonin tablet 1 mg, 1 mg, Oral, At Bedtime PRN, Los Moreno MD    metoprolol tartrate (LOPRESSOR) tablet 100 mg, 100 mg, Oral, BID, Chago Romero MD, 100 mg at 07/14/25 2156    naloxone (NARCAN) injection 0.2 mg, 0.2 mg, Intravenous, Q2 Min PRN **OR** naloxone (NARCAN) injection 0.4 mg, 0.4 mg, Intravenous, Q2 Min PRN **OR** naloxone (NARCAN) injection 0.2 mg, 0.2 mg, Intramuscular, Q2 Min PRN **OR** naloxone (NARCAN) injection 0.4 mg, 0.4 mg, Intramuscular, Q2 Min PRN, Los Moreno MD    nitroGLYcerin (NITRODUR) 0.2 MG/HR 24 hr patch 1 patch, 1 patch, Transdermal, Daily, Los Moreno MD, 1 patch at 07/14/25 0806    nystatin (MYCOSTATIN) suspension 500,000 Units, 500,000 Units, Oral, 4x Daily, Darci Danielson MD, 500,000 Units at 07/14/25 2156    OLANZapine (zyPREXA) injection 2.5 mg, 2.5 mg, Intramuscular, Q6H PRN, Chago Romero MD, 2.5 mg at 07/12/25 0925    OLANZapine zydis (zyPREXA) ODT half-tab 2.5 mg, 2.5 mg, Oral, Q6H PRN, Chago Romero MD    ondansetron (ZOFRAN ODT) ODT tab 4 mg, 4 mg, Oral, Q6H PRN **OR** ondansetron (ZOFRAN) injection 4 mg, 4 mg, Intravenous, Q6H PRN, Los Moreno MD    oxyCODONE (ROXICODONE) tablet 5 mg, 5 mg, Oral, Q4H PRN, Los Moreno MD    oxyCODONE IR (ROXICODONE) half-tab 2.5 mg, 2.5 mg, Oral, Q4H PRN, Los Moreno MD    Patient is already receiving anticoagulation with heparin, enoxaparin (LOVENOX), warfarin (COUMADIN)  or other anticoagulant medication, , Does not apply, Continuous PRN, Los Moreno MD    polyethylene glycol (MIRALAX) Packet 17 g, 17 g, Oral, BID PRN, Los Moreno MD    prochlorperazine (COMPAZINE) injection 5 mg, 5 mg, Intravenous, Q6H PRN **OR** prochlorperazine (COMPAZINE) tablet 5 mg, 5 mg, Oral, Q6H PRN, Los Moreno MD    senna-docusate (SENOKOT-S/PERICOLACE) 8.6-50 MG per tablet 1 tablet, 1 tablet, Oral,  "BID PRN **OR** senna-docusate (SENOKOT-S/PERICOLACE) 8.6-50 MG per tablet 2 tablet, 2 tablet, Oral, BID PRN, Los Moreno MD, 2 tablet at 07/14/25 1310    [Held by provider] spironolactone (ALDACTONE) tablet 25 mg, 25 mg, Oral, Daily, Chago Romero MD, 25 mg at 07/12/25 1422    traZODone (DESYREL) half-tab 25 mg, 25 mg, Oral, At Bedtime, Chago Romero MD, 25 mg at 07/14/25 2156    [Held by provider] Warfarin Dose Required Daily - Pharmacist Managed, 1 each, Oral, See Admin Instructions, Los Moreno MD              Physical Exam:      Vitals: BP (!) 137/99 (BP Location: Left arm)   Pulse 61   Temp 98.6  F (37  C) (Oral)   Resp 18   Ht 1.702 m (5' 7\")   Wt 81 kg (178 lb 9.2 oz)   SpO2 94%   BMI 27.97 kg/m    BMI= Body mass index is 27.97 kg/m .     Patient is asleep, supine in bed.  Opens his eyes just briefly when I ask him to  Did squeeze on left to command, spontaneous grasp on right  Tone increased a bit throughout, but does seem to have some neck stiffness.  No verbal output  Neurological Exam:   Mental status:    CN II: PERRLA.   CN III, IV, VI: EOMI.    CN VII: Face is symmetric   CN VII:    CN IX, X:    CN XII: Tongue is midline with normal movements and no atrophy or fasciculations.   Motor: Unable to test formally today, but no obvious lateralized weakness. He moves both arms a bit        Triston Palafox MD       More than 35 minutes spent reviewing records and results, evaluating patient and on documentation    "

## 2025-07-15 NOTE — PROGRESS NOTES
Physical Therapy        07/14/25 1115   Appointment Info   Signing Clinician's Name / Credentials (PT) Rola Young, PT, DPT   Living Environment   People in Home facility resident   Current Living Arrangements assisted living   Living Environment Comments pt lives with spouse, but she is moving to memory care   Self-Care   Activity/Exercise/Self-Care Comment pt unable to provide history   General Information   Onset of Illness/Injury or Date of Surgery 07/09/25   Referring Physician Chago Romero   Patient/Family Therapy Goals Statement (PT) none stated   Pertinent History of Current Problem (include personal factors and/or comorbidities that impact the POC) 89-year-old male with history of sick sinus syndrome status post pacemaker placement, hypertension, CAD, stage III CKD, chronic atrial fibrillation, on warfarin, HFrEF, seizure disorder, NSVT, gout, nephrolithiasis, aortic regurgitation, pulmonary hypertension, and prior CVA admitted on 7/9/2025 from [facility] with seizure-like activity and suspected metabolic encephalopathy.   Existing Precautions/Restrictions fall;seizures   Cognition   Orientation Status (Cognition) disoriented to;place;situation;time   Pain Assessment   Patient Currently in Pain No   Posture    Posture Not impaired   Range of Motion (ROM)   Range of Motion ROM is WFL   Strength (Manual Muscle Testing)   Strength (Manual Muscle Testing) Deficits observed during functional mobility   Bed Mobility   Comment, (Bed Mobility) pt not initiating movement with verbal cues. Participates in bed mobility with maxAx2   Transfers   Comment, (Transfers) attempted stand with maxAx2, unable to extend LEs fully   Gait/Stairs (Locomotion)   Comment, (Gait/Stairs) NT   Balance   Balance Comments impaired   Clinical Impression   Criteria for Skilled Therapeutic Intervention Yes, treatment indicated   PT Diagnosis (PT) difficulty walking   Influenced by the following impairments weakness, cognitive deficits    Functional limitations due to impairments limited household mobility   Clinical Presentation (PT Evaluation Complexity) stable   Clinical Presentation Rationale presents as medically diagnosed   Clinical Decision Making (Complexity) moderate complexity   Planned Therapy Interventions (PT) balance training;bed mobility training;gait training;home exercise program;neuromuscular re-education;patient/family education;ROM (range of motion);stair training;strengthening;transfer training;progressive activity/exercise   Risk & Benefits of therapy have been explained evaluation/treatment results reviewed;current/potential barriers reviewed;participants voiced agreement with care plan;participants included;patient   PT Total Evaluation Time   PT Eval, Moderate Complexity Minutes (90607) 14   Physical Therapy Goals   PT Frequency 5x/week   PT Predicted Duration/Target Date for Goal Attainment 07/22/25   PT Goals Bed Mobility;Transfers;Gait;Stairs   PT: Bed Mobility Minimal assist;Supine to/from sit   PT: Transfers Minimal assist;Sit to/from stand;Assistive device   PT: Gait Minimal assist;50 feet;Assistive device   Interventions   Interventions Quick Adds Therapeutic Activity;Neuromuscular Re-ed   Therapeutic Activity   Therapeutic Activities: dynamic activities to improve functional performance Minutes (98841) 10   Treatment Detail/Skilled Intervention bed mobility with maxAx2 and VCs; step by step cues for log roll to right, sit<>supine with maxAx2, supine scoot dependent assist x2. pt participated in 3 standing attempts with maxAx3, making it 50% to standing the first time, 25% the second, and just enough to scoot toward HOB on the third.   Neuromuscular Re-education   Neuromuscular Re-Education Minutes (83606) 8   Treatment Detail/Skilled Intervention sitting balance at EOB, better with feet supported. Variable assistance required, from close supervision to Yas. Rarely modA to correct LOB. Cues for anterior weight  shift, posture, alertness, breathing   PT Discharge Planning   PT Plan EOB sitting, progress transfers   PT Discharge Recommendation (DC Rec) Transitional Care Facility   PT Rationale for DC Rec maxAx2   PT Brief overview of current status unable to stand or ambulate   PT Total Distance Amb During Session (feet) 0   Physical Therapy Time and Intention   Timed Code Treatment Minutes 18   Total Session Time (sum of timed and untimed services) 32         Rola Young, DPT 7/15/2025

## 2025-07-15 NOTE — PROGRESS NOTES
Reports compliance with CPAP but does not feel like it is effective. Does have follow-up with sleep medicine in August.  Patient's  thinks that she may benefit from BiPAP.   Encourage patient to discuss this with her sleep specialist. [facility]    Medicine Progress Note - Hospitalist Service    Date of Admission:  7/9/2025    Assessment & Plan   Skip Newman is a 89-year-old male with history of sick sinus syndrome status post pacemaker placement, hypertension, CAD, stage III CKD, chronic atrial fibrillation, on warfarin, HFrEF, seizure disorder, NSVT, gout, nephrolithiasis, aortic regurgitation, pulmonary hypertension, and prior CVA admitted on 7/9/2025 from residential with seizure-like activity and suspected metabolic encephalopathy.      He was placed on Keppra on admission and subsequently transition to Depakote per neurology recommendation. Due to worsening mental status, neurologist plans to pursue lumbar puncture during hospital stay. Head CT was unremarkable. Warfarin is on hold and he received IV vitamin K given supratherapeutic INR.    Patient developed PRICILA on stage III CKD and hyperkalemia during hospital stay, likely secondary to poor oral intake.  PRICILA improved with IV hydration.  Hyperkalemia has resolved.    Suspected metabolic encephalopathy  Seizure-like activity  Unclear whether altered mental status is secondary to worsening PRICILA, and metabolic acidosis versus postictal state from seizure. Head CT was unremarkable.     Continue to divalproex 500 mg every 12 hours per neurology recommendation  Hold gabapentin for now given worsening mental status and PRICILA on stage III CKD  Avoid deliriogenic medications  Lumbar puncture per neurologist  Neurology [id number] assistance    PRICILA on stage III CKD-improving  High anion gap metabolic acidosis  Received sodium bicarbonate infusion  Monitor BMP  Hold PTA chlorthalidone 25 mg daily for now  Hold PTA spironolactone for now  Monitor BMP  Avoid nephrotoxin  Consider nephrology evaluation if renal function is worsening    Hyperkalemia-resolved  Treated for hyperkalemia per protocol.  Now improved  Monitor potassium level    Hypertension  Continue metoprolol  "tartrate and hydralazine  Hold chlorthalidone and spironolactone for now    Atrial fibrillation  Sick sinus syndrome status post pacemaker placement  Heart rate is controlled  Warfarin as per pharmacy  Continue metoprolol    Supratherapeutic INR  Received IV vitamin K on 7/13/2025      HFrEF  Echocardiogram performed 6/27/2025 showed severely reduced LV systolic function with LVEF of 25 to 30%.  Patient required IV hydration f briefly due to suspected prerenal PRICILA in the setting of diuretic therapy and poor oral intake  Hold diuretic therapy for now  Hold spironolactone for now due to PRICILA on stage III CKD  Telemetry  Intake and output monitoring      CAD  Continue atorvastatin 20 mg daily  On warfarin PTA  Sublingual nitroglycerin as needed    Oral thrush  Nystatin swish and swallow    Clinically Significant Risk Factors Present on Admission        # Hyponatremia: Lowest Na = 133 mmol/L in last 2 days, will monitor as appropriate        # Drug Induced Coagulation Defect: home medication list includes an anticoagulant medication    # Hypertension: Noted on problem list  # Chronic heart failure with reduced ejection fraction: last echo with EF <40%           # Overweight: Estimated body mass index is 25.21 kg/m  as calculated from the following:    Height as of this encounter: 1.702 m (5' 7\").    Weight as of this encounter: 73 kg (160 lb 15 oz).       # Financial/Environmental Concerns:     # Pacemaker present           Diet: Combination Diet Low Saturated Fat Na <2400mg Diet  Snacks/Supplements Adult: Ensure Plus High Protein; With Meals  Room Service    DVT Prophylaxis: Warfarin  Chandler Catheter: PRESENT, indication: Acute retention or obstruction  Lines: None     Cardiac Monitoring: ACTIVE order. Indication: Electrolyte Imbalance (24 hours)- Magnesium <1.3 mg/ml; Potassium < =2.8 or > 5.5 mg/ml  Code Status: No CPR- Do NOT Intubate      Clinically Significant Risk Factors        # Hyperkalemia: Highest K = 6.2 " "mmol/L in last 2 days, will monitor as appropriate   # Hypochloremia: Lowest Cl = 97 mmol/L in last 2 days, will monitor as appropriate          # Hypertension: Noted on problem list  # Chronic heart failure with reduced ejection fraction: last echo with EF <40%           # Overweight: Estimated body mass index is 27.97 kg/m  as calculated from the following:    Height as of this encounter: 1.702 m (5' 7\").    Weight as of this encounter: 81 kg (178 lb 9.2 oz).        # Financial/Environmental Concerns:     # Pacemaker present       Social Drivers of Health    Food Insecurity: Unknown (7/10/2025)    Food Insecurity     Within the past 12 months, did you worry that your food would run out before you got money to buy more?: Patient unable to answer     Within the past 12 months, did the food you bought just not last and you didn t have money to get more?: Patient unable to answer   Housing Stability: Unknown (7/10/2025)    Housing Stability     Do you have housing? : Patient unable to answer     Are you worried about losing your housing?: Patient unable to answer   Financial Resource Strain: Unknown (7/10/2025)    Financial Resource Strain     Within the past 12 months, have you or your family members you live with been unable to get utilities (heat, electricity) when it was really needed?: Patient unable to answer   Transportation Needs: Unknown (7/10/2025)    Transportation Needs     Within the past 12 months, has lack of transportation kept you from medical appointments, getting your medicines, non-medical meetings or appointments, work, or from getting things that you need?: Patient unable to answer   Interpersonal Safety: Unknown (7/10/2025)    Interpersonal Safety     Do you feel physically and emotionally safe where you currently live?: Patient unable to answer     Within the past 12 months, have you been hit, slapped, kicked or otherwise physically hurt by someone?: Patient unable to answer     Within the past " 12 months, have you been humiliated or emotionally abused in other ways by your partner or ex-partner?: Patient unable to answer    Received from [facility]    Social Connections          Disposition Plan     Medically Ready for Discharge: Anticipated in 2-4 Days      Barriers to Discharge: Agitation     Disposition: Anticipate discharge in 1 to 2 days, currently lives in assisted living, awaiting PT/OT recommendations regarding disposition however has been too agitated for evaluation    07/13/25: Patient was somnolent and sometimes grunts to sternal rub; unable to engage in any meaningful conversation or follow commands.      Darci Danielson MD  Hospitalist Service  [facility]  Securely message with [employer] (more info)  Text page via [facility] Paging/Directory   ______________________________________________________________________    Interval History   No new complaints today.  Patient is awake and alert but profoundly confused.  Unable to engage in any meaningful conversation.    Physical Exam   Vital Signs: Temp: 98.1  F (36.7  C) Temp src: Axillary BP: 132/59 Pulse: 60   Resp: 20 SpO2: 92 % O2 Device: None (Room air)    Weight: 178 lbs 9.16 oz      General appearance: Somnolent, only groans to sternal rub.  Unable to follow simple commands.   HEENT: Normocephalic, atraumatic, conjunctiva clear without icterus and ears without discharge  Lungs: Diminished air entry bilaterally  Cardiovascular: Irregular rhythm, normal apical impulse, normal S1 and S2, no lower extremity edema bilaterally  Abdomen: Soft, non-tender and Non-distended, active bowel sounds  Skin: Skin color, texture normal and bruising or bleeding. No rashes or lesions over face, neck, arms and legs, turgor normal.  Musculoskeletal: No bony deformities or joint tenderness. Normal ROM upon flexion & extension.   Neurologic: Somnolent.  Unable to follow simple commands.  Groans to sternal  rub.  Psychiatric: Unable to assess    Medical Decision Making       55 MINUTES SPENT BY ME on the date of service doing chart review, history, exam, documentation & further activities per the note.      Data     I have personally reviewed the following data over the past 24 hrs:    N/A  \   N/A   / N/A     137 97 (L) 93.5 (H) /  115 (H)   4.4 25 2.39 (H) \     INR:  3.33 (H) PTT:  N/A   D-dimer:  N/A Fibrinogen:  N/A       Imaging results reviewed over the past 24 hrs:   Recent Results (from the past 24 hours)   XR Forearm Port Left 2 Views    Narrative    EXAM: XR FOREARM PORT LEFT 2 VIEWS  LOCATION: [facility]  DATE: 7/14/2025    INDICATION: Left forearm pain.  COMPARISON: None.      Impression    IMPRESSION: Bones are demineralized. There is no evidence of an acute displaced left forearm fracture. Atherosclerotic vascular calcifications. 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   XR Shoulder Left 1 View    Narrative    EXAM: XR SHOULDER LEFT 1 VIEW  LOCATION: [facility]  DATE: 7/14/2025    INDICATION: Left shoulder pain  COMPARISON: 07/09/2025      Impression    IMPRESSION: No change from prior. No acute fracture. Severe degenerative arthritis glenohumeral joint and likely rotator cuff arthropathy. Moderate degenerative arthrosis AC joint. Sclerotic focus versus overlying soft tissue calcification humeral neck   unchanged. Degenerative changes in the cervical and thoracic spine. Cardiac pacer.   XR Forearm Port Right 2 Views    Narrative    EXAM: XR FOREARM PORT RIGHT 2 VIEWS  LOCATION: [facility]  DATE: 7/14/2025    INDICATION: Wrong side, tech error.  COMPARISON: None.      Impression    IMPRESSION:   Bones are demineralized. There is no evidence of an acute displaced right forearm fracture. 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 Atherosclerotic vascular calcifications. Tiny metallic surgical clip near the posterior elbow.   XR Shoulder Right Port 1  View    Narrative    EXAM: XR SHOULDER RIGHT PORT 1 VIEW  LOCATION: [facility]  DATE: 7/14/2025    INDICATION: Wrong side tech error  COMPARISON: None.      Impression    IMPRESSION: Single view of the right shoulder shows advanced AC joint arthrosis as well as glenohumeral joint arthritic change. High-grade humeral head probably reflects a chronic rotator cuff tear. Demineralization without evidence of a displaced   fracture.   CT Head w/o Contrast    Narrative    EXAM: CT HEAD W/O CONTRAST  LOCATION: [facility]  DATE: 7/14/2025    INDICATION: AMS, eval for stroke or ICH  COMPARISON: Head CT 07/09/2025.  TECHNIQUE: Routine CT Head without IV contrast. Multiplanar reformats. Dose reduction techniques were used.    FINDINGS:  INTRACRANIAL CONTENTS: No intracranial hemorrhage, extraaxial collection, or mass effect.  No CT evidence of acute infarct. Chronic left basal ganglia lacunar infarct. Moderate presumed chronic small vessel ischemic changes. Moderate generalized volume   loss. No hydrocephalus.     VISUALIZED ORBITS/SINUSES/MASTOIDS: Prior bilateral cataract surgery. Visualized portions of the orbits are otherwise unremarkable. No paranasal sinus mucosal disease. No middle ear or mastoid effusion.    BONES/SOFT TISSUES: No acute abnormality.      Impression    IMPRESSION:  1.  No evidence of acute intracranial abnormality.

## 2025-07-15 NOTE — PROGRESS NOTES
NEUROLOGY INPATIENT PROGRESS NOTE       [facility] [place]  [address] [place] [address]  Tel: [phone]  Fax: [phone]  [email or web address]     ASSESSMENT & PLAN   Date: 07/16/2025  Hospital Day#: 6  Visit diagnosis: Seizure-like activity    Seizure-like activity  89-year-old male with history of HTN, HLD, CAD s/p stent, pacemaker, CKD stage III, chronic atrial fibrillation who was admitted on 7/10/2025 for seizure-like episode.  He was at assisted living facility when he suddenly tensed up and started shaking  CT of the brain was unremarkable repeat CT scan shows no change..  Patient's pacemaker is not MRI compatible  Initial EEG showed nonspecific slowing but no clear epileptiform discharges that on subsequent EEG showed nonspecific generalized slowing.  Patient was initially started on Keppra but he became quite agitated and was switched to Depakote that made managing his INR more difficult and it was discontinued  Off Depakote patient became more confused and less responsive  Patient's history strongly suggestive of seizure and I would recommend starting on Vimpat  Some neck stiffness noted and if mental status does not improve, he will need lumbar puncture however with INR of 3.33 to reverse before attempting lumbar puncture    Neurology Discharge Planning :   FREDDIE Cleary MD  [facility], [place]     PROBLEM LIST      Patient Active Problem List   Diagnosis    Pure hypercholesterolemia    Primary hypertension    CAD -- S/P Stents 2001, 2012, 2014    Nonsustained ventricular tachycardia (H)    SSS -- S/P dual chamber Pacemaker    Primary osteoarthritis of left knee    Gout    Recurrent kidney stones    Status post total left knee replacement    Primary osteoarthritis of right knee    Thrombocytopenia    Spinal stenosis, lumbar region, with neurogenic claudication    Stage 3a chronic kidney disease (H)    Chronic atrial fibrillation (H)     History of stroke    Chronic heart failure with reduced ejection fraction (HFrEF, <= 40%) (H)    Aortic regurgitation -- 2+ on Echo 12/21/23    Pulmonary hypertension (H)    Chronic Left rotator cuff tear    Occlusion of right vertebral artery    History of skin cancer    Anticoagulation monitoring, INR range 2-3    Hearing loss    Seizure-like activity (H)    Lactic acidosis    Increased anion gap metabolic acidosis    Hyponatremia    Anticoagulated on warfarin    Postictal state (H)       Past Medical History:   Patient  has a past medical history of Arthritis, Atrial fibrillation, Cardiomyopathy (H), Chronic kidney disease, Coronary artery disease, CVA (cerebral vascular accident) (H) (07/17/2020), Elevated brain natriuretic peptide (BNP) level (03/13/2024), Elevated troponin (02/15/2024), Fall-- found down, > 12 hrs (02/15/2024), Gout, History of transfusion, Hyperlipidemia, Hypertension, Impotence of organic origin, Kidney stones, Non-traumatic rhabdomyolysis (02/15/2024), Sick sinus syndrome -- S/P Medtronic PPM, Spinal stenosis, lumbar region, with neurogenic claudication, Syncope (08/25/2008), and Thrombocytopenia.     SUBJECTIVE     Patient drowsy throughout the night denies any complaint.  Reports that he is feeling okay but comprehension is questionable     OBJECTIVE     Vital signs in last 24 hours  Temp:  [97.1  F (36.2  C)-98.2  F (36.8  C)] 97.9  F (36.6  C)  Pulse:  [59-75] 59  Resp:  [16-20] 17  BP: (119-186)/(59-86) 186/86  SpO2:  [92 %-99 %] 98 %    Review of Systems   Pertinent items are noted in HPI.    General Physical Exam: Patient is alert and oriented x 1. Vital signs were reviewed and are documented in EMR. Neck was stiff, no Carotid bruit, thyromegaly, JVD or lymphadenopathy noted.  Neurological Exam:  Patient is alert and oriented drowsy opens his eyes and says okay able to squeeze on the left, pupil equal round reactive face symmetrical tongue midline grimaces to painful stimuli.  Did  not cooperate for cerebellar testing gait testing deferred.     DIAGNOSTIC STUDIES     Pertinent Radiology   Following imaging studies were reviewed:    CT  7/14/2025  No evidence of acute intracranial abnormality.     EEG  7/13/2025  This is a mildly abnormal EEG due to excessive sleepiness and some mild generalized slowing. There is no evidence of seizure on today's study.     7/10/2025  This is a mildly abnormal EEG due to some mild bilateral slowing and some slowing of the background.  These findings are nonspecific and suggest a mild encephalopathy.  No seizures or interictal discharges are seen.     Echocardiogram  Echo result w/o MOPS: Interpretation Summary 1. The left ventricle is normal in size. Left ventricular systolic performanceis moderate to severely reduced. The estimated ejection fraction is 25-30%.2. There is moderate global reduction in left ventricular systolic performancewith basal-mid posterior and inferior wall akinesis.3. There is mild aortic insufficiency.4. There is mild to moderate mitral insufficiency.5. Borderline right ventricular enlargement with low normal right ventricularsystolic performance.6. There is moderate left atrial enlargement. When compared to the prior real-time echocardiogram dated 3 January 2025, theacoustic quality and endocardial resolution on the current examination issomewhat better than the previous. There is perhaps a subtle increase in theoverall ejection fraction though overall appears fairly similar. The reductionin left ventricular systolic performance now appears more regional in naturewith basal-mid posterior and inferior wall akinesis (this not necessarily feltto be a significant change, but more of a reflection of the improvedendocardial resolution on the current study allowing for better assessment ofsegmental function).    Pertinent Labs   Lab Results: Personally Reviewed  Recent Results (from the past 24 hours)   INR    Collection Time: 07/16/25  6:14  AM   Result Value Ref Range    INR 1.96 (H) 0.85 - 1.15    PT 22.3 (H) 11.8 - 14.8 Seconds           HOSPITAL MEDICATIONS     Current Facility-Administered Medications   Medication Dose Route Frequency Provider Last Rate Last Admin    atorvastatin (LIPITOR) tablet 20 mg  20 mg Oral QPM Los Moreno MD   20 mg at 07/15/25 2012    [Held by provider] chlorthalidone (HYGROTON) tablet 25 mg  25 mg Oral QPM Chago Romero MD   25 mg at 07/11/25 2217    [Held by provider] gabapentin (NEURONTIN) solution 200 mg  200 mg Oral BID Triston Palafox MD   200 mg at 07/13/25 1255    hydrALAZINE (APRESOLINE) tablet 10 mg  10 mg Oral TID Los Moreno MD   10 mg at 07/15/25 2013    lacosamide (VIMPAT) 100 mg in sodium chloride 0.9 % 110 mL intermittent infusion  100 mg Intravenous BID Ayan Cleary MD        metoprolol tartrate (LOPRESSOR) tablet 100 mg  100 mg Oral BID Chago Romero MD   100 mg at 07/15/25 2013    nitroGLYcerin (NITRODUR) 0.2 MG/HR 24 hr patch 1 patch  1 patch Transdermal Daily Los Moreno MD   1 patch at 07/15/25 0851    nystatin (MYCOSTATIN) suspension 500,000 Units  500,000 Units Oral 4x Daily Darci Danielson MD   500,000 Units at 07/15/25 2012    [Held by provider] spironolactone (ALDACTONE) tablet 25 mg  25 mg Oral Daily Chago Romero MD   25 mg at 07/12/25 1422    traZODone (DESYREL) half-tab 25 mg  25 mg Oral At Bedtime Chago Romero MD   25 mg at 07/15/25 2259    [Held by provider] Warfarin Dose Required Daily - Pharmacist Managed  1 each Oral See Admin Instructions Los Moreno MD            Total time spent for face to face visit, reviewing labs/imaging studies, counseling and coordination of care was: 45 Minutes More than 50% of this time was spent on counseling and coordination of care.      This note was dictated using voice recognition software.  Any grammatical or context distortions are unintentional and inherent to the software.

## 2025-07-15 NOTE — PLAN OF CARE
Problem: Adult Inpatient Plan of Care  Goal: Optimal Comfort and Wellbeing  Outcome: Progressing     Problem: Delirium  Goal: Improved Attention and Thought Clarity  Outcome: Progressing  Goal: Improved Sleep  Outcome: Progressing     Problem: Pain Acute  Goal: Optimal Pain Control and Function  Outcome: Progressing  Intervention: Prevent or Manage Pain  Recent Flowsheet Documentation  Taken 7/15/2025 0000 by Roc Santoyo, RN  Medication Review/Management: medications reviewed   Goal Outcome Evaluation:    Oriented x1-0; speech illogical tonight and pt combative with cares. Appears to be comfortable at rest but seems uncomfortable with repositioning and incontinence cares.    Turn and repo q2 hrs. Resting between cares.           Roc Santoyo, RN

## 2025-07-15 NOTE — PLAN OF CARE
56 y/o female presents to the ED with nausea and vomiting that began tonight. She stated she vomited 5 times today and once in the ER. She states her stomach is uncomfortable and has chest tightness. Denies SOB at this time. Her last meal was this morning, she had a boiled egg and some turkey. Patient feels tired and and weak. No fevers at this time.    Alert to place only. Calm and cooperative. Responding to questions and commands. Able to drink but difficulty holding a bottle. Swallowed pills whole with fluid. Drinking and ensure this evening. Repositioning done every 2 hours. Heels elevated on pillows because of redness there. Mepilex on left hand abrasion. Had an incontinent loose stool. Shortly later said he had to use the bathroom. Used the bedpan and more loose stool. Somewhat agitated while cleaning up stool and wiping around scrotum, but was able to tolerate being cleaned up after that.

## 2025-07-15 NOTE — PLAN OF CARE
Problem: Adult Inpatient Plan of Care  Goal: Absence of Hospital-Acquired Illness or Injury  Intervention: Identify and Manage Fall Risk  Recent Flowsheet Documentation  Taken 7/15/2025 0858 by Merissa Cohn RN  Safety Promotion/Fall Prevention:   activity supervised   assistive device/personal items within reach   clutter free environment maintained   nonskid shoes/slippers when out of bed   safety round/check completed     Problem: Adult Inpatient Plan of Care  Goal: Absence of Hospital-Acquired Illness or Injury  Intervention: Prevent Skin Injury  Recent Flowsheet Documentation  Taken 7/15/2025 0858 by Merissa Cohn RN  Body Position: weight shifting     Problem: Pain Acute  Goal: Optimal Pain Control and Function  Intervention: Prevent or Manage Pain  Recent Flowsheet Documentation  Taken 7/15/2025 0858 by Merissa Cohn RN  Medication Review/Management: medications reviewed   Goal Outcome Evaluation:         Pt combative this morning and was swinging at RN and aide when providing cares. Patient refused meds this morning. No nonverbal signs of pain noted. VSS on RA. Bed alarm on. Patient repositions self in bed. Chandler patent. PIV SL. At 1430, patient became more cooperative and allowed repositioning in bed by aide and RN and took meds. Offered water and juice. Small BM x1. Sejal cares provided. Nursing to continue to monitor.

## 2025-07-15 NOTE — PLAN OF CARE
Pt reports he feels okay, although it is difficult to gauge if his responses are informed by good understanding. He appears comfortable. Asking for cup and able to hold it and drink water. Drinking ensure also. Repositioning done every 2 hours. Incontinence pad in place. Seizure pads in place. Able to eat half a meal with assistance.

## 2025-07-16 LAB
ALBUMIN MFR UR ELPH: 13.8 MG/DL
ALBUMIN UR-MCNC: NEGATIVE MG/DL
APPEARANCE UR: CLEAR
BACTERIA #/AREA URNS HPF: ABNORMAL /HPF
BILIRUB UR QL STRIP: NEGATIVE
CK SERPL-CCNC: 258 U/L (ref 39–308)
COLOR UR AUTO: YELLOW
CREAT UR-MCNC: 124.5 MG/DL
CYSTATIN C (ROCHE): 2.7 MG/L (ref 0.6–1)
GFR/BSA.PRED SERPLBLD CYS-BASED-ARV: 18 ML/MIN/1.73M2
GLUCOSE UR STRIP-MCNC: NEGATIVE MG/DL
HGB UR QL STRIP: ABNORMAL
HYALINE CASTS: 8 /LPF
INR PPP: 1.96 (ref 0.85–1.15)
KETONES UR STRIP-MCNC: NEGATIVE MG/DL
LEUKOCYTE ESTERASE UR QL STRIP: ABNORMAL
NITRATE UR QL: NEGATIVE
PH UR STRIP: 5 [PH] (ref 5–7)
PROT/CREAT 24H UR: 0.11 MG/MG CR (ref 0–0.2)
PROTHROMBIN TIME: 22.3 SECONDS (ref 11.8–14.8)
RBC URINE: 10 /HPF
SP GR UR STRIP: 1.02 (ref 1–1.03)
SQUAMOUS EPITHELIAL: <1 /HPF
TRANSITIONAL EPI: <1 /HPF
UROBILINOGEN UR STRIP-MCNC: NORMAL MG/DL
WBC URINE: 18 /HPF

## 2025-07-16 PROCEDURE — 250N000013 HC RX MED GY IP 250 OP 250 PS 637: Performed by: INTERNAL MEDICINE

## 2025-07-16 PROCEDURE — 258N000003 HC RX IP 258 OP 636

## 2025-07-16 PROCEDURE — 36415 COLL VENOUS BLD VENIPUNCTURE: CPT | Performed by: EMERGENCY MEDICINE

## 2025-07-16 PROCEDURE — 258N000003 HC RX IP 258 OP 636: Performed by: PSYCHIATRY & NEUROLOGY

## 2025-07-16 PROCEDURE — 250N000011 HC RX IP 250 OP 636: Performed by: PSYCHIATRY & NEUROLOGY

## 2025-07-16 PROCEDURE — 85610 PROTHROMBIN TIME: CPT | Performed by: EMERGENCY MEDICINE

## 2025-07-16 PROCEDURE — 84156 ASSAY OF PROTEIN URINE: CPT

## 2025-07-16 PROCEDURE — 81001 URINALYSIS AUTO W/SCOPE: CPT

## 2025-07-16 PROCEDURE — 99223 1ST HOSP IP/OBS HIGH 75: CPT

## 2025-07-16 PROCEDURE — 250N000011 HC RX IP 250 OP 636: Performed by: INTERNAL MEDICINE

## 2025-07-16 PROCEDURE — 99233 SBSQ HOSP IP/OBS HIGH 50: CPT | Performed by: INTERNAL MEDICINE

## 2025-07-16 PROCEDURE — 120N000001 HC R&B MED SURG/OB

## 2025-07-16 PROCEDURE — 87086 URINE CULTURE/COLONY COUNT: CPT | Performed by: INTERNAL MEDICINE

## 2025-07-16 PROCEDURE — C9254 INJECTION, LACOSAMIDE: HCPCS | Performed by: PSYCHIATRY & NEUROLOGY

## 2025-07-16 PROCEDURE — 250N000013 HC RX MED GY IP 250 OP 250 PS 637: Performed by: EMERGENCY MEDICINE

## 2025-07-16 PROCEDURE — 99232 SBSQ HOSP IP/OBS MODERATE 35: CPT | Performed by: PSYCHIATRY & NEUROLOGY

## 2025-07-16 RX ORDER — SODIUM CHLORIDE 9 MG/ML
INJECTION, SOLUTION INTRAVENOUS CONTINUOUS
Status: DISCONTINUED | OUTPATIENT
Start: 2025-07-16 | End: 2025-07-17

## 2025-07-16 RX ADMIN — HYDROMORPHONE HYDROCHLORIDE 0.2 MG: 0.2 INJECTION, SOLUTION INTRAMUSCULAR; INTRAVENOUS; SUBCUTANEOUS at 17:01

## 2025-07-16 RX ADMIN — SODIUM CHLORIDE 100 MG: 9 INJECTION, SOLUTION INTRAVENOUS at 09:42

## 2025-07-16 RX ADMIN — HYDRALAZINE HYDROCHLORIDE 10 MG: 10 TABLET ORAL at 08:14

## 2025-07-16 RX ADMIN — NYSTATIN 500000 UNITS: 100000 SUSPENSION ORAL at 20:48

## 2025-07-16 RX ADMIN — HYDRALAZINE HYDROCHLORIDE 10 MG: 10 TABLET ORAL at 13:33

## 2025-07-16 RX ADMIN — SODIUM CHLORIDE: 0.9 INJECTION, SOLUTION INTRAVENOUS at 16:00

## 2025-07-16 RX ADMIN — HYDROMORPHONE HYDROCHLORIDE 0.2 MG: 0.2 INJECTION, SOLUTION INTRAMUSCULAR; INTRAVENOUS; SUBCUTANEOUS at 20:45

## 2025-07-16 RX ADMIN — NYSTATIN 500000 UNITS: 100000 SUSPENSION ORAL at 12:26

## 2025-07-16 RX ADMIN — METOPROLOL TARTRATE 100 MG: 25 TABLET, FILM COATED ORAL at 20:48

## 2025-07-16 RX ADMIN — ACETAMINOPHEN 650 MG: 325 TABLET ORAL at 12:39

## 2025-07-16 RX ADMIN — METOPROLOL TARTRATE 100 MG: 25 TABLET, FILM COATED ORAL at 09:43

## 2025-07-16 RX ADMIN — TRAZODONE HYDROCHLORIDE 25 MG: 50 TABLET ORAL at 21:53

## 2025-07-16 RX ADMIN — NYSTATIN 500000 UNITS: 100000 SUSPENSION ORAL at 08:16

## 2025-07-16 RX ADMIN — ATORVASTATIN CALCIUM 20 MG: 10 TABLET, FILM COATED ORAL at 20:48

## 2025-07-16 RX ADMIN — NITROGLYCERIN 1 PATCH: 0.2 PATCH TRANSDERMAL at 08:20

## 2025-07-16 RX ADMIN — HYDRALAZINE HYDROCHLORIDE 10 MG: 10 TABLET ORAL at 20:48

## 2025-07-16 RX ADMIN — SODIUM CHLORIDE 100 MG: 9 INJECTION, SOLUTION INTRAVENOUS at 22:51

## 2025-07-16 ASSESSMENT — ACTIVITIES OF DAILY LIVING (ADL)
ADLS_ACUITY_SCORE: 80
ADLS_ACUITY_SCORE: 78
ADLS_ACUITY_SCORE: 80
ADLS_ACUITY_SCORE: 80

## 2025-07-16 NOTE — PLAN OF CARE
Problem: Delirium  Goal: Optimal Coping  Outcome: Progressing  Goal: Improved Behavioral Control  Intervention: Minimize Safety Risk  Recent Flowsheet Documentation  Taken 7/16/2025 0426 by Smooth Dalal RN  Enhanced Safety Measures: patient/family teach back on injury risk  Goal: Improved Attention and Thought Clarity  Intervention: Maximize Cognitive Function  Recent Flowsheet Documentation  Taken 7/16/2025 0426 by Smooth Dalal RN  Sensory Stimulation Regulation: quiet environment promoted  Reorientation Measures: clock in view     Problem: Pain Acute  Goal: Optimal Pain Control and Function  Outcome: Progressing  Intervention: Prevent or Manage Pain  Recent Flowsheet Documentation  Taken 7/16/2025 0426 by Smooth Dalal RN  Sensory Stimulation Regulation: quiet environment promoted  Medication Review/Management: medications reviewed     Goal Outcome Evaluation: Care from 300-700: Pt slept between cares. Seems comfortable and not in pain. VSS. Afebrile. Chandler in place and patent. Repo q2 for comfort. Assist of 2 with nabila lift. Bed alarm on for safety.

## 2025-07-16 NOTE — PROGRESS NOTES
CLINICAL NUTRITION SERVICES - REASSESSMENT NOTE     RECOMMENDATIONS FOR MDs/PROVIDERS TO ORDER:    Registered Dietitian Interventions:  Add a magic cup with supper meal, continue ensure  Plus HP daily    Future/Additional Recommendations:  Monitor po intake, weight, labs     INFORMATION OBTAINED  Patient not available for interview due to pt fast asleep    CURRENT NUTRITION ORDERS  Diet: Low Saturated Fat/2400 mg Sodium  Snacks/Supplements: Ensure plus HP daily      CURRENT INTAKE/TOLERANCE  Pt consumed 75% lunch and 25% breakfast on 7/14, H Ate 50% supper on 7/15 and took 1 ensure later.  He needs to be fed.  Poor po intake     NEW FINDINGS  GI symptoms: WDL, constipation, last BM not documented     Nutrition-relevant labs: Na 137, urea nitrogen 93.5 (H), Cr 2.39 (H), Glu 115  Nutrition-relevant medications: Lipitor, hydralazine, vimpat, trazodone  Weight: 80.6 Kg ( 177 lb 11.1 oz)    NUTRITION DIAGNOSIS  Inadequate oral intake related to acute illness as evidenced by < 50% >/= 4 days    INTERVENTIONS  Medical food supplement therapy-add vanilla magic cup with supper trays and continue ensure plus High protein  Follow po intake/ weight/labs    GOALS  Patient to consume % of nutritionally adequate meal trays TID, or the equivalent with supplements/snacks. Not met  Meet >75% nutrition needs- not met  BG < 180- met     MONITORING/EVALUATION  Progress toward goals will be monitored and evaluated per policy.

## 2025-07-16 NOTE — PLAN OF CARE
Care Management Follow Up    Length of Stay (days): 6    Expected Discharge Date: 07/17/2025    Anticipated Discharge Plan:  TCU    Transportation: TBD    PT Recommendations: Transitional Care Facility  OT Recommendations:        Barriers to Discharge: medical stability    Prior Living Situation: assisted living with facility resident    Discussed  Partnership in Safe Discharge Planning  document with patient/family: No     Handoff Completed: No, handoff not indicated or clinically appropriate    Patient/Spokesperson Updated: No    Additional Information:  Per chart review, neurology following. Pt not med ready.      Next Steps: Follow up w/dtr Rashmi for TCU choices.     Mike Parikh RN  Acute Care Management  [facility]  For further questions/concerns you can reach us at Vocera Web Console

## 2025-07-16 NOTE — PROGRESS NOTES
[facility]    PROGRESS NOTE - Hospitalist Service    ASSESSMENT AND PLAN     Principal Problem:    Seizure-like activity (H)  Active Problems:    Pure hypercholesterolemia    Primary hypertension    CAD -- S/P Stents 2001, 2012, 2014    SSS -- S/P dual chamber Pacemaker    Stage 3a chronic kidney disease (H)    Chronic atrial fibrillation (H)    Chronic heart failure with reduced ejection fraction (HFrEF, <= 40%) (H)    Lactic acidosis    Increased anion gap metabolic acidosis    Hyponatremia    Anticoagulated on warfarin    Postictal state (H)    Skip Newman is a 89 year old male with history of SSS status post pacemaker placement, hypertension, CAD, stage III CKD, chronic atrial fibrillation, on warfarin, HFrEF, seizure disorder, NSVT, gout, nephrolithiasis, aortic regurgitation, pulmonary hypertension, and prior CVA admitted on 7/9/2025 from intermediate with seizure-like activity and suspected metabolic encephalopathy.       Suspected metabolic encephalopathy  Seizure-like activity  Unclear whether altered mental status is secondary to worsening PRICILA, and metabolic acidosis versus postictal state from seizure. Head CT was unremarkable.   He was placed on Keppra on admission and subsequently transition to Depakote per neurology recommendation.  With worsening mental status so neurology switched to Vimpat   Neurology planning to pursue lumbar puncture   Holding gabapentin for now given worsening mental status     Supratherapeutic INR  Warfarin is on hold and he received IV vitamin K 7/13 given supratherapeutic INR.  INR 1.96 on 7/16/2025     PRICILA on stage III CKD  High anion gap metabolic acidosis  Baseline creatinine of 1.7?  Creatinine 2.39 today  Received sodium bicarbonate infusion  Holding  PTA chlorthalidone 25 mg and spironolactone for now  Nephrology evaluation appreciated      Hyperkalemia-resolved  Treated for hyperkalemia per protocol.     Hypertension  Continue metoprolol tartrate and  "hydralazine  Hold chlorthalidone and spironolactone for now     Atrial fibrillation  Sick sinus syndrome status post pacemaker placement  Heart rate is controlled  Warfarin as per pharmacy- currently on hold for possible LP  Continue metoprolol       HFrEF  Echocardiogram performed 6/27/2025 showed severely reduced LV systolic function with LVEF of 25 to 30%.  Patient required IV hydration briefly due to suspected prerenal PRICILA in the setting of diuretic therapy and poor oral intake  Holding spironolactone for now due to PRICILA on stage III CKD      CAD  Continue atorvastatin 20 mg daily  On warfarin PTA  Sublingual nitroglycerin as needed     Oral thrush  Nystatin swish and swallow     Barriers to discharge: Altered mental status, LP, PRICILA, neurology workup    Anticipated length of stay: Several days    Medically Ready for Discharge: Anticipated in 2-4 Days    Clinically Significant Risk Factors          # Hypochloremia: Lowest Cl = 97 mmol/L in last 2 days, will monitor as appropriate       # Coagulation Defect: INR = 1.96 (Ref range: 0.85 - 1.15) and/or PTT = N/A, will monitor for bleeding    # Hypertension: Noted on problem list  # Chronic heart failure with reduced ejection fraction: last echo with EF <40%           # Overweight: Estimated body mass index is 27.83 kg/m  as calculated from the following:    Height as of this encounter: 1.702 m (5' 7\").    Weight as of this encounter: 80.6 kg (177 lb 11.1 oz).      # Financial/Environmental Concerns:     # Pacemaker present           Subjective:  Patient resting in bed.  Does not respond to voice.  Opens eyes to physical stimuli.    PHYSICAL EXAM  Temp:  [97.1  F (36.2  C)-98.2  F (36.8  C)] 97.9  F (36.6  C)  Pulse:  [59-75] 59  Resp:  [15-20] 15  BP: (119-186)/(62-86) 139/62  SpO2:  [94 %-99 %] 95 %  Wt Readings from Last 1 Encounters:   07/16/25 80.6 kg (177 lb 11.1 oz)       Intake/Output Summary (Last 24 hours) at 7/16/2025 9901  Last data filed at 7/16/2025 " 1220  Gross per 24 hour   Intake 1178 ml   Output 900 ml   Net 278 ml      Body mass index is 27.83 kg/m .    GENRL: But awakes to sternal rub.  No breathing issues.  Not in acute distress. Lying in bed   CHEST: Clear to auscultation bilaterally. Breathing easily   HEART: Regular rate   ABDMN: Soft  EXTRM: No pedal edema  NEURO: Not Following commands.    INTGM: No skin rash    Medical Decision Making       55 MINUTES SPENT BY ME on the date of service doing chart review, history, exam, documentation & further activities per the note.      PERTINENT LABS/IMAGING:  Results for orders placed or performed during the hospital encounter of 07/09/25   Head CT w/o contrast    Impression    IMPRESSION:  1.  No acute findings.   CT Cervical Spine w/o Contrast    Impression    IMPRESSION:  1.  No acute cervical spine fracture.   CT Lumbar Spine w/o Contrast    Impression    IMPRESSION:  1.  Multilevel degenerative changes. No acute fracture.   XR Shoulder Left 2 Views    Impression    IMPRESSION: Demineralization without displaced fracture. 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 Severe glenohumeral joint osteoarthritis. Superior screw fixation of the humerus likely indicating underlying   rotator cuff pathology. Left chest pacer. Redemonstrated lucent finding the proximal humerus which may represent sequela of prior biceps tenodesis.    XR Forearm Port Left 2 Views    Impression    IMPRESSION: Bones are demineralized. There is no evidence of an acute displaced left forearm fracture. Atherosclerotic vascular calcifications. 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   CT Head w/o Contrast    Impression    IMPRESSION:  1.  No evidence of acute intracranial abnormality.   XR Shoulder Left 1 View    Impression    IMPRESSION: No change from prior. No acute fracture. Severe degenerative arthritis glenohumeral joint and likely rotator cuff arthropathy. Moderate degenerative arthrosis AC joint.  "Sclerotic focus versus overlying soft tissue calcification humeral neck   unchanged. Degenerative changes in the cervical and thoracic spine. Cardiac pacer.   XR Shoulder Right Port 1 View    Impression    IMPRESSION: Single view of the right shoulder shows advanced AC joint arthrosis as well as glenohumeral joint arthritic change. High-grade humeral head probably reflects a chronic rotator cuff tear. Demineralization without evidence of a displaced   fracture.   XR Forearm Port Right 2 Views    Impression    IMPRESSION:   Bones are demineralized. There is no evidence of an acute displaced right forearm fracture. 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 Atherosclerotic vascular calcifications. Tiny metallic surgical clip near the posterior elbow.     Most Recent 3 CBC's:  Recent Labs   Lab Test 07/13/25  1235 07/09/25  1757 01/04/25  0703   WBC 9.9 9.5 9.5   HGB 14.1 13.1* 12.2*   MCV 96 94 92   * 134* 100*     Most Recent 3 BMP's:  Recent Labs   Lab Test 07/15/25  0618 07/14/25  0843 07/14/25  0629    141 140   POTASSIUM 4.4 4.3 4.2   CHLORIDE 97* 102 101   CO2 25 26 24   BUN 93.5* 87.3* 87.0*   CR 2.39* 2.52* 2.58*   ANIONGAP 15 13 15   JUAN 9.0 9.3 9.2   * 111* 116*     Most Recent 2 LFT's:  Recent Labs   Lab Test 07/14/25  0843 07/14/25  0629   AST 24 25   ALT 21 22   ALKPHOS 95 95   BILITOTAL 0.7 0.7       Recent Labs   Lab Test 01/02/25 1914   CHOL 89   HDL 28*   LDL 31   TRIG 152*     Recent Labs   Lab Test 01/02/25  1914   LDL 31     Recent Labs   Lab Test 07/15/25  0618      POTASSIUM 4.4   CHLORIDE 97*   CO2 25   *   BUN 93.5*   CR 2.39*   GFRESTIMATED 25*   JUAN 9.0     Recent Labs   Lab Test 01/02/25 1914   A1C 6.3*     Recent Labs   Lab Test 07/13/25  1235 07/09/25  1757 01/04/25  0703   HGB 14.1 13.1* 12.2*     No results for input(s): \"TROPONINI\" in the last 96968 hours.  Recent Labs   Lab Test 03/13/24  1903   NTBNPI 13,076*     No results for input(s): \"TSH\" in the last 72000 " hours.  Recent Labs   Lab Test 07/16/25  0614 07/15/25  0618 07/14/25  0628   INR 1.96* 3.33* 5.41*       Nirmala Heck DO  Hospitalist Service  [facility]

## 2025-07-16 NOTE — PLAN OF CARE
Upon seeing pt he was laying at an angle in the bed and crying out and saying he was in intense pain that was everywhere all over his body. Pt. Became calm when son arrived and no longer complained of everywhere pain. New IV placed and 0.2 IV dilaudid given prior to transfer to [facility] for renal US. Pt tolerated ultrasound. Repositioning every 2 hours. Had a small BM soft. Again later complained of pain everywhere, and 0.2 IV dilaudid given. On recheck pt was sleeping. Ate a little bit of magic cup and ensure and drank water.

## 2025-07-16 NOTE — PLAN OF CARE
Problem: Adult Inpatient Plan of Care  Goal: Plan of Care Review  Description: The Plan of Care Review/Shift note should be completed every shift.  The Outcome Evaluation is a brief statement about your assessment that the patient is improving, declining, or no change.  This information will be displayed automatically on your shift  note.  Outcome: Progressing  Flowsheets (Taken 7/16/2025 0347)  Plan of Care Reviewed With: patient  Overall Patient Progress: no change     Problem: Delirium  Goal: Improved Behavioral Control  Outcome: Progressing  Goal: Improved Sleep  Outcome: Progressing     Problem: Comorbidity Management  Goal: Blood Pressure in Desired Range  Outcome: Progressing  Intervention: Maintain Blood Pressure Management  Recent Flowsheet Documentation  Taken 7/16/2025 0150 by Jason Humphrey RN  Medication Review/Management: medications reviewed   Goal Outcome Evaluation:      Plan of Care Reviewed With: patient    Overall Patient Progress: no changeOverall Patient Progress: no change    Resume care [id number]     Pt was very drowsy in this shift. Pt denied pain, no sign or symptom of discomfort. Vss on RA. Chandler is patent and draining. Reposition as needed. No acute changes overnight.    Chelly Humphrey RN.

## 2025-07-16 NOTE — PLAN OF CARE
Goal Outcome Evaluation:       [facility] - ICU    RN Progress Note:  Patient has been labile with his lethargic to awake.  He has also been labile with crying out, emotional.  Does follow commands when asked, needs encouragement.            Pertinent Assessments:      Please refer to flowsheet rows for full assessment     Blood pressure stable, does take pills po with applesauce, and with water.            Key Events - This Shift:                              Barriers to Discharge / Downgrade:     Lethargic, will have nephrology see patient         Point of Contact Update: YES-OR-NO: Yes  If No, reason:   Name:farrah  Phone Number:[phone]  Summary of Conversation: updated

## 2025-07-16 NOTE — CONSULTS
RENAL CONSULT NOTE    REQUESTING PHYSICIAN:  Nirmala Heck DO       REASON FOR CONSULT:   PRICILA: Cr 2.4, eGFR 25%.     ASSESSMENT/PLAN:    PLAN  -Continue expectant management (Avoid nephrotoxins, avoid hyper/Hypotension, renal dose meds, daily wt, daily IO), no urgent indications for RRT, pt likely not a good dialysis candidate with advanced age, frailty, and MMPs including pulmonary HTN which can make Hemodialysis tolerance difficult.   -Continue oral nutrition supplements between meals.   -start gentle IVFs NS 75/hr, for 13 hours (~1L administer)  -renal Ultrasound with doppler, pending  -UA, UPCR, and cystatin C, pending  -BMP daily    Non-oliguric PRICILA on CKD3: BL Cr is 1.4-1.7 since 2020 and variable. Chronic CKD likely 2/2 age related changes, Hypertensive nephrosclerosis. Other risk factors include Nephrolithiasis Hx.  In regards to PRICILA his Cr alexandr to 2.6 7/13 now down to>2.3 today etiology unclear but appears to be ?dehydration/malnutrition/chronic poor PO intake. Prior UAs relatively bland, some minor protein. Npo contrast exposure, VSS, CK 7/15 258 WNL, urine bland. Denies NSAIDs. No Hypotension/intermittent High BPs. See plan above.     Azotemia: BUN in 90s, 2/2 PRICILA, 2/2 PRICILA, +/- ?catabolic state/+Sarcopenia/malnutrition. Likely intravascularly dry/Appears dehydrated/Poor oral intake per RN/No edema. Not on TFs. No s/s bleed/Hg stable. No steroids. No asterixis noted. Will check Cystatin C, pending.     HTN: controlled on hydral 10 TID, Metoprolol 100 daily. Spironolactone held.     CKD MBD:Ca+ WNL    Hyperkalemia:resolved s/p Lokelma. Now WNL    NAGMA: on arrival 2/2 PRICILA, now resolved.  co2 WNL now s/p Isotonic bicarb infusion    AMS/Seizure like activity/?metabolic encephalopathy: HEad CT 7/14 without acute findings. ?postictal state of seizure. S/p keppra, now on depakote, then to Vimpat>Neuro planning lumbar puncture. Nikhil held. Neuro following.     Gout Hx    A-fib: s/p pacemaer, on  chronic AC.     HLD/CAD: statin, AC.       HPI: 89 YOM w/PMH of SSS s/p pacemaker, HTN, CAD, CKD3, a-fib on chronic AC, Seizure disorder, NSVT, gout, nephrolithiasis, aortic regurg, Pulm HTN, prior CVA, who was admitted 7/9/25 from [facility] with seizure like activity/>metabolic encephalopathy. REnal consulted for PRICILA.     Pt aware he is hospitalized, and location, but ongoing AMS/difficult to collect HPI from pt  Per RN pt needs to be prompted to eat, poor oral intake ongoing, sleeping a lot  VSS, RA  Wt up 73>80, appears very dry/no edema/sarcopenia  UO yellow/~700cc  Denies pain, appears comfortable, reports he feels cold, helped him cover with blankets.   Discussed labs/plan  with pt and RN in person.     REVIEW OF SYSTEMS:  Complete 12 point review of systems was negative other than those noted in the HPI      Past Medical History:   Diagnosis Date    Arthritis     Atrial fibrillation     Cardiomyopathy (H)     Chronic kidney disease     Coronary artery disease     CVA (cerebral vascular accident) (H) 07/17/2020    Elevated brain natriuretic peptide (BNP) level 03/13/2024    Elevated troponin 02/15/2024    Fall-- found down, > 12 hrs 02/15/2024    Gout     History of transfusion     Hyperlipidemia     Hypertension     Impotence of organic origin     Kidney stones     Non-traumatic rhabdomyolysis 02/15/2024    Sick sinus syndrome -- S/P [employer] PPM     Spinal stenosis, lumbar region, with neurogenic claudication     Syncope 08/25/2008    Thrombocytopenia        Current Facility-Administered Medications   Medication Dose Route Frequency Provider Last Rate Last Admin    acetaminophen (TYLENOL) tablet 650 mg  650 mg Oral Q4H PRN Los Moreno MD   650 mg at 07/16/25 1239    Or    acetaminophen (TYLENOL) Suppository 650 mg  650 mg Rectal Q4H PRN Los Moreno MD        alum & mag hydroxide-simethicone (MAALOX) suspension 30 mL  30 mL Oral Q4H PRN Los Moreno MD        atorvastatin (LIPITOR) tablet 20 mg   20 mg Oral QPM Los Moreno MD   20 mg at 07/15/25 2012    [Held by provider] chlorthalidone (HYGROTON) tablet 25 mg  25 mg Oral QPM Chago Romero MD   25 mg at 07/11/25 2217    glucose gel 15-30 g  15-30 g Oral Q15 Min PRN Darci Danielson MD        Or    dextrose 50 % injection 25-50 mL  25-50 mL Intravenous Q15 Min PRN Darci Danielson MD        Or    glucagon injection 1 mg  1 mg Subcutaneous Q15 Min PRN Darci Danielson MD        [Held by provider] gabapentin (NEURONTIN) solution 200 mg  200 mg Oral BID Triston Palafox MD   200 mg at 07/13/25 1255    hydrALAZINE (APRESOLINE) injection 10-20 mg  10-20 mg Intravenous Q30 Min PRN Los Moreno MD   20 mg at 07/13/25 0431    hydrALAZINE (APRESOLINE) tablet 10 mg  10 mg Oral TID Los Moreno MD   10 mg at 07/16/25 1333    HYDROmorphone (DILAUDID) injection 0.2 mg  0.2 mg Intravenous Q2H PRN Los Moreno MD   0.2 mg at 07/13/25 0258    HYDROmorphone (DILAUDID) injection 0.4 mg  0.4 mg Intravenous Q2H PRN Los Moreno MD        labetalol (NORMODYNE/TRANDATE) injection 10-20 mg  10-20 mg Intravenous Q10 Min PRN Los Moreno MD        lacosamide (VIMPAT) 100 mg in sodium chloride 0.9 % 120 mL intermittent infusion  100 mg Intravenous BID Ayan Cleary  mL/hr at 07/16/25 0942 100 mg at 07/16/25 0942    melatonin tablet 1 mg  1 mg Oral At Bedtime PRN Los Moreno MD        metoprolol tartrate (LOPRESSOR) tablet 100 mg  100 mg Oral BID Chago Romero MD   100 mg at 07/16/25 0943    naloxone (NARCAN) injection 0.2 mg  0.2 mg Intravenous Q2 Min PRN Los Moreno MD        Or    naloxone (NARCAN) injection 0.4 mg  0.4 mg Intravenous Q2 Min PRN Los Moreno MD        Or    naloxone (NARCAN) injection 0.2 mg  0.2 mg Intramuscular Q2 Min PRN Los Moreno MD        Or    naloxone (NARCAN) injection 0.4 mg  0.4 mg Intramuscular Q2 Min PRN Los Moreno MD        nitroGLYcerin  (NITRODUR) 0.2 MG/HR 24 hr patch 1 patch  1 patch Transdermal Daily Los Moreno MD   1 patch at 07/16/25 0820    nystatin (MYCOSTATIN) suspension 500,000 Units  500,000 Units Oral 4x Daily Darci Danielson MD   500,000 Units at 07/16/25 1226    OLANZapine (zyPREXA) injection 2.5 mg  2.5 mg Intramuscular Q6H PRN Chago Romero MD   2.5 mg at 07/12/25 0925    OLANZapine zydis (zyPREXA) ODT half-tab 2.5 mg  2.5 mg Oral Q6H PRN Chago Romero MD        ondansetron (ZOFRAN ODT) ODT tab 4 mg  4 mg Oral Q6H PRN Los Moreno MD        Or    ondansetron (ZOFRAN) injection 4 mg  4 mg Intravenous Q6H PRN Los Moreno MD        oxyCODONE (ROXICODONE) tablet 5 mg  5 mg Oral Q4H PRN Los Moreno MD        oxyCODONE IR (ROXICODONE) half-tab 2.5 mg  2.5 mg Oral Q4H PRN Los Moreno MD        Patient is already receiving anticoagulation with heparin, enoxaparin (LOVENOX), warfarin (COUMADIN)  or other anticoagulant medication   Does not apply Continuous PRN Los Moreno MD        polyethylene glycol (MIRALAX) Packet 17 g  17 g Oral BID PRN Los Moreno MD        prochlorperazine (COMPAZINE) injection 5 mg  5 mg Intravenous Q6H PRN Los Moreno MD        Or    prochlorperazine (COMPAZINE) tablet 5 mg  5 mg Oral Q6H PRN Los Moreno MD        senna-docusate (SENOKOT-S/PERICOLACE) 8.6-50 MG per tablet 1 tablet  1 tablet Oral BID PRN Los Moreno MD        Or    senna-docusate (SENOKOT-S/PERICOLACE) 8.6-50 MG per tablet 2 tablet  2 tablet Oral BID PRN Los Moreno MD   2 tablet at 07/14/25 1310    [Held by provider] spironolactone (ALDACTONE) tablet 25 mg  25 mg Oral Daily Chago Romero MD   25 mg at 07/12/25 1422    traZODone (DESYREL) half-tab 25 mg  25 mg Oral At Bedtime Chago Romero MD   25 mg at 07/15/25 0034    [Held by provider] Warfarin Dose Required Daily - Pharmacist Managed  1 each Oral See Admin Instructions  Los Moreno MD           No current outpatient medications on file.      ALLERGIES/SENSITIVITIES:  Allergies   Allergen Reactions    Lisinopril Cough    Indomethacin Rash    Naproxen Rash     Social History     Tobacco Use    Smoking status: Never    Smokeless tobacco: Never   Substance Use Topics    Alcohol use: No    Drug use: Not Currently     I have reviewed this patient's family history and updated it with pertinent information if needed.  Family History   Problem Relation Age of Onset    Heart Failure Mother     Heart Failure Father          PHYSICAL EXAM:  Physical Exam   Temp: 97.9  F (36.6  C) Temp src: Axillary BP: 139/62 Pulse: 59   Resp: 15 SpO2: 95 % O2 Device: None (Room air)    Vitals:    07/13/25 0654 07/14/25 0546 07/16/25 0700   Weight: 71.6 kg (157 lb 13.6 oz) 81 kg (178 lb 9.2 oz) 80.6 kg (177 lb 11.1 oz)     Vital Signs with Ranges  Temp:  [97.1  F (36.2  C)-98.2  F (36.8  C)] 97.9  F (36.6  C)  Pulse:  [59-75] 59  Resp:  [15-20] 15  BP: (119-186)/(62-86) 139/62  SpO2:  [94 %-99 %] 95 %  I/O last 3 completed shifts:  In: 1018 [P.O.:1018]  Out: 725 [Urine:725]    Patient Vitals for the past 72 hrs:   Weight   07/16/25 0700 80.6 kg (177 lb 11.1 oz)   07/14/25 0546 81 kg (178 lb 9.2 oz)       GEN: NAD, elderly male, AMS  CV: RRR, no JVD  Lung: clear and equal, RA  Ab: soft and NT, thin  Ext: no edema/appears dry and well perfused  Skin: no rash  Psych: confused, poor interaction, able to arouse but not much interaction  Neuro: NFD, no asterixsis  : + garza, yellow UO, no-oliguric      Laboratory:     Recent Labs   Lab 07/13/25  1235 07/09/25  1757   WBC 9.9 9.5   RBC 4.50 4.11*   HGB 14.1 13.1*   HCT 43.3 38.8*   * 134*       Basic Metabolic Panel:  Recent Labs   Lab 07/15/25  0618 07/14/25  0843 07/14/25  0629 07/14/25  0202 07/13/25  2333 07/13/25  1818 07/13/25  1738 07/13/25  1637 07/13/25  1515 07/13/25  1235 07/11/25  0135 07/10/25  0523 07/09/25  1757    141 140  --    --   --   --   --   --  141  --  136 133*   POTASSIUM 4.4 4.3 4.2  --   --  5.0  --   --  6.2* 5.9*  --  4.4 5.2   CHLORIDE 97* 102 101  --   --   --   --   --   --  104  --  102 100   CO2 25 26 24  --   --   --   --   --   --  17*  --  21* 17*   BUN 93.5* 87.3* 87.0*  --   --   --   --   --   --  80.3*  --  49.6* 53.4*   CR 2.39* 2.52* 2.58*  --   --   --   --   --   --  2.60*  --  1.66* 1.73*   * 111* 116* 173* 208*  --  211*   < >  --  144*   < > 97 135*   JUAN 9.0 9.3 9.2  --   --   --   --   --   --  9.4  --  9.3 9.6    < > = values in this interval not displayed.       INR  Recent Labs   Lab 07/16/25  0614 07/15/25  0618 07/14/25  0628 07/13/25  1235   INR 1.96* 3.33* 5.41* 5.00*       Recent Labs   Lab Test 07/15/25  0618 07/14/25  0843   POTASSIUM 4.4 4.3   CHLORIDE 97* 102   BUN 93.5* 87.3*      Recent Labs   Lab Test 07/14/25  0843 07/14/25  0629 07/10/25  0858 03/13/24  2032   ALBUMIN 3.8 3.8  --   --    BILITOTAL 0.7 0.7  --   --    ALT 21 22  --   --    AST 24 25  --   --    PROTEIN  --   --  10* Negative       Personally reviewed today's laboratory studies      Thank you for involving us in the care of this patient. We will continue to follow along with you.      Julia Heaton [facility]  [facility]  [phone]

## 2025-07-17 ENCOUNTER — APPOINTMENT (OUTPATIENT)
Dept: PHYSICAL THERAPY | Facility: HOSPITAL | Age: OVER 89
End: 2025-07-17
Attending: PSYCHIATRY & NEUROLOGY
Payer: MEDICARE

## 2025-07-17 VITALS
SYSTOLIC BLOOD PRESSURE: 161 MMHG | HEART RATE: 63 BPM | HEIGHT: 67 IN | RESPIRATION RATE: 12 BRPM | OXYGEN SATURATION: 98 % | WEIGHT: 160.05 LBS | BODY MASS INDEX: 25.12 KG/M2 | TEMPERATURE: 98 F | DIASTOLIC BLOOD PRESSURE: 70 MMHG

## 2025-07-17 LAB
ALBUMIN SERPL BCG-MCNC: 3.4 G/DL (ref 3.5–5.2)
ANION GAP SERPL CALCULATED.3IONS-SCNC: 14 MMOL/L (ref 7–15)
APPEARANCE CSF: CLEAR
BASOPHILS # BLD AUTO: 0 10E3/UL (ref 0–0.2)
BASOPHILS NFR BLD AUTO: 0 %
BUN SERPL-MCNC: 87.1 MG/DL (ref 8–23)
C GATTII+NEOFOR DNA CSF QL NAA+NON-PROBE: NEGATIVE
CALCIUM SERPL-MCNC: 8.7 MG/DL (ref 8.8–10.4)
CHLORIDE SERPL-SCNC: 103 MMOL/L (ref 98–107)
CMV DNA CSF QL NAA+NON-PROBE: NEGATIVE
COLOR CSF: COLORLESS
CREAT SERPL-MCNC: 1.95 MG/DL (ref 0.67–1.17)
E COLI K1 AG CSF QL: NEGATIVE
EGFRCR SERPLBLD CKD-EPI 2021: 32 ML/MIN/1.73M2
EOSINOPHIL # BLD AUTO: 0.2 10E3/UL (ref 0–0.7)
EOSINOPHIL NFR BLD AUTO: 2 %
ERYTHROCYTE [DISTWIDTH] IN BLOOD BY AUTOMATED COUNT: 14.6 % (ref 10–15)
EV RNA SPEC QL NAA+PROBE: NEGATIVE
GLUCOSE CSF-MCNC: 84 MG/DL (ref 40–70)
GLUCOSE SERPL-MCNC: 123 MG/DL (ref 70–99)
GP B STREP DNA CSF QL NAA+NON-PROBE: NEGATIVE
GRAM STAIN RESULT: NORMAL
GRAM STAIN RESULT: NORMAL
HAEM INFLU DNA CSF QL NAA+NON-PROBE: NEGATIVE
HCO3 SERPL-SCNC: 23 MMOL/L (ref 22–29)
HCT VFR BLD AUTO: 35.7 % (ref 40–53)
HGB BLD-MCNC: 12.2 G/DL (ref 13.3–17.7)
HHV6 DNA CSF QL NAA+NON-PROBE: POSITIVE
HSV1 DNA CSF QL NAA+NON-PROBE: NEGATIVE
HSV2 DNA CSF QL NAA+NON-PROBE: NEGATIVE
IMM GRANULOCYTES # BLD: 0.1 10E3/UL
IMM GRANULOCYTES NFR BLD: 1 %
INR PPP: 1.55 (ref 0.85–1.15)
L MONOCYTOG DNA CSF QL NAA+NON-PROBE: NEGATIVE
LYMPHOCYTES # BLD AUTO: 1.8 10E3/UL (ref 0.8–5.3)
LYMPHOCYTES NFR BLD AUTO: 17 %
MCH RBC QN AUTO: 32.3 PG (ref 26.5–33)
MCHC RBC AUTO-ENTMCNC: 34.2 G/DL (ref 31.5–36.5)
MCV RBC AUTO: 94 FL (ref 78–100)
MDC_IDC_LEAD_CONNECTION_STATUS: NORMAL
MDC_IDC_LEAD_CONNECTION_STATUS: NORMAL
MDC_IDC_LEAD_IMPLANT_DT: NORMAL
MDC_IDC_LEAD_IMPLANT_DT: NORMAL
MDC_IDC_LEAD_LOCATION: NORMAL
MDC_IDC_LEAD_LOCATION: NORMAL
MDC_IDC_LEAD_LOCATION_DETAIL_1: NORMAL
MDC_IDC_LEAD_LOCATION_DETAIL_1: NORMAL
MDC_IDC_LEAD_MFG: NORMAL
MDC_IDC_LEAD_MFG: NORMAL
MDC_IDC_LEAD_MODEL: NORMAL
MDC_IDC_LEAD_MODEL: NORMAL
MDC_IDC_LEAD_POLARITY_TYPE: NORMAL
MDC_IDC_LEAD_POLARITY_TYPE: NORMAL
MDC_IDC_LEAD_SERIAL: NORMAL
MDC_IDC_LEAD_SERIAL: NORMAL
MDC_IDC_MSMT_BATTERY_DTM: NORMAL
MDC_IDC_MSMT_BATTERY_IMPEDANCE: 1618 OHM
MDC_IDC_MSMT_BATTERY_REMAINING_LONGEVITY: 59 MO
MDC_IDC_MSMT_BATTERY_STATUS: NORMAL
MDC_IDC_MSMT_BATTERY_VOLTAGE: 2.76 V
MDC_IDC_MSMT_LEADCHNL_RA_IMPEDANCE_VALUE: 67 OHM
MDC_IDC_MSMT_LEADCHNL_RV_IMPEDANCE_VALUE: 459 OHM
MDC_IDC_MSMT_LEADCHNL_RV_PACING_THRESHOLD_AMPLITUDE: 1.25 V
MDC_IDC_MSMT_LEADCHNL_RV_PACING_THRESHOLD_PULSEWIDTH: 0.4 MS
MDC_IDC_PG_IMPLANT_DTM: NORMAL
MDC_IDC_PG_MFG: NORMAL
MDC_IDC_PG_MODEL: NORMAL
MDC_IDC_PG_SERIAL: NORMAL
MDC_IDC_PG_TYPE: NORMAL
MDC_IDC_SESS_CLINIC_NAME: NORMAL
MDC_IDC_SESS_DTM: NORMAL
MDC_IDC_SESS_TYPE: NORMAL
MDC_IDC_SET_BRADY_LOWRATE: 60 {BEATS}/MIN
MDC_IDC_SET_BRADY_MAX_SENSOR_RATE: 120 {BEATS}/MIN
MDC_IDC_SET_BRADY_MAX_TRACKING_RATE: 105 {BEATS}/MIN
MDC_IDC_SET_BRADY_MODE: NORMAL
MDC_IDC_SET_LEADCHNL_RV_PACING_AMPLITUDE: 1.88
MDC_IDC_SET_LEADCHNL_RV_PACING_CAPTURE_MODE: NORMAL
MDC_IDC_SET_LEADCHNL_RV_PACING_POLARITY: NORMAL
MDC_IDC_SET_LEADCHNL_RV_PACING_PULSEWIDTH: 0.4 MS
MDC_IDC_SET_LEADCHNL_RV_SENSING_POLARITY: NORMAL
MDC_IDC_SET_LEADCHNL_RV_SENSING_SENSITIVITY: 5.6 MV
MDC_IDC_SET_ZONE_DETECTION_INTERVAL: 375 MS
MDC_IDC_SET_ZONE_STATUS: NORMAL
MDC_IDC_SET_ZONE_STATUS: NORMAL
MDC_IDC_SET_ZONE_TYPE: NORMAL
MDC_IDC_SET_ZONE_TYPE: NORMAL
MDC_IDC_SET_ZONE_VENDOR_TYPE: NORMAL
MDC_IDC_SET_ZONE_VENDOR_TYPE: NORMAL
MDC_IDC_STAT_AT_BURDEN_PERCENT: 0 %
MDC_IDC_STAT_AT_DTM_END: NORMAL
MDC_IDC_STAT_AT_DTM_START: NORMAL
MDC_IDC_STAT_BRADY_DTM_END: NORMAL
MDC_IDC_STAT_BRADY_DTM_START: NORMAL
MDC_IDC_STAT_BRADY_RV_PERCENT_PACED: 12 %
MDC_IDC_STAT_EPISODE_RECENT_COUNT: 0
MDC_IDC_STAT_EPISODE_RECENT_COUNT: 0
MDC_IDC_STAT_EPISODE_RECENT_COUNT_DTM_END: NORMAL
MDC_IDC_STAT_EPISODE_RECENT_COUNT_DTM_END: NORMAL
MDC_IDC_STAT_EPISODE_RECENT_COUNT_DTM_START: NORMAL
MDC_IDC_STAT_EPISODE_RECENT_COUNT_DTM_START: NORMAL
MDC_IDC_STAT_EPISODE_TYPE: NORMAL
MDC_IDC_STAT_EPISODE_TYPE: NORMAL
MONOCYTES # BLD AUTO: 1.1 10E3/UL (ref 0–1.3)
MONOCYTES NFR BLD AUTO: 10 %
N MEN DNA CSF QL NAA+NON-PROBE: NEGATIVE
NEUTROPHILS # BLD AUTO: 7.3 10E3/UL (ref 1.6–8.3)
NEUTROPHILS NFR BLD AUTO: 70 %
NRBC # BLD AUTO: 0 10E3/UL
NRBC BLD AUTO-RTO: 0 /100
PARECHOVIRUS A RNA CSF QL NAA+NON-PROBE: NEGATIVE
PLATELET # BLD AUTO: 95 10E3/UL (ref 150–450)
POTASSIUM SERPL-SCNC: 4.3 MMOL/L (ref 3.4–5.3)
PROT CSF-MCNC: 51.8 MG/DL (ref 15–45)
PROTHROMBIN TIME: 18.8 SECONDS (ref 11.8–14.8)
RBC # BLD AUTO: 3.78 10E6/UL (ref 4.4–5.9)
RBC # CSF MANUAL: 1 /UL (ref 0–2)
S PNEUM DNA CSF QL NAA+NON-PROBE: NEGATIVE
SODIUM SERPL-SCNC: 140 MMOL/L (ref 135–145)
TUBE # CSF: 4
VZV DNA CSF QL NAA+NON-PROBE: NEGATIVE
WBC # BLD AUTO: 10.5 10E3/UL (ref 4–11)
WBC # CSF MANUAL: 0 /UL (ref 0–5)

## 2025-07-17 PROCEDURE — 99232 SBSQ HOSP IP/OBS MODERATE 35: CPT | Performed by: PSYCHIATRY & NEUROLOGY

## 2025-07-17 PROCEDURE — 250N000011 HC RX IP 250 OP 636: Performed by: PSYCHIATRY & NEUROLOGY

## 2025-07-17 PROCEDURE — C9254 INJECTION, LACOSAMIDE: HCPCS | Performed by: PSYCHIATRY & NEUROLOGY

## 2025-07-17 PROCEDURE — 87205 SMEAR GRAM STAIN: CPT | Performed by: PSYCHIATRY & NEUROLOGY

## 2025-07-17 PROCEDURE — 97530 THERAPEUTIC ACTIVITIES: CPT | Mod: GP

## 2025-07-17 PROCEDURE — 97112 NEUROMUSCULAR REEDUCATION: CPT | Mod: GP

## 2025-07-17 PROCEDURE — 80048 BASIC METABOLIC PNL TOTAL CA: CPT | Performed by: INTERNAL MEDICINE

## 2025-07-17 PROCEDURE — 89050 BODY FLUID CELL COUNT: CPT | Performed by: PSYCHIATRY & NEUROLOGY

## 2025-07-17 PROCEDURE — 82945 GLUCOSE OTHER FLUID: CPT | Performed by: PSYCHIATRY & NEUROLOGY

## 2025-07-17 PROCEDURE — 99232 SBSQ HOSP IP/OBS MODERATE 35: CPT | Performed by: INTERNAL MEDICINE

## 2025-07-17 PROCEDURE — 87529 HSV DNA AMP PROBE: CPT | Performed by: PSYCHIATRY & NEUROLOGY

## 2025-07-17 PROCEDURE — 120N000001 HC R&B MED SURG/OB

## 2025-07-17 PROCEDURE — 85610 PROTHROMBIN TIME: CPT | Performed by: EMERGENCY MEDICINE

## 2025-07-17 PROCEDURE — 99232 SBSQ HOSP IP/OBS MODERATE 35: CPT

## 2025-07-17 PROCEDURE — 87483 CNS DNA AMP PROBE TYPE 12-25: CPT | Performed by: PSYCHIATRY & NEUROLOGY

## 2025-07-17 PROCEDURE — 250N000013 HC RX MED GY IP 250 OP 250 PS 637: Performed by: EMERGENCY MEDICINE

## 2025-07-17 PROCEDURE — 87070 CULTURE OTHR SPECIMN AEROBIC: CPT | Performed by: PSYCHIATRY & NEUROLOGY

## 2025-07-17 PROCEDURE — 36415 COLL VENOUS BLD VENIPUNCTURE: CPT | Performed by: INTERNAL MEDICINE

## 2025-07-17 PROCEDURE — 97110 THERAPEUTIC EXERCISES: CPT | Mod: GP

## 2025-07-17 PROCEDURE — 86788 WEST NILE VIRUS AB IGM: CPT | Performed by: PSYCHIATRY & NEUROLOGY

## 2025-07-17 PROCEDURE — 85025 COMPLETE CBC W/AUTO DIFF WBC: CPT | Performed by: INTERNAL MEDICINE

## 2025-07-17 PROCEDURE — 250N000011 HC RX IP 250 OP 636: Performed by: INTERNAL MEDICINE

## 2025-07-17 PROCEDURE — 84157 ASSAY OF PROTEIN OTHER: CPT | Performed by: PSYCHIATRY & NEUROLOGY

## 2025-07-17 PROCEDURE — 258N000003 HC RX IP 258 OP 636: Performed by: PSYCHIATRY & NEUROLOGY

## 2025-07-17 PROCEDURE — 258N000003 HC RX IP 258 OP 636

## 2025-07-17 PROCEDURE — 82040 ASSAY OF SERUM ALBUMIN: CPT

## 2025-07-17 PROCEDURE — 250N000013 HC RX MED GY IP 250 OP 250 PS 637: Performed by: INTERNAL MEDICINE

## 2025-07-17 PROCEDURE — 250N000009 HC RX 250: Performed by: PSYCHIATRY & NEUROLOGY

## 2025-07-17 PROCEDURE — 009U3ZX DRAINAGE OF SPINAL CANAL, PERCUTANEOUS APPROACH, DIAGNOSTIC: ICD-10-PCS | Performed by: PSYCHIATRY & NEUROLOGY

## 2025-07-17 RX ORDER — LORAZEPAM 2 MG/ML
0.5 INJECTION INTRAMUSCULAR ONCE
Status: COMPLETED | OUTPATIENT
Start: 2025-07-17 | End: 2025-07-17

## 2025-07-17 RX ORDER — LIDOCAINE HYDROCHLORIDE 10 MG/ML
5 INJECTION, SOLUTION INFILTRATION; PERINEURAL ONCE
Status: COMPLETED | OUTPATIENT
Start: 2025-07-17 | End: 2025-07-17

## 2025-07-17 RX ADMIN — HYDRALAZINE HYDROCHLORIDE 10 MG: 10 TABLET ORAL at 10:28

## 2025-07-17 RX ADMIN — SODIUM CHLORIDE 100 MG: 9 INJECTION, SOLUTION INTRAVENOUS at 10:25

## 2025-07-17 RX ADMIN — LORAZEPAM 0.5 MG: 2 INJECTION INTRAMUSCULAR; INTRAVENOUS at 14:31

## 2025-07-17 RX ADMIN — METOPROLOL TARTRATE 100 MG: 25 TABLET, FILM COATED ORAL at 20:51

## 2025-07-17 RX ADMIN — NITROGLYCERIN 1 PATCH: 0.2 PATCH TRANSDERMAL at 10:28

## 2025-07-17 RX ADMIN — HYDROMORPHONE HYDROCHLORIDE 0.2 MG: 0.2 INJECTION, SOLUTION INTRAMUSCULAR; INTRAVENOUS; SUBCUTANEOUS at 12:06

## 2025-07-17 RX ADMIN — LIDOCAINE HYDROCHLORIDE 5 ML: 10 INJECTION, SOLUTION INFILTRATION; PERINEURAL at 15:54

## 2025-07-17 RX ADMIN — HYDROMORPHONE HYDROCHLORIDE 0.4 MG: 0.2 INJECTION, SOLUTION INTRAMUSCULAR; INTRAVENOUS; SUBCUTANEOUS at 18:19

## 2025-07-17 RX ADMIN — HYDRALAZINE HYDROCHLORIDE 10 MG: 10 TABLET ORAL at 20:51

## 2025-07-17 RX ADMIN — METOPROLOL TARTRATE 100 MG: 25 TABLET, FILM COATED ORAL at 10:28

## 2025-07-17 RX ADMIN — HYDRALAZINE HYDROCHLORIDE 10 MG: 20 INJECTION INTRAMUSCULAR; INTRAVENOUS at 00:17

## 2025-07-17 RX ADMIN — ATORVASTATIN CALCIUM 20 MG: 10 TABLET, FILM COATED ORAL at 20:51

## 2025-07-17 RX ADMIN — SODIUM CHLORIDE: 0.9 INJECTION, SOLUTION INTRAVENOUS at 10:20

## 2025-07-17 RX ADMIN — SODIUM CHLORIDE 100 MG: 9 INJECTION, SOLUTION INTRAVENOUS at 21:27

## 2025-07-17 ASSESSMENT — ACTIVITIES OF DAILY LIVING (ADL)
ADLS_ACUITY_SCORE: 81
ADLS_ACUITY_SCORE: 80
ADLS_ACUITY_SCORE: 81
ADLS_ACUITY_SCORE: 80
ADLS_ACUITY_SCORE: 81
ADLS_ACUITY_SCORE: 80
ADLS_ACUITY_SCORE: 81
ADLS_ACUITY_SCORE: 80
ADLS_ACUITY_SCORE: 81
ADLS_ACUITY_SCORE: 80
ADLS_ACUITY_SCORE: 80
ADLS_ACUITY_SCORE: 81
ADLS_ACUITY_SCORE: 80
ADLS_ACUITY_SCORE: 81
ADLS_ACUITY_SCORE: 80
ADLS_ACUITY_SCORE: 80
ADLS_ACUITY_SCORE: 81
ADLS_ACUITY_SCORE: 81

## 2025-07-17 NOTE — PROGRESS NOTES
NEUROLOGY INPATIENT PROGRESS NOTE       [facility] NEUROLOGY [place]  [address] [place] [address]  Tel: [phone]  Fax: [phone]  [email or web address]     ASSESSMENT & PLAN   Date: 07/17/2025  Hospital Day#: 7  Visit diagnosis: Seizure-like activity    Seizure-like activity  89-year-old male with history of HTN, HLD, CAD s/p stent, pacemaker, CKD stage III, chronic atrial fibrillation who was admitted on 7/10/2025 for seizure-like episode.  He was at assisted living facility when he suddenly tensed up and started shaking  CT of the brain was unremarkable repeat CT scan shows no change..  Patient's pacemaker is not MRI compatible  Initial EEG showed nonspecific slowing but no clear epileptiform discharges that on subsequent EEG showed nonspecific generalized slowing.  Patient was initially started on Keppra but he became quite agitated and was switched to Depakote that made managing his INR more difficult and it was discontinued.  As patient history strongly suggest seizures he was started on Vimpat   Some neck stiffness noted and if mental status does not improve, he will need lumbar puncture however with INR of 3.33 to reverse before attempting lumbar puncture.  Patient received vitamin K yesterday and INR 1.55.  Will schedule for lumbar puncture send CSF for routine studies.   Nephrology following for PRICILA    Neurology Discharge Planning :   FREDDIE Cleary MD  [facility] NEUROLOGY, [place]     PROBLEM LIST      Patient Active Problem List   Diagnosis    Pure hypercholesterolemia    Primary hypertension    CAD -- S/P Stents 2001, 2012, 2014    Nonsustained ventricular tachycardia (H)    SSS -- S/P dual chamber Pacemaker    Primary osteoarthritis of left knee    Gout    Recurrent kidney stones    Status post total left knee replacement    Primary osteoarthritis of right knee    Thrombocytopenia    Spinal stenosis, lumbar region, with neurogenic claudication    Stage  3a chronic kidney disease (H)    Chronic atrial fibrillation (H)    History of stroke    Chronic heart failure with reduced ejection fraction (HFrEF, <= 40%) (H)    Aortic regurgitation -- 2+ on Echo 12/21/23    Pulmonary hypertension (H)    Chronic Left rotator cuff tear    Occlusion of right vertebral artery    History of skin cancer    Anticoagulation monitoring, INR range 2-3    Hearing loss    Seizure-like activity (H)    Lactic acidosis    Increased anion gap metabolic acidosis    Hyponatremia    Anticoagulated on warfarin    Postictal state (H)       Past Medical History:   Patient  has a past medical history of Arthritis, Atrial fibrillation, Cardiomyopathy (H), Chronic kidney disease, Coronary artery disease, CVA (cerebral vascular accident) (H) (07/17/2020), Elevated brain natriuretic peptide (BNP) level (03/13/2024), Elevated troponin (02/15/2024), Fall-- found down, > 12 hrs (02/15/2024), Gout, History of transfusion, Hyperlipidemia, Hypertension, Impotence of organic origin, Kidney stones, Non-traumatic rhabdomyolysis (02/15/2024), Sick sinus syndrome -- S/P [employer] PPM, Spinal stenosis, lumbar region, with neurogenic claudication, Syncope (08/25/2008), and Thrombocytopenia.     SUBJECTIVE     Patient continues to be confused mumbles few words does not follow commands     OBJECTIVE     Vital signs in last 24 hours  Temp:  [97.5  F (36.4  C)-98.2  F (36.8  C)] 97.8  F (36.6  C)  Pulse:  [58-65] 60  Resp:  [15-18] 18  BP: (134-185)/(62-85) 134/63  SpO2:  [95 %-97 %] 97 %    Review of Systems   Pertinent items are noted in HPI.    General Physical Exam: Patient is alert and oriented x 1. Vital signs were reviewed and are documented in EMR. Neck was stiff, no Carotid bruit, thyromegaly, JVD or lymphadenopathy noted.  Neurological Exam:  Patient is alert and oriented drowsy opens his eyes and says okay able to squeeze on the left, pupil equal round reactive face symmetrical tongue midline grimaces to  painful stimuli.  Did not cooperate for cerebellar testing gait testing deferred.     DIAGNOSTIC STUDIES     Pertinent Radiology   Following imaging studies were reviewed:    CT  7/14/2025  No evidence of acute intracranial abnormality.     EEG  7/13/2025  This is a mildly abnormal EEG due to excessive sleepiness and some mild generalized slowing. There is no evidence of seizure on today's study.     7/10/2025  This is a mildly abnormal EEG due to some mild bilateral slowing and some slowing of the background.  These findings are nonspecific and suggest a mild encephalopathy.  No seizures or interictal discharges are seen.     Echocardiogram  Echo result w/o MOPS: Interpretation Summary 1. The left ventricle is normal in size. Left ventricular systolic performanceis moderate to severely reduced. The estimated ejection fraction is 25-30%.2. There is moderate global reduction in left ventricular systolic performancewith basal-mid posterior and inferior wall akinesis.3. There is mild aortic insufficiency.4. There is mild to moderate mitral insufficiency.5. Borderline right ventricular enlargement with low normal right ventricularsystolic performance.6. There is moderate left atrial enlargement. When compared to the prior real-time echocardiogram dated 3 January 2025, theacoustic quality and endocardial resolution on the current examination issomewhat better than the previous. There is perhaps a subtle increase in theoverall ejection fraction though overall appears fairly similar. The reductionin left ventricular systolic performance now appears more regional in naturewith basal-mid posterior and inferior wall akinesis (this not necessarily feltto be a significant change, but more of a reflection of the improvedendocardial resolution on the current study allowing for better assessment ofsegmental function).    Pertinent Labs   Lab Results: Personally Reviewed  Recent Results (from the past 24 hours)   UA with  Microscopic    Collection Time: 07/16/25  2:41 PM   Result Value Ref Range    Color Urine Yellow Colorless, Straw, Light Yellow, Yellow    Appearance Urine Clear Clear    Glucose Urine Negative Negative mg/dL    Bilirubin Urine Negative Negative    Ketones Urine Negative Negative mg/dL    Specific Gravity Urine 1.021 1.001 - 1.030    Blood Urine 0.06 mg/dL (A) Negative    pH Urine 5.0 5.0 - 7.0    Protein Albumin Urine Negative Negative mg/dL    Urobilinogen Urine Normal Normal mg/dL    Nitrite Urine Negative Negative    Leukocyte Esterase Urine 250 Shalonda/uL (A) Negative    Bacteria Urine Few (A) None Seen /HPF    RBC Urine 10 (H) <=2 /HPF    WBC Urine 18 (H) <=5 /HPF    Squamous Epithelials Urine <1 <=1 /HPF    Transitional Epithelials Urine <1 <=1 /HPF    Hyaline Casts Urine 8 (H) <=2 /LPF   Protein  random urine    Collection Time: 07/16/25  2:41 PM   Result Value Ref Range    Total Protein Urine mg/dL 13.8   mg/dL    Total Protein Urine mg/mg Creat 0.11 0.00 - 0.20 mg/mg Cr    Creatinine Urine mg/dL 124.5 mg/dL   INR    Collection Time: 07/17/25  6:44 AM   Result Value Ref Range    INR 1.55 (H) 0.85 - 1.15    PT 18.8 (H) 11.8 - 14.8 Seconds   Basic metabolic panel    Collection Time: 07/17/25  6:44 AM   Result Value Ref Range    Sodium 140 135 - 145 mmol/L    Potassium 4.3 3.4 - 5.3 mmol/L    Chloride 103 98 - 107 mmol/L    Carbon Dioxide (CO2) 23 22 - 29 mmol/L    Anion Gap 14 7 - 15 mmol/L    Urea Nitrogen 87.1 (H) 8.0 - 23.0 mg/dL    Creatinine 1.95 (H) 0.67 - 1.17 mg/dL    GFR Estimate 32 (L) >60 mL/min/1.73m2    Calcium 8.7 (L) 8.8 - 10.4 mg/dL    Glucose 123 (H) 70 - 99 mg/dL   CBC with platelets and differential    Collection Time: 07/17/25  6:44 AM   Result Value Ref Range    WBC Count 10.5 4.0 - 11.0 10e3/uL    RBC Count 3.78 (L) 4.40 - 5.90 10e6/uL    Hemoglobin 12.2 (L) 13.3 - 17.7 g/dL    Hematocrit 35.7 (L) 40.0 - 53.0 %    MCV 94 78 - 100 fL    MCH 32.3 26.5 - 33.0 pg    MCHC 34.2 31.5 - 36.5 g/dL     RDW 14.6 10.0 - 15.0 %    Platelet Count 95 (L) 150 - 450 10e3/uL    % Neutrophils 70 %    % Lymphocytes 17 %    % Monocytes 10 %    % Eosinophils 2 %    % Basophils 0 %    % Immature Granulocytes 1 %    NRBCs per 100 WBC 0 <1 /100    Absolute Neutrophils 7.3 1.6 - 8.3 10e3/uL    Absolute Lymphocytes 1.8 0.8 - 5.3 10e3/uL    Absolute Monocytes 1.1 0.0 - 1.3 10e3/uL    Absolute Eosinophils 0.2 0.0 - 0.7 10e3/uL    Absolute Basophils 0.0 0.0 - 0.2 10e3/uL    Absolute Immature Granulocytes 0.1 <=0.4 10e3/uL    Absolute NRBCs 0.0 10e3/uL           HOSPITAL MEDICATIONS     Current Facility-Administered Medications   Medication Dose Route Frequency Provider Last Rate Last Admin    atorvastatin (LIPITOR) tablet 20 mg  20 mg Oral QPM Los Moreno MD   20 mg at 07/16/25 2048    [Held by provider] chlorthalidone (HYGROTON) tablet 25 mg  25 mg Oral QPM Chago Romero MD   25 mg at 07/11/25 2217    [Held by provider] gabapentin (NEURONTIN) solution 200 mg  200 mg Oral BID Triston Palafox MD   200 mg at 07/13/25 1255    hydrALAZINE (APRESOLINE) tablet 10 mg  10 mg Oral TID Los Moreno MD   10 mg at 07/16/25 2048    lacosamide (VIMPAT) 100 mg in sodium chloride 0.9 % 120 mL intermittent infusion  100 mg Intravenous BID Ayan Cleary  mL/hr at 07/16/25 2251 100 mg at 07/16/25 2251    metoprolol tartrate (LOPRESSOR) tablet 100 mg  100 mg Oral BID Chago Romero MD   100 mg at 07/16/25 2048    nitroGLYcerin (NITRODUR) 0.2 MG/HR 24 hr patch 1 patch  1 patch Transdermal Daily Los Moreno MD   1 patch at 07/16/25 0820    nystatin (MYCOSTATIN) suspension 500,000 Units  500,000 Units Oral 4x Daily Darci Danielson MD   500,000 Units at 07/16/25 2048    [Held by provider] spironolactone (ALDACTONE) tablet 25 mg  25 mg Oral Daily Chago Romero MD   25 mg at 07/12/25 1422    traZODone (DESYREL) half-tab 25 mg  25 mg Oral At Bedtime Chago Romero MD   25 mg at 07/16/25 1650     [Held by provider] Warfarin Dose Required Daily - Pharmacist Managed  1 each Oral See Admin Instructions Los Moreno MD            Total time spent for face to face visit, reviewing labs/imaging studies, counseling and coordination of care was: 45 Minutes More than 50% of this time was spent on counseling and coordination of care.      This note was dictated using voice recognition software.  Any grammatical or context distortions are unintentional and inherent to the software.

## 2025-07-17 NOTE — PROGRESS NOTES
[facility]    PROGRESS NOTE - Hospitalist Service    ASSESSMENT AND PLAN     Principal Problem:    Seizure-like activity (H)  Active Problems:    Pure hypercholesterolemia    Primary hypertension    CAD -- S/P Stents 2001, 2012, 2014    SSS -- S/P dual chamber Pacemaker    Stage 3a chronic kidney disease (H)    Chronic atrial fibrillation (H)    Chronic heart failure with reduced ejection fraction (HFrEF, <= 40%) (H)    Lactic acidosis    Increased anion gap metabolic acidosis    Hyponatremia    Anticoagulated on warfarin    Postictal state (H)    Skip Newman is a 89 year old male with history of SSS status post pacemaker placement, hypertension, CAD, stage III CKD, chronic atrial fibrillation, on warfarin, HFrEF, seizure disorder, NSVT, gout, nephrolithiasis, aortic regurgitation, pulmonary hypertension, and prior CVA admitted on 7/9/2025 from [facility] with seizure-like activity and suspected metabolic encephalopathy.       Suspected metabolic encephalopathy  Seizure-like activity  Unclear whether altered mental status is secondary to worsening PRICILA, and metabolic acidosis versus postictal state from seizure. Head CT was unremarkable.   He was placed on Keppra on admission and subsequently transition to Depakote per neurology recommendation.  With worsening mental status so neurology switched to Vimpat   Neurology ordered lumbar puncture- pending   Holding gabapentin for now given worsening mental status      Supratherapeutic INR  Warfarin is on hold and he received IV vitamin K 7/13 given supratherapeutic INR.  INR 1.96 on 7/16/2025     PRICILA on stage III CKD  High anion gap metabolic acidosis  Baseline creatinine of 1.7?  Peaked at 2.39- now down to 1.95 today  Received sodium bicarbonate infusion  Holding  PTA chlorthalidone 25 mg and spironolactone for now  Nephrology evaluation appreciated-expectant management.  Signed off.     Hyperkalemia-resolved  Treated for hyperkalemia per  "protocol.     Hypertension  Continue metoprolol tartrate and hydralazine  Hold chlorthalidone and spironolactone for now     Atrial fibrillation  Sick sinus syndrome status post pacemaker placement  Heart rate is controlled  Warfarin as per pharmacy- currently on hold for LP  Continue metoprolol       HFrEF  Echocardiogram performed 6/27/2025 showed severely reduced LV systolic function with LVEF of 25 to 30%.  Patient required IV hydration briefly due to suspected prerenal PRICILA in the setting of diuretic therapy and poor oral intake  Holding spironolactone for now due to PRICILA on stage III CKD      CAD  Continue atorvastatin 20 mg daily  On warfarin PTA  Sublingual nitroglycerin as needed     Oral thrush  Nystatin swish and swallow     Barriers to discharge: Altered mental status, LP, PRICILA, neurology workup     Anticipated length of stay: Several days    Medically Ready for Discharge: Anticipated in 2-4 Days    Clinically Significant Risk Factors               # Hypoalbuminemia: Lowest albumin = 3.4 g/dL at 7/17/2025  6:44 AM, will monitor as appropriate  # Coagulation Defect: INR = 1.55 (Ref range: 0.85 - 1.15) and/or PTT = N/A, will monitor for bleeding  # Thrombocytopenia: Lowest platelets = 95 in last 2 days, will monitor for bleeding   # Hypertension: Noted on problem list  # Chronic heart failure with reduced ejection fraction: last echo with EF <40%           # Overweight: Estimated body mass index is 25.07 kg/m  as calculated from the following:    Height as of this encounter: 1.702 m (5' 7\").    Weight as of this encounter: 72.6 kg (160 lb 0.9 oz).      # Financial/Environmental Concerns:     # Pacemaker present       Subjective:  Patient remains altered.  Talking today but not making any sense.  No respiratory distress.    PHYSICAL EXAM  Temp:  [97.5  F (36.4  C)-98.2  F (36.8  C)] 98.1  F (36.7  C)  Pulse:  [58-65] 61  Resp:  [16-18] 16  BP: (134-185)/(63-85) 143/65  SpO2:  [93 %-97 %] 93 %  Wt Readings from " Last 1 Encounters:   07/17/25 72.6 kg (160 lb 0.9 oz)       Intake/Output Summary (Last 24 hours) at 7/17/2025 1454  Last data filed at 7/17/2025 1410  Gross per 24 hour   Intake 831 ml   Output 800 ml   Net 31 ml      Body mass index is 25.07 kg/m .    GENRL: Awake.  Answering questions but not appropriately. Not in acute distress. Lying in bed   CHEST: Clear to auscultation bilaterally. Breathing easily   HEART: Regular rate   ABDMN: Soft  EXTRM: No pedal edema  NEURO: Not Following commands.    INTGM: No skin rash      Medical Decision Making       35 MINUTES SPENT BY ME on the date of service doing chart review, history, exam, documentation & further activities per the note.      PERTINENT LABS/IMAGING:  Results for orders placed or performed during the hospital encounter of 07/09/25   Head CT w/o contrast    Impression    IMPRESSION:  1.  No acute findings.   CT Cervical Spine w/o Contrast    Impression    IMPRESSION:  1.  No acute cervical spine fracture.   CT Lumbar Spine w/o Contrast    Impression    IMPRESSION:  1.  Multilevel degenerative changes. No acute fracture.   XR Shoulder Left 2 Views    Impression    IMPRESSION: Demineralization without displaced fracture. 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 Severe glenohumeral joint osteoarthritis. Superior screw fixation of the humerus likely indicating underlying   rotator cuff pathology. Left chest pacer. Redemonstrated lucent finding the proximal humerus which may represent sequela of prior biceps tenodesis.    XR Forearm Port Left 2 Views    Impression    IMPRESSION: Bones are demineralized. There is no evidence of an acute displaced left forearm fracture. Atherosclerotic vascular calcifications. 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   CT Head w/o Contrast    Impression    IMPRESSION:  1.  No evidence of acute intracranial abnormality.   XR Shoulder Left 1 View    Impression    IMPRESSION: No change from prior.  No acute fracture. Severe degenerative arthritis glenohumeral joint and likely rotator cuff arthropathy. Moderate degenerative arthrosis AC joint. Sclerotic focus versus overlying soft tissue calcification humeral neck   unchanged. Degenerative changes in the cervical and thoracic spine. Cardiac pacer.   XR Shoulder Right Port 1 View    Impression    IMPRESSION: Single view of the right shoulder shows advanced AC joint arthrosis as well as glenohumeral joint arthritic change. High-grade humeral head probably reflects a chronic rotator cuff tear. Demineralization without evidence of a displaced   fracture.   XR Forearm Port Right 2 Views    Impression    IMPRESSION:   Bones are demineralized. There is no evidence of an acute displaced right forearm fracture. 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 Atherosclerotic vascular calcifications. Tiny metallic surgical clip near the posterior elbow.   US Renal Complete w Arterial Duplex    Impression    IMPRESSION:   1.  Bilateral atrophic kidneys with echogenic renal cortices, suggesting medical renal disease.  2.  Significantly limited exam secondary to patient not being able to hold breath and overlying bowel gas. The right renal artery was only visualized at the renal hilum. The left renal artery was not visualized.  3.  Nonobstructing left renal calculi.     Most Recent 3 CBC's:  Recent Labs   Lab Test 07/17/25  0644 07/13/25  1235 07/09/25  1757   WBC 10.5 9.9 9.5   HGB 12.2* 14.1 13.1*   MCV 94 96 94   PLT 95* 114* 134*     Most Recent 3 BMP's:  Recent Labs   Lab Test 07/17/25  0644 07/15/25  0618 07/14/25  0843    137 141   POTASSIUM 4.3 4.4 4.3   CHLORIDE 103 97* 102   CO2 23 25 26   BUN 87.1* 93.5* 87.3*   CR 1.95* 2.39* 2.52*   ANIONGAP 14 15 13   JUAN 8.7* 9.0 9.3   * 115* 111*     Most Recent 2 LFT's:  Recent Labs   Lab Test 07/14/25  0843 07/14/25  0629   AST 24 25   ALT 21 22   ALKPHOS 95 95   BILITOTAL 0.7 0.7       Recent Labs   Lab Test 01/02/25  1914  "  CHOL 89   HDL 28*   LDL 31   TRIG 152*     Recent Labs   Lab Test 01/02/25  1914   LDL 31     Recent Labs   Lab Test 07/17/25  0644      POTASSIUM 4.3   CHLORIDE 103   CO2 23   *   BUN 87.1*   CR 1.95*   GFRESTIMATED 32*   JUAN 8.7*     Recent Labs   Lab Test 01/02/25 1914   A1C 6.3*     Recent Labs   Lab Test 07/17/25  0644 07/13/25  1235 07/09/25  1757   HGB 12.2* 14.1 13.1*     No results for input(s): \"TROPONINI\" in the last 11601 hours.  Recent Labs   Lab Test 03/13/24  1903   NTBNPI 13,725*     No results for input(s): \"TSH\" in the last 03419 hours.  Recent Labs   Lab Test 07/17/25  0644 07/16/25  0614 07/15/25  0618   INR 1.55* 1.96* 3.33*       Nirmala Heck, DO  Hospitalist Service  [facility]      "

## 2025-07-17 NOTE — PLAN OF CARE
Goal Outcome Evaluation:    Problem: Delirium  Goal: Optimal Coping  Outcome: Progressing  Goal: Improved Behavioral Control  Outcome: Progressing  Goal: Improved Attention and Thought Clarity  Outcome: Progressing  Goal: Improved Sleep  Outcome: Progressing        Pt lethargic and disoriented, arouses to voice. No signs of pain. Q2 turn and repositions. IVF. [place] in place.

## 2025-07-17 NOTE — PROGRESS NOTES
"Care Management Follow Up    Length of Stay (days): 7    Expected Discharge Date: 07/18/2025    Anticipated Discharge Plan: TBD    Transportation: TBD    PT Recommendations: Transitional Care Facility  OT Recommendations:        Barriers to Discharge: medical stability/ Neurology following. Neph following.     Prior Living Situation:   \"Pt comes from home; lives at [facility], and noted to be somewhat independent at baseline with assistive devices. Wife lives with pt at this time but will be moving into memory care which has been causing significant stress on pt. Therapy is consulted; TCU is anticipated to be recommended by care team. Per daughter, pt appears to be more confused than what his baseline is and noted that he has not displayed aggression like this in the past. \"    [facility] Main #[phone]    Discussed  Partnership in Safe Discharge Planning  document with patient/family: No     Handoff Completed: No, handoff not indicated or clinically appropriate    Patient/Spokesperson Updated: Yes. Who? Pt's daughter Rashmi    Additional Information:    RNCM spoke to pt's daughter Rashmi about TCU rec. She would prefer to see how pt does the next day or two and how pt's mentation is.       She thinks if pt's mentation does not clear that she would want to focus on increasing services at [facility] and have pt work with therapy still over there. She thinks TCU would be too hard on pt.       If mentation does clear and pt still need rehab she would consider more TCU.       RNCM left  for [facility] to give update. Awaiting call back. [phone]      Jamie RN at [facility] #[phone] called and left  looking for an update. Writer called and left her a VM back.       Next Steps: Follow up with pts mentation. Follow up with pt and pt's daughter on Return to [facility] with therapy vs TCU. CM will continue to monitor progression of care, review team recommendations and " provide discharge planning assist as needed.       Ana Vinson RN  Acute Care Management  [facility]  For further questions/concerns you can reach us at [employer]

## 2025-07-17 NOTE — PROGRESS NOTES
RENAL PROGRESS NOTE    CC:  PRICILA/CKD3    HPI: 89 YOM w/PMH of SSS s/p pacemaker, HTN, CAD, CKD3, a-fib on chronic AC, Seizure disorder, NSVT, gout, nephrolithiasis, aortic regurg, Pulm HTN, prior CVA, who was admitted 7/9/25 from [facility] with seizure like activity/>metabolic encephalopathy. REnal consulted for PRICILA.     ASSESSMENT & PLAN:     PLAN  -Continue expectant management (Avoid nephrotoxins, avoid hyper/Hypotension, renal dose meds, daily wt, daily IO), no urgent indications for RRT, pt likely not a good dialysis candidate with advanced age, frailty, and MMPs including pulmonary HTN which can make Hemodialysis tolerance difficult.   -Continue oral nutrition supplements between meals.   -HOLD IVFs, encourage PO intake.  -With improving Cr and stable lytes/avid/base, and MBD labs,  Nephrology will sign off. Please re-consult if need arise.   -BMP daily     Non-oliguric PRICILA on CKD3: BL Cr is 1.4-1.7 since 2020 and variable. Chronic CKD likely 2/2 age related changes, Hypertensive nephrosclerosis. Other risk factors include Nephrolithiasis Hx.  In regards to PRICILA his Cr alexandr to 2.6 7/13 now down to>2.3 today etiology unclear but appears to be ?dehydration/malnutrition/chronic poor PO intake. Prior UAs relatively bland, some minor protein. Npo contrast exposure, VSS, CK 7/15 258 WNL, urine bland. Denies NSAIDs. No Hypotension/intermittent High BPs. UA + Hyaline casts which would support dehydration. + Ua otherwise  bland WBCS/RBCs with garza. UPCR 0.1 mg/mg. Cystatin C is CR 2.7, eGFR, renal function likely overestimated on standard labs 2/2 Severe sarcopenia/Low Muscle mass. BL atrophic kidneys on Renal US suggestive of long standing CKD/Non-obstructing L nephrolith.      Azotemia: BUN in 90s>80s s/p IVFs, 2/2 PRICILA, 2/2 PRICILA, +/- ?catabolic state/+Sarcopenia/malnutrition. Likely intravascularly dry/Appears dehydrated/Poor oral intake per RN/No edema. Not on TFs. No s/s bleed/Hg stable. No steroids. No asterixis  noted. Cystating C is CR 2.7, eGFR, renal funtion likely overestimated on standard labs 2/2 Severe sarcopenia/Low Muscle mass.     HTN: controlled on hydral 10 TID, Metoprolol 100 daily. Spironolactone held.     CKD MBD:Ca+ WNL    Hyperkalemia:resolved s/p Lokelma. Now WNL    NAGMA: on arrival 2/2 PRICILA, now resolved.  co2 WNL now s/p Isotonic bicarb infusion     AMS/Seizure like activity/?metabolic encephalopathy: HEad CT 7/14 without acute findings. ?postictal state of seizure. S/p keppra, now on depakote, then to Vimpat>Neuro planning lumbar puncture. Nikhil held. Neuro following.      Gout Hx     A-fib: s/p pacemaer, on chronic AC.      HLD/CAD: statin, AC.           SUBJECTIVE:    Pt resting in bed, eating breakfast  Aware of person and place  Difficult to collect HPI/Distracted and forgetful  Denies pain, n, v, sob, fever, or CP  No edema  +Sarcopenia  VSS, RA  Eating well this morning.   Discussed labs/Plan, pt had no questions      OBJECTIVE:  Physical Exam   Temp: 97.8  F (36.6  C) Temp src: Oral BP: 134/63 Pulse: 60   Resp: 18 SpO2: 97 % O2 Device: None (Room air)    Vitals:    07/14/25 0546 07/16/25 0700 07/17/25 0652   Weight: 81 kg (178 lb 9.2 oz) 80.6 kg (177 lb 11.1 oz) 72.6 kg (160 lb 0.9 oz)     Vital Signs with Ranges  Temp:  [97.5  F (36.4  C)-98.2  F (36.8  C)] 97.8  F (36.6  C)  Pulse:  [58-65] 60  Resp:  [15-18] 18  BP: (134-185)/(62-85) 134/63  SpO2:  [95 %-97 %] 97 %  I/O last 3 completed shifts:  In: 634 [P.O.:480; I.V.:154]  Out: 1150 [Urine:1150]    Patient Vitals for the past 72 hrs:   Weight   07/17/25 0652 72.6 kg (160 lb 0.9 oz)   07/16/25 0700 80.6 kg (177 lb 11.1 oz)     Intake/Output Summary (Last 24 hours) at 7/17/2025 0900  Last data filed at 7/17/2025 0600  Gross per 24 hour   Intake 534 ml   Output 775 ml   Net -241 ml       PHYSICAL EXAM:  GEN: NAD, elderly male, AMS  CV: RRR, no JVD  Lung: clear and equal, RA  Ab: soft and NT, thin  Ext: no edema/appears dry and well  perfused  Skin: no rash  Psych: confused, poor interaction, able to arouse but not much interaction  Neuro: NFD, no asterixsis  : + garza, yellow UO, no-oliguric    LABORATORY STUDIES:     Recent Labs   Lab 07/17/25  0644 07/13/25  1235   WBC 10.5 9.9   RBC 3.78* 4.50   HGB 12.2* 14.1   HCT 35.7* 43.3   PLT 95* 114*       Basic Metabolic Panel:  Recent Labs   Lab 07/17/25  0644 07/15/25  0618 07/14/25  0843 07/14/25  0629 07/14/25  0202 07/13/25  2333 07/13/25  1818 07/13/25  1637 07/13/25  1515 07/13/25  1235    137 141 140  --   --   --   --   --  141   POTASSIUM 4.3 4.4 4.3 4.2  --   --  5.0  --  6.2* 5.9*   CHLORIDE 103 97* 102 101  --   --   --   --   --  104   CO2 23 25 26 24  --   --   --   --   --  17*   BUN 87.1* 93.5* 87.3* 87.0*  --   --   --   --   --  80.3*   CR 1.95* 2.39* 2.52* 2.58*  --   --   --   --   --  2.60*   * 115* 111* 116* 173* 208*  --    < >  --  144*   JUAN 8.7* 9.0 9.3 9.2  --   --   --   --   --  9.4    < > = values in this interval not displayed.       INR  Recent Labs   Lab 07/17/25 0644 07/16/25  0614 07/15/25  0618 07/14/25  0628   INR 1.55* 1.96* 3.33* 5.41*        Recent Labs   Lab Test 07/17/25 0644 07/16/25  0614 07/14/25  0628 07/13/25  1235   INR 1.55* 1.96*   < > 5.00*   WBC 10.5  --   --  9.9   HGB 12.2*  --   --  14.1   PLT 95*  --   --  114*    < > = values in this interval not displayed.       Personally reviewed current labs    Julia WORRELL-BC  [facility]  [phone]

## 2025-07-17 NOTE — PLAN OF CARE
"  Problem: Adult Inpatient Plan of Care  Goal: Plan of Care Review  Description: The Plan of Care Review/Shift note should be completed every shift.  The Outcome Evaluation is a brief statement about your assessment that the patient is improving, declining, or no change.  This information will be displayed automatically on your shift  note.  Outcome: Progressing  Goal: Patient-Specific Goal (Individualized)  Description: You can add care plan individualizations to a care plan. Examples of Individualization might be:  \"Parent requests to be called daily at 9am for status\", \"I have a hard time hearing out of my right ear\", or \"Do not touch me to wake me up as it startles  me\".  Outcome: Progressing  Goal: Absence of Hospital-Acquired Illness or Injury  Outcome: Progressing  Intervention: Identify and Manage Fall Risk  Recent Flowsheet Documentation  Taken 7/17/2025 0900 by Kaelyn Gillis RN  Safety Promotion/Fall Prevention:   activity supervised   clutter free environment maintained   nonskid shoes/slippers when out of bed   safety round/check completed  Goal: Optimal Comfort and Wellbeing  Outcome: Progressing  Goal: Readiness for Transition of Care  Outcome: Progressing   Goal Outcome Evaluation:       Patient is alert to self only.  Cooperative at times, but becomes agitated intermittently.  Yelling out frequently this morning with facial grimacing.  PRN IV dilaudid given x1.  Patient noted to be calmer after med given.  Chandler intact and patent.  Patient needs to be fed meals.  PRN IV ativan given x1 prior to LP this afternoon.                       "

## 2025-07-18 LAB
ANION GAP SERPL CALCULATED.3IONS-SCNC: 10 MMOL/L (ref 7–15)
BASOPHILS # BLD AUTO: 0 10E3/UL (ref 0–0.2)
BASOPHILS NFR BLD AUTO: 0 %
BUN SERPL-MCNC: 86.1 MG/DL (ref 8–23)
CALCIUM SERPL-MCNC: 9.3 MG/DL (ref 8.8–10.4)
CHLORIDE SERPL-SCNC: 103 MMOL/L (ref 98–107)
CREAT SERPL-MCNC: 1.87 MG/DL (ref 0.67–1.17)
EGFRCR SERPLBLD CKD-EPI 2021: 34 ML/MIN/1.73M2
EOSINOPHIL # BLD AUTO: 0.3 10E3/UL (ref 0–0.7)
EOSINOPHIL NFR BLD AUTO: 3 %
ERYTHROCYTE [DISTWIDTH] IN BLOOD BY AUTOMATED COUNT: 14.8 % (ref 10–15)
GLUCOSE BLDC GLUCOMTR-MCNC: 125 MG/DL (ref 70–99)
GLUCOSE SERPL-MCNC: 119 MG/DL (ref 70–99)
HCO3 SERPL-SCNC: 29 MMOL/L (ref 22–29)
HCT VFR BLD AUTO: 37.5 % (ref 40–53)
HGB BLD-MCNC: 12.2 G/DL (ref 13.3–17.7)
HSV1 DNA CSF QL NAA+PROBE: NOT DETECTED
HSV2 DNA CSF QL NAA+PROBE: NOT DETECTED
IMM GRANULOCYTES # BLD: 0.1 10E3/UL
IMM GRANULOCYTES NFR BLD: 1 %
INR PPP: 1.45 (ref 0.85–1.15)
LYMPHOCYTES # BLD AUTO: 1.6 10E3/UL (ref 0.8–5.3)
LYMPHOCYTES NFR BLD AUTO: 17 %
MCH RBC QN AUTO: 31.6 PG (ref 26.5–33)
MCHC RBC AUTO-ENTMCNC: 32.5 G/DL (ref 31.5–36.5)
MCV RBC AUTO: 97 FL (ref 78–100)
MONOCYTES # BLD AUTO: 1 10E3/UL (ref 0–1.3)
MONOCYTES NFR BLD AUTO: 11 %
NEUTROPHILS # BLD AUTO: 6 10E3/UL (ref 1.6–8.3)
NEUTROPHILS NFR BLD AUTO: 68 %
NRBC # BLD AUTO: 0 10E3/UL
NRBC BLD AUTO-RTO: 0 /100
PLATELET # BLD AUTO: 112 10E3/UL (ref 150–450)
POTASSIUM SERPL-SCNC: 4.6 MMOL/L (ref 3.4–5.3)
PROTHROMBIN TIME: 17.9 SECONDS (ref 11.8–14.8)
RBC # BLD AUTO: 3.86 10E6/UL (ref 4.4–5.9)
SODIUM SERPL-SCNC: 142 MMOL/L (ref 135–145)
WBC # BLD AUTO: 8.9 10E3/UL (ref 4–11)

## 2025-07-18 PROCEDURE — 97530 THERAPEUTIC ACTIVITIES: CPT | Mod: GP

## 2025-07-18 PROCEDURE — 250N000013 HC RX MED GY IP 250 OP 250 PS 637: Performed by: INTERNAL MEDICINE

## 2025-07-18 PROCEDURE — 85014 HEMATOCRIT: CPT | Performed by: INTERNAL MEDICINE

## 2025-07-18 PROCEDURE — 250N000011 HC RX IP 250 OP 636: Performed by: HOSPITALIST

## 2025-07-18 PROCEDURE — 120N000001 HC R&B MED SURG/OB

## 2025-07-18 PROCEDURE — 36415 COLL VENOUS BLD VENIPUNCTURE: CPT | Performed by: INTERNAL MEDICINE

## 2025-07-18 PROCEDURE — 99232 SBSQ HOSP IP/OBS MODERATE 35: CPT | Performed by: HOSPITALIST

## 2025-07-18 PROCEDURE — 250N000013 HC RX MED GY IP 250 OP 250 PS 637: Performed by: HOSPITALIST

## 2025-07-18 PROCEDURE — 250N000011 HC RX IP 250 OP 636: Mod: JW | Performed by: PSYCHIATRY & NEUROLOGY

## 2025-07-18 PROCEDURE — 80048 BASIC METABOLIC PNL TOTAL CA: CPT | Performed by: INTERNAL MEDICINE

## 2025-07-18 PROCEDURE — 250N000013 HC RX MED GY IP 250 OP 250 PS 637: Performed by: EMERGENCY MEDICINE

## 2025-07-18 PROCEDURE — 258N000003 HC RX IP 258 OP 636: Performed by: PSYCHIATRY & NEUROLOGY

## 2025-07-18 PROCEDURE — C9254 INJECTION, LACOSAMIDE: HCPCS | Mod: JW | Performed by: PSYCHIATRY & NEUROLOGY

## 2025-07-18 PROCEDURE — 99232 SBSQ HOSP IP/OBS MODERATE 35: CPT | Performed by: PSYCHIATRY & NEUROLOGY

## 2025-07-18 PROCEDURE — 80235 DRUG ASSAY LACOSAMIDE: CPT | Performed by: PSYCHIATRY & NEUROLOGY

## 2025-07-18 PROCEDURE — 85610 PROTHROMBIN TIME: CPT | Performed by: EMERGENCY MEDICINE

## 2025-07-18 RX ORDER — WARFARIN SODIUM 5 MG/1
5 TABLET ORAL
Status: COMPLETED | OUTPATIENT
Start: 2025-07-18 | End: 2025-07-18

## 2025-07-18 RX ORDER — HEPARIN SODIUM 5000 [USP'U]/.5ML
5000 INJECTION, SOLUTION INTRAVENOUS; SUBCUTANEOUS EVERY 12 HOURS
Status: DISCONTINUED | OUTPATIENT
Start: 2025-07-18 | End: 2025-07-21

## 2025-07-18 RX ADMIN — NITROGLYCERIN 1 PATCH: 0.2 PATCH TRANSDERMAL at 10:07

## 2025-07-18 RX ADMIN — METOPROLOL TARTRATE 100 MG: 25 TABLET, FILM COATED ORAL at 10:07

## 2025-07-18 RX ADMIN — SODIUM CHLORIDE 150 MG: 9 INJECTION, SOLUTION INTRAVENOUS at 10:08

## 2025-07-18 RX ADMIN — HYDRALAZINE HYDROCHLORIDE 10 MG: 10 TABLET ORAL at 10:07

## 2025-07-18 RX ADMIN — ATORVASTATIN CALCIUM 20 MG: 10 TABLET, FILM COATED ORAL at 21:16

## 2025-07-18 RX ADMIN — NYSTATIN 500000 UNITS: 100000 SUSPENSION ORAL at 21:16

## 2025-07-18 RX ADMIN — NYSTATIN 500000 UNITS: 100000 SUSPENSION ORAL at 15:06

## 2025-07-18 RX ADMIN — WARFARIN SODIUM 5 MG: 5 TABLET ORAL at 17:46

## 2025-07-18 RX ADMIN — NYSTATIN 500000 UNITS: 100000 SUSPENSION ORAL at 10:07

## 2025-07-18 RX ADMIN — METOPROLOL TARTRATE 100 MG: 25 TABLET, FILM COATED ORAL at 22:02

## 2025-07-18 RX ADMIN — TRAZODONE HYDROCHLORIDE 25 MG: 50 TABLET ORAL at 21:15

## 2025-07-18 RX ADMIN — SODIUM CHLORIDE 150 MG: 9 INJECTION, SOLUTION INTRAVENOUS at 21:37

## 2025-07-18 RX ADMIN — HYDRALAZINE HYDROCHLORIDE 10 MG: 10 TABLET ORAL at 15:05

## 2025-07-18 RX ADMIN — NYSTATIN 500000 UNITS: 100000 SUSPENSION ORAL at 17:46

## 2025-07-18 RX ADMIN — HEPARIN SODIUM 5000 UNITS: 10000 INJECTION, SOLUTION INTRAVENOUS; SUBCUTANEOUS at 21:16

## 2025-07-18 ASSESSMENT — ACTIVITIES OF DAILY LIVING (ADL)
ADLS_ACUITY_SCORE: 81

## 2025-07-18 NOTE — PLAN OF CARE
"  Problem: Adult Inpatient Plan of Care  Goal: Plan of Care Review  Description: The Plan of Care Review/Shift note should be completed every shift.  The Outcome Evaluation is a brief statement about your assessment that the patient is improving, declining, or no change.  This information will be displayed automatically on your shift  note.  7/18/2025 0409 by Bárbara Bishop RN  Outcome: Not Progressing  7/18/2025 0409 by Bárbara Bishop RN  Outcome: Progressing  7/18/2025 0408 by Bárbara Bishop RN  Outcome: Not Progressing     Problem: Adult Inpatient Plan of Care  Goal: Patient-Specific Goal (Individualized)  Description: You can add care plan individualizations to a care plan. Examples of Individualization might be:  \"Parent requests to be called daily at 9am for status\", \"I have a hard time hearing out of my right ear\", or \"Do not touch me to wake me up as it startles  me\".  7/18/2025 0409 by Bárbara Bishop RN  Outcome: Not Progressing  7/18/2025 0409 by Bárbara Bishop RN  Outcome: Progressing  7/18/2025 0408 by Bárbara Bishop RN  Outcome: Not Progressing     Problem: Adult Inpatient Plan of Care  Goal: Absence of Hospital-Acquired Illness or Injury  7/18/2025 0409 by Bárbara Bishop RN  Outcome: Not Progressing  7/18/2025 0409 by Bárbara Bishop RN  Outcome: Progressing  7/18/2025 0408 by Bárbara Bishop RN  Outcome: Not Progressing  Intervention: Identify and Manage Fall Risk  Recent Flowsheet Documentation  Taken 7/18/2025 0022 by Bárbara Bishop RN  Safety Promotion/Fall Prevention:   activity supervised   clutter free environment maintained   nonskid shoes/slippers when out of bed   safety round/check completed  Intervention: Prevent Skin Injury  Recent Flowsheet Documentation  Taken 7/18/2025 0022 by Bárbara Bishop RN  Body Position:   turned   left  Intervention: Prevent and Manage VTE (Venous Thromboembolism) Risk  Recent Flowsheet " Documentation  Taken 7/18/2025 0022 by Bárbara Bishop RN  VTE Prevention/Management: SCDs off (sequential compression devices)  Intervention: Prevent Infection  Recent Flowsheet Documentation  Taken 7/18/2025 0022 by Bárbara Bishop RN  Infection Prevention:   equipment surfaces disinfected   hand hygiene promoted     Problem: Adult Inpatient Plan of Care  Goal: Absence of Hospital-Acquired Illness or Injury  Intervention: Identify and Manage Fall Risk  Recent Flowsheet Documentation  Taken 7/18/2025 0022 by Bárbara Bishop RN  Safety Promotion/Fall Prevention:   activity supervised   clutter free environment maintained   nonskid shoes/slippers when out of bed   safety round/check completed     Problem: Adult Inpatient Plan of Care  Goal: Absence of Hospital-Acquired Illness or Injury  Intervention: Prevent Skin Injury  Recent Flowsheet Documentation  Taken 7/18/2025 0022 by Bárbara Bishop RN  Body Position:   turned   left     Problem: Adult Inpatient Plan of Care  Goal: Absence of Hospital-Acquired Illness or Injury  Intervention: Prevent and Manage VTE (Venous Thromboembolism) Risk  Recent Flowsheet Documentation  Taken 7/18/2025 0022 by Bárbara Bishop RN  VTE Prevention/Management: SCDs off (sequential compression devices)     Problem: Adult Inpatient Plan of Care  Goal: Absence of Hospital-Acquired Illness or Injury  Intervention: Prevent Infection  Recent Flowsheet Documentation  Taken 7/18/2025 0022 by Bárbara Bishop RN  Infection Prevention:   equipment surfaces disinfected   hand hygiene promoted     Problem: Pain Acute  Goal: Optimal Pain Control and Function  7/18/2025 0409 by Bárbara Bishop RN  Outcome: Not Progressing  7/18/2025 0409 by Bárbara Bishop RN  Outcome: Progressing  7/18/2025 0408 by Bárbara Bishop RN  Outcome: Not Progressing  Intervention: Optimize Psychosocial Wellbeing  Recent Flowsheet Documentation  Taken 7/18/2025 0022 by  Bárbara Bishop RN  Supportive Measures: active listening utilized  Intervention: Prevent or Manage Pain  Recent Flowsheet Documentation  Taken 7/18/2025 0022 by Bárbara Bishop RN  Sensory Stimulation Regulation: quiet environment promoted  Bowel Elimination Promotion: adequate fluid intake promoted  Medication Review/Management: medications reviewed     Problem: Comorbidity Management  Goal: Blood Pressure in Desired Range  7/18/2025 0409 by Bárbara Bishop RN  Outcome: Not Progressing  7/18/2025 0409 by Bárbara Bishop RN  Outcome: Progressing  7/18/2025 0408 by Bárbara Bishop RN  Outcome: Not Progressing  Intervention: Maintain Blood Pressure Management  Recent Flowsheet Documentation  Taken 7/18/2025 0022 by Bárbara Bishop RN  Medication Review/Management: medications reviewed     Problem: Comorbidity Management  Goal: Blood Pressure in Desired Range  Intervention: Maintain Blood Pressure Management  Recent Flowsheet Documentation  Taken 7/18/2025 0022 by Bárbara Bishop RN  Medication Review/Management: medications reviewed   Goal Outcome Evaluation:        Patient lethargic most of the night, no sign of pain noticed; on droplet and contact precaution.    Remains on seizure; on 3 L O2 via oxymask.    Assist of two with repositioning.    Chandler in place draining 350cc urine output; large soft brown BM; incontinent.    Plan of care ongoing.    Bárbara PINTO RN.

## 2025-07-18 NOTE — PLAN OF CARE
Problem: Adult Inpatient Plan of Care  Goal: Optimal Comfort and Wellbeing  Outcome: Progressing   Goal Outcome Evaluation:       Pt was lethargic and oriented to self. Pt had limited appetite this AM shift. Pt tolerated IV lacosamide well. Pt's O2 titrated to 4 LPM via oxymask. No other concerns noted.  Skip Mireles RN  7/18/2025  3:27 PM

## 2025-07-18 NOTE — PROGRESS NOTES
NEUROLOGY INPATIENT PROGRESS NOTE       [facility]UROLOG[facility] [place]  [address] [place] [address]  Tel: [phone]  Fax: [phone]  [email or web address]     ASSESSMENT & PLAN   Date: 07/18/2025  Hospital Day#: 8  Visit diagnosis: Seizure-like activity    Seizure-like activity  89-year-old male with history of HTN, HLD, CAD s/p stent, pacemaker, CKD stage III, chronic atrial fibrillation who was admitted on 7/10/2025 for seizure-like episode.  He was at assisted living facility when he suddenly tensed up and started shaking  CT of the brain was unremarkable repeat CT scan shows no change..  Patient's pacemaker is not MRI compatible  Initial EEG showed nonspecific slowing but no clear epileptiform discharges that on subsequent EEG showed nonspecific generalized slowing.  Will repeat EEG  Patient was initially started on Keppra but he became quite agitated and was switched to Depakote that made managing his INR more difficult and it was discontinued.  As patient history strongly suggest seizures he was started on Vimpat.  Increase Vimpat dose to 150 mg twice daily and check a Vimpat level  Lumbar puncture shows no cell, borderline elevated protein and glucose.  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  CSF does not suggest encephalitis (unable to do MRI scan due to pacemaker) and presence of HHV-6 can be incidental and do not necessarily suggest CNS disease  Nephrology following for PRICILA    Neurology Discharge Planning :   FREDDIE Cleary MD  [facility]     PROBLEM LIST      Patient Active Problem List   Diagnosis    Pure hypercholesterolemia    Primary hypertension    CAD -- S/P Stents 2001, 2012, 2014    Nonsustained ventricular tachycardia (H)    SSS -- S/P dual chamber Pacemaker    Primary osteoarthritis of left knee    Gout    Recurrent kidney stones     Status post total left knee replacement    Primary osteoarthritis of right knee    Thrombocytopenia    Spinal stenosis, lumbar region, with neurogenic claudication    Stage 3a chronic kidney disease (H)    Chronic atrial fibrillation (H)    History of stroke    Chronic heart failure with reduced ejection fraction (HFrEF, <= 40%) (H)    Aortic regurgitation -- 2+ on Echo 12/21/23    Pulmonary hypertension (H)    Chronic Left rotator cuff tear    Occlusion of right vertebral artery    History of skin cancer    Anticoagulation monitoring, INR range 2-3    Hearing loss    Seizure-like activity (H)    Lactic acidosis    Increased anion gap metabolic acidosis    Hyponatremia    Anticoagulated on warfarin    Postictal state (H)       Past Medical History:   Patient  has a past medical history of Arthritis, Atrial fibrillation, Cardiomyopathy (H), Chronic kidney disease, Coronary artery disease, CVA (cerebral vascular accident) (H) (07/17/2020), Elevated brain natriuretic peptide (BNP) level (03/13/2024), Elevated troponin (02/15/2024), Fall-- found down, > 12 hrs (02/15/2024), Gout, History of transfusion, Hyperlipidemia, Hypertension, Impotence of organic origin, Kidney stones, Non-traumatic rhabdomyolysis (02/15/2024), Sick sinus syndrome -- S/P [employer] PPM, Spinal stenosis, lumbar region, with neurogenic claudication, Syncope (08/25/2008), and Thrombocytopenia.     SUBJECTIVE     Patient continues to be agitated calling out disoriented received some Dilaudid     OBJECTIVE     Vital signs in last 24 hours  Temp:  [97.9  F (36.6  C)-98.3  F (36.8  C)] 97.9  F (36.6  C)  Pulse:  [58-70] 60  Resp:  [12-16] 16  BP: (117-161)/(62-94) 139/94  SpO2:  [93 %-99 %] 95 %    Review of Systems   Pertinent items are noted in HPI.    General Physical Exam: Patient is alert and oriented x 1. Vital signs were reviewed and are documented in EMR. Neck was stiff, no Carotid bruit, thyromegaly, JVD or lymphadenopathy noted.  Neurological  Exam:  Patient is alert and oriented drowsy opens his eyes and says okay able to squeeze on the left, pupil equal round reactive face symmetrical tongue midline grimaces to painful stimuli.  Did not cooperate for cerebellar testing gait testing deferred.     DIAGNOSTIC STUDIES     Pertinent Radiology   Following imaging studies were reviewed:    CT  7/14/2025  No evidence of acute intracranial abnormality.     EEG  7/13/2025  This is a mildly abnormal EEG due to excessive sleepiness and some mild generalized slowing. There is no evidence of seizure on today's study.     7/10/2025  This is a mildly abnormal EEG due to some mild bilateral slowing and some slowing of the background.  These findings are nonspecific and suggest a mild encephalopathy.  No seizures or interictal discharges are seen.     Echocardiogram  Echo result w/o MOPS: Interpretation Summary 1. The left ventricle is normal in size. Left ventricular systolic performanceis moderate to severely reduced. The estimated ejection fraction is 25-30%.2. There is moderate global reduction in left ventricular systolic performancewith basal-mid posterior and inferior wall akinesis.3. There is mild aortic insufficiency.4. There is mild to moderate mitral insufficiency.5. Borderline right ventricular enlargement with low normal right ventricularsystolic performance.6. There is moderate left atrial enlargement. When compared to the prior real-time echocardiogram dated 3 January 2025, theacoustic quality and endocardial resolution on the current examination issomewhat better than the previous. There is perhaps a subtle increase in theoverall ejection fraction though overall appears fairly similar. The reductionin left ventricular systolic performance now appears more regional in naturewith basal-mid posterior and inferior wall akinesis (this not necessarily feltto be a significant change, but more of a reflection of the improvedendocardial resolution on the current  study allowing for better assessment ofsegmental function).    Pertinent Labs   Lab Results: Personally Reviewed  Recent Results (from the past 24 hours)   Glucose CSF:    Collection Time: 07/17/25  3:58 PM   Result Value Ref Range    Glucose CSF 84 (H) 40 - 70 mg/dL   Protein total CSF:    Collection Time: 07/17/25  3:58 PM   Result Value Ref Range    Protein total CSF 51.8 (H) 15.0 - 45.0 mg/dL   Cerebrospinal fluid Aerobic Bacterial Culture Routine With Gram Stain    Collection Time: 07/17/25  3:58 PM    Specimen: Lumbar Puncture; Cerebrospinal fluid   Result Value Ref Range    Gram Stain Result No organisms seen     Gram Stain Result No white blood cells seen    Meningitis/Encephalitis Panel Qual PCR CSF:    Collection Time: 07/17/25  3:58 PM   Result Value Ref Range    Escherichia coli K1 Negative Negative    Haemophilus influenzae Negative Negative    Listeria monocytogenes Negative Negative    Neisseria meningitidis Negative Negative    Streptococcus agalactiae (GBS) Negative Negative    Streptococcus pneumoniae Negative Negative    Cytomegalovirus Negative Negative    Enterovirus Negative Negative    Herpes simplex virus 1 Negative Negative    Herpes simplex virus 2 Negative Negative    Human Herpes Virus 6 Positive (A) Negative    Human parechovirus Negative Negative    Varicella zoster virus Negative Negative    Cryptococcus neoformans/gattii Negative Negative   Cell Count CSF    Collection Time: 07/17/25  3:58 PM   Result Value Ref Range    Tube Number 4     Color Colorless Colorless    Clarity Clear Clear    Total Nucleated Cells 0 0 - 5 /uL    RBC Count 1 0 - 2 /uL   Glucose by meter    Collection Time: 07/18/25  2:09 AM   Result Value Ref Range    GLUCOSE BY METER POCT 125 (H) 70 - 99 mg/dL   INR    Collection Time: 07/18/25  7:01 AM   Result Value Ref Range    INR 1.45 (H) 0.85 - 1.15    PT 17.9 (H) 11.8 - 14.8 Seconds   Basic metabolic panel    Collection Time: 07/18/25  7:01 AM   Result Value Ref  Range    Sodium 142 135 - 145 mmol/L    Potassium 4.6 3.4 - 5.3 mmol/L    Chloride 103 98 - 107 mmol/L    Carbon Dioxide (CO2) 29 22 - 29 mmol/L    Anion Gap 10 7 - 15 mmol/L    Urea Nitrogen 86.1 (H) 8.0 - 23.0 mg/dL    Creatinine 1.87 (H) 0.67 - 1.17 mg/dL    GFR Estimate 34 (L) >60 mL/min/1.73m2    Calcium 9.3 8.8 - 10.4 mg/dL    Glucose 119 (H) 70 - 99 mg/dL   CBC with platelets and differential    Collection Time: 07/18/25  7:01 AM   Result Value Ref Range    WBC Count 8.9 4.0 - 11.0 10e3/uL    RBC Count 3.86 (L) 4.40 - 5.90 10e6/uL    Hemoglobin 12.2 (L) 13.3 - 17.7 g/dL    Hematocrit 37.5 (L) 40.0 - 53.0 %    MCV 97 78 - 100 fL    MCH 31.6 26.5 - 33.0 pg    MCHC 32.5 31.5 - 36.5 g/dL    RDW 14.8 10.0 - 15.0 %    Platelet Count 112 (L) 150 - 450 10e3/uL    % Neutrophils 68 %    % Lymphocytes 17 %    % Monocytes 11 %    % Eosinophils 3 %    % Basophils 0 %    % Immature Granulocytes 1 %    NRBCs per 100 WBC 0 <1 /100    Absolute Neutrophils 6.0 1.6 - 8.3 10e3/uL    Absolute Lymphocytes 1.6 0.8 - 5.3 10e3/uL    Absolute Monocytes 1.0 0.0 - 1.3 10e3/uL    Absolute Eosinophils 0.3 0.0 - 0.7 10e3/uL    Absolute Basophils 0.0 0.0 - 0.2 10e3/uL    Absolute Immature Granulocytes 0.1 <=0.4 10e3/uL    Absolute NRBCs 0.0 10e3/uL           HOSPITAL MEDICATIONS     Current Facility-Administered Medications   Medication Dose Route Frequency Provider Last Rate Last Admin    atorvastatin (LIPITOR) tablet 20 mg  20 mg Oral QPM Los Moreno MD   20 mg at 07/17/25 2051    [Held by provider] chlorthalidone (HYGROTON) tablet 25 mg  25 mg Oral QPM Chago Romero MD   25 mg at 07/11/25 2217    [Held by provider] gabapentin (NEURONTIN) solution 200 mg  200 mg Oral BID Triston Palafox MD   200 mg at 07/13/25 1255    hydrALAZINE (APRESOLINE) tablet 10 mg  10 mg Oral TID Los Moreno MD   10 mg at 07/17/25 2051    lacosamide (VIMPAT) 150 mg in sodium chloride 0.9 % 125 mL intermittent infusion  150 mg Intravenous  BID Ayan Cleary MD        metoprolol tartrate (LOPRESSOR) tablet 100 mg  100 mg Oral BID Chago Romero MD   100 mg at 07/17/25 2051    nitroGLYcerin (NITRODUR) 0.2 MG/HR 24 hr patch 1 patch  1 patch Transdermal Daily Los Moreno MD   1 patch at 07/17/25 1028    nystatin (MYCOSTATIN) suspension 500,000 Units  500,000 Units Oral 4x Daily Darci Danielson MD   500,000 Units at 07/16/25 2048    [Held by provider] spironolactone (ALDACTONE) tablet 25 mg  25 mg Oral Daily Chago Romero MD   25 mg at 07/12/25 1422    traZODone (DESYREL) half-tab 25 mg  25 mg Oral At Bedtime Chago Romero MD   25 mg at 07/16/25 2153    [Held by provider] Warfarin Dose Required Daily - Pharmacist Managed  1 each Oral See Admin Instructions Los Moreno MD            Total time spent for face to face visit, reviewing labs/imaging studies, counseling and coordination of care was: 45 Minutes More than 50% of this time was spent on counseling and coordination of care.      This note was dictated using voice recognition software.  Any grammatical or context distortions are unintentional and inherent to the software.

## 2025-07-18 NOTE — PROGRESS NOTES
CLINICAL NUTRITION SERVICES - REASSESSMENT NOTE     RECOMMENDATIONS FOR MDs/PROVIDERS TO ORDER:  Recommend regular diet due to very poor appetite  And po intake    Registered Dietitian Interventions:  Continue ensure plus high protein w/ lunch and  Increase magic cups to bid (L and S)    Future/Additional Recommendations:  Monitor po intake, weight, labs, GOC     INFORMATION OBTAINED  Assessed patient in room.Pt did not answer any questions.  RN giving him frequent sips of ensure    CURRENT NUTRITION ORDERS  Diet: Low Saturated Fat/2400 mg Sodium  Snacks/Supplements: Magic Cup daily and ensure plus high protein daily      CURRENT INTAKE/TOLERANCE  Not all meals recorded.  Pt consuming 0-75% meals 7/15-7/17.  Many times just taking a pudding, ensure and magic cup     NEW FINDINGS  GI symptoms: Constipation, last BM 7/16/2025    Skin/wounds: Abrasion left forearm, upper arm, redness/blanchable sacrum/coccyx    Nutrition-relevant labs: Urea nitrogen 86.1 (H), Cr 1.87 (H), Glu 119  Nutrition-relevant medications: Lipitor, hydralazine, vimpat, nystatin, trazodone  Weight: 72.6 Kg ( 145 lb 11.6 oz)    ASSESSED NUTRITION NEEDS  Dosing Weight: 73.2 kg, based on actual wt  Estimated Energy Needs: 6624-5901 kcals/day (25 - 30 kcals/kg)  Justification: Increased needs  Estimated Protein Needs: 59-73 grams protein/day (0.8 - 1 grams of pro/kg)  Justification: CKD and Increased needs  Estimated Fluid Needs: 3633-8113 mL/day (25 - 30 mL/kg)  Justification: Maintenance and Per provider pending fluid status    NUTRITION DIAGNOSIS  Inadequate oral intake related to acute illness as evidenced by po intake < 50%>/= 4 days- continues    INTERVENTIONS  Medical food supplement therapy-increase magic cup to bid ( Lunch and supper)  Recommend regular diet due to very poor appetite and po intake    GOALS  Patient to consume % of nutritionally adequate meal trays TID, or the equivalent with supplements/snacks. Not met  Meet > 75%  nutritional needs- not met  BG < 180-met     MONITORING/EVALUATION  Progress toward goals will be monitored and evaluated per policy.

## 2025-07-18 NOTE — PLAN OF CARE
Problem: Adult Inpatient Plan of Care  Goal: Plan of Care Review  Description: The Plan of Care Review/Shift note should be completed every shift.  The Outcome Evaluation is a brief statement about your assessment that the patient is improving, declining, or no change.  This information will be displayed automatically on your shift  note.  Outcome: Progressing   Goal Outcome Evaluation:       Pt disoriented to place, time, and situation. Agitated and calling out at times. Family stated that probably he is in pain. Prn iv dilaudid given. Pt has been sedated since but vitals stable. On 02 at 3 LPM due to desaturation after dilaudid administration.

## 2025-07-19 LAB
ANION GAP SERPL CALCULATED.3IONS-SCNC: 12 MMOL/L (ref 7–15)
BACTERIA UR CULT: NORMAL
BACTERIA UR CULT: NORMAL
BUN SERPL-MCNC: 81.6 MG/DL (ref 8–23)
CALCIUM SERPL-MCNC: 9.3 MG/DL (ref 8.8–10.4)
CHLORIDE SERPL-SCNC: 107 MMOL/L (ref 98–107)
CREAT SERPL-MCNC: 1.75 MG/DL (ref 0.67–1.17)
EGFRCR SERPLBLD CKD-EPI 2021: 36 ML/MIN/1.73M2
ERYTHROCYTE [DISTWIDTH] IN BLOOD BY AUTOMATED COUNT: 14.7 % (ref 10–15)
GLUCOSE BLDC GLUCOMTR-MCNC: 160 MG/DL (ref 70–99)
GLUCOSE SERPL-MCNC: 97 MG/DL (ref 70–99)
HCO3 SERPL-SCNC: 25 MMOL/L (ref 22–29)
HCT VFR BLD AUTO: 36 % (ref 40–53)
HGB BLD-MCNC: 11.9 G/DL (ref 13.3–17.7)
INR PPP: 1.39 (ref 0.85–1.15)
LACOSAMIDE SERPL-MCNC: 8.7 UG/ML
MCH RBC QN AUTO: 32.3 PG (ref 26.5–33)
MCHC RBC AUTO-ENTMCNC: 33.1 G/DL (ref 31.5–36.5)
MCV RBC AUTO: 98 FL (ref 78–100)
PLATELET # BLD AUTO: 96 10E3/UL (ref 150–450)
POTASSIUM SERPL-SCNC: 4.5 MMOL/L (ref 3.4–5.3)
PROTHROMBIN TIME: 17.4 SECONDS (ref 11.8–14.8)
RBC # BLD AUTO: 3.68 10E6/UL (ref 4.4–5.9)
SODIUM SERPL-SCNC: 144 MMOL/L (ref 135–145)
TSH SERPL DL<=0.005 MIU/L-ACNC: 2.4 UIU/ML (ref 0.3–4.2)
VIT B12 SERPL-MCNC: 2341 PG/ML (ref 232–1245)
WBC # BLD AUTO: 8.5 10E3/UL (ref 4–11)

## 2025-07-19 PROCEDURE — 120N000001 HC R&B MED SURG/OB

## 2025-07-19 PROCEDURE — 258N000003 HC RX IP 258 OP 636: Performed by: HOSPITALIST

## 2025-07-19 PROCEDURE — 84425 ASSAY OF VITAMIN B-1: CPT | Performed by: PSYCHIATRY & NEUROLOGY

## 2025-07-19 PROCEDURE — 36415 COLL VENOUS BLD VENIPUNCTURE: CPT | Performed by: PSYCHIATRY & NEUROLOGY

## 2025-07-19 PROCEDURE — 85610 PROTHROMBIN TIME: CPT | Performed by: EMERGENCY MEDICINE

## 2025-07-19 PROCEDURE — 250N000009 HC RX 250: Performed by: HOSPITALIST

## 2025-07-19 PROCEDURE — 36415 COLL VENOUS BLD VENIPUNCTURE: CPT | Performed by: EMERGENCY MEDICINE

## 2025-07-19 PROCEDURE — C9254 INJECTION, LACOSAMIDE: HCPCS | Mod: JW | Performed by: PSYCHIATRY & NEUROLOGY

## 2025-07-19 PROCEDURE — 82947 ASSAY GLUCOSE BLOOD QUANT: CPT | Performed by: HOSPITALIST

## 2025-07-19 PROCEDURE — 250N000011 HC RX IP 250 OP 636: Mod: JW | Performed by: PSYCHIATRY & NEUROLOGY

## 2025-07-19 PROCEDURE — 250N000013 HC RX MED GY IP 250 OP 250 PS 637: Performed by: EMERGENCY MEDICINE

## 2025-07-19 PROCEDURE — 85014 HEMATOCRIT: CPT | Performed by: HOSPITALIST

## 2025-07-19 PROCEDURE — 250N000013 HC RX MED GY IP 250 OP 250 PS 637: Performed by: INTERNAL MEDICINE

## 2025-07-19 PROCEDURE — 99233 SBSQ HOSP IP/OBS HIGH 50: CPT | Performed by: PSYCHIATRY & NEUROLOGY

## 2025-07-19 PROCEDURE — 82607 VITAMIN B-12: CPT | Performed by: PSYCHIATRY & NEUROLOGY

## 2025-07-19 PROCEDURE — 99233 SBSQ HOSP IP/OBS HIGH 50: CPT | Performed by: HOSPITALIST

## 2025-07-19 PROCEDURE — 84443 ASSAY THYROID STIM HORMONE: CPT | Performed by: PSYCHIATRY & NEUROLOGY

## 2025-07-19 PROCEDURE — 250N000011 HC RX IP 250 OP 636: Performed by: HOSPITALIST

## 2025-07-19 PROCEDURE — 258N000003 HC RX IP 258 OP 636: Performed by: PSYCHIATRY & NEUROLOGY

## 2025-07-19 RX ORDER — METOPROLOL TARTRATE 1 MG/ML
5 INJECTION, SOLUTION INTRAVENOUS EVERY 6 HOURS
Status: DISCONTINUED | OUTPATIENT
Start: 2025-07-19 | End: 2025-07-20

## 2025-07-19 RX ORDER — SODIUM CHLORIDE, SODIUM LACTATE, POTASSIUM CHLORIDE, CALCIUM CHLORIDE 600; 310; 30; 20 MG/100ML; MG/100ML; MG/100ML; MG/100ML
INJECTION, SOLUTION INTRAVENOUS CONTINUOUS
Status: DISCONTINUED | OUTPATIENT
Start: 2025-07-19 | End: 2025-07-21

## 2025-07-19 RX ADMIN — SODIUM CHLORIDE 150 MG: 9 INJECTION, SOLUTION INTRAVENOUS at 20:53

## 2025-07-19 RX ADMIN — NITROGLYCERIN 1 PATCH: 0.2 PATCH TRANSDERMAL at 09:54

## 2025-07-19 RX ADMIN — HEPARIN SODIUM 5000 UNITS: 10000 INJECTION, SOLUTION INTRAVENOUS; SUBCUTANEOUS at 09:55

## 2025-07-19 RX ADMIN — HYDRALAZINE HYDROCHLORIDE 10 MG: 10 TABLET ORAL at 09:59

## 2025-07-19 RX ADMIN — SODIUM CHLORIDE 150 MG: 9 INJECTION, SOLUTION INTRAVENOUS at 12:34

## 2025-07-19 RX ADMIN — METOPROLOL TARTRATE 100 MG: 25 TABLET, FILM COATED ORAL at 09:59

## 2025-07-19 RX ADMIN — METOPROLOL TARTRATE 5 MG: 5 INJECTION INTRAVENOUS at 14:43

## 2025-07-19 RX ADMIN — HEPARIN SODIUM 5000 UNITS: 10000 INJECTION, SOLUTION INTRAVENOUS; SUBCUTANEOUS at 20:49

## 2025-07-19 RX ADMIN — SODIUM CHLORIDE, SODIUM LACTATE, POTASSIUM CHLORIDE, AND CALCIUM CHLORIDE: .6; .31; .03; .02 INJECTION, SOLUTION INTRAVENOUS at 13:35

## 2025-07-19 RX ADMIN — METOPROLOL TARTRATE 5 MG: 5 INJECTION INTRAVENOUS at 20:48

## 2025-07-19 ASSESSMENT — ACTIVITIES OF DAILY LIVING (ADL)
ADLS_ACUITY_SCORE: 81

## 2025-07-19 NOTE — PLAN OF CARE
Problem: Adult Inpatient Plan of Care  Goal: Absence of Hospital-Acquired Illness or Injury  Intervention: Identify and Manage Fall Risk  Recent Flowsheet Documentation  Taken 7/18/2025 2324 by Karlee Pink RN  Safety Promotion/Fall Prevention:   activity supervised   assistive device/personal items within reach   increased rounding and observation   nonskid shoes/slippers when out of bed   patient and family education   safety round/check completed   supervised activity   Bed alarm on and freq checks completed.    Problem: Adult Inpatient Plan of Care  Goal: Absence of Hospital-Acquired Illness or Injury  Intervention: Prevent Skin Injury  Recent Flowsheet Documentation  Taken 7/19/2025 0611 by Karlee Pink, RN  Body Position:   log-rolled   turned   right  Taken 7/19/2025 0100 by Karlee Pink RN  Body Position:   log-rolled   turned   right  Taken 7/18/2025 2324 by Karlee Pink RN  Body Position:   turned   left  Skin Protection:   adhesive use limited   incontinence pads utilized   tubing/devices free from skin contact   pulse oximeter probe site changed    Patient has redness in scrotal area and buttocks, barrier cream applied and foam dressing for protection. Chandler cares completed, x2 incont stool for shift. Q2hr repos, clock and sign on patients door.     Problem: Adult Inpatient Plan of Care  Goal: Absence of Hospital-Acquired Illness or Injury  Intervention: Prevent Infection  Recent Flowsheet Documentation  Taken 7/18/2025 2324 by Karlee Pink RN  Infection Prevention:   single patient room provided   equipment surfaces disinfected   hand hygiene promoted   personal protective equipment utilized   rest/sleep promoted     Droplet/Contact precautions maintained.    Problem: Delirium  Goal: Improved Sleep  7/19/2025 0612 by Karlee Pink, RN  Outcome: Progressing  7/19/2025 0610 by Karlee Pink RN  Outcome: Progressing  Intervention: Promote Sleep  Recent Flowsheet  Documentation  Taken 7/18/2025 2324 by Karlee Pink, RN  Sleep/Rest Enhancement: awakenings minimized     Problem: Seizure, Active Management  Goal: Absence of Seizure/Seizure-Related Injury  7/19/2025 0612 by Karlee Pink, RN  Outcome: Progressing  7/19/2025 0610 by Karlee Pink RN  Outcome: Progressing  Intervention: Prevent Seizure-Related Injury  Recent Flowsheet Documentation  Taken 7/18/2025 2324 by Karlee Pink RN  Sensory Stimulation Regulation:   care clustered   quiet environment promoted  Seizure Precautions:   side rails padded   activity supervised   clutter-free environment maintained    No signs of seizure activity noted for shift, pt appeared lethargic and slept most of shift.     Goal Outcome Evaluation: Patient confused, garbled incoherent speech, ort to self only, arouses to voice or touch. VSS on 4L Oxymask, pt appeared comfortable and not in distress. R PIV flushed, SL. Daily wt completed, no significant events, bed alarm for safety, call light within reach.           Karlee Pink, RN

## 2025-07-19 NOTE — PLAN OF CARE
Problem: Adult Inpatient Plan of Care  Goal: Plan of Care Review  Description: The Plan of Care Review/Shift note should be completed every shift.  The Outcome Evaluation is a brief statement about your assessment that the patient is improving, declining, or no change.  This information will be displayed automatically on your shift  note.  Outcome: Not Progressing   Goal Outcome Evaluation:  Was told pt was a feeder and would not even open eyes when trying to feed him.  Notified hospitalist that pt needed a swallow eval and not appropriate to feed at this time.  Too lethargic.  NPO for now.  IV fluids started.  Speech will do swallow eval tomorrow.  She got him a pocket talker since his hearing aides were not working.  Family took them home.  Medications changed as well due to NPO status.

## 2025-07-19 NOTE — PROGRESS NOTES
NEUROLOGY PROGRESS NOTE     ASSESSMENT & PLAN   Visit diagnosis: Seizure-like activity    Seizure-like activity  Suspected encephalopathy  Acute kidney injury    89-year-old male with history of HTN, HLD, CAD s/p stent, pacemaker, CKD stage III, chronic atrial fibrillation who was admitted on 7/10/2025 for seizure-like episode.  He was at assisted living facility when he suddenly tensed up and started shaking  CT of the brain was unremarkable repeat CT scan shows no change..  Patient's pacemaker is not MRI compatible  Initial EEG showed nonspecific slowing but no clear epileptiform discharges that on subsequent EEG showed nonspecific generalized slowing.  Will repeat EEG-pending  Patient was initially started on Keppra but he became quite agitated and was switched to Depakote that made managing his INR more difficult and it was discontinued.  As patient history strongly suggest seizures he was started on Vimpat.  Increase Vimpat dose to 150 mg twice daily and check a Vimpat level-pending  Lumbar puncture shows no cell, borderline elevated protein and glucose.  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  CSF does not suggest encephalitis (unable to do MRI scan due to pacemaker) and presence of HHV-6 can be incidental and do not necessarily suggest CNS disease  Nephrology following for PRICILA-significantly improved with no improvement in mental status  Check B12/TSH/B1/ammonia  Sundowning precautions  If not rapidly improving in the next couple of days consider palliative consultation to establish goals of care      Neurology Discharge Planning : To be decided    Patient Active Problem List    Diagnosis Date Noted    Postictal state (H) 07/10/2025     Priority: Medium    Seizure-like activity (H) 07/09/2025     Priority: Medium    Lactic acidosis 07/09/2025     Priority: Medium    Increased anion gap metabolic acidosis 07/09/2025      Priority: Medium    Hyponatremia 07/09/2025     Priority: Medium    Anticoagulated on warfarin 07/09/2025     Priority: Medium    Hearing loss      Priority: Medium     high frequency      Occlusion of right vertebral artery 01/02/2025     Priority: Medium     CTA 1/2025 - Complete occlusion of the right vertebral artery. The distal V2 and V3 segments of the right vertebral artery were patent on the prior study. Stable calcified atherosclerotic plaque left vertebral artery origin with at least moderate stenosis. Irregular calcified atherosclerotic plaque left subclavian artery with approximately 50% stenosis at the origin.  Carotid ultrasound 1/2025 - On the right there is a moderate amount of atheromatous plaque.  Peak systolic velocities in the ICA are 86 cm/s which correspond to the less than 50% stenosis range based on NASCET criteria. On the left there is a moderate amount of atheromatous plaque.  Peak systolic velocities in the ICA are 115 cm/s which correspond to the less than 50% stenosis range based on NASCET criteria. Antegrade flow within the left vertebral artery. Right vertebral artery is not visualized and cannot be evaluated.         Aortic regurgitation -- 2+ on Echo 12/21/23 02/16/2024     Priority: Medium     Echo 1/2025 - mild aortic insufficiency.          Pulmonary hypertension (H) 02/16/2024     Priority: Medium     Echo 12/21/23 PAP 36 + RAP. Moderate Pulm HTN   No pulmonary hypertension noted on echo 4/2024, 1/2025       Chronic Left rotator cuff tear 02/16/2024     Priority: Medium    Stage 3a chronic kidney disease (H) 02/15/2024     Priority: Medium    Chronic atrial fibrillation (H) 02/15/2024     Priority: Medium     Persistent      History of stroke 02/15/2024     Priority: Medium     Admitted 7/17/2020 dizziness/vertigo and clumsiness and weakness of right hand. Pt unable to have MRI due to pacemaker so Head CTA showed no acute intracranial abnormality of brain, presumed chronic  small vessel ischemic changes, occlusion of dominant right vertebral artery intracranially. 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 EF 50-55% until 2024 (35-40% with negative stress at that time)       Anticoagulation monitoring, INR range 2-3 06/03/2020     Priority: Medium    Primary osteoarthritis of right knee 01/13/2020     Priority: Medium    Thrombocytopenia      Priority: Medium     Since at least 2018      Spinal stenosis, lumbar region, with neurogenic claudication      Priority: Medium    Primary osteoarthritis of left knee 07/08/2019     Priority: Medium    Recurrent kidney stones 07/08/2019     Priority: Medium     last stone 1993        Status post total left knee replacement 07/08/2019     Priority: Medium     7/08/19 @ [facility] ; Dr Bowen Padgett        Primary hypertension      Priority: Medium    CAD -- S/P Stents 2001, 2012, 2014      Priority: Medium     Angiogram 8/08 showed severe disease RCA, Moderate disease Cx, Chronic total occlusion with collaterals OM1 - Unable to do PCI in proximal rt. PDA       SSS -- S/P dual chamber Pacemaker 03/07/2017     Priority: Medium      s/p pacemaker 2009 and subsequent gen change 2015         Pure hypercholesterolemia      Priority: Medium    History of skin cancer 05/05/2016     Priority: Medium     4/19/16 Atypical squamous proliferation (HAK vs KA/SCC) s/p ED&C 5/5/16 09/21/17, right neck, invasive SCC, excised 10/12/17            Nonsustained ventricular tachycardia (H) 01/19/2016     Priority: Medium     Noted on pacer checks, some of which may have been supraventricular in origin but misrepresented.       Gout 03/26/2008     Priority: Medium     BILATERAL ANKLES 3/08- RX PREDNISONE  On chronic allopurinol (2019)         Medical History  Past Medical History:   Diagnosis Date     "Arthritis     Atrial fibrillation     Cardiomyopathy (H)     Chronic kidney disease     Coronary artery disease     CVA (cerebral vascular accident) (H) 07/17/2020    Elevated brain natriuretic peptide (BNP) level 03/13/2024    Elevated troponin 02/15/2024    Fall-- found down, > 12 hrs 02/15/2024    Gout     History of transfusion     Hyperlipidemia     Hypertension     Impotence of organic origin     Kidney stones     Non-traumatic rhabdomyolysis 02/15/2024    Sick sinus syndrome -- S/P [employer] PPM     Spinal stenosis, lumbar region, with neurogenic claudication     Syncope 08/25/2008    Thrombocytopenia         SUBJECTIVE       Patient seen and followed for Dr. Cleary.      7/19  Patient remains somnolent.  Remains on Vimpat.  Primary team concerned about patient having an encephalopathy.  His creatinine is slowly improving.  Question about establishing goals of care since he is not improving rapidly.     OBJECTIVE     Vital signs in last 24 hours  Temp:  [98.1  F (36.7  C)-98.5  F (36.9  C)] 98.5  F (36.9  C)  Pulse:  [59-65] 60  Resp:  [18-20] 18  BP: (110-178)/(59-89) 129/60  SpO2:  [95 %-99 %] 99 %      Review of Systems   Unable to provide review of systems    PHYSICAL EXAMINATION  VITALS: /60 (BP Location: Left arm)   Pulse 60   Temp 98.5  F (36.9  C) (Oral)   Resp 18   Ht 1.702 m (5' 7\")   Wt 75 kg (165 lb 5.5 oz)   SpO2 99%   BMI 25.90 kg/m    GENERAL -Health appearing, No apparent distress  EYES- No scleral icterus, no eyelid droop, Pupils - see Neuro section  HEENT - Normocephalic, atraumatic, Hearing grossly intact; Oral mucosa moist and pink in color. External Ears and nose intact.   Neck - supple  PULM - Good spontaneous respiratory effort; Normal palpation of chest.   CV- Pedal pulses present with no peripheral edema/ No significant varicosities.  MSK- Gait - see Neuro section; Strength and tone- see Neuro section; Range of motion grossly intact.  PSYCH " -cooperative    Neurological  Mental status - Patient is somnolent.  Unable to follow commands.  Language is limited.    Cranial nerves -  Pupils are symmetric; EOMI partially, VFIT, NLF symmetric  Motor -normal movement in all 4 extremities.  Tone - Tone is symmetric bilaterally in upper and lower extremities.  Reflexes -toes equivocal.  Sensation -responds to pain in all 4 extremities.  Coordination - Finger to nose and heel to shin-unable to follow commands.  Gait and station --unable to safely ambulate due to altered mental status.     DIAGNOSTIC STUDIES     Pertinent Radiology       Recent Results (from the past 24 hours)   INR    Collection Time: 07/19/25  5:56 AM   Result Value Ref Range    INR 1.39 (H) 0.85 - 1.15    PT 17.4 (H) 11.8 - 14.8 Seconds   Basic metabolic panel    Collection Time: 07/19/25  5:56 AM   Result Value Ref Range    Sodium 144 135 - 145 mmol/L    Potassium 4.5 3.4 - 5.3 mmol/L    Chloride 107 98 - 107 mmol/L    Carbon Dioxide (CO2) 25 22 - 29 mmol/L    Anion Gap 12 7 - 15 mmol/L    Urea Nitrogen 81.6 (H) 8.0 - 23.0 mg/dL    Creatinine 1.75 (H) 0.67 - 1.17 mg/dL    GFR Estimate 36 (L) >60 mL/min/1.73m2    Calcium 9.3 8.8 - 10.4 mg/dL    Glucose 97 70 - 99 mg/dL   CBC with platelets    Collection Time: 07/19/25  5:56 AM   Result Value Ref Range    WBC Count 8.5 4.0 - 11.0 10e3/uL    RBC Count 3.68 (L) 4.40 - 5.90 10e6/uL    Hemoglobin 11.9 (L) 13.3 - 17.7 g/dL    Hematocrit 36.0 (L) 40.0 - 53.0 %    MCV 98 78 - 100 fL    MCH 32.3 26.5 - 33.0 pg    MCHC 33.1 31.5 - 36.5 g/dL    RDW 14.7 10.0 - 15.0 %    Platelet Count 96 (L) 150 - 450 10e3/uL         HOSPITAL MEDICATIONS     Current Facility-Administered Medications   Medication Dose Route Frequency Provider Last Rate Last Admin    atorvastatin (LIPITOR) tablet 20 mg  20 mg Oral QPM Los Moreno MD   20 mg at 07/18/25 2116    [Held by provider] chlorthalidone (HYGROTON) tablet 25 mg  25 mg Oral QPM Chago Romero MD   25 mg at  07/11/25 2217    [Held by provider] gabapentin (NEURONTIN) solution 200 mg  200 mg Oral BID Triston Palafox MD   200 mg at 07/13/25 1255    heparin ANTICOAGULANT injection 5,000 Units  5,000 Units Subcutaneous Q12H Xochilt Zamora MD   5,000 Units at 07/19/25 0955    hydrALAZINE (APRESOLINE) tablet 10 mg  10 mg Oral TID Los Moreno MD   10 mg at 07/19/25 0959    lacosamide (VIMPAT) 150 mg in sodium chloride 0.9 % 125 mL intermittent infusion  150 mg Intravenous BID Ayan Cleary  mL/hr at 07/18/25 2137 150 mg at 07/18/25 2137    metoprolol tartrate (LOPRESSOR) tablet 100 mg  100 mg Oral BID Chago Romero MD   100 mg at 07/19/25 0959    nitroGLYcerin (NITRODUR) 0.2 MG/HR 24 hr patch 1 patch  1 patch Transdermal Daily Los Moreno MD   1 patch at 07/19/25 0954    nystatin (MYCOSTATIN) suspension 500,000 Units  500,000 Units Oral 4x Daily Darci Danielson MD   500,000 Units at 07/19/25 0959    [Held by provider] spironolactone (ALDACTONE) tablet 25 mg  25 mg Oral Daily Chago Romero MD   25 mg at 07/12/25 1422    traZODone (DESYREL) half-tab 25 mg  25 mg Oral At Bedtime Chago Romero MD   25 mg at 07/18/25 2115    warfarin ANTICOAGULANT (COUMADIN/JANTOVEN) half-tab 1.5 mg  1.5 mg Oral ONCE at 18:00 Xochilt Zamora MD        Warfarin Dose Required Daily - Pharmacist Managed  1 each Oral See Admin Instructions Xochilt Zamora MD            Total time spent for face to face visit, reviewing labs/imaging studies, counseling and coordination of care was: Over 50 min More than 50% of this time was spent on counseling and coordination of care.    Patient new to me.  Coordination of care with primary team.  Reviewing chart/imaging.  Patient not improving.    Sage Jaramillo MD  Neurologist  [facility]  Tel:- [phone]

## 2025-07-19 NOTE — PROGRESS NOTES
[facility] Hospitalist Progress Note  [facility]  Admission date: 7/9/2025    Summary:    89M with SSS s/p PPM, HTN, CAD, CKD3, chronic atrial fibrillation on warfarin, HFrEF,  NSVT, gout, nephrolithiasis, aortic regurgitation, pulmonary hypertension, and prior CVA admitted on 7/9/2025 from [facility] with seizure-like activity.  Started on keppra and with worsening mental status, changed to depakote which what anticoagualtion challenging and was switched to vimpat.   CT head without explanation.   EEG with seizure activity.  Unable to get MRI due to PPM which is not compatible.  Underwent LP which did not provide an explanation (HHV6 which is usually an incidental finding, 0 WBC, mildly elevated glycose and protein).  Course complicated PRICILA and persistent encephalopathy.       Assessment/Plan    #Seizure-like activity  #Acute metabolic encephalopathy, persistent  -vimpat  -Prognosis TBD, degree of neurologic recovery unclear as already 9 days in    #PRICILA on stage III CKD  #High anion gap metabolic acidosis  Baseline creatinine of 1.7?  Peaked at 2.39- now down to 1.95 today  Received sodium bicarbonate infusion  Holding  PTA chlorthalidone 25 mg and spironolactone for now  Hold gabapentin  Nephrology evaluation appreciated-expectant management.  Signed off.     #Hyperkalemia-resolved    #Hypertension  Continue metoprolol tartrate and hydralazine  Hold chlorthalidone and spironolactone for now     #Atrial fibrillation  #Sick sinus syndrome status post pacemaker placement  Heart rate is controlled  Resume Warfarin  Continue metoprolol       #HFrEF  Echocardiogram performed 6/27/2025 showed severely reduced LV systolic function with LVEF of 25 to 30%.  Patient required IV hydration briefly due to suspected prerenal PRICILA in the setting of diuretic therapy and poor oral intake  Holding spironolactone for now due to PRICILA on stage III CKD      #CAD - PTA atorvastatin 20 mg daily, coumadin      #Oral thrush - Nystatin  "swish and swallow           Checklist:  Code Status: No CPR- Do NOT Intubate    Diet: Combination Diet Low Saturated Fat Na <2400mg Diet  Snacks/Supplements Adult: Ensure Plus High Protein; With Meals  Room Service  Snacks/Supplements Adult: Magic Cup; With Meals     Cardiac Monitoring: None   DVT px:  heparin SQ whiile subtherqpeutic and Warfarin    Disposition and Discharge Planning    Barriers: mental status    Number of days selected below is from a list of system pre-approved options.   Medically Ready for Discharge: Anticipated in 5+ Days      System Identified Risk Variables  Auto-populated based on system request.  If more complex management decisions required, to be addressed above.  \"  Clinically Significant Risk Factors               # Hypoalbuminemia: Lowest albumin = 3.4 g/dL at 7/17/2025  6:44 AM, will monitor as appropriate   # Thrombocytopenia: Lowest platelets = 95 in last 2 days, will monitor for bleeding   # Hypertension: Noted on problem list  # Chronic heart failure with reduced ejection fraction: last echo with EF <40%           # Overweight: Estimated body mass index is 25.07 kg/m  as calculated from the following:    Height as of this encounter: 1.702 m (5' 7\").    Weight as of this encounter: 72.6 kg (160 lb 0.9 oz).      # Financial/Environmental Concerns:     # Pacemaker present       \"    Interval Events/Subjective/Notable results:    Encephalopathic and unable to provide hx  Stopped dialudid.    Reviewed: BMP, INR, CBC, CSF studies  Personally interpreted: NA  Discussed with:      Objective    Vital signs in last 24 hours  Temp:  [97.4  F (36.3  C)-98.4  F (36.9  C)] 98.4  F (36.9  C)  Pulse:  [59-66] 60  Resp:  [14-20] 18  BP: (106-146)/(44-94) 121/59  SpO2:  [95 %-99 %] 96 % O2 Device: Oxymask    Weight:   160 lbs .86 oz  Body mass index is 25.07 kg/m .    Intake/Output last 3 shifts  I/O last 3 completed shifts:  In: 50 [P.O.:50]  Out: 1250 [Urine:1250]    Physical Exam  Seen " earlier  General:  lying in bed, talking to self,   Neurologic:  did not interact or follow commands for me   Psych: calm  HEENT:  Anicteric, MMM  CV:  no edema  Lungs:  Easy respirations  Skin: no rashes noted on exposed skin.    Chandler Catheter: PRESENT, indication: Acute retention or obstruction  Lines: None          Medical Decision Making               Xochilt Zamora MD, [facility]  Internal Medicine Hospitalist

## 2025-07-19 NOTE — PROGRESS NOTES
[facility] Hospitalist Progress Note  [facility]  Admission date: 7/9/2025    Summary:    89M with SSS s/p PPM, HTN, CAD, CKD3, chronic atrial fibrillation on warfarin, HFrEF,  NSVT, gout, nephrolithiasis, aortic regurgitation, pulmonary hypertension, and prior CVA admitted on 7/9/2025 from [facility] with seizure-like activity.  Started on keppra and with worsening mental status, changed to depakote which what anticoagualtion challenging and was switched to vimpat.   CT head without explanation.   EEG with seizure activity.  Unable to get MRI due to PPM which is not compatible.  Underwent LP which did not provide an explanation (HHV6 which is usually an incidental finding, 0 WBC, mildly elevated glycose and protein).      Course complicated PRICILA and persistent encephalopathy.       Assessment/Plan    #Seizure-like activity  #Acute metabolic encephalopathy, persistent  -vimpat  -Prognosis TBD, degree of neurologic recovery unclear as already ~10 days in and still quite encephalopathic.  If not waking up more I think will needs to re-address goals of care.  Appreciate neurology input.      Addendum: not really awake enough to feed safely.  NPO for now.  IVF, lopressor to IV.  Discussed with daughter that neurologic prognosis is unclear and unfortunately may not have good recovery.   Rashmi is understanding and would seemed open to hospice if Mr. Newman is not improving.    #Urinary retention - Chandler.      #PRICILA on CKD3b  -improving renal function after holding diuretic and bi-carbonated fluids.  Now at ~baseline.  -Holding  PTA chlorthalidone 25 mg and spironolactone for now  -Hold gabapentin     #Hyperkalemia-resolved    #Hypertension - PTA metoprolol tartrate and hydralazine     #Atrial fibrillation  #Sick sinus syndrome status post pacemaker placement  -lopressor, coumadin        #chronic HFrEF (EF 25 to 30%) - euvolemic       #CAD - PTA atorvastatin 20 mg daily, coumadin      #Oral thrush - Nystatin swish and  "swallow    Checklist:  Code Status: No CPR- Do NOT Intubate    Diet: Combination Diet Low Saturated Fat Na <2400mg Diet  Snacks/Supplements Adult: Ensure Plus High Protein; With Meals  Room Service  Snacks/Supplements Adult: Magic Cup; With Meals     Cardiac Monitoring: None   DVT px:  heparin SQ whiile subtherqpeutic and Warfarin    Disposition and Discharge Planning    Barriers: mental status    Number of days selected below is from a list of system pre-approved options.   Medically Ready for Discharge: Anticipated in 5+ Days      System Identified Risk Variables  Auto-populated based on system request.  If more complex management decisions required, to be addressed above.  \"  Clinically Significant Risk Factors               # Hypoalbuminemia: Lowest albumin = 3.4 g/dL at 7/17/2025  6:44 AM, will monitor as appropriate   # Thrombocytopenia: Lowest platelets = 96 in last 2 days, will monitor for bleeding   # Hypertension: Noted on problem list    # Chronic heart failure with reduced ejection fraction: last echo with EF <40%           # Overweight: Estimated body mass index is 25.9 kg/m  as calculated from the following:    Height as of this encounter: 1.702 m (5' 7\").    Weight as of this encounter: 75 kg (165 lb 5.5 oz).        # Financial/Environmental Concerns:     # Pacemaker present       \"    Interval Events/Subjective/Notable results:    Encephalopathic and unable to provide hx    Reviewed: BMP, CBC,   Personally interpreted: NA  Discussed with: neurology, RN      Objective    Vital signs in last 24 hours  Temp:  [97.4  F (36.3  C)-98.4  F (36.9  C)] 98.1  F (36.7  C)  Pulse:  [59-65] 65  Resp:  [18-20] 18  BP: (106-178)/(44-89) 178/89  SpO2:  [95 %-98 %] 95 % O2 Device: Oxymask    Weight:   165 lbs 5.52 oz  Body mass index is 25.9 kg/m .    Intake/Output last 3 shifts  I/O last 3 completed shifts:  In: 0   Out: 1500 [Urine:1500]    Physical Exam    General:  sleeping  Neurologic:  did not interact or " follow commands for me   HEENT:  Anicteric, MMM  CV:  no edema  Lungs:  Easy respirations  Skin: no rashes noted on exposed skin.    Chandler Catheter: PRESENT, indication: Acute retention or obstruction  Lines: None          Medical Decision Making           Xochilt Zamora MD, [facility]  Internal Medicine Hospitalist

## 2025-07-19 NOTE — PLAN OF CARE
Problem: Adult Inpatient Plan of Care  Goal: Optimal Comfort and Wellbeing  Outcome: Progressing  Intervention: Provide Person-Centered Care  Recent Flowsheet Documentation  Taken 7/18/2025 1800 by Kayce Saavedra, RN  Trust Relationship/Rapport: care explained   Goal Outcome Evaluation:Patient is alert and arouses to voice. Made comfortable in bed with due medications administered. Only hydralazine was held during this shift with an order from provider Matteo Donohue. Reassurance given and no new concerns.

## 2025-07-20 ENCOUNTER — APPOINTMENT (OUTPATIENT)
Dept: NEUROLOGY | Facility: HOSPITAL | Age: OVER 89
End: 2025-07-20
Attending: PSYCHIATRY & NEUROLOGY
Payer: MEDICARE

## 2025-07-20 LAB
AMMONIA PLAS-SCNC: 13 UMOL/L (ref 16–60)
ANION GAP SERPL CALCULATED.3IONS-SCNC: 12 MMOL/L (ref 7–15)
BUN SERPL-MCNC: 75.9 MG/DL (ref 8–23)
CALCIUM SERPL-MCNC: 9.4 MG/DL (ref 8.8–10.4)
CHLORIDE SERPL-SCNC: 107 MMOL/L (ref 98–107)
CREAT SERPL-MCNC: 1.75 MG/DL (ref 0.67–1.17)
CRP SERPL-MCNC: 27.2 MG/L
EGFRCR SERPLBLD CKD-EPI 2021: 36 ML/MIN/1.73M2
ERYTHROCYTE [DISTWIDTH] IN BLOOD BY AUTOMATED COUNT: 14.6 % (ref 10–15)
GLUCOSE SERPL-MCNC: 135 MG/DL (ref 70–99)
HCO3 SERPL-SCNC: 27 MMOL/L (ref 22–29)
HCT VFR BLD AUTO: 37.3 % (ref 40–53)
HGB BLD-MCNC: 12.3 G/DL (ref 13.3–17.7)
HOLD SPECIMEN: NORMAL
HOLD SPECIMEN: NORMAL
INR PPP: 1.69 (ref 0.85–1.15)
LACTATE SERPL-SCNC: 1.9 MMOL/L (ref 0.7–2)
LACTATE SERPL-SCNC: 2.1 MMOL/L (ref 0.7–2)
MCH RBC QN AUTO: 32.4 PG (ref 26.5–33)
MCHC RBC AUTO-ENTMCNC: 33 G/DL (ref 31.5–36.5)
MCV RBC AUTO: 98 FL (ref 78–100)
PLATELET # BLD AUTO: 101 10E3/UL (ref 150–450)
POTASSIUM SERPL-SCNC: 4.7 MMOL/L (ref 3.4–5.3)
PROTHROMBIN TIME: 20.1 SECONDS (ref 11.8–14.8)
RADIOLOGIST FLAGS: NORMAL
RBC # BLD AUTO: 3.8 10E6/UL (ref 4.4–5.9)
SODIUM SERPL-SCNC: 146 MMOL/L (ref 135–145)
WBC # BLD AUTO: 13.1 10E3/UL (ref 4–11)

## 2025-07-20 PROCEDURE — 258N000003 HC RX IP 258 OP 636: Performed by: HOSPITALIST

## 2025-07-20 PROCEDURE — 250N000009 HC RX 250: Performed by: HOSPITALIST

## 2025-07-20 PROCEDURE — 120N000001 HC R&B MED SURG/OB

## 2025-07-20 PROCEDURE — 250N000011 HC RX IP 250 OP 636: Performed by: PSYCHIATRY & NEUROLOGY

## 2025-07-20 PROCEDURE — 82140 ASSAY OF AMMONIA: CPT | Performed by: PSYCHIATRY & NEUROLOGY

## 2025-07-20 PROCEDURE — 85610 PROTHROMBIN TIME: CPT | Performed by: EMERGENCY MEDICINE

## 2025-07-20 PROCEDURE — 36415 COLL VENOUS BLD VENIPUNCTURE: CPT | Performed by: EMERGENCY MEDICINE

## 2025-07-20 PROCEDURE — 999N000157 HC STATISTIC RCP TIME EA 10 MIN

## 2025-07-20 PROCEDURE — C9254 INJECTION, LACOSAMIDE: HCPCS | Performed by: PSYCHIATRY & NEUROLOGY

## 2025-07-20 PROCEDURE — 83605 ASSAY OF LACTIC ACID: CPT | Performed by: HOSPITALIST

## 2025-07-20 PROCEDURE — 82947 ASSAY GLUCOSE BLOOD QUANT: CPT | Performed by: HOSPITALIST

## 2025-07-20 PROCEDURE — 99233 SBSQ HOSP IP/OBS HIGH 50: CPT | Performed by: PSYCHIATRY & NEUROLOGY

## 2025-07-20 PROCEDURE — 94640 AIRWAY INHALATION TREATMENT: CPT

## 2025-07-20 PROCEDURE — 85027 COMPLETE CBC AUTOMATED: CPT | Performed by: HOSPITALIST

## 2025-07-20 PROCEDURE — 99233 SBSQ HOSP IP/OBS HIGH 50: CPT | Performed by: HOSPITALIST

## 2025-07-20 PROCEDURE — 95816 EEG AWAKE AND DROWSY: CPT

## 2025-07-20 PROCEDURE — 250N000011 HC RX IP 250 OP 636: Performed by: HOSPITALIST

## 2025-07-20 PROCEDURE — 87040 BLOOD CULTURE FOR BACTERIA: CPT | Performed by: HOSPITALIST

## 2025-07-20 PROCEDURE — 95816 EEG AWAKE AND DROWSY: CPT | Mod: 26 | Performed by: PSYCHIATRY & NEUROLOGY

## 2025-07-20 PROCEDURE — 36415 COLL VENOUS BLD VENIPUNCTURE: CPT | Performed by: HOSPITALIST

## 2025-07-20 PROCEDURE — 94640 AIRWAY INHALATION TREATMENT: CPT | Mod: 76

## 2025-07-20 PROCEDURE — 250N000013 HC RX MED GY IP 250 OP 250 PS 637: Performed by: INTERNAL MEDICINE

## 2025-07-20 PROCEDURE — 86140 C-REACTIVE PROTEIN: CPT | Performed by: HOSPITALIST

## 2025-07-20 PROCEDURE — 258N000003 HC RX IP 258 OP 636: Performed by: PSYCHIATRY & NEUROLOGY

## 2025-07-20 RX ORDER — LIDOCAINE 40 MG/G
CREAM TOPICAL
Status: DISCONTINUED | OUTPATIENT
Start: 2025-07-20 | End: 2025-07-31 | Stop reason: HOSPADM

## 2025-07-20 RX ORDER — FLUCONAZOLE 2 MG/ML
100 INJECTION INTRAVENOUS EVERY 24 HOURS
Status: DISCONTINUED | OUTPATIENT
Start: 2025-07-20 | End: 2025-07-26

## 2025-07-20 RX ORDER — PIPERACILLIN SODIUM, TAZOBACTAM SODIUM 3; .375 G/15ML; G/15ML
3.38 INJECTION, POWDER, LYOPHILIZED, FOR SOLUTION INTRAVENOUS EVERY 6 HOURS
Status: DISCONTINUED | OUTPATIENT
Start: 2025-07-20 | End: 2025-07-26

## 2025-07-20 RX ORDER — METOPROLOL TARTRATE 1 MG/ML
2.5 INJECTION, SOLUTION INTRAVENOUS EVERY 6 HOURS
Status: DISCONTINUED | OUTPATIENT
Start: 2025-07-20 | End: 2025-07-28

## 2025-07-20 RX ORDER — IPRATROPIUM BROMIDE AND ALBUTEROL SULFATE 2.5; .5 MG/3ML; MG/3ML
3 SOLUTION RESPIRATORY (INHALATION)
Status: DISCONTINUED | OUTPATIENT
Start: 2025-07-20 | End: 2025-07-23

## 2025-07-20 RX ADMIN — PIPERACILLIN AND TAZOBACTAM 3.38 G: 3; .375 INJECTION, POWDER, FOR SOLUTION INTRAVENOUS at 10:42

## 2025-07-20 RX ADMIN — FLUCONAZOLE 100 MG: 2 INJECTION, SOLUTION INTRAVENOUS at 13:34

## 2025-07-20 RX ADMIN — SODIUM CHLORIDE 150 MG: 9 INJECTION, SOLUTION INTRAVENOUS at 09:07

## 2025-07-20 RX ADMIN — METOPROLOL TARTRATE 2.5 MG: 5 INJECTION INTRAVENOUS at 13:30

## 2025-07-20 RX ADMIN — NITROGLYCERIN 1 PATCH: 0.2 PATCH TRANSDERMAL at 09:02

## 2025-07-20 RX ADMIN — SODIUM CHLORIDE, SODIUM LACTATE, POTASSIUM CHLORIDE, AND CALCIUM CHLORIDE: .6; .31; .03; .02 INJECTION, SOLUTION INTRAVENOUS at 09:46

## 2025-07-20 RX ADMIN — IPRATROPIUM BROMIDE AND ALBUTEROL SULFATE 3 ML: .5; 3 SOLUTION RESPIRATORY (INHALATION) at 12:52

## 2025-07-20 RX ADMIN — METOPROLOL TARTRATE 5 MG: 5 INJECTION INTRAVENOUS at 09:04

## 2025-07-20 RX ADMIN — PIPERACILLIN AND TAZOBACTAM 3.38 G: 3; .375 INJECTION, POWDER, FOR SOLUTION INTRAVENOUS at 22:28

## 2025-07-20 RX ADMIN — HEPARIN SODIUM 5000 UNITS: 10000 INJECTION, SOLUTION INTRAVENOUS; SUBCUTANEOUS at 19:58

## 2025-07-20 RX ADMIN — PIPERACILLIN AND TAZOBACTAM 3.38 G: 3; .375 INJECTION, POWDER, FOR SOLUTION INTRAVENOUS at 16:32

## 2025-07-20 RX ADMIN — IPRATROPIUM BROMIDE AND ALBUTEROL SULFATE 3 ML: .5; 3 SOLUTION RESPIRATORY (INHALATION) at 15:26

## 2025-07-20 RX ADMIN — IPRATROPIUM BROMIDE AND ALBUTEROL SULFATE 3 ML: .5; 3 SOLUTION RESPIRATORY (INHALATION) at 20:12

## 2025-07-20 RX ADMIN — SODIUM CHLORIDE 150 MG: 9 INJECTION, SOLUTION INTRAVENOUS at 21:34

## 2025-07-20 RX ADMIN — HEPARIN SODIUM 5000 UNITS: 10000 INJECTION, SOLUTION INTRAVENOUS; SUBCUTANEOUS at 09:03

## 2025-07-20 ASSESSMENT — ACTIVITIES OF DAILY LIVING (ADL)
ADLS_ACUITY_SCORE: 89
ADLS_ACUITY_SCORE: 81
ADLS_ACUITY_SCORE: 89
ADLS_ACUITY_SCORE: 81
ADLS_ACUITY_SCORE: 81
ADLS_ACUITY_SCORE: 89
ADLS_ACUITY_SCORE: 81
ADLS_ACUITY_SCORE: 89
ADLS_ACUITY_SCORE: 81
ADLS_ACUITY_SCORE: 81
ADLS_ACUITY_SCORE: 89
ADLS_ACUITY_SCORE: 81
ADLS_ACUITY_SCORE: 81
ADLS_ACUITY_SCORE: 89
ADLS_ACUITY_SCORE: 81
ADLS_ACUITY_SCORE: 89

## 2025-07-20 NOTE — PROGRESS NOTES
Bedside EEG was performed. Wake, drowsy, and sleep were obtained.   Activations completed were: (eyes open/eyes closed, mental activations, photic)   Activations omitted & reasoning:Hyperventilations due to patient state  Patient's mental status was: (obtunded/uncooperative)  Was the neurologist consulted regarding significant waveforms or interventions needed? N/a   Skin intact? yes     EEG #   EEG system used: [id number]    Neurologist dictation to follow.

## 2025-07-20 NOTE — PLAN OF CARE
Problem: Adult Inpatient Plan of Care  Goal: Absence of Hospital-Acquired Illness or Injury  Intervention: Prevent Skin Injury  Recent Flowsheet Documentation  Taken 7/20/2025 0100 by Kayce Saavedra, RN  Body Position: supine, legs elevated   Goal Outcome Evaluation:Patient is made comfortable in bed with needed assessment and observations done. Repositioned severally with personal cares done. Due medications administered when needed. Reassurance given.

## 2025-07-20 NOTE — PROGRESS NOTES
NEUROLOGY PROGRESS NOTE     ASSESSMENT & PLAN   Visit diagnosis: Seizure-like activity    Seizure-like activity  Suspected encephalopathy  Acute kidney injury    89-year-old male with history of HTN, HLD, CAD s/p stent, pacemaker, CKD stage III, chronic atrial fibrillation who was admitted on 7/10/2025 for seizure-like episode.  He was at assisted living facility when he suddenly tensed up and started shaking  CT of the brain was unremarkable repeat CT scan shows no change..  Patient's pacemaker is not MRI compatible  Initial EEG showed nonspecific slowing but no clear epileptiform discharges that on subsequent EEG showed nonspecific generalized slowing.  Repeat EEG suggestive of mild to moderate encephalopathy.  Patient was initially started on Keppra but he became quite agitated and was switched to Depakote that made managing his INR more difficult and it was discontinued.  As patient history strongly suggest seizures he was started on Vimpat.  Increase Vimpat dose to 150 mg twice daily.  Vimpat level therapeutic at 8.7.  Lumbar puncture shows no cell, borderline elevated protein and glucose.  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  CSF does not suggest encephalitis (unable to do MRI scan due to pacemaker) and presence of HHV-6 can be incidental and do not necessarily suggest CNS disease  Nephrology following for PRICILA-significantly improved with no improvement in mental status  B12/TSH/B1/ammonia normal.  B1 pending.  Add West Nile to the CSF testing   Sundowning precautions  Suspect mental status worsening due to new pneumonia.  Patient has had a prolonged course with no significant improvement.  Consider palliative care involvement versus primary team to establish goals of care with family.    Neurology Discharge Planning : Per primary team.  Nothing else to add for right now.  Please call if there is any further  questions.    Patient Active Problem List    Diagnosis Date Noted    Postictal state (H) 07/10/2025     Priority: Medium    Seizure-like activity (H) 07/09/2025     Priority: Medium    Lactic acidosis 07/09/2025     Priority: Medium    Increased anion gap metabolic acidosis 07/09/2025     Priority: Medium    Hyponatremia 07/09/2025     Priority: Medium    Anticoagulated on warfarin 07/09/2025     Priority: Medium    Hearing loss      Priority: Medium     high frequency      Occlusion of right vertebral artery 01/02/2025     Priority: Medium     CTA 1/2025 - Complete occlusion of the right vertebral artery. The distal V2 and V3 segments of the right vertebral artery were patent on the prior study. Stable calcified atherosclerotic plaque left vertebral artery origin with at least moderate stenosis. Irregular calcified atherosclerotic plaque left subclavian artery with approximately 50% stenosis at the origin.  Carotid ultrasound 1/2025 - On the right there is a moderate amount of atheromatous plaque.  Peak systolic velocities in the ICA are 86 cm/s which correspond to the less than 50% stenosis range based on NASCET criteria. On the left there is a moderate amount of atheromatous plaque.  Peak systolic velocities in the ICA are 115 cm/s which correspond to the less than 50% stenosis range based on NASCET criteria. Antegrade flow within the left vertebral artery. Right vertebral artery is not visualized and cannot be evaluated.         Aortic regurgitation -- 2+ on Echo 12/21/23 02/16/2024     Priority: Medium     Echo 1/2025 - mild aortic insufficiency.          Pulmonary hypertension (H) 02/16/2024     Priority: Medium     Echo 12/21/23 PAP 36 + RAP. Moderate Pulm HTN   No pulmonary hypertension noted on echo 4/2024, 1/2025       Chronic Left rotator cuff tear 02/16/2024     Priority: Medium    Stage 3a chronic kidney disease (H) 02/15/2024     Priority: Medium    Chronic atrial fibrillation (H) 02/15/2024      Priority: Medium     Persistent      History of stroke 02/15/2024     Priority: Medium     Admitted 7/17/2020 dizziness/vertigo and clumsiness and weakness of right hand. Pt unable to have MRI due to pacemaker so Head CTA showed no acute intracranial abnormality of brain, presumed chronic small vessel ischemic changes, occlusion of dominant right vertebral artery intracranially. 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 EF 50-55% until 2024 (35-40% with negative stress at that time)       Anticoagulation monitoring, INR range 2-3 06/03/2020     Priority: Medium    Primary osteoarthritis of right knee 01/13/2020     Priority: Medium    Thrombocytopenia      Priority: Medium     Since at least 2018      Spinal stenosis, lumbar region, with neurogenic claudication      Priority: Medium    Primary osteoarthritis of left knee 07/08/2019     Priority: Medium    Recurrent kidney stones 07/08/2019     Priority: Medium     last stone 1993        Status post total left knee replacement 07/08/2019     Priority: Medium     7/08/19 @ [facility] ; Dr Bowen Padgett        Primary hypertension      Priority: Medium    CAD -- S/P Stents 2001, 2012, 2014      Priority: Medium     Angiogram 8/08 showed severe disease RCA, Moderate disease Cx, Chronic total occlusion with collaterals OM1 - Unable to do PCI in proximal rt. PDA       SSS -- S/P dual chamber Pacemaker 03/07/2017     Priority: Medium      s/p pacemaker 2009 and subsequent gen change 2015         Pure hypercholesterolemia      Priority: Medium    History of skin cancer 05/05/2016     Priority: Medium     4/19/16 Atypical squamous proliferation (HAK vs KA/SCC) s/p ED&C 5/5/16 09/21/17, right neck, invasive SCC, excised 10/12/17            Nonsustained ventricular tachycardia (H) 01/19/2016     Priority:  "Medium     Noted on pacer checks, some of which may have been supraventricular in origin but misrepresented.       Gout 03/26/2008     Priority: Medium     BILATERAL ANKLES 3/08- RX PREDNISONE  On chronic allopurinol (2019)         Medical History  Past Medical History:   Diagnosis Date    Arthritis     Atrial fibrillation     Cardiomyopathy (H)     Chronic kidney disease     Coronary artery disease     CVA (cerebral vascular accident) (H) 07/17/2020    Elevated brain natriuretic peptide (BNP) level 03/13/2024    Elevated troponin 02/15/2024    Fall-- found down, > 12 hrs 02/15/2024    Gout     History of transfusion     Hyperlipidemia     Hypertension     Impotence of organic origin     Kidney stones     Non-traumatic rhabdomyolysis 02/15/2024    Sick sinus syndrome -- S/P [employer] PPM     Spinal stenosis, lumbar region, with neurogenic claudication     Syncope 08/25/2008    Thrombocytopenia         SUBJECTIVE       Patient seen and followed for Dr. Cleary.      7/19  Patient remains somnolent.  Remains on Vimpat.  Primary team concerned about patient having an encephalopathy.  His creatinine is slowly improving.  Question about establishing goals of care since he is not improving rapidly.    7/20  Patient more somnolent than yesterday.  Has developed pneumonia.  No seizure-like activity.  Repeat EEG today.  Remains on Vimpat.  Discussed with hospitalist and possibly will transition to comfort care tomorrow.  Hospitalist to discuss with family versus getting palliative care involved.     OBJECTIVE     Vital signs in last 24 hours  Temp:  [98.1  F (36.7  C)-100.9  F (38.3  C)] 98.5  F (36.9  C)  Pulse:  [60-64] 64  Resp:  [16-30] 30  BP: ()/(41-77) 129/61  SpO2:  [90 %-99 %] 99 %      Review of Systems   Unable to provide review of systems    PHYSICAL EXAMINATION  VITALS: /61 (BP Location: Left leg)   Pulse 64   Temp 98.5  F (36.9  C) (Axillary)   Resp 30   Ht 1.702 m (5' 7\")   Wt 73.7 kg (162 lb " 7.7 oz)   SpO2 99%   BMI 25.45 kg/m    GENERAL -Health appearing, No apparent distress  EYES- No scleral icterus, no eyelid droop, Pupils - see Neuro section  HEENT - Normocephalic, atraumatic, Hearing grossly intact; Oral mucosa moist and pink in color. External Ears and nose intact.   Neck - supple  PULM - Good spontaneous respiratory effort; Normal palpation of chest.   CV- Pedal pulses present with no peripheral edema/ No significant varicosities.  MSK- Gait - see Neuro section; Strength and tone- see Neuro section; Range of motion grossly intact.  PSYCH -cooperative    Neurological  Mental status - Patient is somnolent.  Unable to follow commands.  Language is limited.    Cranial nerves -  Pupils are symmetric; EOMI partially, VFIT, NLF symmetric  Motor -normal movement in all 4 extremities.  Tone - Tone is symmetric bilaterally in upper and lower extremities.  Reflexes -toes equivocal.  Sensation -responds to pain in all 4 extremities.  Coordination - Finger to nose and heel to shin-unable to follow commands.  Gait and station --unable to safely ambulate due to altered mental status.  More somnolent than yesterday.     DIAGNOSTIC STUDIES     Pertinent Radiology     EEG  IMPRESSION/REPORT/PLAN  This is an abnormal EEG during wakefulness and drowsiness/somnolent state due to generalized slow background.  Such an EEG can be seen in patients with mild to moderate encephalopathy. Further clinical correlation is needed.     Please note that the absence of epileptiform abnormalities does not exclude the possibility of epilepsy in any patient.     Component      Latest Ref Rng 7/18/2025  7:01 AM 7/19/2025  5:56 AM 7/19/2025  2:14 PM   Lacosamide      1.0 - 10.0 ug/mL 8.7      Vitamin B12      232 - 1,245 pg/mL   2,341 (H)    TSH      0.30 - 4.20 uIU/mL  2.40        Legend:  (H) High    Recent Results (from the past 24 hours)   Vitamin B12    Collection Time: 07/19/25  2:14 PM   Result Value Ref Range    Vitamin B12  2,341 (H) 232 - 1,245 pg/mL   Glucose by meter    Collection Time: 07/19/25  2:38 PM   Result Value Ref Range    GLUCOSE BY METER POCT 160 (H) 70 - 99 mg/dL   INR    Collection Time: 07/20/25  6:20 AM   Result Value Ref Range    INR 1.69 (H) 0.85 - 1.15    PT 20.1 (H) 11.8 - 14.8 Seconds   Ammonia    Collection Time: 07/20/25  6:20 AM   Result Value Ref Range    Ammonia 13 (L) 16 - 60 umol/L   Extra Purple Top EDTA (LAB USE ONLY)    Collection Time: 07/20/25  6:23 AM   Result Value Ref Range    Hold Specimen JIC    Extra Green Top Tube (LAB USE ONLY)    Collection Time: 07/20/25  6:23 AM   Result Value Ref Range    Hold Specimen JIC    Basic metabolic panel    Collection Time: 07/20/25  6:23 AM   Result Value Ref Range    Sodium 146 (H) 135 - 145 mmol/L    Potassium 4.7 3.4 - 5.3 mmol/L    Chloride 107 98 - 107 mmol/L    Carbon Dioxide (CO2) 27 22 - 29 mmol/L    Anion Gap 12 7 - 15 mmol/L    Urea Nitrogen 75.9 (H) 8.0 - 23.0 mg/dL    Creatinine 1.75 (H) 0.67 - 1.17 mg/dL    GFR Estimate 36 (L) >60 mL/min/1.73m2    Calcium 9.4 8.8 - 10.4 mg/dL    Glucose 135 (H) 70 - 99 mg/dL   CBC with platelets    Collection Time: 07/20/25  6:23 AM   Result Value Ref Range    WBC Count 13.1 (H) 4.0 - 11.0 10e3/uL    RBC Count 3.80 (L) 4.40 - 5.90 10e6/uL    Hemoglobin 12.3 (L) 13.3 - 17.7 g/dL    Hematocrit 37.3 (L) 40.0 - 53.0 %    MCV 98 78 - 100 fL    MCH 32.4 26.5 - 33.0 pg    MCHC 33.0 31.5 - 36.5 g/dL    RDW 14.6 10.0 - 15.0 %    Platelet Count 101 (L) 150 - 450 10e3/uL   CRP inflammation    Collection Time: 07/20/25  6:23 AM   Result Value Ref Range    CRP Inflammation 27.20 (H) <5.00 mg/L   XR Chest Port 1 View    Collection Time: 07/20/25  8:15 AM   Result Value Ref Range    Radiologist flags Follow-up chest radiograph in 4-6 weeks    Lactic Acid Whole Blood w/ 1x repeat in 2 hrs when >2    Collection Time: 07/20/25 10:28 AM   Result Value Ref Range    Lactic Acid, Initial 2.1 (H) 0.7 - 2.0 mmol/L   Lactic acid whole blood     Collection Time: 07/20/25 12:33 PM   Result Value Ref Range    Lactic Acid 1.9 0.7 - 2.0 mmol/L         HOSPITAL MEDICATIONS     Current Facility-Administered Medications   Medication Dose Route Frequency Provider Last Rate Last Admin    [Held by provider] atorvastatin (LIPITOR) tablet 20 mg  20 mg Oral QPM Los Moreno MD   20 mg at 07/18/25 2116    [Held by provider] chlorthalidone (HYGROTON) tablet 25 mg  25 mg Oral QPM Chago Romero MD   25 mg at 07/11/25 2217    fluconazole (DIFLUCAN) injection 100 mg  100 mg Intravenous Q24H Xochilt Zamora MD        [Held by provider] gabapentin (NEURONTIN) solution 200 mg  200 mg Oral BID Triston Palafox MD   200 mg at 07/13/25 1255    heparin ANTICOAGULANT injection 5,000 Units  5,000 Units Subcutaneous Q12H Xochilt Zamora MD   5,000 Units at 07/20/25 0903    [Held by provider] hydrALAZINE (APRESOLINE) tablet 10 mg  10 mg Oral TID Los Moreno MD   10 mg at 07/19/25 0959    ipratropium - albuterol 0.5 mg/2.5 mg (3mg)/3 mL (DUONEB) neb solution 3 mL  3 mL Nebulization 4x daily Xochilt Zamora MD   3 mL at 07/20/25 1252    lacosamide (VIMPAT) 150 mg in sodium chloride 0.9 % 125 mL intermittent infusion  150 mg Intravenous BID Ayan Cleary  mL/hr at 07/20/25 0907 150 mg at 07/20/25 0907    metoprolol (LOPRESSOR) injection 2.5 mg  2.5 mg Intravenous Q6H Xochilt Zamora MD        [Held by provider] metoprolol tartrate (LOPRESSOR) tablet 100 mg  100 mg Oral BID Chago Romero MD   100 mg at 07/19/25 0959    nitroGLYcerin (NITRODUR) 0.2 MG/HR 24 hr patch 1 patch  1 patch Transdermal Daily Los Moreno MD   1 patch at 07/20/25 0902    [Held by provider] nystatin (MYCOSTATIN) suspension 500,000 Units  500,000 Units Oral 4x Daily Darci Danielson MD   500,000 Units at 07/18/25 2116    piperacillin-tazobactam (ZOSYN) 3.375 g vial to attach to  mL bag  3.375 g Intravenous Q6H Xochilt Zamora MD   Stopped at 07/20/25 1119    sodium  chloride (PF) 0.9% PF flush 3 mL  3 mL Intracatheter Q8H [facility] Xochilt Zamora MD        [Held by provider] spironolactone (ALDACTONE) tablet 25 mg  25 mg Oral Daily Chago oRmero MD   25 mg at 07/12/25 1422    [Held by provider] traZODone (DESYREL) half-tab 25 mg  25 mg Oral At Bedtime Chago Romero MD   25 mg at 07/18/25 2115    [Held by provider] Warfarin Dose Required Daily - Pharmacist Managed  1 each Oral See Admin Instructions Xochilt Zamora MD            Total time spent for face to face visit, reviewing labs/imaging studies, counseling and coordination of care was: Over 50 min More than 50% of this time was spent on counseling and coordination of care.    Reviewing chart.  Coordination of care with the hospitalist.  Testing ordered.  Reviewing chart and previous testing.    Sage Jaramillo MD  Neurologist  [facility] Neurology [facility]  Tel:- [phone]

## 2025-07-20 NOTE — PROGRESS NOTES
[facility] Hospitalist Progress Note  [facility]  Admission date: 7/9/2025    Summary:    89M with SSS s/p PPM, HTN, CAD, CKD3, chronic atrial fibrillation on warfarin, HFrEF,  NSVT, gout, nephrolithiasis, aortic regurgitation, pulmonary hypertension, and prior CVA admitted on 7/9/2025 from [facility] with seizure-like activity.  Started on keppra and with worsening mental status, changed to depakote which what anticoagualtion challenging and was switched to vimpat.   CT head without explanation.   EEG with seizure activity.  Unable to get MRI due to PPM which is not compatible.  Underwent LP which did not provide an explanation (HHV6 which is usually an incidental finding, 0 WBC, mildly elevated glycose and protein).      Course complicated PRICILA, persistent encephalopathy, and now aspiration PNA    Assessment/Plan    #Sepsis due to aspiration PNA and acute hypoxic respiratory failure  -zosyn, O2 as needed maintain sats > 90%  -NPO, RCAT pulm hygiene.    #Seizure-like activity  #Acute metabolic encephalopathy, persistent  -vimpat    #Goals of care - Prognosis guarded I think likely approaching end of life.   Mr. Newman is already ~11 days into admission and still profoundly encephalopathic and now with PNA.  Discussed with daughter who appreciates severity of illness.  Agrees with trial antibiotics.  No escalation of care.  If worsening clinical status and will transition to full comfort measures.      #Urinary retention - Chandler.  I think very likely approaching end of life and will keep for comfort.    #PRICILA on CKD3b  -Holding  PTA chlorthalidone 25 mg and spironolactone for now  -Hold gabapentin     #Hyperkalemia-resolved    #Hypertension - PTA metoprolol tartrate and hydralazine     #Atrial fibrillation  #Sick sinus syndrome status post pacemaker placement  -lopressor, coumadin        #chronic HFrEF (EF 25 to 30%) - euvolemic       #CAD - PTA atorvastatin 20 mg daily, coumadin      #Oral thrush - was on  "nystatin, unable to take, can give fluconazole instead.    Checklist:  Code Status: No CPR- Do NOT Intubate    Diet: Room Service  NPO for Medical/Clinical Reasons Except for: Meds     Cardiac Monitoring: None   DVT px:  heparin subcutaneous,  while subtherqpeutic and Warfarin    Disposition and Discharge Planning    Barriers: numerous    Number of days selected below is from a list of system pre-approved options.   Medically Ready for Discharge: Anticipated in 5+ Days      System Identified Risk Variables  Auto-populated based on system request.  If more complex management decisions required, to be addressed above.  \"  Clinically Significant Risk Factors         # Hypernatremia: Highest Na = 146 mmol/L in last 2 days, will monitor as appropriate       # Hypoalbuminemia: Lowest albumin = 3.4 g/dL at 7/17/2025  6:44 AM, will monitor as appropriate   # Thrombocytopenia: Lowest platelets = 96 in last 2 days, will monitor for bleeding   # Hypertension: Noted on problem list    # Chronic heart failure with reduced ejection fraction: last echo with EF <40%           # Overweight: Estimated body mass index is 25.45 kg/m  as calculated from the following:    Height as of this encounter: 1.702 m (5' 7\").    Weight as of this encounter: 73.7 kg (162 lb 7.7 oz).        # Financial/Environmental Concerns:     # Pacemaker present       \"    Interval Events/Subjective/Notable results:    Encephalopathic and unable to provide hx  Low grade temp, CXR with likely L sided PNA and started on zosyn    Reviewed: BMP, CBC, CXR  Personally interpreted: NA  Discussed with: , RN and patients daughter.      Objective    Vital signs in last 24 hours  Temp:  [98.1  F (36.7  C)-100.9  F (38.3  C)] 98.8  F (37.1  C)  Pulse:  [60-64] 64  Resp:  [16-30] 30  BP: ()/(41-77) 123/56  SpO2:  [90 %-98 %] 95 % O2 Device: Oxymask    Weight:   162 lbs 7.66 oz  Body mass index is 25.45 kg/m .    Intake/Output last 3 shifts  I/O last 3 completed " shifts:  In: 56.6 [I.V.:56.6]  Out: 750 [Urine:750]    Physical Exam    General:  sleeping  Neurologic:  did not interact or follow commands for me   HEENT:  Anicteric, MMM, oxymask  CV:  no edema  Lungs:  Easy respirations, rhonchorous  Skin: no rashes noted on exposed skin.    Chandler Catheter: PRESENT, indication: Acute retention or obstruction  Lines: None          Medical Decision Making           Xochilt Zamora MD, [facility]  Internal Medicine Hospitalist

## 2025-07-20 NOTE — PLAN OF CARE
Problem: Adult Inpatient Plan of Care  Goal: Absence of Hospital-Acquired Illness or Injury  Outcome: Progressing  Intervention: Identify and Manage Fall Risk  Recent Flowsheet Documentation  Taken 7/20/2025 1637 by Karlee Perdue RN  Safety Promotion/Fall Prevention:   clutter free environment maintained   room organization consistent   safety round/check completed  Taken 7/20/2025 0925 by Karlee Perdue RN  Safety Promotion/Fall Prevention:   clutter free environment maintained   room organization consistent   safety round/check completed  Intervention: Prevent Skin Injury  Recent Flowsheet Documentation  Taken 7/20/2025 1637 by Karlee Perdue RN  Body Position:   turned   supine  Taken 7/20/2025 1439 by Karlee Perdue RN  Body Position:   turned   right  Taken 7/20/2025 0925 by Karlee Perdue RN  Body Position:   right   turned  Goal: Optimal Comfort and Wellbeing  Outcome: Progressing     Problem: Delirium  Goal: Improved Behavioral Control  Intervention: Minimize Safety Risk  Recent Flowsheet Documentation  Taken 7/20/2025 1637 by Karlee Perdue RN  Enhanced Safety Measures:   pain management   review medications for side effects with activity  Taken 7/20/2025 0925 by Karlee Perdue RN  Enhanced Safety Measures:   pain management   review medications for side effects with activity  Goal: Improved Attention and Thought Clarity  Outcome: Not Progressing     Problem: Pain Acute  Goal: Optimal Pain Control and Function  Intervention: Prevent or Manage Pain  Recent Flowsheet Documentation  Taken 7/20/2025 1637 by Karlee Perdue RN  Medication Review/Management: medications reviewed  Taken 7/20/2025 0925 by Karlee Perdue RN  Medication Review/Management: medications reviewed     Problem: Comorbidity Management  Goal: Blood Pressure in Desired Range  Intervention: Maintain Blood Pressure Management  Recent Flowsheet Documentation  Taken 7/20/2025 1637 by Karlee Perdue  RN  Medication Review/Management: medications reviewed  Taken 7/20/2025 0925 by Karlee Perdue RN  Medication Review/Management: medications reviewed  Goal: Maintenance of Heart Failure Symptom Control  Intervention: Maintain Heart Failure Management  Recent Flowsheet Documentation  Taken 7/20/2025 1637 by Karlee Perdue RN  Medication Review/Management: medications reviewed  Taken 7/20/2025 0925 by Karlee Perdue RN  Medication Review/Management: medications reviewed      Goal Outcome Evaluation: Patient very lethargic. Groans occasionally with repositioning and cares. Otherwise seems comfortable. Patient has 10-20 second periods of apnea. MD aware. Chandler draining clear urine. Patient turned every 2-3 hours for comfort. Patient's wife has dementia and was brought in with her daughter and grandsons to say goodbye to patient.

## 2025-07-21 LAB
ANION GAP SERPL CALCULATED.3IONS-SCNC: 9 MMOL/L (ref 7–15)
BUN SERPL-MCNC: 67.2 MG/DL (ref 8–23)
CALCIUM SERPL-MCNC: 8.7 MG/DL (ref 8.8–10.4)
CHLORIDE SERPL-SCNC: 113 MMOL/L (ref 98–107)
CREAT SERPL-MCNC: 1.96 MG/DL (ref 0.67–1.17)
EGFRCR SERPLBLD CKD-EPI 2021: 32 ML/MIN/1.73M2
GLUCOSE SERPL-MCNC: 146 MG/DL (ref 70–99)
HCO3 SERPL-SCNC: 27 MMOL/L (ref 22–29)
INR PPP: 2.22 (ref 0.85–1.15)
LACTATE SERPL-SCNC: 1.4 MMOL/L (ref 0.7–2)
MCV RBC AUTO: 98 FL (ref 78–100)
PLATELET # BLD AUTO: 95 10E3/UL (ref 150–450)
POTASSIUM SERPL-SCNC: 4.4 MMOL/L (ref 3.4–5.3)
PROTHROMBIN TIME: 24.7 SECONDS (ref 11.8–14.8)
SODIUM SERPL-SCNC: 149 MMOL/L (ref 135–145)

## 2025-07-21 PROCEDURE — 999N000157 HC STATISTIC RCP TIME EA 10 MIN

## 2025-07-21 PROCEDURE — 85610 PROTHROMBIN TIME: CPT | Performed by: EMERGENCY MEDICINE

## 2025-07-21 PROCEDURE — 80048 BASIC METABOLIC PNL TOTAL CA: CPT | Performed by: INTERNAL MEDICINE

## 2025-07-21 PROCEDURE — 258N000003 HC RX IP 258 OP 636: Performed by: STUDENT IN AN ORGANIZED HEALTH CARE EDUCATION/TRAINING PROGRAM

## 2025-07-21 PROCEDURE — 120N000001 HC R&B MED SURG/OB

## 2025-07-21 PROCEDURE — 999N000156 HC STATISTIC RCP CONSULT EA 30 MIN

## 2025-07-21 PROCEDURE — 99233 SBSQ HOSP IP/OBS HIGH 50: CPT | Performed by: INTERNAL MEDICINE

## 2025-07-21 PROCEDURE — 250N000013 HC RX MED GY IP 250 OP 250 PS 637: Performed by: INTERNAL MEDICINE

## 2025-07-21 PROCEDURE — 258N000003 HC RX IP 258 OP 636: Performed by: PSYCHIATRY & NEUROLOGY

## 2025-07-21 PROCEDURE — 250N000009 HC RX 250: Performed by: HOSPITALIST

## 2025-07-21 PROCEDURE — 250N000011 HC RX IP 250 OP 636: Performed by: PSYCHIATRY & NEUROLOGY

## 2025-07-21 PROCEDURE — 250N000011 HC RX IP 250 OP 636: Performed by: HOSPITALIST

## 2025-07-21 PROCEDURE — 94640 AIRWAY INHALATION TREATMENT: CPT

## 2025-07-21 PROCEDURE — 250N000011 HC RX IP 250 OP 636: Performed by: INTERNAL MEDICINE

## 2025-07-21 PROCEDURE — 83605 ASSAY OF LACTIC ACID: CPT | Performed by: INTERNAL MEDICINE

## 2025-07-21 PROCEDURE — 258N000003 HC RX IP 258 OP 636: Performed by: INTERNAL MEDICINE

## 2025-07-21 PROCEDURE — C9254 INJECTION, LACOSAMIDE: HCPCS | Performed by: PSYCHIATRY & NEUROLOGY

## 2025-07-21 PROCEDURE — 36415 COLL VENOUS BLD VENIPUNCTURE: CPT | Performed by: HOSPITALIST

## 2025-07-21 PROCEDURE — 85049 AUTOMATED PLATELET COUNT: CPT | Performed by: HOSPITALIST

## 2025-07-21 PROCEDURE — 94640 AIRWAY INHALATION TREATMENT: CPT | Mod: 76

## 2025-07-21 PROCEDURE — 36415 COLL VENOUS BLD VENIPUNCTURE: CPT | Performed by: INTERNAL MEDICINE

## 2025-07-21 RX ORDER — DEXTROSE MONOHYDRATE 50 MG/ML
INJECTION, SOLUTION INTRAVENOUS CONTINUOUS
Status: DISCONTINUED | OUTPATIENT
Start: 2025-07-21 | End: 2025-07-26

## 2025-07-21 RX ADMIN — SODIUM CHLORIDE 150 MG: 9 INJECTION, SOLUTION INTRAVENOUS at 20:24

## 2025-07-21 RX ADMIN — SODIUM CHLORIDE, SODIUM LACTATE, POTASSIUM CHLORIDE, AND CALCIUM CHLORIDE: .6; .31; .03; .02 INJECTION, SOLUTION INTRAVENOUS at 05:59

## 2025-07-21 RX ADMIN — PIPERACILLIN AND TAZOBACTAM 3.38 G: 3; .375 INJECTION, POWDER, FOR SOLUTION INTRAVENOUS at 09:47

## 2025-07-21 RX ADMIN — IPRATROPIUM BROMIDE AND ALBUTEROL SULFATE 3 ML: .5; 3 SOLUTION RESPIRATORY (INHALATION) at 19:51

## 2025-07-21 RX ADMIN — SODIUM CHLORIDE 150 MG: 9 INJECTION, SOLUTION INTRAVENOUS at 09:08

## 2025-07-21 RX ADMIN — METOPROLOL TARTRATE 2.5 MG: 5 INJECTION INTRAVENOUS at 02:24

## 2025-07-21 RX ADMIN — METOPROLOL TARTRATE 2.5 MG: 5 INJECTION INTRAVENOUS at 08:43

## 2025-07-21 RX ADMIN — IPRATROPIUM BROMIDE AND ALBUTEROL SULFATE 3 ML: .5; 3 SOLUTION RESPIRATORY (INHALATION) at 11:22

## 2025-07-21 RX ADMIN — METOPROLOL TARTRATE 2.5 MG: 5 INJECTION INTRAVENOUS at 14:15

## 2025-07-21 RX ADMIN — METOPROLOL TARTRATE 2.5 MG: 5 INJECTION INTRAVENOUS at 19:00

## 2025-07-21 RX ADMIN — DEXTROSE: 5 SOLUTION INTRAVENOUS at 11:57

## 2025-07-21 RX ADMIN — PIPERACILLIN AND TAZOBACTAM 3.38 G: 3; .375 INJECTION, POWDER, FOR SOLUTION INTRAVENOUS at 17:26

## 2025-07-21 RX ADMIN — FLUCONAZOLE 100 MG: 2 INJECTION, SOLUTION INTRAVENOUS at 11:58

## 2025-07-21 RX ADMIN — HYDRALAZINE HYDROCHLORIDE 20 MG: 20 INJECTION INTRAMUSCULAR; INTRAVENOUS at 15:17

## 2025-07-21 RX ADMIN — IPRATROPIUM BROMIDE AND ALBUTEROL SULFATE 3 ML: .5; 3 SOLUTION RESPIRATORY (INHALATION) at 17:53

## 2025-07-21 RX ADMIN — PIPERACILLIN AND TAZOBACTAM 3.38 G: 3; .375 INJECTION, POWDER, FOR SOLUTION INTRAVENOUS at 21:48

## 2025-07-21 RX ADMIN — IPRATROPIUM BROMIDE AND ALBUTEROL SULFATE 3 ML: .5; 3 SOLUTION RESPIRATORY (INHALATION) at 08:15

## 2025-07-21 RX ADMIN — NITROGLYCERIN 1 PATCH: 0.2 PATCH TRANSDERMAL at 08:29

## 2025-07-21 RX ADMIN — PIPERACILLIN AND TAZOBACTAM 3.38 G: 3; .375 INJECTION, POWDER, FOR SOLUTION INTRAVENOUS at 04:11

## 2025-07-21 ASSESSMENT — ACTIVITIES OF DAILY LIVING (ADL)
ADLS_ACUITY_SCORE: 89
ADLS_ACUITY_SCORE: 87
ADLS_ACUITY_SCORE: 89
ADLS_ACUITY_SCORE: 89
ADLS_ACUITY_SCORE: 87
ADLS_ACUITY_SCORE: 89
ADLS_ACUITY_SCORE: 87
ADLS_ACUITY_SCORE: 89
ADLS_ACUITY_SCORE: 87
ADLS_ACUITY_SCORE: 87
ADLS_ACUITY_SCORE: 89

## 2025-07-21 NOTE — SIGNIFICANT EVENT
Dr Anna text paged-Pt BP high despite 1415 Metoprolol. Currently 213/91. Do you want prn Hydralazine or Labetalol. Both ordered with same parameter sys >170.

## 2025-07-21 NOTE — PLAN OF CARE
[id number]    Goal Outcome Evaluation: Vss. Patient did mumble a few unintelligible words but has now mostly unresponsive. Q2 repositioning. Chandler in place. IV abx. Continue to monitor.       Problem: Comorbidity Management  Goal: Blood Pressure in Desired Range  Outcome: Progressing  Intervention: Maintain Blood Pressure Management  Recent Flowsheet Documentation  Taken 7/20/2025 2007 by Zaynab Rodríguez RN  Medication Review/Management: medications reviewed     Problem: Seizure, Active Management  Goal: Absence of Seizure/Seizure-Related Injury  Outcome: Progressing

## 2025-07-21 NOTE — PLAN OF CARE
Problem: Acute Kidney Injury/Impairment  Goal: Improved Oral Intake  Outcome: Not Progressing     Problem: Adult Inpatient Plan of Care  Goal: Plan of Care Review  Description: The Plan of Care Review/Shift note should be completed every shift.  The Outcome Evaluation is a brief statement about your assessment that the patient is improving, declining, or no change.  This information will be displayed automatically on your shift  note.  Outcome: Progressing     Problem: Delirium  Goal: Improved Sleep  Outcome: Progressing     Problem: Seizure, Active Management  Goal: Absence of Seizure/Seizure-Related Injury  Outcome: Progressing   Goal Outcome Evaluation:         Pt lethargic, withdraws from pain  HR in 120s, BP elevated, scheduled IV metoprolol given  Repositioned q2h  Chandler patent, some sediment noted in urine  LR running at 75 mL/h  IV abx given per orders  Remains NPO  Droplet/contact precautions in place

## 2025-07-21 NOTE — CONSULTS
Isolation precautions not required for herpes virus 6. Standard precautions apply.    July 21, 2025  Kaye Guallpa, Infection Prevention

## 2025-07-21 NOTE — PLAN OF CARE
Problem: Pain Acute  Goal: Optimal Pain Control and Function  Outcome: Progressing     Problem: Adult Inpatient Plan of Care  Goal: Optimal Comfort and Wellbeing  Outcome: Progressing     Problem: Adult Inpatient Plan of Care  Goal: Plan of Care Review  Description: The Plan of Care Review/Shift note should be completed every shift.  The Outcome Evaluation is a brief statement about your assessment that the patient is improving, declining, or no change.  This information will be displayed automatically on your shift  note.  Outcome: Progressing  Flowsheets (Taken 7/21/2025 1130)  Plan of Care Reviewed With: patient  Overall Patient Progress: declining   Goal Outcome Evaluation:      Plan of Care Reviewed With: patient    Overall Patient Progress: decliningOverall Patient Progress: declining     Somnolent throughout shift. Occ moaning with repositioning. Turn and reposition every 2hrs. Pillow support. Oral cares. Daughter, son and  present at end of shift.

## 2025-07-21 NOTE — PROGRESS NOTES
[facility]    Medicine Progress Note - Hospitalist Service    Date of Admission:  7/9/2025    Assessment & Plan   Summary:     89M with SSS s/p PPM, HTN, CAD, CKD3, chronic atrial fibrillation on warfarin, HFrEF,  NSVT, gout, nephrolithiasis, aortic regurgitation, pulmonary hypertension, and prior CVA admitted on 7[facility]9/[facility] from [facility] with seizure-like activity.  Started on keppra and with worsening mental status, changed to depakote which what anticoagualtion challenging and was switched to vimpat.   CT head without explanation.   EEG with seizure activity.  Unable to get MRI due to PPM which is not compatible.  Underwent LP which did not provide an explanation (HHV6 which is usually an incidental finding, 0 WBC, mildly elevated glycose and protein).       Course complicated PRICILA, persistent encephalopathy, hypernatremiaand  now aspiration PNA     Assessment/Plan     #Sepsis due to aspiration PNA and acute hypoxic respiratory failure  -zosyn, O2 as needed maintain sats > 90%  -NPO, RCAT pulm hygiene.     #Seizure-like activity  #Acute metabolic encephalopathy, persistent  -vimpat    # Hypernatremia due to free water deficit in the setting of encephalopathy  Switch fluids to D5  Recheck sodium levels tomorrow     #Goals of care - Prognosis guarded I think likely approaching end of life.   Mr. Newman is already ~12 days into admission and still profoundly encephalopathic and now with PNA.  Discussed with daughter who appreciates severity of illness.  Agrees with trial antibiotics.  No escalation of care.  If worsening clinical status and will transition to full comfort measures.       #Urinary retention - Chandler.  I think very likely approaching end of life and will keep for comfort.     #PRICILA on CKD3b  -Holding  PTA chlorthalidone 25 mg and spironolactone for now  -Hold gabapentin     #Hyperkalemia-resolved     #Hypertension - PTA metoprolol tartrate and hydralazine     #Atrial fibrillation  #Sick  "sinus syndrome status post pacemaker placement  -lopressor  - Unable to administer warfarin in the setting of encephalopathy, will use prophylactic subcutaneous heparin once INR is subtherapeutic       #chronic HFrEF (EF 25 to 30%) - euvolemic       #CAD - PTA atorvastatin 20 mg daily, coumadin      #Oral thrush - was on nystatin, unable to take, can give fluconazole instead.          Diet: Room Service  NPO for Medical/Clinical Reasons Except for: Meds    DVT Prophylaxis: Warfarin  Chandler Catheter: PRESENT, indication: Acute retention or obstruction  Lines: None     Cardiac Monitoring: None  Code Status: No CPR- Do NOT Intubate      Clinically Significant Risk Factors         # Hypernatremia: Highest Na = 149 mmol/L in last 2 days, will monitor as appropriate  # Hyperchloremia: Highest Cl = 113 mmol/L in last 2 days, will monitor as appropriate          # Hypoalbuminemia: Lowest albumin = 3.4 g/dL at 7/17/2025  6:44 AM, will monitor as appropriate   # Thrombocytopenia: Lowest platelets = 95 in last 2 days, will monitor for bleeding   # Hypertension: Noted on problem list  # Chronic heart failure with reduced ejection fraction: last echo with EF <40%           # Overweight: Estimated body mass index is 25.38 kg/m  as calculated from the following:    Height as of this encounter: 1.702 m (5' 7\").    Weight as of this encounter: 73.5 kg (162 lb 0.6 oz).      # Financial/Environmental Concerns:     # Pacemaker present       Social Drivers of Health    Food Insecurity: Unknown (7/10/2025)    Food Insecurity     Within the past 12 months, did you worry that your food would run out before you got money to buy more?: Patient unable to answer     Within the past 12 months, did the food you bought just not last and you didn t have money to get more?: Patient unable to answer   Housing Stability: Unknown (7/10/2025)    Housing Stability     Do you have housing? : Patient unable to answer     Are you worried about losing your " housing?: Patient unable to answer   Financial Resource Strain: Unknown (7/10/2025)    Financial Resource Strain     Within the past 12 months, have you or your family members you live with been unable to get utilities (heat, electricity) when it was really needed?: Patient unable to answer   Transportation Needs: Unknown (7/10/2025)    Transportation Needs     Within the past 12 months, has lack of transportation kept you from medical appointments, getting your medicines, non-medical meetings or appointments, work, or from getting things that you need?: Patient unable to answer   Interpersonal Safety: Unknown (7/10/2025)    Interpersonal Safety     Do you feel physically and emotionally safe where you currently live?: Patient unable to answer     Within the past 12 months, have you been hit, slapped, kicked or otherwise physically hurt by someone?: Patient unable to answer     Within the past 12 months, have you been humiliated or emotionally abused in other ways by your partner or ex-partner?: Patient unable to answer    Received from [employer] [facility]    Social Connections          Disposition Plan     Medically Ready for Discharge: Anticipated in 2-4 Days             Amber Anna MD  Hospitalist Service  [facility]  Securely message with [employer] (more info)  Text page via [employer] Paging/Directory   ______________________________________________________________________    Interval History   No significant changes since yesterday.  Remains unresponsive to voice and light touch  Weaned down to 2 L supplemental O2    Physical Exam   Vital Signs: Temp: 99.6  F (37.6  C) Temp src: Oral BP: (!) 170/74 Pulse: 75   Resp: 20 SpO2: 97 % O2 Device: Oxymask Oxygen Delivery: 2 LPM  Weight: 162 lbs .61 oz    General Appearance: No acute distress  Respiratory: Easy respirations, on 2 L facemask  Cardiovascular: Irregular.  No significant edema  Other: Unresponsive to voice  or light touch    Medical Decision Making       55 MINUTES SPENT BY ME on the date of service doing chart review, history, exam, documentation & further activities per the note.  MANAGEMENT DISCUSSED with the following over the past 24 hours: Patient, nursing staff, RN CM, updated daughter Rashmi over the phone       Data     I have personally reviewed the following data over the past 24 hrs:    N/A  \   N/A   / 95 (L)     149 (H) 113 (H) 67.2 (H) /  146 (H)   4.4 27 1.96 (H) \     Procal: N/A CRP: N/A Lactic Acid: 1.4       INR:  2.22 (H) PTT:  N/A   D-dimer:  N/A Fibrinogen:  N/A       Imaging results reviewed over the past 24 hrs:   No results found for this or any previous visit (from the past 24 hours).

## 2025-07-21 NOTE — PROGRESS NOTES
"SPIRITUAL HEALTH SERVICES NOTE  [facility]; P2    Reason for Visit: LOS    SPIRITUAL ASSESSMENT  Children adjusting to sudden decline in Alen's health   Being supported by their [employer]    Patient/Family Understanding of Illness and Goals of Care - Introduced to Alen's son Slim and daughter Rashmi in the hallway by a mutual friend of ours who is a . Slim and Rashmi shared that Alen had been caring for his wife up until about 2 weeks ago when she was moved into memory care. Slim lives in the same facility as his wife. They note that Slim had a seizure after visiting his wife and that his health has declined dramatically since then. They do not know if he will return to his previous [facility] at this point. They also share \"Dad is really stubborn, so who knows... he might surprise us all!\"     Distress and Loss - Alen's children are adjusting to the sudden decline in his health and the decisions it will require them to make.     Strengths, Coping, and Resources - Alen's family is supporting one another right now. They note that his wife Ellen came to visit him last night and they felt that was meaningful. They note that their mom's PCA has been a significant source of support and may potentially be able to help with Alen if needed at discharge.     Meaning, Beliefs, and Spirituality - Alen is [employer]. He is connected to [facility] in [place]. His [employer] is aware of his admission.     Plan of Care: I will continue to follow patient/family periodically during this admission to offer emotional and spiritual support as needed (or as requested).    Yaquelin Bingham M.Div.    Office: [phone] (for non-urgent requests)  Please Vocera or page through [facility] for time-sensitive requests    "

## 2025-07-21 NOTE — PROGRESS NOTES
Care Management Follow Up    Length of Stay (days): 11    Expected Discharge Date: 07/22/2025    Anticipated Discharge Plan:  Assisted Living    Transportation: TBD    PT Recommendations: Transitional Care Facility, Per plan established by the PT  OT Recommendations:        Barriers to Discharge: medical stability    Prior Living Situation: assisted living with facility resident    Discussed  Partnership in Safe Discharge Planning  document with patient/family: No     Handoff Completed: No, handoff not indicated or clinically appropriate    Patient/Spokesperson Updated: No    Additional Information:  Therapy rec is TCU and family was considering TCU vs return to [facility]. However, pt is declining and anticipate he will discharge back to [facility] on hospice. CM to discuss this with family and [facility].    CM left  for Esy RN at [facility] ([phone]) to confirm whether or not they'd be able to accept pt back on hospice and if so which hospice agency is their preferred.     Next Steps: discuss discharge on hospice with family and [facility]    Caroline Najera, RATNA  Acute Care Management  [facility]  For further questions/concerns you can reach us at Voc[employer] Web Console

## 2025-07-22 LAB
ANION GAP SERPL CALCULATED.3IONS-SCNC: 12 MMOL/L (ref 7–15)
BACTERIA CSF CULT: NO GROWTH
BASE EXCESS BLDV CALC-SCNC: 2.6 MMOL/L (ref -3–3)
BUN SERPL-MCNC: 55.8 MG/DL (ref 8–23)
CALCIUM SERPL-MCNC: 8.5 MG/DL (ref 8.8–10.4)
CHLORIDE SERPL-SCNC: 112 MMOL/L (ref 98–107)
CREAT SERPL-MCNC: 1.92 MG/DL (ref 0.67–1.17)
EGFRCR SERPLBLD CKD-EPI 2021: 32 ML/MIN/1.73M2
GLUCOSE SERPL-MCNC: 144 MG/DL (ref 70–99)
GRAM STAIN RESULT: NORMAL
GRAM STAIN RESULT: NORMAL
HCO3 BLDV-SCNC: 27 MMOL/L (ref 21–28)
HCO3 SERPL-SCNC: 25 MMOL/L (ref 22–29)
HOLD SPECIMEN: NORMAL
INR PPP: 2.22 (ref 0.85–1.15)
LACTATE SERPL-SCNC: 1.6 MMOL/L (ref 0.7–2)
O2/TOTAL GAS SETTING VFR VENT: 21 %
OXYHGB MFR BLDV: 75 % (ref 70–75)
PCO2 BLDV: 42 MM HG (ref 40–50)
PH BLDV: 7.42 [PH] (ref 7.32–7.43)
PO2 BLDV: 41 MM HG (ref 25–47)
POTASSIUM SERPL-SCNC: 3.7 MMOL/L (ref 3.4–5.3)
PROTHROMBIN TIME: 24.7 SECONDS (ref 11.8–14.8)
SAO2 % BLDV: 76.7 % (ref 70–75)
SODIUM SERPL-SCNC: 149 MMOL/L (ref 135–145)

## 2025-07-22 PROCEDURE — 85610 PROTHROMBIN TIME: CPT | Performed by: EMERGENCY MEDICINE

## 2025-07-22 PROCEDURE — 83605 ASSAY OF LACTIC ACID: CPT | Performed by: INTERNAL MEDICINE

## 2025-07-22 PROCEDURE — 94640 AIRWAY INHALATION TREATMENT: CPT

## 2025-07-22 PROCEDURE — 99418 PROLNG IP/OBS E/M EA 15 MIN: CPT | Performed by: CLINICAL NURSE SPECIALIST

## 2025-07-22 PROCEDURE — 250N000011 HC RX IP 250 OP 636: Mod: JW | Performed by: PSYCHIATRY & NEUROLOGY

## 2025-07-22 PROCEDURE — 250N000011 HC RX IP 250 OP 636: Performed by: HOSPITALIST

## 2025-07-22 PROCEDURE — 258N000003 HC RX IP 258 OP 636: Performed by: PSYCHIATRY & NEUROLOGY

## 2025-07-22 PROCEDURE — 99223 1ST HOSP IP/OBS HIGH 75: CPT | Performed by: CLINICAL NURSE SPECIALIST

## 2025-07-22 PROCEDURE — 80048 BASIC METABOLIC PNL TOTAL CA: CPT | Performed by: INTERNAL MEDICINE

## 2025-07-22 PROCEDURE — 82805 BLOOD GASES W/O2 SATURATION: CPT | Performed by: INTERNAL MEDICINE

## 2025-07-22 PROCEDURE — 258N000003 HC RX IP 258 OP 636: Performed by: INTERNAL MEDICINE

## 2025-07-22 PROCEDURE — 999N000157 HC STATISTIC RCP TIME EA 10 MIN

## 2025-07-22 PROCEDURE — 250N000011 HC RX IP 250 OP 636: Performed by: INTERNAL MEDICINE

## 2025-07-22 PROCEDURE — 36415 COLL VENOUS BLD VENIPUNCTURE: CPT | Performed by: EMERGENCY MEDICINE

## 2025-07-22 PROCEDURE — C9254 INJECTION, LACOSAMIDE: HCPCS | Mod: JW | Performed by: PSYCHIATRY & NEUROLOGY

## 2025-07-22 PROCEDURE — 36415 COLL VENOUS BLD VENIPUNCTURE: CPT | Performed by: INTERNAL MEDICINE

## 2025-07-22 PROCEDURE — 250N000009 HC RX 250: Performed by: HOSPITALIST

## 2025-07-22 PROCEDURE — 94640 AIRWAY INHALATION TREATMENT: CPT | Mod: 76

## 2025-07-22 PROCEDURE — 99233 SBSQ HOSP IP/OBS HIGH 50: CPT | Performed by: INTERNAL MEDICINE

## 2025-07-22 PROCEDURE — 120N000001 HC R&B MED SURG/OB

## 2025-07-22 PROCEDURE — 250N000013 HC RX MED GY IP 250 OP 250 PS 637: Performed by: INTERNAL MEDICINE

## 2025-07-22 RX ORDER — HYDROMORPHONE HCL IN WATER/PF 6 MG/30 ML
0.2 PATIENT CONTROLLED ANALGESIA SYRINGE INTRAVENOUS EVERY 4 HOURS PRN
Status: DISCONTINUED | OUTPATIENT
Start: 2025-07-22 | End: 2025-07-31 | Stop reason: HOSPADM

## 2025-07-22 RX ADMIN — METOPROLOL TARTRATE 2.5 MG: 5 INJECTION INTRAVENOUS at 20:18

## 2025-07-22 RX ADMIN — DEXTROSE: 5 SOLUTION INTRAVENOUS at 04:23

## 2025-07-22 RX ADMIN — PIPERACILLIN AND TAZOBACTAM 3.38 G: 3; .375 INJECTION, POWDER, FOR SOLUTION INTRAVENOUS at 16:56

## 2025-07-22 RX ADMIN — PIPERACILLIN AND TAZOBACTAM 3.38 G: 3; .375 INJECTION, POWDER, FOR SOLUTION INTRAVENOUS at 11:12

## 2025-07-22 RX ADMIN — SODIUM CHLORIDE 150 MG: 9 INJECTION, SOLUTION INTRAVENOUS at 08:59

## 2025-07-22 RX ADMIN — METOPROLOL TARTRATE 2.5 MG: 5 INJECTION INTRAVENOUS at 08:50

## 2025-07-22 RX ADMIN — METOPROLOL TARTRATE 2.5 MG: 5 INJECTION INTRAVENOUS at 16:52

## 2025-07-22 RX ADMIN — IPRATROPIUM BROMIDE AND ALBUTEROL SULFATE 3 ML: .5; 3 SOLUTION RESPIRATORY (INHALATION) at 20:22

## 2025-07-22 RX ADMIN — HYDROMORPHONE HYDROCHLORIDE 0.2 MG: 0.2 INJECTION, SOLUTION INTRAMUSCULAR; INTRAVENOUS; SUBCUTANEOUS at 20:18

## 2025-07-22 RX ADMIN — SODIUM CHLORIDE 150 MG: 9 INJECTION, SOLUTION INTRAVENOUS at 20:35

## 2025-07-22 RX ADMIN — IPRATROPIUM BROMIDE AND ALBUTEROL SULFATE 3 ML: .5; 3 SOLUTION RESPIRATORY (INHALATION) at 16:20

## 2025-07-22 RX ADMIN — PIPERACILLIN AND TAZOBACTAM 3.38 G: 3; .375 INJECTION, POWDER, FOR SOLUTION INTRAVENOUS at 04:19

## 2025-07-22 RX ADMIN — NITROGLYCERIN 1 PATCH: 0.2 PATCH TRANSDERMAL at 08:57

## 2025-07-22 RX ADMIN — IPRATROPIUM BROMIDE AND ALBUTEROL SULFATE 3 ML: .5; 3 SOLUTION RESPIRATORY (INHALATION) at 07:17

## 2025-07-22 RX ADMIN — DEXTROSE: 5 SOLUTION INTRAVENOUS at 17:11

## 2025-07-22 RX ADMIN — METOPROLOL TARTRATE 2.5 MG: 5 INJECTION INTRAVENOUS at 02:31

## 2025-07-22 RX ADMIN — FLUCONAZOLE 100 MG: 2 INJECTION, SOLUTION INTRAVENOUS at 12:12

## 2025-07-22 RX ADMIN — IPRATROPIUM BROMIDE AND ALBUTEROL SULFATE 3 ML: .5; 3 SOLUTION RESPIRATORY (INHALATION) at 12:15

## 2025-07-22 RX ADMIN — HYDRALAZINE HYDROCHLORIDE 10 MG: 20 INJECTION INTRAMUSCULAR; INTRAVENOUS at 00:26

## 2025-07-22 RX ADMIN — PIPERACILLIN AND TAZOBACTAM 3.38 G: 3; .375 INJECTION, POWDER, FOR SOLUTION INTRAVENOUS at 21:44

## 2025-07-22 ASSESSMENT — ACTIVITIES OF DAILY LIVING (ADL)
ADLS_ACUITY_SCORE: 81
ADLS_ACUITY_SCORE: 89
ADLS_ACUITY_SCORE: 85
ADLS_ACUITY_SCORE: 89
ADLS_ACUITY_SCORE: 85
ADLS_ACUITY_SCORE: 81
ADLS_ACUITY_SCORE: 85
ADLS_ACUITY_SCORE: 81
ADLS_ACUITY_SCORE: 89
ADLS_ACUITY_SCORE: 81
ADLS_ACUITY_SCORE: 81
ADLS_ACUITY_SCORE: 85
ADLS_ACUITY_SCORE: 85
ADLS_ACUITY_SCORE: 81
ADLS_ACUITY_SCORE: 81
ADLS_ACUITY_SCORE: 89
ADLS_ACUITY_SCORE: 81

## 2025-07-22 NOTE — TREATMENT PLAN
"/53   Pulse 66   Temp 98.9  F (37.2  C) (Axillary)   Resp 20   Ht 1.702 m (5' 7\")   Wt 73.5 kg (162 lb 0.6 oz)   SpO2 95%   BMI 25.38 kg/m          Allergies   Allergen Reactions    Lisinopril Cough    Indomethacin Rash    Naproxen Rash      RCAT Treatment Plan    Patient Score: 16    Patient Acuity: 3    Clinical Indication for Therapy: aspiraton pna/seiuzre like activity    Therapy Ordered: Douneb QID.      Assessment Summary: Currently pt not able to respond or position self in bed. Pt given neb, breath sounds shallow, decreased. No cough. O2 2 lpm via oxymask. RN states that pt has been somnolent all day. Pt unable to meaningfully particpate in any pulmonary hygiene. Goals of care?    Message sent to hospitalist. Dr. Anna.       Current Facility-Administered Medications:     acetaminophen (TYLENOL) tablet 650 mg, 650 mg, Oral, Q4H PRN, 650 mg at 07/16/25 1239 **OR** acetaminophen (TYLENOL) Suppository 650 mg, 650 mg, Rectal, Q4H PRN, Los Moreno MD    alum & mag hydroxide-simethicone (MAALOX) suspension 30 mL, 30 mL, Oral, Q4H PRN, Los Moreno MD    [Held by provider] atorvastatin (LIPITOR) tablet 20 mg, 20 mg, Oral, QPM, Los Moreno MD, 20 mg at 07/18/25 2116    [Held by provider] chlorthalidone (HYGROTON) tablet 25 mg, 25 mg, Oral, QPM, Chago Romero MD, 25 mg at 07/11/25 2217    dextrose 5% infusion, , Intravenous, Continuous, Amber Anna MD, Last Rate: 75 mL/hr at 07/21/25 1157, New Bag at 07/21/25 1157    glucose gel 15-30 g, 15-30 g, Oral, Q15 Min PRN **OR** dextrose 50 % injection 25-50 mL, 25-50 mL, Intravenous, Q15 Min PRN **OR** glucagon injection 1 mg, 1 mg, Subcutaneous, Q15 Min PRN, Darci Danielson MD    fluconazole (DIFLUCAN) injection 100 mg, 100 mg, Intravenous, Q24H, Xochilt Zamora MD, Last Rate: 50 mL/hr at 07/21/25 1158, 100 mg at 07/21/25 1158    [Held by provider] gabapentin (NEURONTIN) solution 200 mg, 200 mg, Oral, BID, " Triston Palafox MD, 200 mg at 07/13/25 1255    hydrALAZINE (APRESOLINE) injection 10-20 mg, 10-20 mg, Intravenous, Q30 Min PRN, Los Moreno MD, 20 mg at 07/21/25 1517    [Held by provider] hydrALAZINE (APRESOLINE) tablet 10 mg, 10 mg, Oral, TID, Los Moreno MD, 10 mg at 07/19/25 0959    ipratropium - albuterol 0.5 mg/2.5 mg (3mg)/3 mL (DUONEB) neb solution 3 mL, 3 mL, Nebulization, 4x daily, Xochilt Zamora MD, 3 mL at 07/21/25 1951    labetalol (NORMODYNE/TRANDATE) injection 10-20 mg, 10-20 mg, Intravenous, Q10 Min PRN, Los Moreno MD    lacosamide (VIMPAT) 150 mg in sodium chloride 0.9 % 125 mL intermittent infusion, 150 mg, Intravenous, BID, Ayan Cleary MD, Last Rate: 250 mL/hr at 07/21/25 0908, 150 mg at 07/21/25 0908    lidocaine (LMX4) cream, , Topical, Q1H PRN, Xochilt Zamora MD    lidocaine 1 % 0.1-1 mL, 0.1-1 mL, Other, Q1H PRN, Xochilt Zamora MD    melatonin tablet 1 mg, 1 mg, Oral, At Bedtime PRN, Los Moreno MD    metoprolol (LOPRESSOR) injection 2.5 mg, 2.5 mg, Intravenous, Q6H, Xochilt Zamora MD, 2.5 mg at 07/21/25 1900    [Held by provider] metoprolol tartrate (LOPRESSOR) tablet 100 mg, 100 mg, Oral, BID, Chago Romero MD, 100 mg at 07/19/25 0959    naloxone (NARCAN) injection 0.2 mg, 0.2 mg, Intravenous, Q2 Min PRN **OR** naloxone (NARCAN) injection 0.4 mg, 0.4 mg, Intravenous, Q2 Min PRN **OR** naloxone (NARCAN) injection 0.2 mg, 0.2 mg, Intramuscular, Q2 Min PRN **OR** naloxone (NARCAN) injection 0.4 mg, 0.4 mg, Intramuscular, Q2 Min PRN, Los Moreno MD    nitroGLYcerin (NITRODUR) 0.2 MG/HR 24 hr patch 1 patch, 1 patch, Transdermal, Daily, Los Moreno MD, 1 patch at 07/21/25 0829    [Held by provider] nystatin (MYCOSTATIN) suspension 500,000 Units, 500,000 Units, Oral, 4x Daily, Darci Danielson MD, 500,000 Units at 07/18/25 2116    OLANZapine (zyPREXA) injection 2.5 mg, 2.5 mg, Intramuscular, Q6H PRN, Chago Romero MD, 2.5 mg at  07/12/25 0925    OLANZapine zydis (zyPREXA) ODT half-tab 2.5 mg, 2.5 mg, Oral, Q6H PRN, Chago Romero MD    ondansetron (ZOFRAN ODT) ODT tab 4 mg, 4 mg, Oral, Q6H PRN **OR** ondansetron (ZOFRAN) injection 4 mg, 4 mg, Intravenous, Q6H PRN, Los Moreno MD    Patient is already receiving anticoagulation with heparin, enoxaparin (LOVENOX), warfarin (COUMADIN)  or other anticoagulant medication, , Does not apply, Continuous PRN, Los Moreno MD    piperacillin-tazobactam (ZOSYN) 3.375 g vial to attach to  mL bag, 3.375 g, Intravenous, Q6H, Xochilt Zamora MD, Last Rate: 0 mL/hr at 07/20/25 1119, 3.375 g at 07/21/25 1726    prochlorperazine (COMPAZINE) injection 5 mg, 5 mg, Intravenous, Q6H PRN **OR** prochlorperazine (COMPAZINE) tablet 5 mg, 5 mg, Oral, Q6H PRN, Los Moreno MD    sodium chloride (PF) 0.9% PF flush 3 mL, 3 mL, Intracatheter, Q8H MARY, Xochilt Zamora MD    sodium chloride (PF) 0.9% PF flush 3 mL, 3 mL, Intracatheter, q1 min prn, Xochilt Zamora MD    [Held by provider] spironolactone (ALDACTONE) tablet 25 mg, 25 mg, Oral, Daily, Chago Romero MD, 25 mg at 07/12/25 1422    [Held by provider] traZODone (DESYREL) half-tab 25 mg, 25 mg, Oral, At Bedtime, Chago Romero MD, 25 mg at 07/18/25 2115    [Held by provider] Warfarin Dose Required Daily - Pharmacist Managed, 1 each, Oral, See Admin Instructions, Xochilt Zamora MD     7/21/2025

## 2025-07-22 NOTE — PROGRESS NOTES
[facility]    Medicine Progress Note - Hospitalist Service    Date of Admission:  7/9/2025    Assessment & Plan   Summary:     89M with SSS s/p PPM, HTN, CAD, CKD3, chronic atrial fibrillation on warfarin, HFrEF,  NSVT, gout, nephrolithiasis, aortic regurgitation, pulmonary hypertension, and prior CVA [facility] with [facility]-like activity.  Started on keppra and with worsening mental status, changed to depakote which what anticoagualtion challenging and was switched to vimpat.   CT head without explanation.   EEG with seizure activity.  Unable to get MRI due to PPM which is not compatible.  Underwent LP which did not provide an explanation (HHV6 which is usually an incidental finding, 0 WBC, mildly elevated glycose and protein).       Course complicated PRICILA, persistent encephalopathy, hypernatremia and  now aspiration PNA     Assessment/Plan     #Sepsis due to aspiration PNA and acute hypoxic respiratory failure  -zosyn, O2 as needed maintain sats > 90%  -NPO, RCAT pulm hygiene.     #Seizure-like activity  #Acute metabolic encephalopathy, persistent  -vimpat    # Hypernatremia due to free water deficit in the setting of encephalopathy  Unchanged, will increase D5 to 100 mL/h  Recheck sodium levels tomorrow     #Goals of care - Prognosis guarded I think likely approaching end of life.   Mr. Newman is already ~13 days into admission and still profoundly encephalopathic and now with PNA.  Participated in a part of goals of care discussion with patient's daughter Rashmi and palliative care.  Updated medical condition, concern for pneumonia, hypernatremia, possible hypoactive delirium.       #Urinary retention - Chandler.  I think very likely approaching end of life and will keep for comfort.     #PRICILA on CKD3b  -Holding  PTA chlorthalidone 25 mg and spironolactone for now  -Hold gabapentin     #Hyperkalemia-resolved     #Hypertension - PTA metoprolol tartrate and hydralazine      #Atrial fibrillation  #Sick sinus syndrome status post pacemaker placement  -lopressor  - Unable to administer warfarin in the setting of encephalopathy, will use prophylactic subcutaneous heparin once INR is subtherapeutic       #chronic HFrEF (EF 25 to 30%) - euvolemic       #CAD - PTA atorvastatin 20 mg daily, coumadin      #Oral thrush - was on nystatin, unable to take, can give fluconazole instead.          Diet: Room Service  NPO for Medical/Clinical Reasons Except for: Meds    DVT Prophylaxis: Warfarin  Chandler Catheter: PRESENT, indication: Acute retention or obstruction  Lines: None     Cardiac Monitoring: None  Code Status: No CPR- Do NOT Intubate      Clinically Significant Risk Factors         # Hypernatremia: Highest Na = 149 mmol/L in last 2 days, will monitor as appropriate  # Hyperchloremia: Highest Cl = 113 mmol/L in last 2 days, will monitor as appropriate          # Hypoalbuminemia: Lowest albumin = 3.4 g/dL at 7/17/2025  6:44 AM, will monitor as appropriate  # Coagulation Defect: INR = 2.22 (Ref range: 0.85 - 1.15) and/or PTT = N/A, will monitor for bleeding  # Thrombocytopenia: Lowest platelets = 95 in last 2 days, will monitor for bleeding   # Hypertension: Noted on problem list  # Chronic heart failure with reduced ejection fraction: last echo with EF <40%               # Financial/Environmental Concerns:     # Pacemaker present       Social Drivers of Health    Food Insecurity: Unknown (7/10/2025)    Food Insecurity     Within the past 12 months, did you worry that your food would run out before you got money to buy more?: Patient unable to answer     Within the past 12 months, did the food you bought just not last and you didn t have money to get more?: Patient unable to answer   Housing Stability: Unknown (7/10/2025)    Housing Stability     Do you have housing? : Patient unable to answer     Are you worried about losing your housing?: Patient unable to answer   Financial Resource Strain:  Unknown (7/10/2025)    Financial Resource Strain     Within the past 12 months, have you or your family members you live with been unable to get utilities (heat, electricity) when it was really needed?: Patient unable to answer   Transportation Needs: Unknown (7/10/2025)    Transportation Needs     Within the past 12 months, has lack of transportation kept you from medical appointments, getting your medicines, non-medical meetings or appointments, work, or from getting things that you need?: Patient unable to answer   Interpersonal Safety: Unknown (7/10/2025)    Interpersonal Safety     Do you feel physically and emotionally safe where you currently live?: Patient unable to answer     Within the past 12 months, have you been hit, slapped, kicked or otherwise physically hurt by someone?: Patient unable to answer     Within the past 12 months, have you been humiliated or emotionally abused in other ways by your partner or ex-partner?: Patient unable to answer    Received from [facility]    Social Connections          Disposition Plan     Medically Ready for Discharge: Anticipated in 2-4 Days             Amber Anna MD  Hospitalist Service  [facility]  Securely message with [employer] (more info)  Text page via [employer] Paging/Directory   ______________________________________________________________________    Interval History   Patient remains lethargic.  Coughs occasionally.  Remains on 2 L oxy mask.     Physical Exam   Vital Signs: Temp: 98.5  F (36.9  C) Temp src: Axillary BP: (!) 198/100 Pulse: 111   Resp: 12 SpO2: 99 % O2 Device: Oxymask Oxygen Delivery: 2 LPM  Weight: 152 lbs 12.46 oz    General Appearance: No acute distress, unresponsive to voice  Respiratory: Respirations unlabored, on 2 L oxy mask  Other: Spontaneously occasionally    Medical Decision Making       55 MINUTES SPENT BY ME on the date of service doing chart review, history, exam,  documentation & further activities per the note.  MANAGEMENT DISCUSSED with the following over the past 24 hours: Patient's daughter Rashmi, palliative care, nursing staff       Data     I have personally reviewed the following data over the past 24 hrs:    N/A  \   N/A   / N/A     149 (H) 112 (H) 55.8 (H) /  144 (H)   3.7 25 1.92 (H) \     Procal: N/A CRP: N/A Lactic Acid: 1.6       INR:  2.22 (H) PTT:  N/A   D-dimer:  N/A Fibrinogen:  N/A       Imaging results reviewed over the past 24 hrs:   No results found for this or any previous visit (from the past 24 hours).

## 2025-07-22 NOTE — CONSULTS
Palliative Care Consultation Note  [facility]      Patient: Skip Newman  Date of Admission:  2025    Requesting Clinician / Team: Dr. Anna  Reason for consult: Goals of care       Recommendations & Counseling     GOALS OF CARE:   Life-prolonging with limits -DNR/DNI. No escalation of care if his condition declines.  No transfer to ICU.  Strongly considering pivot to comfort.   Recommend  follow up with daughter tomorrow  re: goals  of care.  See notes below for further detail    ADVANCE CARE PLANNING:  No health care directive on file. Per system policy, Surrogate Decision-makers for Patients With Diminished Decision-making Capacity offers guidance on possible decision-makers. Daughter Rashmi with support of son Yaniv has been identified as a surrogate decision maker.   Daughter will bring a copy of POLST  Code status: No CPR- Do NOT Intubate    MEDICAL MANAGEMENT:   #Delirium  #Agitation  Avoid benzodiazepines, antihistamines, anticholinergics if able.  Lights on and blinds open during the day.  Reorient frequently.  Lights & TV off during the night.  Promote normal circadian rhythm.  Limit sensory deprivation - utilize hearing aids, glasses, etc.  Frequently assess basic needs such as temperature, elimination, thirst/hunger, pain  Consider bedside attendant (if able) for additional safety    PSYCHOSOCIAL/SPIRITUAL SUPPORT:  [place] community: Orthodox - appreciate spiritual care support  Living situation: resides in assisted living   Important relationships/caregivers: , 2 living children-Rashmi and Yaniv, one son  in  of encephalitis  Occupation: dentist, retired in   Areas of fulfillment/vikash: volunteering, traveling, Shinto, caring for his wife in later years    Palliative Care will continue to follow. Thank you for the consult and allowing us to aid in the care of Skip Newman.    These recommendations have been discussed with Dr. Anna,  "Rola Olivares RN.    Mercedes Velez, CNS  [facility], Palliative Care  Securely message with the [employer] Web Console (learn more here) or  Text page via [facility] Paging/Directory         Assessment      Skip Newman is a 89 year old male with a past medical history of heart failure with reduced EF, CAD s/p multiple remote stents, CKD3, afib s/p PPM, HTN, nephrolisthasis, aortic regurg, pulmonary HTN, prior CVA who presented on 7/9/2025 with seizure like activity.  Admitted initially on observation with plan to consult neurology.   Neuro following now signed off.  Per  neurology  note  from  7/20/25:   \"Add West Nile to the CSF testing   Sundowning precautions  Suspect mental status worsening due to new pneumonia.  Patient has had a prolonged course with no significant improvement.  Consider palliative care involvement versus primary team to establish goals of care with family\"    Today, the patient was seen for:  Initial palliative care visit, goals of care    History of Present Illness   Met with daughter Rashmi, resident MD.  Patient in room but incoherent.  Only mumbling occasionally to questions (with pocket talker on).  Did not open eyes or follow commands.   Introduced the role of palliative care as an interdisciplinary team that cares for patients with serious illness to help support symptom management, communication, coping for patients and their families as well as support with medical decision making.    Lengthy discussion in room with patient's daughter, resident MD.  Dr. Beasley, Yaquelin rankin was also present for part of visit.     Rashmi states that Bill has had somewhat of a decline since November/December 2024.  Memory has been declining but still independent with some ADLs.  Prior to this, he had been his wife's primary caregiver (dementia).  With his decline in the past several months, family had hired a caregiver from 9[employer]5 4 days a week and 9-1 2 days a week to help care for Bill " "and his wife.  Due to her declining memory, wife moved to memory care last week, and there had been discussion around Bill also moving to memory care.     Rashmi states that patient has lived a very \"full\" life.  Worked as a dentist, enjoyed traveling and volunteering for different organizations, [place] involved [place] [employer].  She states that she feels he is \"ready to go\" despite that he had still recently been open to medical interventions such as sleep study \"as long as I'm still living.\"  States he has a \"\" mindset.      Dr. Anna updated Rashmi on his medical status.  Discussed possibility of hypoactive delirium, hyponatremia, aspiration pneumonia and that many other etiologies for his delirium had been ruled out.  Reviewed concern of patient's lack of improvement for many days.  Multiple questions answered.      Discussed options moving forward including continuing with current care plan and treatments as well as comfort focused care.  Rashmi states would not want to escalate care if his condition declines.  He has completed POLST in the past and Rashmi is hoping to review this and discuss with her brother re: care plan/goals.  She is strongly leaning toward comfort care but would like more time to process information and ensure she has \"all the pieces of the puzzle\" before making a change in plan.      We discussed that Alen appears comfortable and calm at this time, does not appear to be actively dying.  If he were to have more discomfort or a significant clinical decline, then would likely recommend pivot to comfort care.  Rashmi verbalized understanding.      Prognosis, Goals, & Planning:   Functional Status just prior to this current hospitalization:  Outpatient Palliative Performance Score (PPS) 60%  Significant disease.  Normal or reduced intake. Normal LOC or confusion. Reduced ambulation, some assist w/self-care, unable to work/do housework.    Ulysses BRIDGETTE, somewhat " independent, plan was to move to memory care.     Prognosis, Goals, and/or Advance Care Planning:  We discussed general treatment options (full/restorative, selective/conservatives, and comfort only/hospice). We then discussed how these specifically apply to Bill.  Based on this discussion, daughter Rashmi has decided to continue with selective medical treatments at this time.  Considering pivot to comfort focused care.  No escalation of care to ICU  Education provided regarding hospice philosophy, prognostic,and eligibility criteria. Discussed what services are provided and those that are not,  Discussed common misconceptions. We explored the various disposition options where they can receive hospice care (home, residential hospice homes, LTC with hospice) including subsequent financial and familial implications. Discussed typical anticipated timing of discharge.    Code Status was addressed today:   Yes, confirmed DNR/DNI    Patient's decision making preferences: unable to assess        Patient has decision-making capacity today for complex decisions: Lacks decision making capacity          Coping, Meaning, & Spirituality:   Mood, coping, and/or meaning in the context of serious illness were addressed today: Yes    Social:   Living situation: resides in assisted living    Important relationships/caregivers: , 2 living children-Rashmi and Yaniv, one son  in  of encephalitis  Occupation: dentist, retired in   Areas of fulfillment/vikash: volunteering, traveling, Sabianism, caring for his wife in later years    Medications:  Reviewed this patient's medication profile and medications from this hospitalization.   [place] Board of Pharmacy Data Base Reviewed: Yes:   reviewed -  .    ROS:  Unable to complete ROS due to cognitive status.     Physical Exam   Vital Signs with Ranges  Temp:  [98.2  F (36.8  C)-99.6  F (37.6  C)] 98.6  F (37  C)  Pulse:  [59-79] 60  Resp:  [18-22] 20  BP: (132-213)/()  177/70  SpO2:  [95 %-99 %] 96 %  Wt Readings from Last 10 Encounters:   07/22/25 69.3 kg (152 lb 12.5 oz)   06/05/25 75.3 kg (166 lb)   06/03/25 75.3 kg (166 lb)   04/30/25 75.3 kg (166 lb)   02/21/25 74.8 kg (165 lb)   01/28/25 74.8 kg (165 lb)   01/02/25 73.5 kg (162 lb)   12/21/24 75 kg (165 lb 5.5 oz)   12/02/24 74.8 kg (165 lb)   09/30/24 75.3 kg (166 lb)     152 lbs 12.46 oz    PHYSICAL EXAM:  General Appearance:  No acute distress, with  pocket talker, seems to mumble to  some questions but does not follow  commands.  No  meaningful verbal response  Respiratory: Respirations unlabored, on 2 L oxy mask-97% oxygen  saturations. O2 saturations  88% on room air    Data reviewed:  No results found for this or any previous visit (from the past 24 hours).        110 MINUTES SPENT BY ME on the date of service doing chart review, history, exam, documentation & further activities per the note.

## 2025-07-22 NOTE — PLAN OF CARE
Problem: Adult Inpatient Plan of Care  Goal: Plan of Care Review  Description: The Plan of Care Review/Shift note should be completed every shift.  The Outcome Evaluation is a brief statement about your assessment that the patient is improving, declining, or no change.  This information will be displayed automatically on your shift  note.  Outcome: Not Progressing   Goal Outcome Evaluation:  Pt lethargic. Moans with turns and coughs occasionally.  Doesn't open eyes.  Turning and repositioning q 2 hours.  IV atbx and fluids.  Chandler catheter in place.  BP stable with IV metoprolol.

## 2025-07-22 NOTE — PROGRESS NOTES
Care Management Follow Up    Length of Stay (days): 12    Expected Discharge Date: 07/23/2025    Anticipated Discharge Plan:  Assisted Living    Transportation: Anticipate medical transport    PT Recommendations: Transitional Care Facility, Per plan established by the PT  OT Recommendations:        Barriers to Discharge: medical stability, GOC    Prior Living Situation: assisted living with facility resident    Discussed  Partnership in Safe Discharge Planning  document with patient/family: No     Handoff Completed: No, handoff not indicated or clinically appropriate    Patient/Spokesperson Updated: No    Additional Information:  Per MD, consulted palliative care to discuss GOC with family. CM received VM from Jamie RN at [facility] ([phone]) asking for update on pt. CM returned call and left VM with update on pt and that CM will update them if family decides they'd like to pursue hospice at [facility].    Next Steps: follow for GOC and discharge planning    Caroline Najera, [facility]  Acute Care Management  [facility]  For further questions/concerns you can reach us at [employer] Web Console

## 2025-07-22 NOTE — PROGRESS NOTES
CLINICAL NUTRITION SERVICES - REASSESSMENT NOTE     RECOMMENDATIONS FOR MDs/PROVIDERS TO ORDER:    Registered Dietitian Interventions:  None at this time    Future/Additional Recommendations:  Monitor for GOC     INFORMATION OBTAINED  Patient not available for interview due to very lethargic    CURRENT NUTRITION ORDERS  Diet: NPO     NEW FINDINGS  Now aspiration pneumonia  Pt with PRICILA, persistent encephalopathy, pneumonia and acute hypoxic respiratory failure  No escalation of care.  If worsening clinical status will transition to full comfort measure    INTERVENTIONS  Will continue to follow for GOC  RD available if nutrition needs arise    GOALS  Per GOC     MONITORING/EVALUATION  Progress toward goals will be monitored and evaluated per policy.

## 2025-07-22 NOTE — PLAN OF CARE
Problem: Acute Kidney Injury/Impairment  Goal: Improved Oral Intake  Outcome: Not Progressing   Goal Outcome Evaluation:       Pt is NPO. Pt very lethargic, only responds to painful stimuli with repositioning. Pt opens his eyes and speech is garbled and hard to understand. Chandler intact, urine dark annie in color. Pt has periods of apnea while sleeping./82 prn hydralazine given recheck 141/64. Getting iv zosyn. Pt turned and repositioned every 2 hours.  Sharon Monaco RN

## 2025-07-22 NOTE — PROGRESS NOTES
"SPIRITUAL HEALTH SERVICES NOTE  [facility]; P2    Reason for Visit: Follow-up    SPIRITUAL ASSESSMENT  Family feeling overwhelmed with information, explanations and the weight of care decisions  Finding strength, hope and comfort in God's provision and care    Patient/Family Understanding of Illness and Goals of Care - Joined Nikunj Locke and Alen's daughter Rashmi in goals of care discussion. Rox explained what moving to comfort care might look like, and how Alen's care would/would not change. Rashmi was feeling a bit overwhelmed with all the information she has received and the decisions that they potentially might need to make for Alen. She said \"I think I know the direction we will go, but I need some time to talk this over with my brother.\" One of Rashmi's concerns is providing adequate care for Alen at discharge.     Distress and Loss - Rashmi is processing not only the significant change in her father's health, but her mother's care needs as well. She says \"I worked to help keep my mom and dad together in their house for several years, because I knew it was important to my dad to stay in his own home and that it would be better for them to be together.\"     Strengths, Coping, and Resources - Rashmi and Slim will discuss next steps together. Rashmi also a group of girfriends who are source of emotional and spiritual support.  Rashmi also has a friend who works at [facility] and may call her tonight with hospice questions.     Meaning, Beliefs, and Spirituality - Alen, Rashmi and Slim all identify their Restorationism francis as a significant part of their lives. Rashmi shared different ways that she has seen God providing for her in the last few months. She welcomed a prayer.     Recommendations: Alen's family would benefit from regular, ongoing explanations and education around specialties and care options.     Plan of Care: Will follow-up with family tomorrow    Yaquelin Bingham, " Shanice  [facility]  Office: [phone] (for non-urgent requests)  Please Vocera or page through [facility] for time-sensitive requests

## 2025-07-22 NOTE — PLAN OF CARE
Problem: Adult Inpatient Plan of Care  Goal: Optimal Comfort and Wellbeing  Outcome: Progressing     Problem: Delirium  Goal: Improved Behavioral Control  Outcome: Progressing  Intervention: Minimize Safety Risk  Recent Flowsheet Documentation  Taken 7/21/2025 1800 by Jacque Kumra RN  Enhanced Safety Measures: review medications for side effects with activity  Goal: Improved Sleep  Outcome: Progressing     Problem: Pain Acute  Goal: Optimal Pain Control and Function  Outcome: Progressing  Intervention: Prevent or Manage Pain  Recent Flowsheet Documentation  Taken 7/21/2025 1800 by Jacque Kumar RN  Medication Review/Management: medications reviewed     Problem: Comorbidity Management  Goal: Blood Pressure in Desired Range  Outcome: Progressing  Intervention: Maintain Blood Pressure Management  Recent Flowsheet Documentation  Taken 7/21/2025 1800 by Jacque Kumar RN  Medication Review/Management: medications reviewed     Problem: Seizure, Active Management  Goal: Absence of Seizure/Seizure-Related Injury  Outcome: Progressing   Pt has occasional moan/grimace with repositioning, otherwise appears comfortable. Repositioned Q2h. Piv leaking and removed, new piv placed by swat nurse. Chandler in place. Pt remains npo. Pt very lethargic and slept during cares this evening. VSS on 2L oxymask, continue to monitor.    Jacque Kumar, RN

## 2025-07-23 LAB
ANION GAP SERPL CALCULATED.3IONS-SCNC: 10 MMOL/L (ref 7–15)
BUN SERPL-MCNC: 41 MG/DL (ref 8–23)
CALCIUM SERPL-MCNC: 8.4 MG/DL (ref 8.8–10.4)
CHLORIDE SERPL-SCNC: 107 MMOL/L (ref 98–107)
CREAT SERPL-MCNC: 1.63 MG/DL (ref 0.67–1.17)
EGFRCR SERPLBLD CKD-EPI 2021: 40 ML/MIN/1.73M2
GLUCOSE SERPL-MCNC: 133 MG/DL (ref 70–99)
HCO3 SERPL-SCNC: 25 MMOL/L (ref 22–29)
INR PPP: 2.24 (ref 0.85–1.15)
POTASSIUM SERPL-SCNC: 3.5 MMOL/L (ref 3.4–5.3)
PROTHROMBIN TIME: 24.9 SECONDS (ref 11.8–14.8)
SODIUM SERPL-SCNC: 142 MMOL/L (ref 135–145)
VIT B1 PYROPHOSHATE BLD-SCNC: 128 NMOL/L
WNV IGG CSF IA-ACNC: 0.08 IV
WNV IGM CSF IA-ACNC: 0 IV

## 2025-07-23 PROCEDURE — 80048 BASIC METABOLIC PNL TOTAL CA: CPT | Performed by: INTERNAL MEDICINE

## 2025-07-23 PROCEDURE — 99233 SBSQ HOSP IP/OBS HIGH 50: CPT | Performed by: INTERNAL MEDICINE

## 2025-07-23 PROCEDURE — 36415 COLL VENOUS BLD VENIPUNCTURE: CPT | Performed by: EMERGENCY MEDICINE

## 2025-07-23 PROCEDURE — 120N000001 HC R&B MED SURG/OB

## 2025-07-23 PROCEDURE — 999N000157 HC STATISTIC RCP TIME EA 10 MIN

## 2025-07-23 PROCEDURE — 250N000011 HC RX IP 250 OP 636: Performed by: PSYCHIATRY & NEUROLOGY

## 2025-07-23 PROCEDURE — 250N000013 HC RX MED GY IP 250 OP 250 PS 637: Performed by: INTERNAL MEDICINE

## 2025-07-23 PROCEDURE — 250N000009 HC RX 250: Performed by: HOSPITALIST

## 2025-07-23 PROCEDURE — 250N000011 HC RX IP 250 OP 636: Performed by: INTERNAL MEDICINE

## 2025-07-23 PROCEDURE — 85610 PROTHROMBIN TIME: CPT | Performed by: EMERGENCY MEDICINE

## 2025-07-23 PROCEDURE — 258N000003 HC RX IP 258 OP 636: Performed by: INTERNAL MEDICINE

## 2025-07-23 PROCEDURE — C9254 INJECTION, LACOSAMIDE: HCPCS | Performed by: PSYCHIATRY & NEUROLOGY

## 2025-07-23 PROCEDURE — 250N000011 HC RX IP 250 OP 636: Performed by: HOSPITALIST

## 2025-07-23 PROCEDURE — 99232 SBSQ HOSP IP/OBS MODERATE 35: CPT | Performed by: PHYSICIAN ASSISTANT

## 2025-07-23 PROCEDURE — 94640 AIRWAY INHALATION TREATMENT: CPT

## 2025-07-23 PROCEDURE — 258N000003 HC RX IP 258 OP 636: Performed by: PSYCHIATRY & NEUROLOGY

## 2025-07-23 RX ADMIN — DEXTROSE: 5 SOLUTION INTRAVENOUS at 02:43

## 2025-07-23 RX ADMIN — PIPERACILLIN AND TAZOBACTAM 3.38 G: 3; .375 INJECTION, POWDER, FOR SOLUTION INTRAVENOUS at 09:51

## 2025-07-23 RX ADMIN — IPRATROPIUM BROMIDE AND ALBUTEROL SULFATE 3 ML: .5; 3 SOLUTION RESPIRATORY (INHALATION) at 07:49

## 2025-07-23 RX ADMIN — SODIUM CHLORIDE 150 MG: 9 INJECTION, SOLUTION INTRAVENOUS at 09:08

## 2025-07-23 RX ADMIN — DEXTROSE: 5 SOLUTION INTRAVENOUS at 14:55

## 2025-07-23 RX ADMIN — FLUCONAZOLE 100 MG: 2 INJECTION, SOLUTION INTRAVENOUS at 11:36

## 2025-07-23 RX ADMIN — HYDROMORPHONE HYDROCHLORIDE 0.2 MG: 0.2 INJECTION, SOLUTION INTRAMUSCULAR; INTRAVENOUS; SUBCUTANEOUS at 21:49

## 2025-07-23 RX ADMIN — PIPERACILLIN AND TAZOBACTAM 3.38 G: 3; .375 INJECTION, POWDER, FOR SOLUTION INTRAVENOUS at 15:46

## 2025-07-23 RX ADMIN — METOPROLOL TARTRATE 2.5 MG: 5 INJECTION INTRAVENOUS at 13:45

## 2025-07-23 RX ADMIN — METOPROLOL TARTRATE 2.5 MG: 5 INJECTION INTRAVENOUS at 21:09

## 2025-07-23 RX ADMIN — PIPERACILLIN AND TAZOBACTAM 3.38 G: 3; .375 INJECTION, POWDER, FOR SOLUTION INTRAVENOUS at 21:12

## 2025-07-23 RX ADMIN — METOPROLOL TARTRATE 2.5 MG: 5 INJECTION INTRAVENOUS at 02:33

## 2025-07-23 RX ADMIN — PIPERACILLIN AND TAZOBACTAM 3.38 G: 3; .375 INJECTION, POWDER, FOR SOLUTION INTRAVENOUS at 04:19

## 2025-07-23 RX ADMIN — METOPROLOL TARTRATE 2.5 MG: 5 INJECTION INTRAVENOUS at 08:33

## 2025-07-23 RX ADMIN — SODIUM CHLORIDE 150 MG: 9 INJECTION, SOLUTION INTRAVENOUS at 21:16

## 2025-07-23 RX ADMIN — HYDROMORPHONE HYDROCHLORIDE 0.2 MG: 0.2 INJECTION, SOLUTION INTRAMUSCULAR; INTRAVENOUS; SUBCUTANEOUS at 09:04

## 2025-07-23 RX ADMIN — NITROGLYCERIN 1 PATCH: 0.2 PATCH TRANSDERMAL at 08:24

## 2025-07-23 ASSESSMENT — ACTIVITIES OF DAILY LIVING (ADL)
ADLS_ACUITY_SCORE: 85
ADLS_ACUITY_SCORE: 81
ADLS_ACUITY_SCORE: 85
ADLS_ACUITY_SCORE: 81
ADLS_ACUITY_SCORE: 85

## 2025-07-23 NOTE — PLAN OF CARE
Problem: Pain Acute  Goal: Optimal Pain Control and Function  Outcome: Progressing  Intervention: Prevent or Manage Pain  Recent Flowsheet Documentation  Taken 7/22/2025 1700 by Kelly Frazier, RN  Sensory Stimulation Regulation:   care clustered   quiet environment promoted  Medication Review/Management: medications reviewed     Problem: Delirium  Goal: Improved Attention and Thought Clarity  Outcome: Not Progressing   Goal Outcome Evaluation:       Pt lethargic, minimally responsive.  Pt yells out with repositioning.  Prn IV dilaudid given x1 this shift to help with pain.  Oxy mask on at 2L for comfort.    Kelly Frazier, RN

## 2025-07-23 NOTE — PROGRESS NOTES
"  PALLIATIVE CARE PROGRESS NOTE  [facility] [place]     Patient Name: Skip Newman  Date of Admission: 7/9/2025   Today the patient was seen for: goals of care     Recommendations & Counseling     GOALS OF CARE:   Life-prolonging with limits - DNR/DNI. No escalation of care if his condition declines. No transfer to ICU. Strongly considering pivot to comfort.  Rashmi (daughter) is leaning toward comfort care but would like more time to process information and ensure she has \"all the pieces of the puzzle\" before making a change in plan. Additional family including sister visiting today and in coming days.  Rashmi is still trying to locate Alen's most recent POLST, which she hopes will help guide their decision. Explained that even a recent POLST does not necessarily capture what Alen would want in his current condition and may not be particularly helpful in this situation. Rashmi feels it would be very helpful for her and likely will not be ready to make a decision until she has reviewed this.  We discussed that Alen appears comfortable and calm at this time, does not appear to be actively dying. If he were to have more discomfort or a significant clinical decline, then would likely recommend pivot to comfort care - family aware.    ADVANCE CARE PLANNING:  No health care directive on file. Per system policy, Surrogate Decision-makers for Patients With Diminished Decision-making Capacity offers guidance on possible decision-makers. Daughter Rashmi with support of son Yaniv has been identified as a surrogate decision maker.   Daughter will bring a copy of POLST  Code status: No CPR- Do NOT Intubate    MEDICAL MANAGEMENT:   We are not actively managing symptoms at this time.    PSYCHOSOCIAL/SPIRITUAL:  Family - supported by wife (who has dementia and recently moved to memory care), 2 adult children (Rashmi and Yaniv)  [place] community: [place] - appreciate spiritual care support    Palliative Care will " continue to follow.    Sol Smith PA-C  [facility], Palliative Care  Securely message with the [employer] Web Console (learn more here) or  Text page via [facility] Paging/Directory      Assessment          Skip Newman is a 89 year old male with a past medical history of heart failure with reduced EF, CAD s/p multiple remote stents, CKD3, afib s/p PPM, HTN, nephrolisthasis, aortic regurg, pulmonary HTN, prior CVA who presented on 7/9/2025 with seizure like activity. Unable to get MRI due to PPM, LP without clear etiology of ongoing AMS. Now with pneumonia and hypernatremia. Palliative following for goals of care.      Interval History:     Multidisciplinary collaboration:  Discussed with Medicine, reviewed notes from Medicine,     Patient/family narrative  Seen briefly from doorway, sleeping. Remains minimally responsive per staff.    Physical Exam:   Temp:  [97.1  F (36.2  C)-98.6  F (37  C)] 97.1  F (36.2  C)  Pulse:  [] 65  Resp:  [12-22] 18  BP: (122-198)/() 170/75  SpO2:  [94 %-99 %] 99 %  153 lbs 3.52 oz    Physical Exam  General: laying in bed asleep, headphones on        Data Reviewed:     CMP  Recent Labs   Lab 07/23/25  0626 07/22/25  0643 07/18/25  0209 07/17/25  0644    149*   < > 140   POTASSIUM 3.5 3.7   < > 4.3   CHLORIDE 107 112*   < > 103   CO2 25 25   < > 23   ANIONGAP 10 12   < > 14   * 144*   < > 123*   BUN 41.0* 55.8*   < > 87.1*   CR 1.63* 1.92*   < > 1.95*   GFRESTIMATED 40* 32*   < > 32*   JUAN 8.4* 8.5*   < > 8.7*   ALBUMIN  --   --   --  3.4*    < > = values in this interval not displayed.     CBC  Recent Labs   Lab 07/21/25  0628 07/20/25  0623 07/19/25  0556   WBC  --  13.1* 8.5   RBC  --  3.80* 3.68*   HGB  --  12.3* 11.9*   HCT  --  37.3* 36.0*   MCV 98 98 98   MCH  --  32.4 32.3   MCHC  --  33.0 33.1   RDW  --  14.6 14.7   PLT 95* 101* 96*     Medical Decision Making       25 MINUTES SPENT BY ME on the date of service doing chart review, history, exam,  documentation & further activities per the note.

## 2025-07-23 NOTE — PLAN OF CARE
Problem: Adult Inpatient Plan of Care  Goal: Optimal Comfort and Wellbeing  Outcome: Not Progressing   Goal Outcome Evaluation:  Pt is sedated, only opens his eyes when repositioning. Speech is garbled, hard to understand. Repositioned every 2 hours. Low urine output from garza. Getting iv antibiotcs. On 2L of oxygen sating 95-97%. When placed on 1L sats would drop to 89%. Seizure precautions maintained.   Sharon Monaco RN

## 2025-07-23 NOTE — PROGRESS NOTES
[facility]    Medicine Progress Note - Hospitalist Service    Date of Admission:  7/9/2025    Assessment & Plan   Summary:     89M with SSS s/p PPM, HTN, CAD, CKD3, chronic atrial fibrillation on warfarin, HFrEF,  NSVT, gout, nephrolithiasis, aortic regurgitation, pulmonary hypertension, and prior CVA admitted on 7/9/2025 from longterm with seizure-like activity.  Started on keppra and with worsening mental status, changed to depakote which what anticoagualtion challenging and was switched to vimpat.   CT head without explanation.   EEG with seizure activity.  Unable to get MRI due to PPM which is not compatible.  Underwent LP which did not provide an explanation (HHV6 which is usually an incidental finding, 0 WBC, mildly elevated glycose and protein).       Course complicated PRICILA, persistent encephalopathy, hypernatremia and aspiration PNA     Assessment/Plan     #Sepsis due to aspiration PNA and acute hypoxic respiratory failure  -continue zosyn, would complete 7-day treatment   - O2 as needed maintain sats > 90%  -NPO      #Seizure-like activity  #Acute metabolic encephalopathy, persistent  -vimpat    # Hypernatremia due to free water deficit in the setting of encephalopathy  Improved with D5, will continue  Recheck sodium levels tomorrow     #Goals of care - Prognosis guarded I think likely approaching end of life.   Mr. Newamn is already ~14 days into admission and still profoundly encephalopathic and now with PNA.  Palliative care is following.      #Urinary retention - Chandler.  I think very likely approaching end of life and will keep for comfort.     #PRICILA on CKD3b  -Holding  PTA chlorthalidone 25 mg and spironolactone for now  -Hold gabapentin     #Hyperkalemia-resolved     #Hypertension - PTA meds: Metoprolol tartrate and hydralazine  -Continue IV metoprolol     #Atrial fibrillation  #Sick sinus syndrome status post pacemaker placement  -lopressor IV  - Unable to administer warfarin in the  setting of encephalopathy, will use prophylactic subcutaneous heparin once INR is subtherapeutic       #chronic HFrEF (EF 25 to 30%) - euvolemic       #CAD - PTA atorvastatin 20 mg daily, on hold due to n.p.o.      #Oral thrush - was on nystatin, unable to take, can give fluconazole instead.          Diet: Room Service  NPO for Medical/Clinical Reasons Except for: Meds    DVT Prophylaxis: Warfarin  Chandler Catheter: PRESENT, indication: Acute retention or obstruction  Lines: None     Cardiac Monitoring: None  Code Status: No CPR- Do NOT Intubate      Clinically Significant Risk Factors         # Hypernatremia: Highest Na = 149 mmol/L in last 2 days, will monitor as appropriate  # Hyperchloremia: Highest Cl = 112 mmol/L in last 2 days, will monitor as appropriate          # Hypoalbuminemia: Lowest albumin = 3.4 g/dL at 7/17/2025  6:44 AM, will monitor as appropriate  # Coagulation Defect: INR = 2.24 (Ref range: 0.85 - 1.15) and/or PTT = N/A, will monitor for bleeding    # Hypertension: Noted on problem list  # Chronic heart failure with reduced ejection fraction: last echo with EF <40%               # Financial/Environmental Concerns:     # Pacemaker present       Social Drivers of Health    Food Insecurity: Unknown (7/10/2025)    Food Insecurity     Within the past 12 months, did you worry that your food would run out before you got money to buy more?: Patient unable to answer     Within the past 12 months, did the food you bought just not last and you didn t have money to get more?: Patient unable to answer   Housing Stability: Unknown (7/10/2025)    Housing Stability     Do you have housing? : Patient unable to answer     Are you worried about losing your housing?: Patient unable to answer   Financial Resource Strain: Unknown (7/10/2025)    Financial Resource Strain     Within the past 12 months, have you or your family members you live with been unable to get utilities (heat, electricity) when it was really  needed?: Patient unable to answer   Transportation Needs: Unknown (7/10/2025)    Transportation Needs     Within the past 12 months, has lack of transportation kept you from medical appointments, getting your medicines, non-medical meetings or appointments, work, or from getting things that you need?: Patient unable to answer   Interpersonal Safety: Unknown (7/10/2025)    Interpersonal Safety     Do you feel physically and emotionally safe where you currently live?: Patient unable to answer     Within the past 12 months, have you been hit, slapped, kicked or otherwise physically hurt by someone?: Patient unable to answer     Within the past 12 months, have you been humiliated or emotionally abused in other ways by your partner or ex-partner?: Patient unable to answer    Received from [facility]    Social Connections          Disposition Plan     Medically Ready for Discharge: Anticipated in 2-4 Days             Amber Anna MD  Hospitalist Service  [facility]  Securely message with [employer] (more info)  Text page via [employer] Paging/Directory   ______________________________________________________________________    Interval History   No significant changes  Per nursing staff report, was noted to be uncomfortable when turning.  IV hydromorphone has been effective.  While in the room noted to have frequent apnea episodes    Physical Exam   Vital Signs: Temp: 97.1  F (36.2  C) Temp src: Axillary BP: (!) 140/80 Pulse: 86   Resp: 18 SpO2: 97 % O2 Device: Nasal cannula Oxygen Delivery: 2 LPM  Weight: 153 lbs 3.52 oz    General Appearance: No acute distress, somnolent, having apnea episodes  Respiratory: Clear lungs anteriorly  Cardiovascular: Irregular.  No edema  : Chandler catheter present    Medical Decision Making       45 MINUTES SPENT BY ME on the date of service doing chart review, history, exam, documentation & further activities per the  note.  MANAGEMENT DISCUSSED with the following over the past 24 hours: Nursing staff and palliative care, RN CM       Data     I have personally reviewed the following data over the past 24 hrs:    N/A  \   N/A   / N/A     142 107 41.0 (H) /  133 (H)   3.5 25 1.63 (H) \     INR:  2.24 (H) PTT:  N/A   D-dimer:  N/A Fibrinogen:  N/A       Imaging results reviewed over the past 24 hrs:   No results found for this or any previous visit (from the past 24 hours).

## 2025-07-23 NOTE — PROGRESS NOTES
"SPIRITUAL HEALTH SERVICES NOTE  ; P2    Reason for Visit: Follow-up    SPIRITUAL ASSESSMENT  Family visiting Alen and supporting one another  Processing and discussing the information shared yesterday    Patient/Family Understanding of Illness and Goals of Care - Follow-up visit. Met with Alen's son Slim, ROSALINDA Tomeka, sister Christie, and nephew Ilya (?). Slim shares that Alen has been more interactive today, opening his eyes, squeezing Christie's hand, and even calling her \"Sis\". They are grateful for this. Slim does note that Alen seems to be sensitive to touch and says that this began a few weeks ago. They think that the nasal cannula has been more comfortable for Alen than the mask he had on yesterday.     Distress and Loss - Family is adjusting to changes in Alen's health and the uncertainty that accompanies it.     Strengths, Coping, and Resources - Alen's sister Christie reflected on the last time she saw her brother and the nice photo she has of them together. Family is supporting one another well at this time.     Meaning, Beliefs, and Spirituality - Alen's family welcomed a prayer for him.     Plan of Care: Will follow-up with Alen/family tomorrow.     Yaquelin Bingham M.Div.    Office: [phone] (for non-urgent requests)  Please Vocera or page through [facility] for time-sensitive requests      "

## 2025-07-23 NOTE — PROGRESS NOTES
"Pt's daughter-in-law asking for \"family\" if cpap/bipap will be helpful for \"patient to breathe\".  Further questions regarding pneumonia, seizures, and his encephalopathy.   - Updated to provider notes last 2 days.   - Palliative care consulted. Discussed the difference of life prolonging interventions and comfort care/hospice interventions specifically regarding medications to help with breathing.   - Per daughter-in-law, family sounds like they are leaning towards hospice. They just want Bill to be comfortable. Did let them know that pt's vitals are stable on 2 L oxymask.    Ilda Alvarez RN-BC    "

## 2025-07-23 NOTE — PLAN OF CARE
Problem: Pain Acute  Goal: Optimal Pain Control and Function  Intervention: Prevent or Manage Pain  Recent Flowsheet Documentation  Taken 7/23/2025 0951 by Rola Carrillo RN  Medication Review/Management: medications reviewed     Problem: Seizure, Active Management  Goal: Absence of Seizure/Seizure-Related Injury  Intervention: Prevent Seizure-Related Injury  Recent Flowsheet Documentation  Taken 7/23/2025 0951 by Rola Carrillo RN  Seizure Precautions: side rails padded     Problem: Adult Inpatient Plan of Care  Goal: Absence of Hospital-Acquired Illness or Injury  Intervention: Prevent Skin Injury  Recent Flowsheet Documentation  Taken 7/23/2025 0829 by Rola Carrillo RN  Body Position:   turned   left   Goal Outcome Evaluation:  Turning and repositioning every 2 hours.  Pain medicine given as needed-moans with turns at times.  Family here now and he has acknowledged them.  No seizures. Skin remains intact.

## 2025-07-24 LAB
ANION GAP SERPL CALCULATED.3IONS-SCNC: 11 MMOL/L (ref 7–15)
BACTERIA SPEC CULT: NORMAL
BACTERIA SPEC CULT: NORMAL
BUN SERPL-MCNC: 31 MG/DL (ref 8–23)
CALCIUM SERPL-MCNC: 8.4 MG/DL (ref 8.8–10.4)
CHLORIDE SERPL-SCNC: 103 MMOL/L (ref 98–107)
CREAT SERPL-MCNC: 1.56 MG/DL (ref 0.67–1.17)
EGFRCR SERPLBLD CKD-EPI 2021: 42 ML/MIN/1.73M2
GLUCOSE SERPL-MCNC: 107 MG/DL (ref 70–99)
HCO3 SERPL-SCNC: 27 MMOL/L (ref 22–29)
INR PPP: 2.01 (ref 0.85–1.15)
MCV RBC AUTO: 96 FL (ref 78–100)
PLATELET # BLD AUTO: 103 10E3/UL (ref 150–450)
POTASSIUM SERPL-SCNC: 3.8 MMOL/L (ref 3.4–5.3)
PROTHROMBIN TIME: 22.7 SECONDS (ref 11.8–14.8)
SODIUM SERPL-SCNC: 141 MMOL/L (ref 135–145)

## 2025-07-24 PROCEDURE — 250N000011 HC RX IP 250 OP 636: Performed by: INTERNAL MEDICINE

## 2025-07-24 PROCEDURE — 99233 SBSQ HOSP IP/OBS HIGH 50: CPT | Performed by: INTERNAL MEDICINE

## 2025-07-24 PROCEDURE — 250N000013 HC RX MED GY IP 250 OP 250 PS 637: Performed by: INTERNAL MEDICINE

## 2025-07-24 PROCEDURE — 250N000009 HC RX 250: Performed by: HOSPITALIST

## 2025-07-24 PROCEDURE — 250N000011 HC RX IP 250 OP 636: Performed by: HOSPITALIST

## 2025-07-24 PROCEDURE — 258N000003 HC RX IP 258 OP 636: Performed by: INTERNAL MEDICINE

## 2025-07-24 PROCEDURE — 85610 PROTHROMBIN TIME: CPT | Performed by: EMERGENCY MEDICINE

## 2025-07-24 PROCEDURE — 36415 COLL VENOUS BLD VENIPUNCTURE: CPT | Performed by: HOSPITALIST

## 2025-07-24 PROCEDURE — C9254 INJECTION, LACOSAMIDE: HCPCS | Performed by: PSYCHIATRY & NEUROLOGY

## 2025-07-24 PROCEDURE — 250N000011 HC RX IP 250 OP 636: Performed by: PSYCHIATRY & NEUROLOGY

## 2025-07-24 PROCEDURE — 258N000003 HC RX IP 258 OP 636: Performed by: PSYCHIATRY & NEUROLOGY

## 2025-07-24 PROCEDURE — 120N000001 HC R&B MED SURG/OB

## 2025-07-24 PROCEDURE — 999N000226 HC STATISTIC SLP IP EVAL DEFER

## 2025-07-24 PROCEDURE — 82310 ASSAY OF CALCIUM: CPT | Performed by: INTERNAL MEDICINE

## 2025-07-24 PROCEDURE — 85049 AUTOMATED PLATELET COUNT: CPT | Performed by: HOSPITALIST

## 2025-07-24 RX ADMIN — HYDROMORPHONE HYDROCHLORIDE 0.2 MG: 0.2 INJECTION, SOLUTION INTRAMUSCULAR; INTRAVENOUS; SUBCUTANEOUS at 01:59

## 2025-07-24 RX ADMIN — SODIUM CHLORIDE 150 MG: 9 INJECTION, SOLUTION INTRAVENOUS at 09:08

## 2025-07-24 RX ADMIN — METOPROLOL TARTRATE 2.5 MG: 5 INJECTION INTRAVENOUS at 20:25

## 2025-07-24 RX ADMIN — LABETALOL HYDROCHLORIDE 10 MG: 5 INJECTION, SOLUTION INTRAVENOUS at 10:11

## 2025-07-24 RX ADMIN — LABETALOL HYDROCHLORIDE 10 MG: 5 INJECTION, SOLUTION INTRAVENOUS at 14:42

## 2025-07-24 RX ADMIN — NITROGLYCERIN 1 PATCH: 0.2 PATCH TRANSDERMAL at 08:49

## 2025-07-24 RX ADMIN — DEXTROSE: 5 SOLUTION INTRAVENOUS at 20:32

## 2025-07-24 RX ADMIN — METOPROLOL TARTRATE 2.5 MG: 5 INJECTION INTRAVENOUS at 14:01

## 2025-07-24 RX ADMIN — METOPROLOL TARTRATE 2.5 MG: 5 INJECTION INTRAVENOUS at 08:45

## 2025-07-24 RX ADMIN — PIPERACILLIN AND TAZOBACTAM 3.38 G: 3; .375 INJECTION, POWDER, FOR SOLUTION INTRAVENOUS at 16:28

## 2025-07-24 RX ADMIN — PIPERACILLIN AND TAZOBACTAM 3.38 G: 3; .375 INJECTION, POWDER, FOR SOLUTION INTRAVENOUS at 03:55

## 2025-07-24 RX ADMIN — SODIUM CHLORIDE 150 MG: 9 INJECTION, SOLUTION INTRAVENOUS at 20:19

## 2025-07-24 RX ADMIN — PIPERACILLIN AND TAZOBACTAM 3.38 G: 3; .375 INJECTION, POWDER, FOR SOLUTION INTRAVENOUS at 10:15

## 2025-07-24 RX ADMIN — DEXTROSE: 5 SOLUTION INTRAVENOUS at 04:27

## 2025-07-24 RX ADMIN — PIPERACILLIN AND TAZOBACTAM 3.38 G: 3; .375 INJECTION, POWDER, FOR SOLUTION INTRAVENOUS at 21:00

## 2025-07-24 RX ADMIN — FLUCONAZOLE 100 MG: 2 INJECTION, SOLUTION INTRAVENOUS at 11:33

## 2025-07-24 RX ADMIN — METOPROLOL TARTRATE 2.5 MG: 5 INJECTION INTRAVENOUS at 01:58

## 2025-07-24 RX ADMIN — HYDROMORPHONE HYDROCHLORIDE 0.2 MG: 0.2 INJECTION, SOLUTION INTRAMUSCULAR; INTRAVENOUS; SUBCUTANEOUS at 18:31

## 2025-07-24 RX ADMIN — LABETALOL HYDROCHLORIDE 20 MG: 5 INJECTION, SOLUTION INTRAVENOUS at 11:28

## 2025-07-24 RX ADMIN — HYDROMORPHONE HYDROCHLORIDE 0.2 MG: 0.2 INJECTION, SOLUTION INTRAMUSCULAR; INTRAVENOUS; SUBCUTANEOUS at 14:10

## 2025-07-24 RX ADMIN — HYDROMORPHONE HYDROCHLORIDE 0.2 MG: 0.2 INJECTION, SOLUTION INTRAMUSCULAR; INTRAVENOUS; SUBCUTANEOUS at 08:46

## 2025-07-24 ASSESSMENT — ACTIVITIES OF DAILY LIVING (ADL)
ADLS_ACUITY_SCORE: 84
ADLS_ACUITY_SCORE: 84
ADLS_ACUITY_SCORE: 85
ADLS_ACUITY_SCORE: 85
ADLS_ACUITY_SCORE: 84
ADLS_ACUITY_SCORE: 85
ADLS_ACUITY_SCORE: 84
ADLS_ACUITY_SCORE: 85
ADLS_ACUITY_SCORE: 84
ADLS_ACUITY_SCORE: 85
ADLS_ACUITY_SCORE: 84
ADLS_ACUITY_SCORE: 85
ADLS_ACUITY_SCORE: 84

## 2025-07-24 NOTE — PLAN OF CARE
Problem: Adult Inpatient Plan of Care  Goal: Optimal Comfort and Wellbeing  Outcome: Progressing     Problem: Pain Acute  Goal: Optimal Pain Control and Function  Outcome: Progressing  Intervention: Prevent or Manage Pain  Recent Flowsheet Documentation  Taken 7/24/2025 0158 by Maye Kumar RN  Medication Review/Management: medications reviewed  Taken 7/23/2025 2149 by Maye Kumar RN  Medication Review/Management: medications reviewed   Goal Outcome Evaluation:       Pt is somnolent. Prn dilaudid x 2 was administer for pain and effective. Turn and repo q2hr. VSS, continue to monitor. Maye Kumar RN

## 2025-07-24 NOTE — PLAN OF CARE
Problem: Comorbidity Management  Goal: Blood Pressure in Desired Range  Outcome: Not Progressing  Intervention: Maintain Blood Pressure Management  Recent Flowsheet Documentation  Taken 7/24/2025 0850 by Mark Anthony Meza RN  Medication Review/Management: medications reviewed     Problem: Pain Acute  Goal: Optimal Pain Control and Function  Outcome: Progressing  Intervention: Prevent or Manage Pain  Recent Flowsheet Documentation  Taken 7/24/2025 0850 by Mark Anthony Meza RN  Medication Review/Management: medications reviewed     Problem: Seizure, Active Management  Goal: Absence of Seizure/Seizure-Related Injury  Outcome: Progressing     Goal Outcome Evaluation:  Pt is very lethargic and unable to speak coherently. Appears to be declining; speech and PT signed off but would like to be re-consulted if he improves. Per family, pt was able to say short words and squeeze their hands yesterday but was unable to do so today.     Pt hypertensive this shift. Given scheduled IV metoprolol x2 as well as PRN IV labetalol x3. BP improved to 146/66, with SBP goal <170. Increased O2 to 3 L NC. Respirations are occasionally labored with expiratory wheezing and apneic periods. Pt also moans at times. Given PRN dilaudid x3 with improvement. Asked for frequency to be increased from q4h to q2h so it is lined up with turns, but per Dr. Domingo this cannot be changed unless pt transitions to comfort cares. Family is still weighing options. Palliative following.    Turns and oral cares done q2h. Skin intact and sacral mepilex in place. No BM this shift. Chandler CDI. Continues to be NPO with D5 infusing.    Mark Anthony Meza RN  [phone]

## 2025-07-24 NOTE — PROGRESS NOTES
SLP orders received. Clinical Swallow Evaluation has been attempted since 7/20/25 with pt unable to participate. Spoke to RN today who reports pt is still not appropriate. SLP will defer Evaluation at this time. If pt becomes appropriate for CSE please place new orders at that time. Defer discharge recommendations to treatment team.? Will complete orders.

## 2025-07-24 NOTE — PROGRESS NOTES
[facility]    PROGRESS NOTE - Hospitalist Service    Assessment and Plan  Patient is new to me, Skip Newman is a 89 years old male with a past medical history of SSS s/p PPM, HTN, CAD, CKD3, chronic atrial fibrillation on warfarin, HFrEF,  NSVT, gout, nephrolithiasis, aortic regurgitation, pulmonary hypertension, and prior CVA admitted on 7/9/2025 from residential with seizure-like activity.  Started on keppra and with worsening mental status, changed to depakote which what anticoagualtion challenging and was switched to vimpat.   CT head without explanation.   EEG with seizure activity.  Unable to get MRI due to PPM which is not compatible.  Underwent LP which did not provide an explanation (HHV6 which is usually an incidental finding, 0 WBC, mildly elevated glycose and protein).       Course complicated PRICILA, persistent encephalopathy, hypernatremia and aspiration PNA        Sepsis due to aspiration PNA and acute hypoxic respiratory failure  -continue zosyn, would complete 7-day treatment   - O2 as needed maintain sats > 90%  - Continue NPO      Seizure-like activity  Acute metabolic encephalopathy, persistent  -vimpat     Hypernatremia due to free water deficit in the setting of encephalopathy  Improved with D5, will continue  Recheck sodium levels tomorrow       Urinary retention - Ramesh.  I think very likely approaching end of life and will keep for comfort.     PRICILA on CKD3b  -Holding  PTA chlorthalidone 25 mg and spironolactone for now  -Hold gabapentin     Hyperkalemia-resolved     Hypertension   - PTA meds: Metoprolol tartrate and hydralazine  -Patient is n.p.o. unable to take oral because of lethargy  -Continue IV metoprolol, IV hydralazine     Atrial fibrillation  Sick sinus syndrome status post pacemaker placement  -lopressor IV  - Unable to administer warfarin in the setting of encephalopathy, will use prophylactic subcutaneous heparin once INR is subtherapeutic       chronic HFrEF (EF  25 to 30%) - euvolemic       CAD - PTA atorvastatin 20 mg daily, on hold due to n.p.o.      Oral thrush - was on nystatin, unable to take, can give fluconazole instead.    Goals of care   - Patient is critically ill with guarded prognosis   - He is likely approaching end of life.     - Mr. Newman is already ~15 days into admission and still profoundly encephalopathic and now with PNA.    - Palliative care is following.   -Awaiting on final decision from family to transition to comfort care      50 MINUTES SPENT BY ME on the date of service doing chart review, history, exam, documentation & further activities per the note    Principal Problem:    Seizure-like activity (H)  Active Problems:    Pure hypercholesterolemia    Primary hypertension    CAD -- S/P Stents 2001, 2012, 2014    SSS -- S/P dual chamber Pacemaker    Stage 3a chronic kidney disease (H)    Chronic atrial fibrillation (H)    Chronic heart failure with reduced ejection fraction (HFrEF, <= 40%) (H)    Lactic acidosis    Increased anion gap metabolic acidosis    Hyponatremia    Anticoagulated on warfarin    Postictal state (H)      VTE prophylaxis:  Pneumatic Compression Devices  DIET: Orders Placed This Encounter      NPO for Medical/Clinical Reasons Except for: Meds      Disposition/Barriers to discharge: Goals of care discussion, end-of-life care.  Medically Ready for Discharge: Anticipated in 2-4 Days    Code Status: No CPR- Do NOT Intubate    Subjective:  Skip is very lethargic, minimally verbal response    PHYSICAL EXAM  Vitals:    07/22/25 0910 07/23/25 0500 07/24/25 0654   Weight: 69.3 kg (152 lb 12.5 oz) 69.5 kg (153 lb 3.5 oz) 77.1 kg (169 lb 15.6 oz)     B/P:187/78[gave labetalol[ T:98.1 P:63 R:18     Intake/Output Summary (Last 24 hours) at 7/24/2025 1453  Last data filed at 7/24/2025 1441  Gross per 24 hour   Intake 2995 ml   Output 1200 ml   Net 1795 ml      Body mass index is 26.62 kg/m .    Constitutional: Lethargic, sleeping  comfortable  Respiratory: Decreased breath sounds lung base with scattered rhonchi, no wheeze.    Cardiovascular: Irregular, soft systolic murmur in aortic area  Skin: no bruising or bleeding and normal skin color, texture, turgor  Musculoskeletal: There is no redness, warmth, or swelling of the joints.  Full range of motion noted.  no lower extremity pitting edema present  Neurologic: Lethargic, minimally verbal response.         PERTINENT LABS/IMAGING:    I have personally reviewed the following data over the past 24 hrs:    N/A  \   N/A   / 103 (L)     141 103 31.0 (H) /  107 (H)   3.8 27 1.56 (H) \     INR:  2.01 (H) PTT:  N/A   D-dimer:  N/A Fibrinogen:  N/A       Imaging results reviewed over the past 24 hrs:   No results found for this or any previous visit (from the past 24 hours).    Discussed with patient, family, palliative care, nursing staff and discharge planner    Fer Domingo MD  [facility] Medicine Service  [phone]

## 2025-07-24 NOTE — PLAN OF CARE
Problem: Delirium  Goal: Improved Sleep  Outcome: Progressing  Problem: Pain Acute  Goal: Optimal Pain Control and Function  Outcome: Progressing  Intervention: Prevent or Manage Pain  Problem: Seizure, Active Management  Goal: Absence of Seizure/Seizure-Related Injury  Outcome: Progressing    Patient scored 0 on pain scale. VSS. On 2L of O2 nasal cannula. On IV abx. Has a strong congested non-productive cough. Able to mumble and make noises but unable to form words to communicate. Patient is somnolent to lethargic. Repositioning q2h.    Family at bedside and supportive. Reviewed plan of care, answered all questions and concerns, verbalized understanding, plan of care ongoing.

## 2025-07-24 NOTE — PLAN OF CARE
Physical Therapy Discharge Summary    Reason for therapy discharge:    Change in medical status. Patient has been unable to participate in physical therapy since 7/18.    Progress towards therapy goal(s). [facility] for goal details.  Goals not met.  Barriers to achieving goals:   limited tolerance for therapy.    Therapy recommendation(s):    No further therapy is recommended at this time. If patient's status changes and patient is able to actively participate in PT, please re-order PT.    Yen Sterling, PT  7/24/2025

## 2025-07-24 NOTE — PROGRESS NOTES
Care Management Follow Up    Length of Stay (days): 14    Expected Discharge Date: 07/26/2025    Anticipated Discharge Plan:  TBD     Transportation: Anticipate medical transport    PT Recommendations: Transitional Care Facility, Per plan established by the PT  OT Recommendations:        Barriers to Discharge: medical stability, placement    Prior Living Situation: assisted living with facility resident    Discussed  Partnership in Safe Discharge Planning  document with patient/family: No     Handoff Completed: No, handoff not indicated or clinically appropriate    Patient/Spokesperson Updated: Yes. Who? Daughter Rashmi     Additional Information:  CM reviewed chart, discussed with medical team.  Palliative care consult completed on 07/23:  Life-prolonging with limits - DNR/DNI. No escalation of care if his condition declines. No transfer to ICU. Strongly considering pivot to comfort.     CM spoke with daughter Rashmi on the phone.  Reviewed with her palliative consult; she confirms she and family are still considering options of where he will go when he discharge from the hospital.  She also asks and CM provides basic overview of hospice care and support for her.  Rashmi discussed many changes over the past two weeks, with her dad in the hospital and they just moved her mom to memory care @7days ago.  Acknowledged these big changes for the family.  Discussed and CM will talk with Rashmi again tomorrow to review additional questions and any decisions family has made re care and support for patient.     Next Steps:   CM continues to follow for discharge needs and support.      Kyra Gallegos, ALEXEYW  Acute Care Management  [facility]  For further questions/concerns you can reach us at Vocera Web Console

## 2025-07-25 VITALS
HEART RATE: 73 BPM | DIASTOLIC BLOOD PRESSURE: 93 MMHG | HEIGHT: 67 IN | WEIGHT: 169.97 LBS | SYSTOLIC BLOOD PRESSURE: 141 MMHG | RESPIRATION RATE: 16 BRPM | TEMPERATURE: 100.1 F | OXYGEN SATURATION: 96 % | BODY MASS INDEX: 26.68 KG/M2

## 2025-07-25 LAB
ALBUMIN SERPL BCG-MCNC: 2.9 G/DL (ref 3.5–5.2)
ALP SERPL-CCNC: 53 U/L (ref 40–150)
ALT SERPL W P-5'-P-CCNC: 15 U/L (ref 0–70)
ANION GAP SERPL CALCULATED.3IONS-SCNC: 10 MMOL/L (ref 7–15)
AST SERPL W P-5'-P-CCNC: 25 U/L (ref 0–45)
BACTERIA SPEC CULT: NO GROWTH
BACTERIA SPEC CULT: NO GROWTH
BILIRUB SERPL-MCNC: 1 MG/DL
BUN SERPL-MCNC: 31 MG/DL (ref 8–23)
CALCIUM SERPL-MCNC: 8.1 MG/DL (ref 8.8–10.4)
CHLORIDE SERPL-SCNC: 101 MMOL/L (ref 98–107)
CREAT SERPL-MCNC: 1.83 MG/DL (ref 0.67–1.17)
EGFRCR SERPLBLD CKD-EPI 2021: 34 ML/MIN/1.73M2
ERYTHROCYTE [DISTWIDTH] IN BLOOD BY AUTOMATED COUNT: 14.1 % (ref 10–15)
GLUCOSE SERPL-MCNC: 127 MG/DL (ref 70–99)
HCO3 SERPL-SCNC: 24 MMOL/L (ref 22–29)
HCT VFR BLD AUTO: 32.9 % (ref 40–53)
HGB BLD-MCNC: 10.7 G/DL (ref 13.3–17.7)
INR PPP: 2.37 (ref 0.85–1.15)
MCH RBC QN AUTO: 31 PG (ref 26.5–33)
MCHC RBC AUTO-ENTMCNC: 32.5 G/DL (ref 31.5–36.5)
MCV RBC AUTO: 95 FL (ref 78–100)
PLATELET # BLD AUTO: 92 10E3/UL (ref 150–450)
POTASSIUM SERPL-SCNC: 3.7 MMOL/L (ref 3.4–5.3)
PROT SERPL-MCNC: 5.4 G/DL (ref 6.4–8.3)
PROTHROMBIN TIME: 25.7 SECONDS (ref 11.8–14.8)
RBC # BLD AUTO: 3.45 10E6/UL (ref 4.4–5.9)
SODIUM SERPL-SCNC: 135 MMOL/L (ref 135–145)
WBC # BLD AUTO: 10.3 10E3/UL (ref 4–11)

## 2025-07-25 PROCEDURE — 250N000009 HC RX 250: Performed by: HOSPITALIST

## 2025-07-25 PROCEDURE — 258N000003 HC RX IP 258 OP 636: Performed by: PSYCHIATRY & NEUROLOGY

## 2025-07-25 PROCEDURE — 250N000011 HC RX IP 250 OP 636: Performed by: INTERNAL MEDICINE

## 2025-07-25 PROCEDURE — 85610 PROTHROMBIN TIME: CPT | Performed by: EMERGENCY MEDICINE

## 2025-07-25 PROCEDURE — 85027 COMPLETE CBC AUTOMATED: CPT | Performed by: INTERNAL MEDICINE

## 2025-07-25 PROCEDURE — 99233 SBSQ HOSP IP/OBS HIGH 50: CPT | Performed by: INTERNAL MEDICINE

## 2025-07-25 PROCEDURE — 258N000003 HC RX IP 258 OP 636: Performed by: INTERNAL MEDICINE

## 2025-07-25 PROCEDURE — 36415 COLL VENOUS BLD VENIPUNCTURE: CPT | Performed by: EMERGENCY MEDICINE

## 2025-07-25 PROCEDURE — C9254 INJECTION, LACOSAMIDE: HCPCS | Performed by: PSYCHIATRY & NEUROLOGY

## 2025-07-25 PROCEDURE — 250N000011 HC RX IP 250 OP 636: Performed by: PSYCHIATRY & NEUROLOGY

## 2025-07-25 PROCEDURE — 250N000011 HC RX IP 250 OP 636: Performed by: HOSPITALIST

## 2025-07-25 PROCEDURE — 120N000001 HC R&B MED SURG/OB

## 2025-07-25 PROCEDURE — 250N000013 HC RX MED GY IP 250 OP 250 PS 637: Performed by: INTERNAL MEDICINE

## 2025-07-25 PROCEDURE — 80053 COMPREHEN METABOLIC PANEL: CPT | Performed by: INTERNAL MEDICINE

## 2025-07-25 RX ADMIN — METOPROLOL TARTRATE 2.5 MG: 5 INJECTION INTRAVENOUS at 03:08

## 2025-07-25 RX ADMIN — FLUCONAZOLE 100 MG: 2 INJECTION, SOLUTION INTRAVENOUS at 12:01

## 2025-07-25 RX ADMIN — PIPERACILLIN AND TAZOBACTAM 3.38 G: 3; .375 INJECTION, POWDER, FOR SOLUTION INTRAVENOUS at 10:12

## 2025-07-25 RX ADMIN — SODIUM CHLORIDE 150 MG: 9 INJECTION, SOLUTION INTRAVENOUS at 11:08

## 2025-07-25 RX ADMIN — DEXTROSE: 5 SOLUTION INTRAVENOUS at 10:10

## 2025-07-25 RX ADMIN — HYDROMORPHONE HYDROCHLORIDE 0.2 MG: 0.2 INJECTION, SOLUTION INTRAMUSCULAR; INTRAVENOUS; SUBCUTANEOUS at 14:06

## 2025-07-25 RX ADMIN — NITROGLYCERIN 1 PATCH: 0.2 PATCH TRANSDERMAL at 11:12

## 2025-07-25 RX ADMIN — METOPROLOL TARTRATE 2.5 MG: 5 INJECTION INTRAVENOUS at 14:06

## 2025-07-25 RX ADMIN — METOPROLOL TARTRATE 2.5 MG: 5 INJECTION INTRAVENOUS at 20:36

## 2025-07-25 RX ADMIN — HYDROMORPHONE HYDROCHLORIDE 0.2 MG: 0.2 INJECTION, SOLUTION INTRAMUSCULAR; INTRAVENOUS; SUBCUTANEOUS at 10:03

## 2025-07-25 RX ADMIN — METOPROLOL TARTRATE 2.5 MG: 5 INJECTION INTRAVENOUS at 09:58

## 2025-07-25 RX ADMIN — PIPERACILLIN AND TAZOBACTAM 3.38 G: 3; .375 INJECTION, POWDER, FOR SOLUTION INTRAVENOUS at 17:16

## 2025-07-25 RX ADMIN — HYDROMORPHONE HYDROCHLORIDE 0.2 MG: 0.2 INJECTION, SOLUTION INTRAMUSCULAR; INTRAVENOUS; SUBCUTANEOUS at 03:19

## 2025-07-25 RX ADMIN — PIPERACILLIN AND TAZOBACTAM 3.38 G: 3; .375 INJECTION, POWDER, FOR SOLUTION INTRAVENOUS at 03:09

## 2025-07-25 RX ADMIN — SODIUM CHLORIDE 150 MG: 9 INJECTION, SOLUTION INTRAVENOUS at 20:49

## 2025-07-25 RX ADMIN — PIPERACILLIN AND TAZOBACTAM 3.38 G: 3; .375 INJECTION, POWDER, FOR SOLUTION INTRAVENOUS at 22:06

## 2025-07-25 RX ADMIN — LABETALOL HYDROCHLORIDE 10 MG: 5 INJECTION, SOLUTION INTRAVENOUS at 17:12

## 2025-07-25 ASSESSMENT — ACTIVITIES OF DAILY LIVING (ADL)
ADLS_ACUITY_SCORE: 84
ADLS_ACUITY_SCORE: 83

## 2025-07-25 NOTE — PLAN OF CARE
Problem: Adult Inpatient Plan of Care  Goal: Optimal Comfort and Wellbeing  Outcome: Progressing  Intervention: Provide Person-Centered Care  Recent Flowsheet Documentation  Taken 7/25/2025 0200 by Maye Kumar RN  Trust Relationship/Rapport: reassurance provided  Taken 7/24/2025 2000 by Maye Kumar RN  Trust Relationship/Rapport: reassurance provided     Problem: Pain Acute  Goal: Optimal Pain Control and Function  Outcome: Progressing  Intervention: Prevent or Manage Pain  Recent Flowsheet Documentation  Taken 7/25/2025 0200 by Maye Kumar RN  Medication Review/Management: medications reviewed  Taken 7/24/2025 2000 by Maye Kumar RN  Medication Review/Management: medications reviewed     Problem: Seizure, Active Management  Goal: Absence of Seizure/Seizure-Related Injury  Outcome: Progressing   Goal Outcome Evaluation:         Pt is somnolent. Prn dilaudid x 1 was administer for pain and effective. Turn and repo q2hr. VSS, continue to monitor. Maye Kumar RN

## 2025-07-25 NOTE — PLAN OF CARE
Problem: Adult Inpatient Plan of Care  Goal: Plan of Care Review  Outcome: Progressing  Flowsheets (Taken 7/25/2025 5252)  Plan of Care Reviewed With: patient  Overall Patient Progress: declining    Problem: Pain Acute  Goal: Optimal Pain Control and Function  Outcome: Progressing     Problem: Seizure, Active Management  Goal: Absence of Seizure/Seizure-Related Injury  Outcome: Progressing  Goal: Optimal Comfort and Wellbeing  Outcome: Progressing       Goal Outcome Evaluation:      Plan of Care Reviewed With: patient    Overall Patient Progress: decliningOverall Patient Progress: declining    Alert with touch, [place], has pocket talker at bedside. Afebrile. LS dim. No cough noted. Has moments of sleep apnea, O2 sats dip to low 80s and recovers, mouth breathes. O2 sat 96% 3L NC. BP managed with scheduled metoprolol. Has PRN Labetalol for SBP > 170. Has history of injury to L shoulder, family requested no BP to L arm. NPO. PIV RFA infusing D5 @ 75 ml/hr. Family reports patient moans and groans in pain at baseline, normal behavior. PRN dilaudid every 4 hours for s/s of pain or discomfort, given x2 with activities and cares. Family at bedside this afternoon and requested to meet with provider, hospitalist paged. Advanced care directive brought in by patient's daughter, copy made to chart and original returned to family. Questions and concerns addressed. Will continue to monitor.    Aura Botello RN

## 2025-07-25 NOTE — PROGRESS NOTES
Care Management Follow Up    Length of Stay (days): 15    Expected Discharge Date: 07/26/2025    Anticipated Discharge Plan:  TBD    Transportation: TBD    PT Recommendations: Transitional Care Facility, Per plan established by the PT  OT Recommendations:        Barriers to Discharge: medical stability, diagnostic workup, comfort care as of 07/25    Prior Living Situation: assisted living with facility resident    Discussed  Partnership in Safe Discharge Planning  document with patient/family: No     Handoff Completed: No, handoff not indicated or clinically appropriate    Patient/Spokesperson Updated: No    Additional Information:  CM reviewed chart, discussed with rounding MD.  Per MD, they met with patient and family, reviewed current status and prognosis - patient has now been transitioned to comfort care.  MD feels patient may pass in the hospital over the weekend.  CM will not meet with family today, per MD, they are processing this change for patient.    Next Steps:   Anticipate no needs from care management at this time. CM continues to be available to support family as needed, requested.     LAWSON Montero  Acute Care Management  [facility]  For further questions/concerns you can reach us at Vocera Web Console

## 2025-07-25 NOTE — PROGRESS NOTES
CLINICAL NUTRITION SERVICES - REASSESSMENT NOTE     RECOMMENDATIONS FOR MDs/PROVIDERS TO ORDER:    Registered Dietitian Interventions:  None at this time    Future/Additional Recommendations:  Follow for GOC     CURRENT NUTRITION ORDERS  Diet: NPO  IVF D 5% @ 75 ml/hr    Pt is very lethargic and unable to speak coherently, appears to be declining per nursing note  NPO x  6+ days  Per MD waiting for final decision from family to transition to comfort care    NEW FINDINGS  GI symptoms: WDL, fecal incontinence, last BM not documented  Skin/wounds: Abrasion/excoriation left upper arm/forearm.  Redness/blanchable sacrum/coccyx    Nutrition-relevant labs: Urea nitrogen 31 (H), Cr 1.83 (H), Glu 127 (H)  Nutrition-relevant medications: diflucan, vimpat, lopressor, zosyn  Weight: 77.1 Kg ( 169 lb 15.6 oz)  Wt Readings from Last 10 Encounters:   07/24/25 77.1 kg (169 lb 15.6 oz)   06/05/25 75.3 kg (166 lb)   06/03/25 75.3 kg (166 lb)   04/30/25 75.3 kg (166 lb)   02/21/25 74.8 kg (165 lb)   01/28/25 74.8 kg (165 lb)   01/02/25 73.5 kg (162 lb)   12/21/24 75 kg (165 lb 5.5 oz)   12/02/24 74.8 kg (165 lb)   09/30/24 75.3 kg (166 lb)     MALNUTRITION  % Intake: </= 50% for >/= 5 days (severe)  % Weight Loss: None noted  Subcutaneous Fat Loss: Triceps: Mild  Muscle Loss: Temples (temporalis muscle): Mild and Clavicles (pectoralis and deltoids): Mild  Fluid Accumulation/Edema: Mild, 1+ ( generalized)  Malnutrition Diagnosis: Moderate malnutrition in the context of acute illness or injury  Malnutrition Present on Admission: No    NUTRITION DIAGNOSIS  Inadequate oral intake related to AMS/lethargy as evidenced by NPO    INTERVENTIONS  Will follow for GOC  RD available if nutrition needs arise    GOALS  Per GOC     MONITORING/EVALUATION  Progress toward goals will be monitored and evaluated per policy.

## 2025-07-25 NOTE — PROGRESS NOTES
[facility]    PROGRESS NOTE - Hospitalist Service    Assessment and Plan  89 years old male with a past medical history of SSS s/p PPM, HTN, CAD, CKD3, chronic atrial fibrillation on warfarin, HFrEF,  NSVT, gout, nephrolithiasis, aortic regurgitation, pulmonary hypertension, and prior CVA admitted on 7/9/2025 from detention with seizure-like activity.  Started on keppra and with worsening mental status, changed to depakote which what anticoagualtion challenging and was switched to vimpat.   CT head without explanation.   EEG with seizure activity.  Unable to get MRI due to PPM which is not compatible.  Underwent LP which did not provide an explanation (HHV6 which is usually an incidental finding, 0 WBC, mildly elevated glycose and protein).    Course complicated PRICILA, persistent encephalopathy, hypernatremia and aspiration PNA     Acute respiratory failure with hypoxia  - Secondary to aspiration pneumonia with sepsis   -continue zosyn, would complete 7-day treatment   - O2 as needed maintain sats > 90%  - Continue NPO      Seizure-like activity  Acute metabolic encephalopathy, persistent  -vimpat  -Neurology consult, appreciate input      Hypernatremia   - due to free water deficit in the setting of encephalopathy  Improved with D5, will continue  -Still sodium level        Urinary retention -   - S/P Chandler.    - Patient is approaching end of life and will keep for comfort.     PRICILA on CKD3b  -Holding  PTA chlorthalidone 25 mg and spironolactone for now  -Hold gabapentin     Hyperkalemia-resolved     Hypertension   - PTA meds: Metoprolol tartrate and hydralazine  -Patient is n.p.o. unable to take oral because of lethargy  -Continue IV metoprolol, IV hydralazine     Atrial fibrillation  Sick sinus syndrome status post pacemaker placement  -lopressor IV  - Unable to administer warfarin in the setting of encephalopathy, will use prophylactic subcutaneous heparin once INR is subtherapeutic       chronic  HFrEF (EF 25 to 30%) - euvolemic       CAD - PTA atorvastatin 20 mg daily, on hold due to n.p.o.      Oral thrush - was on nystatin, unable to take, can give fluconazole instead.     Goals of care   - Patient is critically ill with guarded prognosis   - He is likely approaching end of life.     - Mr. Newman is already ~15 days into admission and still profoundly encephalopathic and now with PNA.    - Palliative care is following.   -Had family care conference today with patient daughter Rashmi and son Yaniv to discuss patient current medical condition and explaining treatment plan.  -Patient's family is understanding the current situation and is probably looking towards moving to comfort care but requested more time today to discuss with other family members.  -Family would like to keep patient in the hospital if transition to comfort care  -Awaiting on final decision from family to transition to comfort care    50 MINUTES SPENT BY ME on the date of service doing chart review, history, exam, documentation & further activities per the note    Principal Problem:    Seizure-like activity (H)  Active Problems:    Pure hypercholesterolemia    Primary hypertension    CAD -- S/P Stents 2001, 2012, 2014    SSS -- S/P dual chamber Pacemaker    Stage 3a chronic kidney disease (H)    Chronic atrial fibrillation (H)    Chronic heart failure with reduced ejection fraction (HFrEF, <= 40%) (H)    Lactic acidosis    Increased anion gap metabolic acidosis    Hyponatremia    Anticoagulated on warfarin    Postictal state (H)      VTE prophylaxis:  Pneumatic Compression Devices  DIET: Orders Placed This Encounter      NPO for Medical/Clinical Reasons Except for: Meds      Disposition/Barriers to discharge: End-of-life care, transition to comfort care.  Medically Ready for Discharge: Anticipated in 2-4 Days    Code Status: No CPR- Do NOT Intubate    Subjective:  Skip is very lethargic, responding to the best my briefly with slurred  speech.  No fever or chills overnight    PHYSICAL EXAM  Vitals:    07/22/25 0910 07/23/25 0500 07/24/25 0654   Weight: 69.3 kg (152 lb 12.5 oz) 69.5 kg (153 lb 3.5 oz) 77.1 kg (169 lb 15.6 oz)     B/P:195/84 T:98.7 P:66 R:18     Intake/Output Summary (Last 24 hours) at 7/25/2025 1500  Last data filed at 7/25/2025 1412  Gross per 24 hour   Intake 1815 ml   Output 1425 ml   Net 390 ml      Body mass index is 26.62 kg/m .    Constitutional: Lethargic, sleeping comfortable in no apparent distress, and appears stated age  Respiratory: Decreased breath sounds on lung bases.    Cardiovascular: Normal apical impulse, regular rate and rhythm, normal S1 and S2, soft systolic murmur in aortic area.  GI: No scars, normal bowel sounds, soft, non-distended, non-tender, no masses palpated, no hepatosplenomegally  Skin: no bruising or bleeding and normal skin color, texture, turgor  Musculoskeletal: There is no redness, warmth, or swelling of the joints.  Full range of motion noted.  no lower extremity pitting edema present  Neurologic: Lethargic, responding to verbal stimuli briefly.  Unable to do full neuroexam      PERTINENT LABS/IMAGING:    I have personally reviewed the following data over the past 24 hrs:    10.3  \   10.7 (L)   / 92 (L)     135 101 31.0 (H) /  127 (H)   3.7 24 1.83 (H) \     ALT: 15 AST: 25 AP: 53 TBILI: 1.0   ALB: 2.9 (L) TOT PROTEIN: 5.4 (L) LIPASE: N/A     INR:  2.37 (H) PTT:  N/A   D-dimer:  N/A Fibrinogen:  N/A       Imaging results reviewed over the past 24 hrs:   No results found for this or any previous visit (from the past 24 hours).    Discussed with patient, family, nursing staff and discharge planner    Fer Domingo MD  [facility] Medicine Service  [phone]

## 2025-07-26 LAB
ANION GAP SERPL CALCULATED.3IONS-SCNC: 11 MMOL/L (ref 7–15)
BUN SERPL-MCNC: 25.3 MG/DL (ref 8–23)
CALCIUM SERPL-MCNC: 8.3 MG/DL (ref 8.8–10.4)
CHLORIDE SERPL-SCNC: 101 MMOL/L (ref 98–107)
CREAT SERPL-MCNC: 1.65 MG/DL (ref 0.67–1.17)
EGFRCR SERPLBLD CKD-EPI 2021: 39 ML/MIN/1.73M2
GLUCOSE SERPL-MCNC: 111 MG/DL (ref 70–99)
HCO3 SERPL-SCNC: 25 MMOL/L (ref 22–29)
HOLD SPECIMEN: NORMAL
INR PPP: 2.09 (ref 0.85–1.15)
POTASSIUM SERPL-SCNC: 3.3 MMOL/L (ref 3.4–5.3)
PROTHROMBIN TIME: 23.4 SECONDS (ref 11.8–14.8)
SODIUM SERPL-SCNC: 137 MMOL/L (ref 135–145)

## 2025-07-26 PROCEDURE — 250N000011 HC RX IP 250 OP 636: Performed by: INTERNAL MEDICINE

## 2025-07-26 PROCEDURE — 250N000009 HC RX 250: Performed by: HOSPITALIST

## 2025-07-26 PROCEDURE — 250N000011 HC RX IP 250 OP 636: Mod: JW | Performed by: PSYCHIATRY & NEUROLOGY

## 2025-07-26 PROCEDURE — 85610 PROTHROMBIN TIME: CPT | Performed by: EMERGENCY MEDICINE

## 2025-07-26 PROCEDURE — C9254 INJECTION, LACOSAMIDE: HCPCS | Mod: JW | Performed by: PSYCHIATRY & NEUROLOGY

## 2025-07-26 PROCEDURE — 258N000001 HC RX 258: Performed by: INTERNAL MEDICINE

## 2025-07-26 PROCEDURE — 120N000001 HC R&B MED SURG/OB

## 2025-07-26 PROCEDURE — 80048 BASIC METABOLIC PNL TOTAL CA: CPT | Performed by: INTERNAL MEDICINE

## 2025-07-26 PROCEDURE — 250N000011 HC RX IP 250 OP 636: Performed by: HOSPITALIST

## 2025-07-26 PROCEDURE — 258N000003 HC RX IP 258 OP 636: Performed by: PSYCHIATRY & NEUROLOGY

## 2025-07-26 PROCEDURE — 250N000013 HC RX MED GY IP 250 OP 250 PS 637: Performed by: INTERNAL MEDICINE

## 2025-07-26 PROCEDURE — 258N000003 HC RX IP 258 OP 636: Performed by: INTERNAL MEDICINE

## 2025-07-26 PROCEDURE — 36415 COLL VENOUS BLD VENIPUNCTURE: CPT | Performed by: INTERNAL MEDICINE

## 2025-07-26 PROCEDURE — 99233 SBSQ HOSP IP/OBS HIGH 50: CPT | Performed by: INTERNAL MEDICINE

## 2025-07-26 RX ORDER — HYDROMORPHONE HCL IN WATER/PF 6 MG/30 ML
0.2 PATIENT CONTROLLED ANALGESIA SYRINGE INTRAVENOUS EVERY 6 HOURS
Status: DISCONTINUED | OUTPATIENT
Start: 2025-07-26 | End: 2025-07-27

## 2025-07-26 RX ORDER — DEXTROSE MONOHYDRATE, SODIUM CHLORIDE, AND POTASSIUM CHLORIDE 50; 1.49; 9 G/1000ML; G/1000ML; G/1000ML
INJECTION, SOLUTION INTRAVENOUS CONTINUOUS
Status: DISPENSED | OUTPATIENT
Start: 2025-07-26 | End: 2025-07-28

## 2025-07-26 RX ADMIN — DEXTROSE: 5 SOLUTION INTRAVENOUS at 03:08

## 2025-07-26 RX ADMIN — FLUCONAZOLE 100 MG: 2 INJECTION, SOLUTION INTRAVENOUS at 12:06

## 2025-07-26 RX ADMIN — SODIUM CHLORIDE 150 MG: 9 INJECTION, SOLUTION INTRAVENOUS at 09:45

## 2025-07-26 RX ADMIN — METOPROLOL TARTRATE 2.5 MG: 5 INJECTION INTRAVENOUS at 03:08

## 2025-07-26 RX ADMIN — METOPROLOL TARTRATE 2.5 MG: 5 INJECTION INTRAVENOUS at 09:42

## 2025-07-26 RX ADMIN — NITROGLYCERIN 1 PATCH: 0.2 PATCH TRANSDERMAL at 11:05

## 2025-07-26 RX ADMIN — PIPERACILLIN AND TAZOBACTAM 3.38 G: 3; .375 INJECTION, POWDER, FOR SOLUTION INTRAVENOUS at 11:06

## 2025-07-26 RX ADMIN — PIPERACILLIN AND TAZOBACTAM 3.38 G: 3; .375 INJECTION, POWDER, FOR SOLUTION INTRAVENOUS at 03:07

## 2025-07-26 RX ADMIN — HYDROMORPHONE HYDROCHLORIDE 0.2 MG: 0.2 INJECTION, SOLUTION INTRAMUSCULAR; INTRAVENOUS; SUBCUTANEOUS at 20:33

## 2025-07-26 RX ADMIN — DEXTROSE, SODIUM CHLORIDE, AND POTASSIUM CHLORIDE: 5; .9; .15 INJECTION INTRAVENOUS at 13:50

## 2025-07-26 RX ADMIN — METOPROLOL TARTRATE 2.5 MG: 5 INJECTION INTRAVENOUS at 20:33

## 2025-07-26 RX ADMIN — HYDROMORPHONE HYDROCHLORIDE 0.2 MG: 0.2 INJECTION, SOLUTION INTRAMUSCULAR; INTRAVENOUS; SUBCUTANEOUS at 13:52

## 2025-07-26 RX ADMIN — HYDROMORPHONE HYDROCHLORIDE 0.2 MG: 0.2 INJECTION, SOLUTION INTRAMUSCULAR; INTRAVENOUS; SUBCUTANEOUS at 09:44

## 2025-07-26 RX ADMIN — SODIUM CHLORIDE 150 MG: 9 INJECTION, SOLUTION INTRAVENOUS at 20:34

## 2025-07-26 RX ADMIN — METOPROLOL TARTRATE 2.5 MG: 5 INJECTION INTRAVENOUS at 13:51

## 2025-07-26 ASSESSMENT — ACTIVITIES OF DAILY LIVING (ADL)
ADLS_ACUITY_SCORE: 81
ADLS_ACUITY_SCORE: 84
ADLS_ACUITY_SCORE: 86
ADLS_ACUITY_SCORE: 86
ADLS_ACUITY_SCORE: 84
ADLS_ACUITY_SCORE: 85
ADLS_ACUITY_SCORE: 84
ADLS_ACUITY_SCORE: 86
ADLS_ACUITY_SCORE: 86
ADLS_ACUITY_SCORE: 85
ADLS_ACUITY_SCORE: 86
ADLS_ACUITY_SCORE: 81
ADLS_ACUITY_SCORE: 86
ADLS_ACUITY_SCORE: 84
ADLS_ACUITY_SCORE: 86
ADLS_ACUITY_SCORE: 81
ADLS_ACUITY_SCORE: 86
ADLS_ACUITY_SCORE: 81
ADLS_ACUITY_SCORE: 85

## 2025-07-26 NOTE — PLAN OF CARE
Problem: Adult Inpatient Plan of Care  Goal: Optimal Comfort and Wellbeing  Outcome: Progressing     Problem: Delirium  Goal: Improved Sleep  Outcome: Progressing     Problem: Pain Acute  Goal: Optimal Pain Control and Function  Outcome: Progressing  Intervention: Prevent or Manage Pain  Recent Flowsheet Documentation  Taken 7/26/2025 0010 by Roc Santoyo, RN  Medication Review/Management: medications reviewed   Goal Outcome Evaluation:    Confused and very lethargic tonight. Resting and only reacting to pain and repositioning.     Desatting on room air, stable on 2L O2. Apneic at times.     Incontinent of stool x2. Chandler intact and patent. Repositioning ~q2hrs.        Roc Santoyo, RN

## 2025-07-26 NOTE — PROGRESS NOTES
[facility]    PROGRESS NOTE - Hospitalist Service    Assessment and Plan  89 years old male with a past medical history of SSS s/p PPM, HTN, CAD, CKD3, chronic atrial fibrillation on warfarin, HFrEF,  NSVT, gout, nephrolithiasis, aortic regurgitation, pulmonary hypertension, and prior CVA admitted on 7/9/2025 from [facility] with seizure-like activity.  Started on keppra and with worsening mental status, changed to depakote which what anticoagualtion challenging and was switched to vimpat.   CT head without explanation.   EEG with seizure activity.  Unable to get MRI due to PPM which is not compatible.  Underwent LP which did not provide an explanation (HHV6 which is usually an incidental finding, 0 WBC, mildly elevated glycose and protein).    Course complicated PRICILA, persistent encephalopathy, hypernatremia and aspiration PNA     Acute respiratory failure with hypoxia  - Secondary to aspiration pneumonia with sepsis   -Stable on IV Zosyn  - O2 as needed maintain sats > 90%  - Continue NPO   - Discontinue antibiotic today 7/26/2025 after discussion with family     Seizure-like activity  Acute metabolic encephalopathy, persistent  - Continue IV vimpat  -Neurology consult, appreciate input      Hypernatremia   - due to free water deficit in the setting of encephalopathy  Improved with D5, will continue  -Stable sodium level     Urinary retention -   - S/P Chandler.    - Patient is approaching end of life and will keep for comfort.     PRICILA on CKD3b  -Holding  PTA chlorthalidone 25 mg and spironolactone for now  -Hold gabapentin     Hyperkalemia-resolved     Hypertension   - PTA meds: Metoprolol tartrate and hydralazine  -Patient is n.p.o. unable to take oral because of lethargy  -Continue IV metoprolol, IV hydralazine     Atrial fibrillation  Sick sinus syndrome status post pacemaker placement  -lopressor IV  - Unable to administer warfarin in the setting of encephalopathy, will use prophylactic  subcutaneous heparin once INR is subtherapeutic       chronic HFrEF (EF 25 to 30%) - euvolemic       CAD - PTA atorvastatin 20 mg daily, on hold due to n.p.o.      Oral thrush - was on nystatin, unable to take, can give fluconazole instead.     Goals of care   - Patient is critically ill with guarded prognosis   - He is likely approaching end of life.     - Mr. Newman is already ~15 days into admission and still profoundly encephalopathic and now with PNA.    - Palliative care is following.   -Had family care conference today with patient daughter Rashmi and son Yaniv to discuss patient current medical condition and explaining treatment plan.  -Patient's family is understanding the current situation and is probably looking towards moving to comfort care but requested more time today to discuss with other family members.  -Family would like to keep patient in the hospital if transition to comfort care  -Discussed again with patient's daughter Rashmi on the phone and she agreed on scheduling pain medication and discontinue antibiotic.  - She is expecting more family members coming to visit tomorrow and Monday-and she will make the final decision afterwards.  -Awaiting on final decision from family to transition to comfort care    50 MINUTES SPENT BY ME on the date of service doing chart review, history, exam, documentation & further activities per the note    Principal Problem:    Seizure-like activity (H)  Active Problems:    Pure hypercholesterolemia    Primary hypertension    CAD -- S/P Stents 2001, 2012, 2014    SSS -- S/P dual chamber Pacemaker    Stage 3a chronic kidney disease (H)    Chronic atrial fibrillation (H)    Chronic heart failure with reduced ejection fraction (HFrEF, <= 40%) (H)    Lactic acidosis    Increased anion gap metabolic acidosis    Hyponatremia    Anticoagulated on warfarin    Postictal state (H)      VTE prophylaxis:  Pneumatic Compression Devices  DIET: Orders Placed This Encounter      NPO  for Medical/Clinical Reasons Except for: Meds      Disposition/Barriers to discharge: End-of-life care discussion  Medically Ready for Discharge: Anticipated in 2-4 Days   Code Status: No CPR- Do NOT Intubate    Subjective:  Skip is very obtunded, minimally verbal response with morning.      PHYSICAL EXAM  Vitals:    07/23/25 0500 07/24/25 0654 07/26/25 0500   Weight: 69.5 kg (153 lb 3.5 oz) 77.1 kg (169 lb 15.6 oz) 76.5 kg (168 lb 10.4 oz)     B/P:129/61 T:98.2 P:70 R:16     Intake/Output Summary (Last 24 hours) at 7/26/2025 1306  Last data filed at 7/26/2025 1106  Gross per 24 hour   Intake 2287.5 ml   Output 2150 ml   Net 137.5 ml      Body mass index is 26.41 kg/m .    Constitutional: Lethargic, sleeping comfortable in no apparent distress, and appears stated age  Respiratory: Decreased breath sounds on lung bases.    Cardiovascular: Normal apical impulse, regular rate and rhythm, normal S1 and S2, soft systolic murmur in aortic area.  GI: No scars, normal bowel sounds, soft, non-distended, non-tender, no masses palpated, no hepatosplenomegally  Skin: no bruising or bleeding and normal skin color, texture, turgor  Musculoskeletal: There is no redness, warmth, or swelling of the joints.  Full range of motion noted.  no lower extremity pitting edema present  Neurologic: Lethargic, responding to verbal stimuli briefly.  Unable to do full neuroexam      PERTINENT LABS/IMAGING:    I have personally reviewed the following data over the past 24 hrs:    N/A  \   N/A   / N/A     137 101 25.3 (H) /  111 (H)   3.3 (L) 25 1.65 (H) \     INR:  2.09 (H) PTT:  N/A   D-dimer:  N/A Fibrinogen:  N/A       Imaging results reviewed over the past 24 hrs:   No results found for this or any previous visit (from the past 24 hours).    Discussed with family, nursing staff and discharge planner    Fer Domingo MD  [facility] Medicine Service  [phone]

## 2025-07-26 NOTE — PLAN OF CARE
Problem: Adult Inpatient Plan of Care  Goal: Absence of Hospital-Acquired Illness or Injury  Outcome: Progressing  Intervention: Identify and Manage Fall Risk  Recent Flowsheet Documentation  Taken 7/25/2025 1600 by Caroline Dalal RN  Safety Promotion/Fall Prevention: safety round/check completed  Intervention: Prevent Skin Injury  Recent Flowsheet Documentation  Taken 7/25/2025 1700 by Caroline Dalal RN  Body Position:   turned   right   heels elevated  Taken 7/25/2025 1600 by Caroline Dalal RN  Body Position: weight shifting     Problem: Pain Acute  Goal: Optimal Pain Control and Function  Outcome: Progressing  Intervention: Prevent or Manage Pain  Recent Flowsheet Documentation  Taken 7/25/2025 1600 by Caroline Dalal RN  Medication Review/Management: medications reviewed     Problem: Seizure, Active Management  Goal: Absence of Seizure/Seizure-Related Injury  Outcome: Progressing       Goal Outcome Evaluation:    Lethargic. 2 liter nasal cannula. Prn labetolol x1 for blood pressure 190/80. Blood pressure recheck was 133/63. Chandler yellow output. Bowel movement x2.

## 2025-07-26 NOTE — PLAN OF CARE
Problem: Comorbidity Management  Goal: Blood Pressure in Desired Range  Intervention: Maintain Blood Pressure Management  Recent Flowsheet Documentation  Taken 7/26/2025 0945 by Makenzie aRndhawa RN  Medication Review/Management: medications reviewed  Goal: Maintenance of Heart Failure Symptom Control  Intervention: Maintain Heart Failure Management  Recent Flowsheet Documentation  Taken 7/26/2025 0945 by Makenzie Randhawa RN  Medication Review/Management: medications reviewed     Problem: Pain Acute  Goal: Optimal Pain Control and Function  Intervention: Prevent or Manage Pain  Recent Flowsheet Documentation  Taken 7/26/2025 0945 by Makenzie Randhawa RN  Medication Review/Management: medications reviewed     Problem: Acute Kidney Injury/Impairment  Goal: Fluid and Electrolyte Balance  Intervention: Monitor and Manage Fluid and Electrolyte Balance  Recent Flowsheet Documentation  Taken 7/26/2025 0945 by Makenzie Randhawa RN  Fluid/Electrolyte Management: intravenous fluid replacement initiated  Goal: Effective Renal Function  Intervention: Monitor and Support Renal Function  Recent Flowsheet Documentation  Taken 7/26/2025 0945 by Makenzie Randhawa RN  Medication Review/Management: medications reviewed   Goal Outcome Evaluation:  Patient lethargic. Moans with reposition q 2 hrs.  VSS.  IV Dilaudid prn given X1 now scheduled every 6 hrs.  IVF changed to D5 NS with KCL of 20 mEq/L running at 50 mls/hr  Chandler Patient draining clear annie Colour.  Goal of care is still in  progressing waiting for Family to agree to comfort care.    Will continue  to monitor.

## 2025-07-27 LAB
ANION GAP SERPL CALCULATED.3IONS-SCNC: 11 MMOL/L (ref 7–15)
BUN SERPL-MCNC: 22 MG/DL (ref 8–23)
CALCIUM SERPL-MCNC: 8.4 MG/DL (ref 8.8–10.4)
CHLORIDE SERPL-SCNC: 105 MMOL/L (ref 98–107)
CREAT SERPL-MCNC: 1.51 MG/DL (ref 0.67–1.17)
EGFRCR SERPLBLD CKD-EPI 2021: 43 ML/MIN/1.73M2
GLUCOSE SERPL-MCNC: 100 MG/DL (ref 70–99)
HCO3 SERPL-SCNC: 24 MMOL/L (ref 22–29)
INR PPP: 1.98 (ref 0.85–1.15)
MCV RBC AUTO: 94 FL (ref 78–100)
PLATELET # BLD AUTO: 91 10E3/UL (ref 150–450)
POTASSIUM SERPL-SCNC: 3.6 MMOL/L (ref 3.4–5.3)
PROTHROMBIN TIME: 22.5 SECONDS (ref 11.8–14.8)
SODIUM SERPL-SCNC: 140 MMOL/L (ref 135–145)

## 2025-07-27 PROCEDURE — 250N000009 HC RX 250: Performed by: HOSPITALIST

## 2025-07-27 PROCEDURE — 85610 PROTHROMBIN TIME: CPT | Performed by: EMERGENCY MEDICINE

## 2025-07-27 PROCEDURE — 36415 COLL VENOUS BLD VENIPUNCTURE: CPT | Performed by: HOSPITALIST

## 2025-07-27 PROCEDURE — 258N000003 HC RX IP 258 OP 636: Performed by: PSYCHIATRY & NEUROLOGY

## 2025-07-27 PROCEDURE — 258N000001 HC RX 258: Performed by: INTERNAL MEDICINE

## 2025-07-27 PROCEDURE — C9254 INJECTION, LACOSAMIDE: HCPCS | Performed by: PSYCHIATRY & NEUROLOGY

## 2025-07-27 PROCEDURE — 250N000013 HC RX MED GY IP 250 OP 250 PS 637: Performed by: INTERNAL MEDICINE

## 2025-07-27 PROCEDURE — 80048 BASIC METABOLIC PNL TOTAL CA: CPT | Performed by: INTERNAL MEDICINE

## 2025-07-27 PROCEDURE — 120N000001 HC R&B MED SURG/OB

## 2025-07-27 PROCEDURE — 250N000011 HC RX IP 250 OP 636: Performed by: INTERNAL MEDICINE

## 2025-07-27 PROCEDURE — 99232 SBSQ HOSP IP/OBS MODERATE 35: CPT | Performed by: INTERNAL MEDICINE

## 2025-07-27 PROCEDURE — 85049 AUTOMATED PLATELET COUNT: CPT | Performed by: HOSPITALIST

## 2025-07-27 PROCEDURE — 250N000011 HC RX IP 250 OP 636: Performed by: PSYCHIATRY & NEUROLOGY

## 2025-07-27 RX ORDER — HYDROMORPHONE HYDROCHLORIDE 1 MG/ML
0.5 INJECTION, SOLUTION INTRAMUSCULAR; INTRAVENOUS; SUBCUTANEOUS EVERY 6 HOURS
Status: DISCONTINUED | OUTPATIENT
Start: 2025-07-27 | End: 2025-07-28

## 2025-07-27 RX ADMIN — METOPROLOL TARTRATE 2.5 MG: 5 INJECTION INTRAVENOUS at 13:26

## 2025-07-27 RX ADMIN — SODIUM CHLORIDE 150 MG: 9 INJECTION, SOLUTION INTRAVENOUS at 21:11

## 2025-07-27 RX ADMIN — DEXTROSE, SODIUM CHLORIDE, AND POTASSIUM CHLORIDE: 5; .9; .15 INJECTION INTRAVENOUS at 13:27

## 2025-07-27 RX ADMIN — NITROGLYCERIN 1 PATCH: 0.2 PATCH TRANSDERMAL at 08:28

## 2025-07-27 RX ADMIN — METOPROLOL TARTRATE 2.5 MG: 5 INJECTION INTRAVENOUS at 21:11

## 2025-07-27 RX ADMIN — HYDROMORPHONE HYDROCHLORIDE 0.2 MG: 0.2 INJECTION, SOLUTION INTRAMUSCULAR; INTRAVENOUS; SUBCUTANEOUS at 08:02

## 2025-07-27 RX ADMIN — SODIUM CHLORIDE 150 MG: 9 INJECTION, SOLUTION INTRAVENOUS at 08:25

## 2025-07-27 RX ADMIN — HYDROMORPHONE HYDROCHLORIDE 0.2 MG: 0.2 INJECTION, SOLUTION INTRAMUSCULAR; INTRAVENOUS; SUBCUTANEOUS at 01:29

## 2025-07-27 RX ADMIN — HYDROMORPHONE HYDROCHLORIDE 0.5 MG: 1 INJECTION, SOLUTION INTRAMUSCULAR; INTRAVENOUS; SUBCUTANEOUS at 21:10

## 2025-07-27 RX ADMIN — METOPROLOL TARTRATE 2.5 MG: 5 INJECTION INTRAVENOUS at 01:29

## 2025-07-27 RX ADMIN — METOPROLOL TARTRATE 2.5 MG: 5 INJECTION INTRAVENOUS at 08:03

## 2025-07-27 RX ADMIN — HYDROMORPHONE HYDROCHLORIDE 0.5 MG: 1 INJECTION, SOLUTION INTRAMUSCULAR; INTRAVENOUS; SUBCUTANEOUS at 13:27

## 2025-07-27 ASSESSMENT — ACTIVITIES OF DAILY LIVING (ADL)
ADLS_ACUITY_SCORE: 85
ADLS_ACUITY_SCORE: 86
ADLS_ACUITY_SCORE: 85
ADLS_ACUITY_SCORE: 86
ADLS_ACUITY_SCORE: 85
ADLS_ACUITY_SCORE: 86
ADLS_ACUITY_SCORE: 86
ADLS_ACUITY_SCORE: 85

## 2025-07-27 NOTE — PLAN OF CARE
Problem: Adult Inpatient Plan of Care  Goal: Optimal Comfort and Wellbeing  Outcome: Progressing     Problem: Delirium  Goal: Improved Sleep  Outcome: Progressing     Problem: Pain Acute  Goal: Optimal Pain Control and Function  Outcome: Progressing  Intervention: Prevent or Manage Pain  Recent Flowsheet Documentation  Taken 7/27/2025 0130 by Roc Santoyo, RN  Medication Review/Management: medications reviewed  Taken 7/26/2025 2030 by Roc Santoyo, RN  Medication Review/Management: medications reviewed   Goal Outcome Evaluation:    Very lethargic and obtunded. Moans with cares; appears comfortable at rest and with scheduled dilaudid.     On 2L O2. Apneic at times.    Stool incontinence x2. Chandler patent and intact.     Turn and repo q2 hrs. Heels suspended. Oral cares provided q2 hrs.         Roc Santoyo RN

## 2025-07-27 NOTE — PLAN OF CARE
Problem: Adult Inpatient Plan of Care  Goal: Absence of Hospital-Acquired Illness or Injury  Intervention: Identify and Manage Fall Risk  Recent Flowsheet Documentation  Taken 7/27/2025 0830 by January Frances RN  Safety Promotion/Fall Prevention:   activity supervised   assistive device/personal items within reach   clutter free environment maintained   increased rounding and observation   safety round/check completed     Problem: Delirium  Goal: Improved Behavioral Control  Intervention: Minimize Safety Risk  Recent Flowsheet Documentation  Taken 7/27/2025 0830 by January Frances RN  Enhanced Safety Measures: pain management     Problem: Pain Acute  Goal: Optimal Pain Control and Function  Intervention: Prevent or Manage Pain  Recent Flowsheet Documentation  Taken 7/27/2025 0830 by January Frances RN  Medication Review/Management: medications reviewed     Problem: Comorbidity Management  Goal: Blood Pressure in Desired Range  Intervention: Maintain Blood Pressure Management  Recent Flowsheet Documentation  Taken 7/27/2025 0830 by January Frances RN  Medication Review/Management: medications reviewed  Goal: Maintenance of Heart Failure Symptom Control  Intervention: Maintain Heart Failure Management  Recent Flowsheet Documentation  Taken 7/27/2025 0830 by January Frances RN  Medication Review/Management: medications reviewed     Problem: Acute Kidney Injury/Impairment  Goal: Fluid and Electrolyte Balance  Intervention: Monitor and Manage Fluid and Electrolyte Balance  Recent Flowsheet Documentation  Taken 7/27/2025 0830 by January Frances RN  Fluid/Electrolyte Management: intravenous fluid replacement initiated  Goal: Effective Renal Function  Intervention: Monitor and Support Renal Function  Recent Flowsheet Documentation  Taken 7/27/2025 0830 by January Frances RN  Medication Review/Management: medications reviewed   Goal Outcome Evaluation:    VSS pt minimally responsive, only yelling when  being moved. Repositioned Q2H. Cont on IV fluids. Chandler remains patent. Pt appears more comfortable with increase in scheduled dilaudid dose. Family bedside this afternoon. Pt does not utilize call light, staff anticipate needs.

## 2025-07-27 NOTE — PROGRESS NOTES
[facility]    PROGRESS NOTE - Hospitalist Service    Assessment and Plan  89 years old male with a past medical history of SSS s/p PPM, HTN, CAD, CKD3, chronic atrial fibrillation on warfarin, HFrEF,  NSVT, gout, nephrolithiasis, aortic regurgitation, pulmonary hypertension, and [facility].  Started on keppra and with worsening mental status, changed to depakote which what anticoagualtion challenging and was switched to vimpat.   CT head without explanation.   EEG with seizure activity.  Unable to get MRI due to PPM which is not compatible.  Underwent LP which did not provide an explanation (HHV6 which is usually an incidental finding, 0 WBC, mildly elevated glycose and protein).    Course complicated PRICILA, persistent encephalopathy, hypernatremia and aspiration PNA     Acute respiratory failure with hypoxia  - Secondary to aspiration pneumonia with sepsis   -Stable on IV Zosyn  - O2 as needed maintain sats > 90%  - Continue NPO   - Discontinue antibiotic today 7/26/2025 after discussion with family     Seizure-like activity  Acute metabolic encephalopathy, persistent  - Continue IV vimpat  -Neurology consult, appreciate input      Hypernatremia   - due to free water deficit in the setting of encephalopathy  Improved with D5, will continue  -Stable sodium level     Urinary retention -   - S/P Chandler.    - Patient is approaching end of life and will keep for comfort.     PRICILA on CKD3b  -Holding  PTA chlorthalidone 25 mg and spironolactone for now  -Hold gabapentin     Hyperkalemia-resolved     Hypertension   - PTA meds: Metoprolol tartrate and hydralazine  -Patient is n.p.o. unable to take oral because of lethargy  -Continue IV metoprolol, IV hydralazine     Atrial fibrillation  Sick sinus syndrome status post pacemaker placement  -lopressor IV  - Unable to administer warfarin in the setting of encephalopathy, will use prophylactic  subcutaneous heparin once INR is subtherapeutic       chronic HFrEF (EF 25 to 30%) - euvolemic       CAD - PTA atorvastatin 20 mg daily, on hold due to n.p.o.      Oral thrush - was on nystatin, unable to take, can give fluconazole instead.     Goals of care   - Patient is critically ill with guarded prognosis   - He is likely approaching end of life.     - Mr. Newman is already ~15 days into admission and still profoundly encephalopathic and now with PNA.    - Palliative care is following.   -Had family care conference today with patient daughter Rashmi and son Yaniv to discuss patient current medical condition and explaining treatment plan.  -Patient's family is understanding the current situation and is probably looking towards moving to comfort care but requested more time today to discuss with other family members.  -Family would like to keep patient in the hospital if transition to comfort care  -Discussed again with patient's daughter Rashmi on the phone and she agreed on scheduling pain medication and discontinue antibiotic.  - She is expecting more family members coming to visit tomorrow and Monday-and she will make the final decision afterwards.  -Awaiting on final decision from family to transition to comfort care  - Increase Dilaudid dose today as patient still moaning voice positioning     40 MINUTES SPENT BY ME on the date of service doing chart review, history, exam, documentation & further activities per the note    Principal Problem:    Seizure-like activity (H)  Active Problems:    Pure hypercholesterolemia    Primary hypertension    CAD -- S/P Stents 2001, 2012, 2014    SSS -- S/P dual chamber Pacemaker    Stage 3a chronic kidney disease (H)    Chronic atrial fibrillation (H)    Chronic heart failure with reduced ejection fraction (HFrEF, <= 40%) (H)    Lactic acidosis    Increased anion gap metabolic acidosis    Hyponatremia    Anticoagulated on warfarin    Postictal state (H)      VTE prophylaxis:   Pneumatic Compression Devices  DIET: Orders Placed This Encounter      NPO for Medical/Clinical Reasons Except for: Meds      Disposition/Barriers to discharge: Goals of care discussion and transition to comfort care.  Medically Ready for Discharge: Anticipated in 2-4 Days    Code Status: No CPR- Do NOT Intubate    Subjective:    Skip is very lethargic, briefly responding to verbal stimuli with moaning    PHYSICAL EXAM  Vitals:    07/23/25 0500 07/24/25 0654 07/26/25 0500   Weight: 69.5 kg (153 lb 3.5 oz) 77.1 kg (169 lb 15.6 oz) 76.5 kg (168 lb 10.4 oz)     B/P:116/59 T:98.5 P:70 R:18     Intake/Output Summary (Last 24 hours) at 7/27/2025 1106  Last data filed at 7/27/2025 0600  Gross per 24 hour   Intake 1411.33 ml   Output 900 ml   Net 511.33 ml      Body mass index is 26.41 kg/m .    Constitutional: Lethargic,  no apparent distress, and appears stated age  Respiratory: Decreased breath sounds, base with scattered rhonchi, no wheeze.    Cardiovascular: Normal apical impulse, regular rate and rhythm, normal S1 and S2, no S3 or S4, and no murmur noted  GI: No scars, normal bowel sounds, soft, non-distended, non-tender, no masses palpated, no hepatosplenomegally  Skin: no bruising or bleeding and normal skin color, texture, turgor  Musculoskeletal: There is no redness, warmth, or swelling of the joints.  Full range of motion noted.  no lower extremity pitting edema present  Neurologic: Lethargic, unable to do full neuroexam.       PERTINENT LABS/IMAGING:    I have personally reviewed the following data over the past 24 hrs:    N/A  \   N/A   / 91 (L)     140 105 22.0 /  100 (H)   3.6 24 1.51 (H) \     INR:  1.98 (H) PTT:  N/A   D-dimer:  N/A Fibrinogen:  N/A       Imaging results reviewed over the past 24 hrs:   No results found for this or any previous visit (from the past 24 hours).    Discussed with family,, nursing staff and discharge planner    Fer Domingo MD  [facility]  Service  [phone]

## 2025-07-28 VITALS
BODY MASS INDEX: 22.21 KG/M2 | TEMPERATURE: 99.3 F | HEIGHT: 67 IN | RESPIRATION RATE: 18 BRPM | OXYGEN SATURATION: 97 % | HEART RATE: 70 BPM | SYSTOLIC BLOOD PRESSURE: 180 MMHG | WEIGHT: 141.54 LBS | DIASTOLIC BLOOD PRESSURE: 80 MMHG

## 2025-07-28 LAB
ANION GAP SERPL CALCULATED.3IONS-SCNC: 9 MMOL/L (ref 7–15)
BUN SERPL-MCNC: 22 MG/DL (ref 8–23)
CALCIUM SERPL-MCNC: 8.4 MG/DL (ref 8.8–10.4)
CHLORIDE SERPL-SCNC: 109 MMOL/L (ref 98–107)
CREAT SERPL-MCNC: 1.48 MG/DL (ref 0.67–1.17)
EGFRCR SERPLBLD CKD-EPI 2021: 44 ML/MIN/1.73M2
GLUCOSE SERPL-MCNC: 106 MG/DL (ref 70–99)
HCO3 SERPL-SCNC: 24 MMOL/L (ref 22–29)
INR PPP: 1.88 (ref 0.85–1.15)
POTASSIUM SERPL-SCNC: 3.8 MMOL/L (ref 3.4–5.3)
PROTHROMBIN TIME: 21.6 SECONDS (ref 11.8–14.8)
SODIUM SERPL-SCNC: 142 MMOL/L (ref 135–145)

## 2025-07-28 PROCEDURE — 120N000001 HC R&B MED SURG/OB

## 2025-07-28 PROCEDURE — 36415 COLL VENOUS BLD VENIPUNCTURE: CPT | Performed by: INTERNAL MEDICINE

## 2025-07-28 PROCEDURE — 250N000013 HC RX MED GY IP 250 OP 250 PS 637: Performed by: CLINICAL NURSE SPECIALIST

## 2025-07-28 PROCEDURE — 82374 ASSAY BLOOD CARBON DIOXIDE: CPT | Performed by: INTERNAL MEDICINE

## 2025-07-28 PROCEDURE — 99233 SBSQ HOSP IP/OBS HIGH 50: CPT | Performed by: CLINICAL NURSE SPECIALIST

## 2025-07-28 PROCEDURE — 85610 PROTHROMBIN TIME: CPT | Performed by: EMERGENCY MEDICINE

## 2025-07-28 PROCEDURE — 258N000003 HC RX IP 258 OP 636: Performed by: PSYCHIATRY & NEUROLOGY

## 2025-07-28 PROCEDURE — 250N000013 HC RX MED GY IP 250 OP 250 PS 637: Performed by: INTERNAL MEDICINE

## 2025-07-28 PROCEDURE — C9254 INJECTION, LACOSAMIDE: HCPCS | Performed by: PSYCHIATRY & NEUROLOGY

## 2025-07-28 PROCEDURE — 250N000009 HC RX 250: Performed by: HOSPITALIST

## 2025-07-28 PROCEDURE — 250N000011 HC RX IP 250 OP 636: Performed by: PSYCHIATRY & NEUROLOGY

## 2025-07-28 PROCEDURE — 258N000001 HC RX 258: Performed by: INTERNAL MEDICINE

## 2025-07-28 PROCEDURE — 99232 SBSQ HOSP IP/OBS MODERATE 35: CPT | Performed by: INTERNAL MEDICINE

## 2025-07-28 PROCEDURE — 250N000011 HC RX IP 250 OP 636: Performed by: INTERNAL MEDICINE

## 2025-07-28 RX ORDER — OXYCODONE HCL 20 MG/ML
5 CONCENTRATE, ORAL ORAL EVERY 6 HOURS
Refills: 0 | Status: DISCONTINUED | OUTPATIENT
Start: 2025-07-28 | End: 2025-07-30

## 2025-07-28 RX ORDER — CARBOXYMETHYLCELLULOSE SODIUM 5 MG/ML
1-2 SOLUTION/ DROPS OPHTHALMIC
Status: DISCONTINUED | OUTPATIENT
Start: 2025-07-28 | End: 2025-07-31 | Stop reason: HOSPADM

## 2025-07-28 RX ORDER — LORAZEPAM 2 MG/ML
1 CONCENTRATE ORAL 3 TIMES DAILY
Status: DISCONTINUED | OUTPATIENT
Start: 2025-07-28 | End: 2025-07-31 | Stop reason: HOSPADM

## 2025-07-28 RX ORDER — SENNOSIDES 8.6 MG
1 TABLET ORAL 2 TIMES DAILY PRN
Status: DISCONTINUED | OUTPATIENT
Start: 2025-07-28 | End: 2025-07-31 | Stop reason: HOSPADM

## 2025-07-28 RX ORDER — NALOXONE HYDROCHLORIDE 0.4 MG/ML
0.1 INJECTION, SOLUTION INTRAMUSCULAR; INTRAVENOUS; SUBCUTANEOUS
Status: DISCONTINUED | OUTPATIENT
Start: 2025-07-28 | End: 2025-07-31 | Stop reason: HOSPADM

## 2025-07-28 RX ORDER — BISACODYL 10 MG
10 SUPPOSITORY, RECTAL RECTAL
Status: DISCONTINUED | OUTPATIENT
Start: 2025-07-31 | End: 2025-07-31 | Stop reason: HOSPADM

## 2025-07-28 RX ORDER — ATROPINE SULFATE 10 MG/ML
2 SOLUTION/ DROPS OPHTHALMIC EVERY 4 HOURS PRN
Status: DISCONTINUED | OUTPATIENT
Start: 2025-07-28 | End: 2025-07-31 | Stop reason: HOSPADM

## 2025-07-28 RX ORDER — NALOXONE HYDROCHLORIDE 0.4 MG/ML
0.2 INJECTION, SOLUTION INTRAMUSCULAR; INTRAVENOUS; SUBCUTANEOUS
Status: DISCONTINUED | OUTPATIENT
Start: 2025-07-28 | End: 2025-07-31 | Stop reason: HOSPADM

## 2025-07-28 RX ORDER — MINERAL OIL/HYDROPHIL PETROLAT
OINTMENT (GRAM) TOPICAL
Status: DISCONTINUED | OUTPATIENT
Start: 2025-07-28 | End: 2025-07-31 | Stop reason: HOSPADM

## 2025-07-28 RX ADMIN — METOPROLOL TARTRATE 2.5 MG: 5 INJECTION INTRAVENOUS at 14:22

## 2025-07-28 RX ADMIN — LORAZEPAM 1 MG: 2 LIQUID ORAL at 20:59

## 2025-07-28 RX ADMIN — METOPROLOL TARTRATE 2.5 MG: 5 INJECTION INTRAVENOUS at 03:06

## 2025-07-28 RX ADMIN — HYDROMORPHONE HYDROCHLORIDE 0.5 MG: 1 INJECTION, SOLUTION INTRAMUSCULAR; INTRAVENOUS; SUBCUTANEOUS at 09:49

## 2025-07-28 RX ADMIN — NITROGLYCERIN 1 PATCH: 0.2 PATCH TRANSDERMAL at 09:42

## 2025-07-28 RX ADMIN — METOPROLOL TARTRATE 2.5 MG: 5 INJECTION INTRAVENOUS at 09:43

## 2025-07-28 RX ADMIN — HYDROMORPHONE HYDROCHLORIDE 0.5 MG: 1 INJECTION, SOLUTION INTRAMUSCULAR; INTRAVENOUS; SUBCUTANEOUS at 03:06

## 2025-07-28 RX ADMIN — OXYCODONE HYDROCHLORIDE 5 MG: 100 SOLUTION ORAL at 17:21

## 2025-07-28 RX ADMIN — OXYCODONE HYDROCHLORIDE 5 MG: 100 SOLUTION ORAL at 23:08

## 2025-07-28 RX ADMIN — SODIUM CHLORIDE 150 MG: 9 INJECTION, SOLUTION INTRAVENOUS at 09:44

## 2025-07-28 RX ADMIN — DEXTROSE, SODIUM CHLORIDE, AND POTASSIUM CHLORIDE: 5; .9; .15 INJECTION INTRAVENOUS at 10:32

## 2025-07-28 ASSESSMENT — ACTIVITIES OF DAILY LIVING (ADL)
ADLS_ACUITY_SCORE: 87
ADLS_ACUITY_SCORE: 85
ADLS_ACUITY_SCORE: 87
ADLS_ACUITY_SCORE: 85
ADLS_ACUITY_SCORE: 87
ADLS_ACUITY_SCORE: 87
ADLS_ACUITY_SCORE: 85
ADLS_ACUITY_SCORE: 87
ADLS_ACUITY_SCORE: 87
ADLS_ACUITY_SCORE: 85
ADLS_ACUITY_SCORE: 85
ADLS_ACUITY_SCORE: 87

## 2025-07-28 NOTE — PLAN OF CARE
Turned at repositioned every 2 hours. Pt spoke during turning but it was unintelligible. Offered sponge with water to moisten mouth but did not open mouth to accept, lips moistened and gums outside of teeth. Chandler in place. Fluid and oxygen removed at 1700 with transition to comfort cares. Water offered on sponges. Pt appears comfortable.

## 2025-07-28 NOTE — PLAN OF CARE
Problem: Delirium  Goal: Improved Sleep  Outcome: Progressing     Problem: Pain Acute  Goal: Optimal Pain Control and Function  Outcome: Progressing  Intervention: Prevent or Manage Pain  Recent Flowsheet Documentation  Taken 7/28/2025 0000 by Roc Santoyo RN  Medication Review/Management: medications reviewed  Taken 7/27/2025 2100 by Roc Santoyo, RN  Medication Review/Management: medications reviewed     Problem: Seizure, Active Management  Goal: Absence of Seizure/Seizure-Related Injury  Outcome: Progressing   Goal Outcome Evaluation:    Obtunded and very lethargic. Will moan and has garbled speech at times, especially during repositioning. Appears comfortable at rest and seems to better tolerate repositioning with increased dose of scheduled dilaudid.     Chandler patent. Incontinent of stool.     Turn and repo with oral cares q2 hrs.           Roc Santoyo RN

## 2025-07-28 NOTE — PLAN OF CARE
Problem: Adult Inpatient Plan of Care  Goal: Plan of Care Review  Description: The Plan of Care Review/Shift note should be completed every shift.  The Outcome Evaluation is a brief statement about your assessment that the patient is improving, declining, or no change.  This information will be displayed automatically on your shift  note.  Outcome: Progressing  Flowsheets (Taken 7/28/2025 1241)  Plan of Care Reviewed With: patient     Patient appeared comfortable throughout the shift.  2 hour turns completed.  Skin integrity maintained, no significant changes noted.  Air loss mattress in place and mepilex on coccyx.    Patient npo with IV fluids running at 50  ml/hr.      Mouth cares completed.      Patient not responsive with staff when cares are done.    Increased apnea in the afternoon of 10-15 seconds every 5- 10 minutes.

## 2025-07-28 NOTE — PROGRESS NOTES
[facility]    PROGRESS NOTE - Hospitalist Service    Assessment and Plan  89 years old male with a past medical history of SSS s/p PPM, HTN, CAD, CKD3, chronic atrial fibrillation on warfarin, HFrEF,  NSVT, gout, nephrolithiasis, aortic regurgitation, pulmonary hypertension, and prior CVA admitted on 7/9/2025 from senior care with seizure-like activity.  Started on keppra and with worsening mental status, changed to depakote which what anticoagualtion challenging and was switched to vimpat.   CT head without explanation.   EEG with seizure activity.  Unable to get MRI due to PPM which is not compatible.  Underwent LP which did not provide an explanation (HHV6 which is usually an incidental finding, 0 WBC, mildly elevated glycose and protein).    Course complicated PRICILA, persistent encephalopathy, hypernatremia and aspiration PNA, plan to transition to comfort care today At 5 PM     Acute respiratory failure with hypoxia  - Secondary to aspiration pneumonia with sepsis   -Stable on IV Zosyn  - O2 as needed maintain sats > 90%  - Continue NPO   - Discontinue antibiotic today 7/26/2025 after discussion with family  -Plan to discontinue oxygen and transition to comfort care today at 5 PM      Seizure-like activity  Acute metabolic encephalopathy, persistent  - Continue IV vimpat  -Neurology consult, appreciate input      Hypernatremia   - due to free water deficit in the setting of encephalopathy  Improved with D5, will continue  -Stable sodium level     Urinary retention -   - S/P Chandler.    - Patient is approaching end of life and will keep for comfort.     PRICILA on CKD3b  -Holding  PTA chlorthalidone 25 mg and spironolactone for now  -Hold gabapentin     Hyperkalemia-resolved     Hypertension   - PTA meds: Metoprolol tartrate and hydralazine  -Patient is n.p.o. unable to take oral because of lethargy  -Continue IV metoprolol, IV hydralazine     Atrial fibrillation  Sick sinus syndrome status post  pacemaker placement  -lopressor IV  - Unable to administer warfarin in the setting of encephalopathy, will use prophylactic subcutaneous heparin once INR is subtherapeutic       chronic HFrEF (EF 25 to 30%) - euvolemic       CAD - PTA atorvastatin 20 mg daily, on hold due to n.p.o.      Oral thrush - was on nystatin, unable to take, can give fluconazole instead.     Goals of care   - Patient is critically ill with guarded prognosis   - He is likely approaching end of life.     - Mr. Newman is already ~15 days into admission and still profoundly encephalopathic and now with PNA.    - Palliative care is following.   -Had family care conference today with patient daughter Rashmi and son Yainv to discuss patient current medical condition and explaining treatment plan.  -Patient's family is understanding the current situation and is probably looking towards moving to comfort care but requested more time today to discuss with other family members.  -Family would like to keep patient in the hospital if transition to comfort care  -Discussed again with patient's daughter Rashmi on the phone and she agreed on scheduling pain medication and discontinue antibiotic.  - She is expecting more family members coming to visit tomorrow and Monday-and she will make the final decision afterwards.  - 7/27/2025 increase Dilaudid dose as patient still moaning voice positioning  -7/28/2025 family care conference with patient's son today and he agreed to transition to comfort care at 5 PM  -Consider GIP consult tomorrow if patient is actively deteriorating     40 MINUTES SPENT BY ME on the date of service doing chart review, history, exam, documentation & further activities per the note    Principal Problem:    Seizure-like activity (H)  Active Problems:    Pure hypercholesterolemia    Primary hypertension    CAD -- S/P Stents 2001, 2012, 2014    SSS -- S/P dual chamber Pacemaker    Stage 3a chronic kidney disease (H)    Chronic atrial  fibrillation (H)    Chronic heart failure with reduced ejection fraction (HFrEF, <= 40%) (H)    Lactic acidosis    Increased anion gap metabolic acidosis    Hyponatremia    Anticoagulated on warfarin    Postictal state (H)      VTE prophylaxis: Comfort care  DIET: Orders Placed This Encounter      NPO for Medical/Clinical Reasons Except for: Meds      Disposition/Barriers to discharge: End-of-life care.  Medically Ready for Discharge: Anticipated in 2-4 Days    Code Status: No CPR- Do NOT Intubate    Subjective:  Skip is very obtunded, minimally verbal response.  Son at bedside    PHYSICAL EXAM  Vitals:    07/24/25 0654 07/26/25 0500 07/28/25 0532   Weight: 77.1 kg (169 lb 15.6 oz) 76.5 kg (168 lb 10.4 oz) 64.2 kg (141 lb 8.6 oz)     B/P:180/80 T:99.3 P:70 R:18     Intake/Output Summary (Last 24 hours) at 7/28/2025 1618  Last data filed at 7/28/2025 1500  Gross per 24 hour   Intake 400 ml   Output 625 ml   Net -225 ml      Body mass index is 22.17 kg/m .    Constitutional: Lethargic,  no apparent distress, and appears stated age  Respiratory: Decreased breath sounds, base with scattered rhonchi, no wheeze.    Cardiovascular: Normal apical impulse, regular rate and rhythm, normal S1 and S2, no S3 or S4, and no murmur noted  GI: No scars, normal bowel sounds, soft, non-distended, non-tender, no masses palpated, no hepatosplenomegally  Skin: no bruising or bleeding and normal skin color, texture, turgor  Musculoskeletal: There is no redness, warmth, or swelling of the joints.  Full range of motion noted.  no lower extremity pitting edema present  Neurologic: Lethargic, unable to do full neuroexam.       PERTINENT LABS/IMAGING:    I have personally reviewed the following data over the past 24 hrs:    N/A  \   N/A   / N/A     142 109 (H) 22.0 /  106 (H)   3.8 24 1.48 (H) \     INR:  1.88 (H) PTT:  N/A   D-dimer:  N/A Fibrinogen:  N/A       Imaging results reviewed over the past 24 hrs:   No results found for this or  any previous visit (from the past 24 hours).    Discussed with, family, palliative care, nursing staff and discharge planner    Fer Domingo MD  [facility]  [phone]

## 2025-07-29 PROBLEM — Z51.5 HOSPICE CARE: Status: ACTIVE | Noted: 2025-01-01

## 2025-07-29 PROCEDURE — 250N000013 HC RX MED GY IP 250 OP 250 PS 637: Performed by: INTERNAL MEDICINE

## 2025-07-29 PROCEDURE — 99233 SBSQ HOSP IP/OBS HIGH 50: CPT | Performed by: FAMILY MEDICINE

## 2025-07-29 PROCEDURE — 99232 SBSQ HOSP IP/OBS MODERATE 35: CPT | Performed by: CLINICAL NURSE SPECIALIST

## 2025-07-29 PROCEDURE — 250N000013 HC RX MED GY IP 250 OP 250 PS 637: Performed by: CLINICAL NURSE SPECIALIST

## 2025-07-29 PROCEDURE — 110N000005 HC R&B HOSPICE, ACCENT

## 2025-07-29 RX ORDER — OXYCODONE HCL 20 MG/ML
5 CONCENTRATE, ORAL ORAL
Refills: 0 | Status: DISCONTINUED | OUTPATIENT
Start: 2025-07-29 | End: 2025-07-31 | Stop reason: HOSPADM

## 2025-07-29 RX ADMIN — OXYCODONE HYDROCHLORIDE 5 MG: 100 SOLUTION ORAL at 22:27

## 2025-07-29 RX ADMIN — OXYCODONE HYDROCHLORIDE 5 MG: 100 SOLUTION ORAL at 10:21

## 2025-07-29 RX ADMIN — LORAZEPAM 1 MG: 2 LIQUID ORAL at 11:14

## 2025-07-29 RX ADMIN — LORAZEPAM 1 MG: 2 LIQUID ORAL at 14:16

## 2025-07-29 RX ADMIN — OXYCODONE HYDROCHLORIDE 5 MG: 100 SOLUTION ORAL at 16:33

## 2025-07-29 RX ADMIN — OXYCODONE HYDROCHLORIDE 5 MG: 100 SOLUTION ORAL at 03:59

## 2025-07-29 RX ADMIN — LORAZEPAM 1 MG: 2 LIQUID ORAL at 21:10

## 2025-07-29 ASSESSMENT — ACTIVITIES OF DAILY LIVING (ADL)
ADLS_ACUITY_SCORE: 85

## 2025-07-29 NOTE — PROGRESS NOTES
PALLIATIVE CARE PROGRESS NOTE  [facility]     Patient Name: Skip Newman  Date of Admission: 7/9/2025   Today the patient was seen for: follow up on symptom management     Recommendations & Counseling       GOALS OF CARE:   Comfort care   Would estimate life expectancy to be days to 1 week if nothing by mouth.      Palliative care will sign off as goals are established and symptoms are currently managed.      ADVANCE CARE PLANNING:  No health care directive on file. Per system policy, Surrogate Decision-makers for Patients With Diminished Decision-making Capacity offers guidance on possible decision-makers. Daughter Rashmi with support of son Yaniv has been identified as a surrogate decision maker.   Daughter will bring a copy of POLST  Code status: No CPR- Do NOT Intubate      MEDICAL MANAGEMENT:   #Pain-unclear pain source, patient reportedly moaning with any position changes/movement  -Continue Oxycodone 5 mg SL every 6 hours scheduled and every 1 hour PRN    Comfort Care   Below are common symptoms routinely seen in patients with comfort focused goals and at the end of life. This patient may not currently exhibit all of these symptoms; however, if these were to occur our recommendations are as follows:    #Pain   Oxycodone 5 mg SL every 6 hours scheduled.-order placed  In addition, once transitions to full comfort care, recommend oxycodone 5 mg SL Every 1 hour PRN    #Air hunger/Dyspnea    Oxycodone 5 mg SL every 6 hours scheduled.-order placed  In addition, once transitions to full comfort care, recommend oxycodone 5 mg SL Every 1 hour PRN    #Anxiety    1st choice: Lorazepam PO/SL 0.5 mg  q 3 hour as needed.   2nd choice: Lorazepam IV 0.5 mg q 3 hour as needed    #Secretion burden   Robinul 0.1 mg (PO/IV) q 4 hours as needed. If ineffective, increase up to 0.3 mg   Consider atropine if Robinul ineffective after dose increase      #Nausea   Zofran 4 mg q 6 hours as needed. Can  increase to 8 mg   Zyprexa 5 mg q 6 hours as needed     #Agitation  Aggressive control of other symptoms first, then  1st choice: Zyprexa 5 mg ODT or IM   2nd choice: Increase dose of Zyprexa to 10 mg or consider switch to Haldol   3rd choice: Lorazepam as above           PSYCHOSOCIAL/SPIRITUAL:  Family - supported by wife (who has dementia and recently moved to memory care), 2 adult children (Rashmi and Yaniv)  [place] community: [facility] - appreciate spiritual care support    Thank you for the consult and allowing us to aid in the care of Skip Newman.    Mercedes Velez, CNS  [facility], Palliative Care  Securely message with the Vocera Web Console (learn more here) or  Text page via [employer] Paging/Directory        Assessment          Skip Newman is a 89 year old male with a past medical history of heart failure with reduced EF, CAD s/p multiple remote stents, CKD3, afib s/p PPM, HTN, nephrolisthasis, aortic regurg, pulmonary HTN, prior CVA who presented on 7/9/2025 with seizure like activity. Unable to get MRI due to PPM, LP without clear etiology of ongoing AMS. Now with pneumonia and hypernatremia. Palliative following for goals of care.         Interval History:     Multidisciplinary collaboration:  -Chart reviewed  -Chandler with 600 ml output in past 24 hours Kidney function stable.  Not taking po.   -Transitioned to full comfort care yesterday  -Oxycodone 20 mg in the past 24 hours     Patient/family narrative  Met in room with patient and resident MD.  No family at bedside.  Opens eyes to voice with pocket talker.  Not verbal.  Appears comfortable.  20 second periods of apnea noted.       Review of Systems:     Unable to complete ROS due to cognitive status.        Physical Exam:   Temp:  [99.3  F (37.4  C)] 99.3  F (37.4  C)  Pulse:  [70] 70  Resp:  [18] 18  BP: (180)/(80) 180/80  SpO2:  [97 %] 97 %  141 lbs 8.57 oz    Physical Exam    Patient in NAD.  Breathing nonlabored, 20 second period of apnea noted.    Strong radial pulses        Data Reviewed:     No results found for this or any previous visit (from the past 24 hours).          25 MINUTES SPENT BY ME on the date of service doing chart review, history, exam, documentation & further activities per the note.

## 2025-07-29 NOTE — CONSULTS
SPIRITUAL HEALTH SERVICES Progress Note  [facility], P2    Saw pt Skip Newman per consult order. No family/visitors present at time of visit. Chart review indicates they have been receiving support from their home [employer]. Alen was non-responsive at time of visit. Offered prayer at bedside since this has been appreciated in past visits.     Plan of Care - A  will continue to visit as able or per request by patient/family/staff.      Kin Mak MDiv, [facility]  /Manager Spiritual Health Services  [phone]       Spiritual Health Services is available 24/7 for emergent requests and consults, either by paging the on-call  or by entering an ASAP/STAT consult in [email or web address], which will also page the on-call .

## 2025-07-29 NOTE — PROGRESS NOTES
[facility]    PROGRESS NOTE - Hospitalist Service    Assessment and Plan  89 years old male with a past medical history of SSS s/p PPM, HTN, CAD, CKD3, chronic atrial fibrillation on warfarin, HFrEF,  NSVT, gout, nephrolithiasis, aortic regurgitation, pulmonary hypertension, and prior CVA admitted on 7/9/2025 from long-term with seizure-like activity.  Started on keppra and with worsening mental status, changed to depakote which what anticoagualtion challenging and was switched to vimpat.   CT head without explanation.   EEG with seizure activity.  Unable to get MRI due to PPM which is not compatible.  Underwent LP which did not provide an explanation (HHV6 which is usually an incidental finding, 0 WBC, mildly elevated glycose and protein).    Course complicated PRICILA, persistent encephalopathy, hypernatremia and aspiration PNA, transitioned to comfort cares, since still having some uncontrolled symptoms I spoke with son and they would like to be assessed for [facility] hospice.     Acute respiratory failure with hypoxia  - Secondary to aspiration pneumonia with sepsis   -Stable on IV Zosyn  - O2 as needed for comfort  - Continue NPO   - Discontinued antibiotic 7/26/2025 after discussion with family    Seizure-like activity  Acute metabolic encephalopathy, persistent  - Continue IV vimpat  -Neurology consult, appreciate input      Hypernatremia   - due to free water deficit in the setting of encephalopathy  -Stable sodium level recently     Urinary retention -   - S/P Chandler.    - Patient is approaching end of life and will keep for comfort.     PRICILA on CKD3b  -Holding  PTA chlorthalidone 25 mg and spironolactone for now  -Hold gabapentin     Hyperkalemia-resolved     Hypertension   - PTA meds: Metoprolol tartrate and hydralazine  -Patient is n.p.o. unable to take oral because of lethargy  -Continue IV metoprolol, IV hydralazine prn     Atrial fibrillation  Sick sinus syndrome status post pacemaker  placement  -lopressor IV  - Unable to administer warfarin in the setting of encephalopathy, will use prophylactic subcutaneous heparin once INR is subtherapeutic       chronic HFrEF (EF 25 to 30%) - euvolemic       CAD - PTA atorvastatin 20 mg daily, on hold due to n.p.o.      Oral thrush - was on nystatin, unable to take, can give fluconazole instead.     Goals of care   - Patient is critically ill with guarded prognosis   - He is likely approaching end of life.     - Mr. Newman is already ~15 days into admission and still profoundly encephalopathic and now with PNA.    - Palliative care is following.   -Had family care conference today with patient daughter Rashmi and son Yaniv to discuss patient current medical condition and explaining treatment plan.  -Patient's family is understanding the current situation and is probably looking towards moving to comfort care but requested more time today to discuss with other family members.  -Family would like to keep patient in the hospital if transition to comfort care  -Discussed again with patient's daughter Rashmi on the phone and she agreed on scheduling pain medication and discontinue antibiotic.  - She is expecting more family members coming to visit tomorrow and Monday-and she will make the final decision afterwards.  - 7/27/2025 increase Dilaudid dose as patient still moaning voice positioning  -7/28/2025 family care conference with patient's son today and he agreed to transition to comfort care at 5 PM  - 7/29/2025  Son asked about GIP today and since pain symptom remains I agree that GIP assessment is appropriate.   - Patient qualifies for GIP so will use discharge readmit orders to initiate GIP care.     40 MINUTES SPENT BY ME on the date of service doing chart review, history, exam, documentation & further activities per the note    Principal Problem:    Seizure-like activity (H)  Active Problems:    Pure hypercholesterolemia    Primary hypertension    CAD -- S/P  Stents 2001, 2012, 2014    SSS -- S/P dual chamber Pacemaker    Stage 3a chronic kidney disease (H)    Chronic atrial fibrillation (H)    Chronic heart failure with reduced ejection fraction (HFrEF, <= 40%) (H)    Lactic acidosis    Increased anion gap metabolic acidosis    Hyponatremia    Anticoagulated on warfarin    Postictal state (H)    Hospice care      VTE prophylaxis: Comfort care  DIET: Orders Placed This Encounter      NPO for Medical/Clinical Reasons Except for: Meds      Disposition/Barriers to discharge: End-of-life care.  Medically Ready for Discharge: Anticipated in 2-4 Days    Code Status: No CPR- Do NOT Intubate    Subjective:  Skip is very obtunded, minimally verbal response.  Son at bedside is very knowledgable    PHYSICAL EXAM  Vitals:    07/24/25 0654 07/26/25 0500 07/28/25 0532   Weight: 77.1 kg (169 lb 15.6 oz) 76.5 kg (168 lb 10.4 oz) 64.2 kg (141 lb 8.6 oz)     B/P:180/80 T:99.3 P:70 R:18     Intake/Output Summary (Last 24 hours) at 7/28/2025 1618  Last data filed at 7/28/2025 1500  Gross per 24 hour   Intake 400 ml   Output 625 ml   Net -225 ml      Body mass index is 22.17 kg/m .    Constitutional: Lethargic,  no apparent distress, and appears stated age  Respiratory: Decreased breath sounds, base with scattered rhonchi, no wheeze.    Cardiovascular:  regular rate and rhythm, normal S1 and S2, no S3 or S4, and no murmur noted  GI: No scars, normal bowel sounds, soft, non-distended, non-tender,   Skin: no bruising or bleeding and normal skin color, texture, turgor  Musculoskeletal: There is no redness, warmth, or swelling of the joints.    Neurologic: Lethargic, unable to do full neuroexam.       PERTINENT LABS/IMAGING:        Imaging results reviewed over the past 24 hrs:   No results found for this or any previous visit (from the past 24 hours).    Discussed with, family, palliative care, nursing staff and discharge planner

## 2025-07-29 NOTE — PLAN OF CARE
Goal Outcome Evaluation:    Problem: Pain Acute  Goal: Optimal Pain Control and Function  Outcome: Progressing  Intervention: Optimize Psychosocial Wellbeing  Recent Flowsheet Documentation  Taken 7/29/2025 1735 by Sil Anderson RN  Supportive Measures: active listening utilized  Intervention: Prevent or Manage Pain  Recent Flowsheet Documentation  Taken 7/29/2025 1735 by Sil Anderson RN  Sensory Stimulation Regulation:   television on   care clustered   lighting decreased   quiet environment promoted  Sleep/Rest Enhancement: awakenings minimized  Bowel Elimination Promotion: privacy promoted  Medication Review/Management: medications reviewed   Pt on comfort cares. Turn and reposition Q 2 hrs.  Schedule ativan and oxycodone given for comfort.   Chandler cath in place and draining.   Family at bedside visiting.

## 2025-07-29 NOTE — CONSULTS
[facility]    Consult Note - [facility]  _________________________________________________________________    [facility] 24/7 Contact Number: [phone]    - Providers: Please contact [facility] with changes in orders or clinical plan of care   - Nursing: Please contact [facility] with significant changes in patient condition    Hospice will notify the care team (including the hospitalist) to confirm date of inpatient hospice (GIP) admission.    New Epic encounter will not be created until hospice completes admission.   ______________________________________________________________________        Hospice Diagnosis: late effects CVA    Indication for Inpatient Hospice: pain    Goals for Hospital Discharge: pain managed AEB patient not groaning in pain during cares    Plan of Care Discussed with the Following:   - Nurse: STEPH Smith  - Hospitalist/Rounding Provider: Dr. Moncada  - Skip's Family/Preferred Contact: Yaniv michael  - Hospice Provider: Dr. Harman    Summary of Visit (includes assessment, medications and any new orders):   Patient in bed, appears comfortable at this time. Intermittent grunting, otherwise comfortable. 45 second period of apnea observed during admission assessment. Consents for [facility] hospice services signed. Educated family on hospice services and all questions answered to family's satisfaction. Son reports he is going to try to bring his mother to the hospital to see patient sometime later today.     New orders:    Lorazepam 1mg Q3hrs SL PRN - anxiety  Robinul 0.2mg Q4hrs IV PRN - secretions  Haldol 1mg Q4hrs SL PRN - agitation      Kiersten Landry RN

## 2025-07-29 NOTE — PLAN OF CARE
Problem: Adult Inpatient Plan of Care  Goal: Plan of Care Review  Description: The Plan of Care Review/Shift note should be completed every shift.  The Outcome Evaluation is a brief statement about your assessment that the patient is improving, declining, or no change.  This information will be displayed automatically on your shift  note.  Outcome: Progressing     Problem: Adult Inpatient Plan of Care  Goal: Optimal Comfort and Wellbeing  Outcome: Progressing     Problem: Pain Acute  Goal: Optimal Pain Control and Function  Outcome: Progressing   Goal Outcome Evaluation:       Pt turned/repositioned every 2 hours  Pt appeared comfortable throughout shift  Green oral swabs used to moisten pt's mouth  Pt non responsive to staff  Chandler in place

## 2025-07-29 NOTE — PROGRESS NOTES
[facility]  _________________________________________________________________     [facility] 24/7 Contact Number: [phone]   - Providers: Please contact [facility] with changes in orders or clinical plan of care  - Nursing: Please contact [facility] with significant changes in patient condition     Hospice will notify the care team (including the hospitalist) to confirm date of inpatient hospice (GIP) admission.   New Epic encounter will not be created until hospice completes admission.     Received, thank you for the referral. We will work on sending someone to meet with pt/family

## 2025-07-30 PROCEDURE — 250N000013 HC RX MED GY IP 250 OP 250 PS 637: Performed by: HOSPITALIST

## 2025-07-30 PROCEDURE — 250N000013 HC RX MED GY IP 250 OP 250 PS 637: Performed by: INTERNAL MEDICINE

## 2025-07-30 PROCEDURE — 250N000009 HC RX 250: Performed by: INTERNAL MEDICINE

## 2025-07-30 PROCEDURE — 250N000013 HC RX MED GY IP 250 OP 250 PS 637: Performed by: CLINICAL NURSE SPECIALIST

## 2025-07-30 PROCEDURE — 99232 SBSQ HOSP IP/OBS MODERATE 35: CPT | Mod: GV | Performed by: HOSPITALIST

## 2025-07-30 PROCEDURE — 110N000005 HC R&B HOSPICE, ACCENT

## 2025-07-30 RX ORDER — OXYCODONE HCL 20 MG/ML
5 CONCENTRATE, ORAL ORAL EVERY 4 HOURS
Refills: 0 | Status: DISCONTINUED | OUTPATIENT
Start: 2025-07-30 | End: 2025-07-31 | Stop reason: HOSPADM

## 2025-07-30 RX ADMIN — OXYCODONE HYDROCHLORIDE 5 MG: 100 SOLUTION ORAL at 10:34

## 2025-07-30 RX ADMIN — ATROPINE SULFATE 2 DROP: 10 SOLUTION/ DROPS OPHTHALMIC at 04:14

## 2025-07-30 RX ADMIN — OXYCODONE HYDROCHLORIDE 5 MG: 100 SOLUTION ORAL at 04:13

## 2025-07-30 RX ADMIN — LORAZEPAM 1 MG: 2 LIQUID ORAL at 21:13

## 2025-07-30 RX ADMIN — LORAZEPAM 1 MG: 2 LIQUID ORAL at 10:34

## 2025-07-30 RX ADMIN — OXYCODONE HYDROCHLORIDE 5 MG: 100 SOLUTION ORAL at 22:37

## 2025-07-30 RX ADMIN — ATROPINE SULFATE 2 DROP: 10 SOLUTION/ DROPS OPHTHALMIC at 22:45

## 2025-07-30 ASSESSMENT — ACTIVITIES OF DAILY LIVING (ADL)
ADLS_ACUITY_SCORE: 85
ADLS_ACUITY_SCORE: 87
ADLS_ACUITY_SCORE: 85
ADLS_ACUITY_SCORE: 87
ADLS_ACUITY_SCORE: 85
ADLS_ACUITY_SCORE: 85
ADLS_ACUITY_SCORE: 87
ADLS_ACUITY_SCORE: 85
ADLS_ACUITY_SCORE: 87

## 2025-07-30 NOTE — PROGRESS NOTES
[facility]    Medicine Progress Note - Hospitalist Service    Date of Admission:  7/9/2025    Assessment & Plan   89-year-old male with h/o SSS s/p PPM, CAD, CKD 3, A-fib on warfarin, CHF, NSVT, pulm HTN and prior CVA admitted with new onset seizure.    #New onset seizure  #Acute metabolic encephalopathy, persistent  Neurology consulted  EEG showed seizure activity, CT head without explanation  Unable to have MRI due to PPM not compatible  LP showed incidental HHV-6  S/p Keppra which caused worsening mental status, changed to Depakote which interacted with AC, changed to Vimpat    #Acute respiratory failure with hypoxia  #Aspiration pneumonia with sepsis  #Moderate malnutrition  S/p IV Zosyn, discontinued 7/26 after discussion with family and transition to inpatient hospice  Oxygen for comfort  Opioids, benzodiazepine for pain and air hunger    #Oral thrush  S/p nystatin    #Hypernatremia  Due to free water deficit in setting of encephalopathy    #Urinary retention  S/p Chandler catheter, continuing for comfort    #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  #Hyperkalemia  #Chronic HFrEF, EF 25-30%  #CAD  Euvolemic to hypovolemic  Discontinued chlorthalidone, spironolactone, gabapentin, statin            Diet: Room Service  NPO for Medical/Clinical Reasons Except for: Meds    Chandler Catheter: PRESENT, indication: End of life  Lines: None     Cardiac Monitoring: None  Code Status: No CPR- Do NOT Intubate      Clinically Significant Risk Factors               # Hypoalbuminemia: Lowest albumin = 2.9 g/dL at 7/25/2025  6:32 AM, will monitor as appropriate  # Coagulation Defect: INR = 1.88 (Ref range: 0.85 - 1.15) and/or PTT = N/A, will monitor for bleeding    # Hypertension: Noted on problem list  # Chronic heart failure with reduced ejection fraction: last echo with EF <40%            # Moderate Malnutrition: based on nutrition assessment and treatment provided per dietitian's recommendations.    #  Financial/Environmental Concerns:     # Pacemaker present       Social Drivers of Health    Food Insecurity: Unknown (7/10/2025)    Food Insecurity     Within the past 12 months, did you worry that your food would run out before you got money to buy more?: Patient unable to answer     Within the past 12 months, did the food you bought just not last and you didn t have money to get more?: Patient unable to answer   Housing Stability: Unknown (7/10/2025)    Housing Stability     Do you have housing? : Patient unable to answer     Are you worried about losing your housing?: Patient unable to answer   Financial Resource Strain: Unknown (7/10/2025)    Financial Resource Strain     Within the past 12 months, have you or your family members you live with been unable to get utilities (heat, electricity) when it was really needed?: Patient unable to answer   Transportation Needs: Unknown (7/10/2025)    Transportation Needs     Within the past 12 months, has lack of transportation kept you from medical appointments, getting your medicines, non-medical meetings or appointments, work, or from getting things that you need?: Patient unable to answer   Interpersonal Safety: Unknown (7/10/2025)    Interpersonal Safety     Do you feel physically and emotionally safe where you currently live?: Patient unable to answer     Within the past 12 months, have you been hit, slapped, kicked or otherwise physically hurt by someone?: Patient unable to answer     Within the past 12 months, have you been humiliated or emotionally abused in other ways by your partner or ex-partner?: Patient unable to answer    Received from [facility] [employer] [facility]    Social Connections          Disposition Plan     Medically Ready for Discharge: Anticipated Tomorrow             Ketan Jensen DO  Hospitalist Service  [facility]  Securely message with [employer] (more info)  Text page via [facility] Paging/Directory    ______________________________________________________________________    Interval History   No acute events overnight    Physical Exam   Vital Signs:                    Weight: 141 lbs 8.57 oz    General Appearance:  No acute distress  Respiratory: Nonlabored breathing  Extremities: No peripheral edema or cyanosis      Medical Decision Making       50 MINUTES SPENT BY ME on the date of service doing chart review, history, exam, documentation & further activities per the note.      Data

## 2025-07-30 NOTE — PROGRESS NOTES
"[facility]    Consult Note - [facility] Inpatient [facility]  _________________________________________________________________    [facility] 24[facility] Contact Number: [phone]    - Providers: Please contact [facility] with changes in orders or clinical plan of care   - Nursing: Please contact [facility] with significant changes in patient condition    Hospice will notify the care team (including the hospitalist) to confirm date of inpatient hospice (GIP) admission.    New Epic encounter will not be created until hospice completes admission.   ______________________________________________________________________      Hospice Diagnosis: Late effects CVA     Indication for Inpatient Hospice: Pain     Goals for Hospital Discharge: pain managed AEB patient not groaning in pain during cares     Plan of Care Discussed with the Following:   - Nurse: STEPH Johnston  - Hospitalist/Rounding Provider: Ketan Jensen DO  - Skip's Family/Preferred Contact: Yaniv michael  - Hospice Provider: Gary Harman MD    Summary of Visit (includes assessment, medications and any new orders):     Family not at bedside- spoke with son Slim-   He is sister Rashmi grieving appropriately.  \"Praying\" pt passes peacefully soon.   Pt and wife 60th Wedding anniversary end of week.  Wife Magdalena visited Tuesday night- she has Lewy Bodiy dementia-and will continue with Facetime verses in person visits.     Pt not showing s/sx of pain.  Obtuneded. Apneic breathing continuing.     VS: 95% O2 sats, 28 RR/min without Apnea,   With apnea- RR and O2 sats drop to 87%.  119P, 191/83 BP, 99.3 Ax temp.     Hospice Recommendations:   Increase Roxicodone Frequency from 6 hours to 4 hours for pain/comfort    Vimal Corbin RN   "

## 2025-07-30 NOTE — PROGRESS NOTES
Received message from device  that patient's annual device check & follow up with provider were canceled for next week due to change in patient's status.  Alen is currently inpatient at [facility] and appears to be enrolled in hospice care.  He has a Medtronic Adapta dual chamber pacemaker.  Next remote transmission is scheduled for November 10th, 2025.     Karlee Mercado RN   [facility]  [phone]  option 3

## 2025-07-30 NOTE — PLAN OF CARE
"Goal Outcome Evaluation:         Sleep apnea..  Snoring.  Atropine drops for increased secretions. Rhonchi.  Poor skin integrity.  Edematous.  Repositioning q 2hrs.  Chandler draining annie urine,    Unresponsive.  Uneventful shift.  Care plan reviewed.   Problem: Adult Inpatient Plan of Care  Goal: Plan of Care Review  Description: The Plan of Care Review/Shift note should be completed every shift.  The Outcome Evaluation is a brief statement about your assessment that the patient is improving, declining, or no change.  This information will be displayed automatically on your shift  note.  Outcome: Progressing  Goal: Patient-Specific Goal (Individualized)  Description: You can add care plan individualizations to a care plan. Examples of Individualization might be:  \"Parent requests to be called daily at 9am for status\", \"I have a hard time hearing out of my right ear\", or \"Do not touch me to wake me up as it startles  me\".  Outcome: Progressing  Goal: Absence of Hospital-Acquired Illness or Injury  Outcome: Progressing  Intervention: Prevent Skin Injury  Recent Flowsheet Documentation  Taken 7/30/2025 0217 by Ana Lake RN  Body Position: left  Goal: Optimal Comfort and Wellbeing  Outcome: Progressing  Goal: Readiness for Transition of Care  Outcome: Progressing     Problem: Delirium  Goal: Optimal Coping  Outcome: Progressing  Goal: Improved Behavioral Control  Outcome: Progressing  Goal: Improved Attention and Thought Clarity  Outcome: Progressing  Goal: Improved Sleep  Outcome: Progressing     Problem: Pain Acute  Goal: Optimal Pain Control and Function  Outcome: Progressing     Problem: Comorbidity Management  Goal: Blood Pressure in Desired Range  Outcome: Progressing  Goal: Maintenance of Heart Failure Symptom Control  Outcome: Progressing     Problem: Seizure, Active Management  Goal: Absence of Seizure/Seizure-Related Injury  Outcome: Progressing     Problem: Acute Kidney Injury/Impairment  Goal: " Fluid and Electrolyte Balance  Outcome: Progressing  Goal: Improved Oral Intake  Outcome: Progressing  Goal: Effective Renal Function  Outcome: Progressing     Problem: End-of-Life Care  Goal: Comfort, Peace and Preserved Dignity  Outcome: Progressing

## 2025-07-30 NOTE — CONSULTS
[facility]    Consult Note - [facility]  _________________________________________________________________    [facility] Contact Number: [phone]    - Providers: Please contact [facility] with changes in orders or clinical plan of care   - Nursing: Please contact [facility] with significant changes in patient condition    Hospice will notify the care team (including the hospitalist) to confirm date of inpatient hospice (GIP) admission.    New Epic encounter will not be created until hospice completes admission.   ______________________________________________________________________        Hospice Diagnosis: late effects CVA    Indication for Inpatient Hospice: agitation    Goals for Hospital Discharge: agitation managed AEB patient not groaning and shaking head for 48 hours     Plan of Care Discussed with the Following:   - Nurse: STEPH Smith  - Hospitalist/Rounding Provider: Dr. Moncada  - Skip's Family/Preferred Contact: Yaniv michael  - Hospice Provider: Dr. Harman    Summary of Visit (includes assessment, medications and any new orders):   Met with patient and family, son by the bedside. Educated on hospice services and consents signed. All questions answered to family's satisfaction. Son reports that he is hoping to have patients wife be able to come visit in person later this afternoon.    New orders:     Lorazepam 1mg Q3hrs SL PRN - anxiety  Robinul 0.2mg Q4hrs IV PRN - secretions  Haldol 1mg Q4hrs SL PRN - agitation, nausea      Kiersten Landry RN

## 2025-07-30 NOTE — PLAN OF CARE
[id number]    Goal Outcome Evaluation: Q2 repositioning. Patient receiving scheduled medications only. Chandler patent and draining. Mouth cares provided. Continue to monitor.       Problem: Seizure, Active Management  Goal: Absence of Seizure/Seizure-Related Injury  Outcome: Progressing     Problem: End-of-Life Care  Goal: Comfort, Peace and Preserved Dignity  Outcome: Progressing

## 2025-07-31 PROCEDURE — 250N000013 HC RX MED GY IP 250 OP 250 PS 637: Performed by: HOSPITALIST

## 2025-07-31 PROCEDURE — 99239 HOSP IP/OBS DSCHRG MGMT >30: CPT | Performed by: HOSPITALIST

## 2025-07-31 RX ADMIN — OXYCODONE HYDROCHLORIDE 5 MG: 100 SOLUTION ORAL at 02:56

## 2025-07-31 RX ADMIN — OXYCODONE HYDROCHLORIDE 5 MG: 100 SOLUTION ORAL at 06:34

## 2025-07-31 ASSESSMENT — ACTIVITIES OF DAILY LIVING (ADL)
ADLS_ACUITY_SCORE: 85

## 2025-07-31 NOTE — PROGRESS NOTES
Body picked up by [facility] [phone] at 1630 on 25. Pt in gown and following items in bag: shirt, pants, jacket, toothbrush, tooth paste- was sent with pt to  home.

## 2025-07-31 NOTE — PLAN OF CARE
Problem: Adult Inpatient Plan of Care  Goal: Optimal Comfort and Wellbeing  Outcome: Progressing     Problem: End-of-Life Care  Goal: Comfort, Peace and Preserved Dignity  Outcome: Progressing   Goal Outcome Evaluation:    Turned and repositioned q 2hr. Chandler in place. Scheduled oxy and ativan for comfort. PRN atropine given. Oral cares.

## 2025-07-31 NOTE — DISCHARGE SUMMARY
[facility]    Death Summary - Hospitalist Service     Date of Admission:  7/9/2025  Date of Death: 7/31/2025  Discharging Provider: Ketan Jensen DO    Discharge Diagnoses   New onset seizure  Acute metabolic encephalopathy  Acute respiratory failure with hypoxia  Aspiration pneumonia with sepsis  Moderate malnutrition  Hypernatremia  Urinary retention  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  Hyperkalemia  Chronic HFrEF  CAD  Oral thrush    Cause of death: Aspiration pneumonia    Hospital Course   Patient is a 89-year-old male with h/o SSS s/p PPM, CAD, CKD 3, A-fib on warfarin, CHF, NSVT, pulm HTN and prior CVA who was admitted with new onset seizure.  CT head without explanation, PPM not compatible for MRI.  EEG showed seizure activity.  Neurology initially started patient on Keppra which caused worsening mental status and changed to Depakote but this interacted with his anticoagulation and subsequently changed to Vimpat.  Underwent lumbar puncture which showed no active infection, incidental HHV-6.  Patient had persistent encephalopathy and developed aspiration pneumonia with sepsis and acute respiratory failure and started on IV Zosyn.  Developed a free water deficit hypernatremia.  Palliative care consulted and family decided to transition to comfort care.  [facility] hospice consulted and accepted.  Oxygen continued for comfort, opioids and benzodiazepine used for pain and air hunger.  Patient passed this morning while I happened to be in the room rounding.   TOD 8:53      Ketan Jensen DO  [facility]  ______________________________________________________________________      Significant Results and Procedures   Most Recent 3 CBC's:  Recent Labs   Lab Test 07/27/25  0706 07/25/25  0632 07/24/25  0541 07/21/25  0628 07/20/25  0623 07/19/25  0556   WBC  --  10.3  --   --  13.1* 8.5   HGB  --  10.7*  --   --  12.3* 11.9*   MCV 94 95 96   < > 98 98   PLT 91* 92* 103*   < > 101* 96*     < > = values in this interval not displayed.     Most Recent 3 BMP's:  Recent Labs   Lab Test 07/28/25  0648 07/27/25  0706 07/26/25  0622    140 137   POTASSIUM 3.8 3.6 3.3*   CHLORIDE 109* 105 101   CO2 24 24 25   BUN 22.0 22.0 25.3*   CR 1.48* 1.51* 1.65*   ANIONGAP 9 11 11   JUAN 8.4* 8.4* 8.3*   * 100* 111*     Most Recent 2 LFT's:  Recent Labs   Lab Test 07/25/25  0632 07/14/25  0843   AST 25 24   ALT 15 21   ALKPHOS 53 95   BILITOTAL 1.0 0.7     7-Day Micro Results       No results found for the last 168 hours.          Most Recent Urinalysis:  Recent Labs   Lab Test 07/16/25  1441   COLOR Yellow   APPEARANCE Clear   URINEGLC Negative   URINEBILI Negative   URINEKETONE Negative   SG 1.021   UBLD 0.06 mg/dL*   URINEPH 5.0   PROTEIN Negative   NITRITE Negative   LEUKEST 250 Shalonda/uL*   RBCU 10*   WBCU 18*     Most Recent ABG:No lab results found.,   Results for orders placed or performed during the hospital encounter of 07/09/25   Head CT w/o contrast    Narrative    EXAM: CT HEAD W/O CONTRAST  LOCATION: [facility]  DATE: 7/9/2025    INDICATION: seizure, anticoagulated (recent fall?)  COMPARISON: 1/3/2025  TECHNIQUE: Routine CT Head without IV contrast. Multiplanar reformats. Dose reduction techniques were used.    FINDINGS:  INTRACRANIAL CONTENTS: No intracranial hemorrhage, extraaxial collection, or mass effect.  No CT evidence of acute infarct. Moderate presumed chronic small vessel ischemic changes. Moderate generalized volume loss. No hydrocephalus. Intracranial   atheromatous disease.     VISUALIZED ORBITS/SINUSES/MASTOIDS: No intraorbital abnormality. No paranasal sinus mucosal disease. No middle ear or mastoid effusion.    BONES/SOFT TISSUES: No acute abnormality.      Impression    IMPRESSION:  1.  No acute findings.   CT Cervical Spine w/o Contrast    Narrative    EXAM: CT CERVICAL SPINE W/O CONTRAST  LOCATION: [facility]  DATE:  7/9/2025    INDICATION: fall, confused, unclear if head or neck trauma  COMPARISON: None.  TECHNIQUE: Routine CT Cervical Spine without IV contrast. Multiplanar reformats. Dose reduction techniques were used.    FINDINGS:  Vertebral body heights are maintained. Multilevel loss of disc height. No jumped or perched facets. Spinous processes are intact. Degenerative pannus at the craniocervical junction. Stable appearance to subchondral cystic changes and degenerative pannus   formation at the craniocervical junction at the level of the midportion of C2. There is a stable appearance to the craniocervical junction and C2 level as compared to 12/21/2024. Multilevel facet disease left greater than right. No prevertebral edema.      Impression    IMPRESSION:  1.  No acute cervical spine fracture.   CT Lumbar Spine w/o Contrast    Narrative    EXAM: CT LUMBAR SPINE W/O CONTRAST  LOCATION: [facility]  DATE: 7/9/2025    INDICATION: fall, low back and left leg pain. pt declining MRI  COMPARISON: None.  TECHNIQUE: Routine CT Lumbar Spine without IV contrast. Multiplanar reformats. Dose reduction techniques were used.     FINDINGS:  Vertebral body heights are maintained. Extensive multilevel loss of disc height. Multilevel Schmorl's nodes. Multilevel anterior osteophytic changes. Mild straightening of the normal lumbar curvature. Moderate canal stenotic findings at L2-3, and   postoperative changes of left hemilaminectomy at L3-4. Moderate canal stenosis at this level. Moderate canal stenosis at L4-5 with postoperative changes of left hemilaminectomy. No paravertebral edematous changes.    Moderate bilateral foraminal narrowing at L4-5 and L3-4. Moderate bilateral foraminal narrowing at L2-3. Mild bilateral foraminal narrowing at L1-2.      Impression    IMPRESSION:  1.  Multilevel degenerative changes. No acute fracture.   XR Shoulder Left 2 Views    Narrative    EXAM: XR SHOULDER LEFT 2  VIEWS  LOCATION: [facility]  DATE: 7/9/2025    INDICATION: fall, pain  COMPARISON: 02/15/2024       Impression    IMPRESSION: Demineralization without displaced fracture. 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 Severe glenohumeral joint osteoarthritis. Superior screw fixation of the humerus likely indicating underlying   rotator cuff pathology. Left chest pacer. Redemonstrated lucent finding the proximal humerus which may represent sequela of prior biceps tenodesis.    XR Forearm Port Left 2 Views    Narrative    EXAM: XR FOREARM PORT LEFT 2 VIEWS  LOCATION: [facility]  DATE: 7/14/2025    INDICATION: Left forearm pain.  COMPARISON: None.      Impression    IMPRESSION: Bones are demineralized. There is no evidence of an acute displaced left forearm fracture. Atherosclerotic vascular calcifications. 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   CT Head w/o Contrast    Narrative    EXAM: CT HEAD W/O CONTRAST  LOCATION: [facility]  DATE: 7/14/2025    INDICATION: AMS, eval for stroke or ICH  COMPARISON: Head CT 07/09/2025.  TECHNIQUE: Routine CT Head without IV contrast. Multiplanar reformats. Dose reduction techniques were used.    FINDINGS:  INTRACRANIAL CONTENTS: No intracranial hemorrhage, extraaxial collection, or mass effect.  No CT evidence of acute infarct. Chronic left basal ganglia lacunar infarct. Moderate presumed chronic small vessel ischemic changes. Moderate generalized volume   loss. No hydrocephalus.     VISUALIZED ORBITS/SINUSES/MASTOIDS: Prior bilateral cataract surgery. Visualized portions of the orbits are otherwise unremarkable. No paranasal sinus mucosal disease. No middle ear or mastoid effusion.    BONES/SOFT TISSUES: No acute abnormality.      Impression    IMPRESSION:  1.  No evidence of acute intracranial abnormality.   XR Shoulder Left 1 View    Narrative    EXAM: XR SHOULDER LEFT 1  VIEW  LOCATION: [facility]  DATE: 7/14/2025    INDICATION: Left shoulder pain  COMPARISON: 07/09/2025      Impression    IMPRESSION: No change from prior. No acute fracture. Severe degenerative arthritis glenohumeral joint and likely rotator cuff arthropathy. Moderate degenerative arthrosis AC joint. Sclerotic focus versus overlying soft tissue calcification humeral neck   unchanged. Degenerative changes in the cervical and thoracic spine. Cardiac pacer.   XR Shoulder Right Port 1 View    Narrative    EXAM: XR SHOULDER RIGHT PORT 1 VIEW  LOCATION: [facility]  DATE: 7/14/2025    INDICATION: Wrong side tech error  COMPARISON: None.      Impression    IMPRESSION: Single view of the right shoulder shows advanced AC joint arthrosis as well as glenohumeral joint arthritic change. High-grade humeral head probably reflects a chronic rotator cuff tear. Demineralization without evidence of a displaced   fracture.   XR Forearm Port Right 2 Views    Narrative    EXAM: XR FOREARM PORT RIGHT 2 VIEWS  LOCATION: [facility]  DATE: 7/14/2025    INDICATION: Wrong side, tech error.  COMPARISON: None.      Impression    IMPRESSION:   Bones are demineralized. There is no evidence of an acute displaced right forearm fracture. 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 Atherosclerotic vascular calcifications. Tiny metallic surgical clip near the posterior elbow.   US Renal Complete w Arterial Duplex    Narrative    EXAM:  1. RENAL ULTRASOUND   2. RENAL DUPLEX  LOCATION: [facility]  DATE: 7/16/2025    INDICATION: PRICILA, Nephrolithiasis HS, HTN history.  COMPARISON: CT abdomen and pelvis 12/21/2024.  TECHNIQUE: Duplex imaging is performed utilizing gray-scale, two-dimensional images, and color-flow imaging. Doppler waveform analysis and spectral Doppler imaging is also performed.    FINDINGS: Very limited exam. Patient was unable to hold his breath, and  overlying bowel gas.    RIGHT KIDNEY: 9.8 x 4.9 x 4.9 cm. Normal without hydronephrosis or masses. The renal parenchyma is echogenic, suggesting medical renal disease. Cortical thinning is noted.    LEFT KIDNEY 8.2 x 3.5 x 4.1 cm. Normal without hydronephrosis or masses. The renal parenchyma is echogenic, suggesting medical renal disease . Simple cyst in the superior pole of the left kidney, no specific follow-up needed for this finding.   Nonobstructing echogenic focus in the inferior pole of the left kidney measuring 7 mm, likely nonobstructing stone. Cortical thinning is noted.    BLADDER: Nondistended with a Chandler catheter in place.    RENAL DUPLEX:  Aortic PSV: 73 cm/s, multiphasic  Right Renal Artery PSV: Normal, less than 200 cm/s (Normal considered less than 200 cm/s.) Where visualized, however, this was extremely limited and only the renal artery at the level hilum was visualized secondary to the patient not being able to hold   breath, atrophic kidney and overlying bowel gas.  Right Intrarenal Resistive Index: Normal, 0.7 or less  Right renal vein is not visualized secondary to overlying bowel gas.    Left Renal Artery PSV not visualized secondary to patient not being able to hold breath, atrophic kidney and overlying bowel gas.  Left Intrarenal Resistive Index: Normal, 0.7 or less  Left renal vein is patent.      Impression    IMPRESSION:   1.  Bilateral atrophic kidneys with echogenic renal cortices, suggesting medical renal disease.  2.  Significantly limited exam secondary to patient not being able to hold breath and overlying bowel gas. The right renal artery was only visualized at the renal hilum. The left renal artery was not visualized.  3.  Nonobstructing left renal calculi.   XR Lumbar Puncture Spinal Tap Diag    Narrative    EXAM:  1. DIAGNOSTIC LUMBAR PUNCTURE  2. FLUOROSCOPIC GUIDANCE  LOCATION: [facility]  DATE: 7/17/2025    INDICATION: Altered mental  status    RADIATION DOSE: Total Air Kerma 6.2 mGy    PROCEDURE: Procedure and risks explained to the patient's daughter and phone call consent was obtained. The patient was placed in prone position, and the lower back was prepped and draped in sterile fashion. 1% local lidocaine infused in local soft   tissues.     Under direct fluoroscopic guidance, a 20 gauge spinal needle was placed into the spinal canal at the L3-L4 level.    SPECIMEN: 16 mL of clear CSF sent to lab.    COMPLICATIONS: None.      Impression    IMPRESSION:  Fluoroscopically guided lumbar puncture.   XR Chest Port 1 View     Value    Radiologist flags Follow-up chest radiograph in 4-6 weeks    Narrative    EXAM: XR CHEST PORT 1 VIEW  LOCATION: [facility]  DATE: 7/20/2025    INDICATION: low grade fever, at risk aspiration  COMPARISON: 3/13/2024      Impression    IMPRESSION: Small ovoid consolidation in the left midlung is most likely infectious. Recommend a follow-up chest radiograph after therapy to document resolution.    No pleural effusion. Stable enlarged cardiac silhouette. Cardiac pacemaker. Mild pulmonary vascular congestion.      [Recommend Follow Up: Follow-up chest radiograph in 4-6 weeks]    This report will be copied to the [facility] to ensure a provider acknowledges the finding.          Cardiac Device Check - Inpatient *Canceled*    Narrative    The following orders were created for panel order Cardiac Device Check - Inpatient.  Procedure                               Abnormality         Status                     ---------                               -----------         ------                       Please view results for these tests on the individual orders.   Cardiac Device Check - Inpatient *Canceled*    Narrative    The following orders were created for panel order Cardiac Device Check - Inpatient.  Procedure                               Abnormality         Status                      ---------                               -----------         ------                       Please view results for these tests on the individual orders.       Consultations This Hospital Stay   PHARMACY TO DOSE WARFARIN  NEUROLOGY IP CONSULT  CARE MANAGEMENT / SOCIAL WORK IP CONSULT  PHYSICAL THERAPY ADULT IP CONSULT  OCCUPATIONAL THERAPY ADULT IP CONSULT  PHARMACY IP CONSULT  PSYCHIATRY IP CONSULT  NEPHROLOGY IP CONSULT  SPEECH LANGUAGE PATH ADULT IP CONSULT  INFECTION PREVENTION IP CONSULT  PALLIATIVE CARE ADULT IP CONSULT  SPIRITUAL HEALTH SERVICES IP CONSULT  [facility] INPATIENT HOSPICE ADULT CONSULT  PHARMACY IP CONSULT    Primary Care Physician   Jacquelin Francis    Time Spent on this Encounter   I, Ketan Jensen DO, personally saw the patient today and spent greater than 30 minutes discharging this patient.

## 2025-07-31 NOTE — PLAN OF CARE
Family at bedside throughout the day.  Son and daughter okay to release patient to  home.  Waiting for  home transport.  Family removed belongings from room and took home.  No further concerns voiced. See flow charting for further info.

## 2025-07-31 NOTE — PROGRESS NOTES
Pt snoring and sleeping for the majority of the shift. Mouth care done 2 times and repositioned every two hours.

## 2025-08-07 LAB
MDC_IDC_LEAD_CONNECTION_STATUS: NORMAL
MDC_IDC_LEAD_CONNECTION_STATUS: NORMAL
MDC_IDC_LEAD_IMPLANT_DT: NORMAL
MDC_IDC_LEAD_IMPLANT_DT: NORMAL
MDC_IDC_LEAD_LOCATION: NORMAL
MDC_IDC_LEAD_LOCATION: NORMAL
MDC_IDC_LEAD_LOCATION_DETAIL_1: NORMAL
MDC_IDC_LEAD_LOCATION_DETAIL_1: NORMAL
MDC_IDC_LEAD_MFG: NORMAL
MDC_IDC_LEAD_MFG: NORMAL
MDC_IDC_LEAD_MODEL: NORMAL
MDC_IDC_LEAD_MODEL: NORMAL
MDC_IDC_LEAD_POLARITY_TYPE: NORMAL
MDC_IDC_LEAD_POLARITY_TYPE: NORMAL
MDC_IDC_LEAD_SERIAL: NORMAL
MDC_IDC_LEAD_SERIAL: NORMAL
MDC_IDC_MSMT_BATTERY_DTM: NORMAL
MDC_IDC_MSMT_BATTERY_IMPEDANCE: 1658 OHM
MDC_IDC_MSMT_BATTERY_REMAINING_LONGEVITY: 59 MO
MDC_IDC_MSMT_BATTERY_STATUS: NORMAL
MDC_IDC_MSMT_BATTERY_VOLTAGE: 2.76 V
MDC_IDC_MSMT_LEADCHNL_RA_IMPEDANCE_VALUE: 67 OHM
MDC_IDC_MSMT_LEADCHNL_RV_IMPEDANCE_VALUE: 496 OHM
MDC_IDC_MSMT_LEADCHNL_RV_PACING_THRESHOLD_AMPLITUDE: 1 V
MDC_IDC_MSMT_LEADCHNL_RV_PACING_THRESHOLD_PULSEWIDTH: 0.4 MS
MDC_IDC_PG_IMPLANT_DTM: NORMAL
MDC_IDC_PG_MFG: NORMAL
MDC_IDC_PG_MODEL: NORMAL
MDC_IDC_PG_SERIAL: NORMAL
MDC_IDC_PG_TYPE: NORMAL
MDC_IDC_SESS_CLINIC_NAME: NORMAL
MDC_IDC_SESS_DTM: NORMAL
MDC_IDC_SESS_TYPE: NORMAL
MDC_IDC_SET_BRADY_LOWRATE: 60 {BEATS}/MIN
MDC_IDC_SET_BRADY_MAX_SENSOR_RATE: 120 {BEATS}/MIN
MDC_IDC_SET_BRADY_MAX_TRACKING_RATE: 105 {BEATS}/MIN
MDC_IDC_SET_BRADY_MODE: NORMAL
MDC_IDC_SET_LEADCHNL_RV_PACING_AMPLITUDE: 1.5 V
MDC_IDC_SET_LEADCHNL_RV_PACING_CAPTURE_MODE: NORMAL
MDC_IDC_SET_LEADCHNL_RV_PACING_POLARITY: NORMAL
MDC_IDC_SET_LEADCHNL_RV_PACING_PULSEWIDTH: 0.4 MS
MDC_IDC_SET_LEADCHNL_RV_SENSING_POLARITY: NORMAL
MDC_IDC_SET_LEADCHNL_RV_SENSING_SENSITIVITY: 5.6 MV
MDC_IDC_SET_ZONE_DETECTION_INTERVAL: 375 MS
MDC_IDC_SET_ZONE_STATUS: NORMAL
MDC_IDC_SET_ZONE_STATUS: NORMAL
MDC_IDC_SET_ZONE_TYPE: NORMAL
MDC_IDC_SET_ZONE_TYPE: NORMAL
MDC_IDC_SET_ZONE_VENDOR_TYPE: NORMAL
MDC_IDC_SET_ZONE_VENDOR_TYPE: NORMAL
MDC_IDC_STAT_AT_BURDEN_PERCENT: 0 %
MDC_IDC_STAT_AT_DTM_END: NORMAL
MDC_IDC_STAT_AT_DTM_START: NORMAL
MDC_IDC_STAT_BRADY_DTM_END: NORMAL
MDC_IDC_STAT_BRADY_DTM_START: NORMAL
MDC_IDC_STAT_BRADY_RV_PERCENT_PACED: 6 %
MDC_IDC_STAT_EPISODE_RECENT_COUNT: 0
MDC_IDC_STAT_EPISODE_RECENT_COUNT: 7
MDC_IDC_STAT_EPISODE_RECENT_COUNT_DTM_END: NORMAL
MDC_IDC_STAT_EPISODE_RECENT_COUNT_DTM_END: NORMAL
MDC_IDC_STAT_EPISODE_RECENT_COUNT_DTM_START: NORMAL
MDC_IDC_STAT_EPISODE_RECENT_COUNT_DTM_START: NORMAL
MDC_IDC_STAT_EPISODE_TYPE: NORMAL
MDC_IDC_STAT_EPISODE_TYPE: NORMAL

## 2025-08-08 PROCEDURE — 110N000005 HC R&B HOSPICE, ACCENT

## 2025-08-11 PROCEDURE — 93294 REM INTERROG EVL PM/LDLS PM: CPT | Performed by: INTERNAL MEDICINE

## 2025-08-11 PROCEDURE — 93296 REM INTERROG EVL PM/IDS: CPT | Performed by: INTERNAL MEDICINE
